# Patient Record
Sex: FEMALE | Race: WHITE | NOT HISPANIC OR LATINO | Employment: FULL TIME | URBAN - METROPOLITAN AREA
[De-identification: names, ages, dates, MRNs, and addresses within clinical notes are randomized per-mention and may not be internally consistent; named-entity substitution may affect disease eponyms.]

---

## 2018-06-25 ENCOUNTER — TRANSFERRED RECORDS (OUTPATIENT)
Dept: HEALTH INFORMATION MANAGEMENT | Facility: CLINIC | Age: 43
End: 2018-06-25

## 2018-06-26 ENCOUNTER — TRANSFERRED RECORDS (OUTPATIENT)
Dept: HEALTH INFORMATION MANAGEMENT | Facility: CLINIC | Age: 43
End: 2018-06-26

## 2018-06-29 ENCOUNTER — TRANSFERRED RECORDS (OUTPATIENT)
Dept: HEALTH INFORMATION MANAGEMENT | Facility: CLINIC | Age: 43
End: 2018-06-29

## 2020-10-22 ENCOUNTER — TRANSFERRED RECORDS (OUTPATIENT)
Dept: HEALTH INFORMATION MANAGEMENT | Facility: CLINIC | Age: 45
End: 2020-10-22
Payer: COMMERCIAL

## 2021-11-21 NOTE — PROGRESS NOTES
HPI:    Ms. Boston comes into establish care  And physical today.  She works at the [employer]. She was living in [place] until recently and has moved to be closer to her family. She had clinical COVID 4/2020. She states hair loss,  Muscle, joint pain/stiffness,  and skin pain/tightning. She does see a physical therapist. He mother has breast cancer. She was seen by several provider in [facility] (notes in Care Everywhere) including Rheumatology. She states has been seen in a post COVID clinic in [place]. Overall she is better than a few months ago She does not smoke nor abuse EtOH. No other HEENT, cardiopulmonary, abdominal, , GYN, neurological, systemic, psychiatric, lymphatic, endocrine, vascular complaints.        PE:    Vitals noted, gen, nad, cooperative, alert, neck supple nl rom, no B carotid bruits, she has hair loss posterior scalp. Lungs with good air movement, RRR, S1, S2, no MRG, abdomen, no acute findings. Grossly normal neurological exam. She does not have wrist swelling or tenderness to palpation.     MRI 2/5/2021 in Care Everywhere:    Impression  Performed by [id number]  The previously demonstrated hyperintense T1 precontrast focus in the posterior   pituitary gland is not visualized on today's study. This may have represented   proteinaceous debris or subacute blood products that have since resolved. Just   below the expected location of this abnormality, the 2 mm rounded focus of   hypoenhancement in the midline posterior 3rd of the pituitary gland is a again   redemonstrated and unchanged (see screenshots series 1000). This appears to   small to differentiate between possibilities including microadenoma, pituitary   cyst, Rathke's cleft cyst. While a Rathke's cleft cyst is favored,   endocrinologic workup would be appropriate.     Otherwise the pituitary gland appears normal. Midline infundibulum without optic   chiasm displacement or cavernous sinus abnormality.     Several small bifrontal subcortical T2  hyperintensities are stable and   nonspecific but can be seen with small vessel change or in some migraineurs.    Narrative  Performed by [id number]  EXAM: MR PITUITARY WITHOUT AND WITH IV CONTRAST     COMPARISON: Outside MRI 10/22/2020    Procedure Note    Tony Dimas M.D. - 02/05/2021   Formatting of this note might be different from the original.   EXAM: MR PITUITARY WITHOUT AND WITH IV CONTRAST     COMPARISON: Outside MRI 10/22/2020     IMPRESSION:   The previously demonstrated hyperintense T1 precontrast focus in the posterior   pituitary gland is not visualized on today's study. This may have represented   proteinaceous debris or subacute blood products that have since resolved. Just   below the expected location of this abnormality, the 2 mm rounded focus of   hypoenhancement in the midline posterior 3rd of the pituitary gland is a again   redemonstrated and unchanged (see screenshots series 1000). This appears to   small to differentiate between possibilities including microadenoma, pituitary   cyst, Rathke's cleft cyst. While a Rathke's cleft cyst is favored,   endocrinologic workup would be appropriate.     Otherwise the pituitary gland appears normal. Midline infundibulum without optic   chiasm displacement or cavernous sinus abnormality.     Several small bifrontal subcortical T2 hyperintensities are stable and   nonspecific but can be seen with small vessel change or in some migraineurs.  Specimen Collected: 02/05/21 12:27 PM Last Resulted: 02/05/21  1:26 PM   Received From: [facility]            A/P:    1. Immunizations; Influenza vaccine 10/21/2021  2. Lipids; 7/9/2020 HDL 71 and   3. Mammogram; ordered today 11/22/2021   4. Dermatology being followed for alopecia and hirsutism; see [facility] Dermatology note 2/5/2021 (In Care Everyhwere)   5. Colonsocopy; not done and ordered today   6. GYN/annual exam placed referral today.   7. [facility] note Endocrinology in [facility] [place]  2/9/2021 for pituitary cyst/adenoma  8. Anxiety on Cymbalta   9. Vitamin D   10. ADHD on Vyvanse   11. Seen [facility] Neurology detailed  note  From Dr. Duke  in Care Everywhere 2/9/2021 for neurological sxs. Related to COVID infection in 4/2020. She had a normal EMG 2/4/2021. She had a negative autonomic screen 2/5/2021 w/o evidence of autonomic failures. Subjective orthostatic intolerance was noted. Normal swallowing study at [facility] 2/9/2021 (in Care Everywhere). She had cervical and thoracic MRI studies 10/6/2020 in [facility] (In Care Everywhere)   12. Thrombocytosis; she states being evaluated by Hematology? in the past. Can't recall JAK2 testing? Platelets 458 8/26/2020. 426 on 7/6/2021  13. Cardiology note in Care Everywhere 8/20/2018 for discussion regarding PFO closure secondary to possible TIA vs. Migraine. No clear indication for PFO closure.

## 2021-11-22 ENCOUNTER — OFFICE VISIT (OUTPATIENT)
Dept: INTERNAL MEDICINE | Facility: CLINIC | Age: 46
End: 2021-11-22
Payer: COMMERCIAL

## 2021-11-22 VITALS
WEIGHT: 152 LBS | BODY MASS INDEX: 23.04 KG/M2 | HEIGHT: 68 IN | DIASTOLIC BLOOD PRESSURE: 70 MMHG | HEART RATE: 97 BPM | SYSTOLIC BLOOD PRESSURE: 107 MMHG | OXYGEN SATURATION: 97 %

## 2021-11-22 DIAGNOSIS — Z12.11 SPECIAL SCREENING FOR MALIGNANT NEOPLASMS, COLON: Primary | ICD-10-CM

## 2021-11-22 DIAGNOSIS — Z12.31 ENCOUNTER FOR SCREENING MAMMOGRAM FOR BREAST CANCER: ICD-10-CM

## 2021-11-22 PROCEDURE — 99396 PREV VISIT EST AGE 40-64: CPT | Performed by: INTERNAL MEDICINE

## 2021-11-22 RX ORDER — LISDEXAMFETAMINE DIMESYLATE 40 MG/1
40 CAPSULE ORAL EVERY MORNING
COMMUNITY
End: 2023-04-26

## 2021-11-22 RX ORDER — DULOXETIN HYDROCHLORIDE 30 MG/1
1 CAPSULE, DELAYED RELEASE ORAL
COMMUNITY
End: 2022-02-09

## 2021-11-22 RX ORDER — IBUPROFEN 400 MG/1
1 TABLET, FILM COATED ORAL DAILY PRN
COMMUNITY
End: 2022-06-21

## 2021-11-22 ASSESSMENT — MIFFLIN-ST. JEOR: SCORE: 1372.97

## 2021-11-22 ASSESSMENT — PAIN SCALES - GENERAL: PAINLEVEL: SEVERE PAIN (6)

## 2021-11-22 NOTE — NURSING NOTE
Chief Complaint   Patient presents with     Physical     Establish Care     recently moved from [place], about a year and a half ago got sick with something cause neurological problems       Opal Daniel, EMT at 5:36 PM on 11/22/2021

## 2021-11-23 NOTE — PATIENT INSTRUCTIONS
To schedule a mammogram, please call radiology scheduling at [phone].    To schedule a covid booster appointment, please call the pharmacy scheduling at [phone].

## 2021-11-26 ENCOUNTER — TELEPHONE (OUTPATIENT)
Dept: GASTROENTEROLOGY | Facility: CLINIC | Age: 46
End: 2021-11-26
Payer: COMMERCIAL

## 2021-11-26 DIAGNOSIS — Z11.59 ENCOUNTER FOR SCREENING FOR OTHER VIRAL DISEASES: ICD-10-CM

## 2021-11-26 NOTE — TELEPHONE ENCOUNTER
Screening Questions  1. Are you active on mychart? N    2. What insurance is in the chart? Preferred One/A[employer]na    2.  Ordering/Referring Provider: Philipp    3. BMI 23.1 If greater than 40 review exclusion criteria    4.  Respiratory Screening (If yes to any of the following Hospital setting only):     Do you use daily home oxygen? N  Do you have mod to severe Obstructive Sleep Apnea? N   Do you have Pulmonary Hypertension? N   Do you have UNCONTROLLED asthma? N    5. Have you had a heart or lung transplant (If yes, please review exclusion criteria) ? N    6. Are you currently on dialysis or have chronic kidney disease? N    7. Have you had a stroke or Transient ischemic attack (TIA) within 6 months? N    8. In the past 6 months, have you had any heart related issues including cardiomyopathy or heart attack? N                 If yes, did it require cardiac stenting or other implantable device?      9. Do you have any implantable devices in your body (pacemaker, defib, LVAD)? N    10. Do you take nitroglycerin? If yes, how often? N    11. Are you currently taking any blood thinners?N    12. Are you a diabetic? N    13. (Females) Are you currently pregnant?   If yes, how many weeks?      15. Are you taking any prescription pain medications on a routine schedule? Y (not on a schedule, as needed. 100 mg of Gavapentin as needed. Takes a couple times per week. If yes, MAC sedation.    16. Do you have any chemical dependencies such as alcohol, street drugs, or methadone? NIf yes, MAC sedation.    17. Do you have any history of post-traumatic stress syndrome, severe anxiety or history of psychosis? N    18. Do you transfer independently? Y    19.  Do you have any issues with constipation? N    20. Preferred Pharmacy for Pre Prescription [facility] on [address].    Scheduling Details    Which Colonoscopy Prep was Sent?: Miralax  Procedure Scheduled: Colonoscopy  Surgeon:   Date of Procedure: 12/7  Location:  [facility]  Caller (Please ask for phone number if not scheduled by patient): Inez      Sedation Type: MAC  Conscious Sedation- Needs  for 6 hours after the procedure  MAC/General-Needs  for 24 hours after procedure    Pre-op Required at [facility], [place], [place] and OR for MAC sedation:   (if yes advise patient they will need a pre-op prior to procedure)      Is patient on blood thinners? -N (If yes- inform patient to follow up with PCP or provider for follow up instructions)     Informed patient they will need an adult  Y  Cannot take any type of public or medical transportation alone    Pre-Procedure Covid test to be completed at [facility] or Externally: [facility] on 12/3 at 1:30 pm    Confirmed Nurse will call to complete assessment Y    Additional comments:    done

## 2021-12-03 ENCOUNTER — LAB (OUTPATIENT)
Dept: LAB | Facility: CLINIC | Age: 46
End: 2021-12-03
Payer: COMMERCIAL

## 2021-12-03 DIAGNOSIS — Z11.59 ENCOUNTER FOR SCREENING FOR OTHER VIRAL DISEASES: ICD-10-CM

## 2021-12-03 PROCEDURE — U0005 INFEC AGEN DETEC AMPLI PROBE: HCPCS

## 2021-12-03 PROCEDURE — U0003 INFECTIOUS AGENT DETECTION BY NUCLEIC ACID (DNA OR RNA); SEVERE ACUTE RESPIRATORY SYNDROME CORONAVIRUS 2 (SARS-COV-2) (CORONAVIRUS DISEASE [COVID-19]), AMPLIFIED PROBE TECHNIQUE, MAKING USE OF HIGH THROUGHPUT TECHNOLOGIES AS DESCRIBED BY CMS-2020-01-R: HCPCS

## 2021-12-04 LAB — SARS-COV-2 RNA RESP QL NAA+PROBE: NEGATIVE

## 2021-12-07 ENCOUNTER — TRANSFERRED RECORDS (OUTPATIENT)
Dept: HEALTH INFORMATION MANAGEMENT | Facility: CLINIC | Age: 46
End: 2021-12-07
Payer: COMMERCIAL

## 2021-12-07 ENCOUNTER — DOCUMENTATION ONLY (OUTPATIENT)
Dept: GASTROENTEROLOGY | Facility: OUTPATIENT CENTER | Age: 46
End: 2021-12-07
Payer: COMMERCIAL

## 2021-12-07 DIAGNOSIS — Z12.31 ENCOUNTER FOR SCREENING MAMMOGRAM FOR BREAST CANCER: ICD-10-CM

## 2021-12-07 DIAGNOSIS — Z12.11 SPECIAL SCREENING FOR MALIGNANT NEOPLASMS, COLON: ICD-10-CM

## 2021-12-21 NOTE — PROGRESS NOTES
HPI:    Last visit with me 11/22/2021 to establish care. She continues with PT for mainly B neck pain and stiffness. She finds this beneficial. She still has B UE numbness but not worse. She does not feel she has any worrisome skin lesions that need to be evaluated in Dermatology. Otherwise, no additional HEENT, cardiopulmonary, abdominal, , neurological, systemic, psychiatric, lymphatic, endocrine complaints.       PE:    Vitals noted, gen, nad, cooperative, alert, neck supple with some B lower neck mild tenderness to palpation. No B carotid bruits, lungs with good air movement, RRR, S1, S2, no MRG, abdomen, no acute findings. Grossly normal neurological exam.     A/P:    1. Immunizations; Pfizer COVID vaccination x 3. Influenza vaccine 10/1/2021  2. Mammogram scheduled for 1/11/2022  3. Colonoscopy 12/7/2021 and repeat 10 years   4. Placed GYN referral 11/22/2021 and she states this is scheduled.   5. Thrombocytosis; will review records and most likely order additional testing and hematology evaluation  6. Vitamin D today normal at 32.   7. She remains on Cymbalta and Vyvanse  8. Placed PMR referral to Dr. Fischer to evaluate for possible trigger point/botox treatment?         30 minutes spent on the date of the encounter doing chart review, history and exam, documentation and further activities as noted above

## 2021-12-22 ENCOUNTER — OFFICE VISIT (OUTPATIENT)
Dept: INTERNAL MEDICINE | Facility: CLINIC | Age: 46
End: 2021-12-22
Payer: COMMERCIAL

## 2021-12-22 ENCOUNTER — TELEPHONE (OUTPATIENT)
Dept: PHYSICAL MEDICINE AND REHAB | Facility: CLINIC | Age: 46
End: 2021-12-22

## 2021-12-22 ENCOUNTER — LAB (OUTPATIENT)
Dept: LAB | Facility: CLINIC | Age: 46
End: 2021-12-22
Payer: COMMERCIAL

## 2021-12-22 VITALS
WEIGHT: 154.32 LBS | SYSTOLIC BLOOD PRESSURE: 107 MMHG | HEIGHT: 68 IN | OXYGEN SATURATION: 98 % | DIASTOLIC BLOOD PRESSURE: 69 MMHG | HEART RATE: 81 BPM | BODY MASS INDEX: 23.39 KG/M2

## 2021-12-22 DIAGNOSIS — M54.2 NECK PAIN: Primary | ICD-10-CM

## 2021-12-22 DIAGNOSIS — R79.89 HIGH PLATELET COUNT: ICD-10-CM

## 2021-12-22 DIAGNOSIS — M54.2 NECK PAIN: ICD-10-CM

## 2021-12-22 LAB
ALBUMIN SERPL-MCNC: 3.8 G/DL (ref 3.4–5)
ALP SERPL-CCNC: 51 U/L (ref 40–150)
ALT SERPL W P-5'-P-CCNC: 27 U/L (ref 0–50)
ANION GAP SERPL CALCULATED.3IONS-SCNC: 7 MMOL/L (ref 3–14)
AST SERPL W P-5'-P-CCNC: 12 U/L (ref 0–45)
BASOPHILS # BLD AUTO: 0.1 10E3/UL (ref 0–0.2)
BASOPHILS NFR BLD AUTO: 1 %
BILIRUB SERPL-MCNC: 0.4 MG/DL (ref 0.2–1.3)
BUN SERPL-MCNC: 10 MG/DL (ref 7–30)
CALCIUM SERPL-MCNC: 9 MG/DL (ref 8.5–10.1)
CHLORIDE BLD-SCNC: 105 MMOL/L (ref 94–109)
CO2 SERPL-SCNC: 30 MMOL/L (ref 20–32)
CREAT SERPL-MCNC: 0.57 MG/DL (ref 0.52–1.04)
DEPRECATED CALCIDIOL+CALCIFEROL SERPL-MC: 32 UG/L (ref 20–75)
EOSINOPHIL # BLD AUTO: 0.2 10E3/UL (ref 0–0.7)
EOSINOPHIL NFR BLD AUTO: 3 %
ERYTHROCYTE [DISTWIDTH] IN BLOOD BY AUTOMATED COUNT: 12.1 % (ref 10–15)
GFR SERPL CREATININE-BSD FRML MDRD: >90 ML/MIN/1.73M2
GLUCOSE BLD-MCNC: 91 MG/DL (ref 70–99)
HCT VFR BLD AUTO: 40 % (ref 35–47)
HGB BLD-MCNC: 12.8 G/DL (ref 11.7–15.7)
IMM GRANULOCYTES # BLD: 0 10E3/UL
IMM GRANULOCYTES NFR BLD: 1 %
LYMPHOCYTES # BLD AUTO: 2.7 10E3/UL (ref 0.8–5.3)
LYMPHOCYTES NFR BLD AUTO: 31 %
MCH RBC QN AUTO: 30.5 PG (ref 26.5–33)
MCHC RBC AUTO-ENTMCNC: 32 G/DL (ref 31.5–36.5)
MCV RBC AUTO: 95 FL (ref 78–100)
MONOCYTES # BLD AUTO: 0.6 10E3/UL (ref 0–1.3)
MONOCYTES NFR BLD AUTO: 7 %
NEUTROPHILS # BLD AUTO: 5.1 10E3/UL (ref 1.6–8.3)
NEUTROPHILS NFR BLD AUTO: 57 %
NRBC # BLD AUTO: 0 10E3/UL
NRBC BLD AUTO-RTO: 0 /100
PLATELET # BLD AUTO: 500 10E3/UL (ref 150–450)
POTASSIUM BLD-SCNC: 4.4 MMOL/L (ref 3.4–5.3)
PROT SERPL-MCNC: 7.5 G/DL (ref 6.8–8.8)
RBC # BLD AUTO: 4.2 10E6/UL (ref 3.8–5.2)
SODIUM SERPL-SCNC: 142 MMOL/L (ref 133–144)
TSH SERPL DL<=0.005 MIU/L-ACNC: 1.39 MU/L (ref 0.4–4)
WBC # BLD AUTO: 8.8 10E3/UL (ref 4–11)

## 2021-12-22 PROCEDURE — 82306 VITAMIN D 25 HYDROXY: CPT | Performed by: INTERNAL MEDICINE

## 2021-12-22 PROCEDURE — 99214 OFFICE O/P EST MOD 30 MIN: CPT | Performed by: INTERNAL MEDICINE

## 2021-12-22 PROCEDURE — 36415 COLL VENOUS BLD VENIPUNCTURE: CPT | Performed by: PATHOLOGY

## 2021-12-22 PROCEDURE — 80050 GENERAL HEALTH PANEL: CPT | Performed by: PATHOLOGY

## 2021-12-22 ASSESSMENT — MIFFLIN-ST. JEOR: SCORE: 1383.49

## 2021-12-22 ASSESSMENT — PAIN SCALES - GENERAL: PAINLEVEL: MODERATE PAIN (5)

## 2021-12-22 NOTE — NURSING NOTE
Inez Boston is a 46 year old female patient that presents today in clinic for the following:    Chief Complaint   Patient presents with     RECHECK     post covid symptoms     The patient's allergies and medications were reviewed as noted. A set of vitals were recorded as noted without incident. The patient does not have any other questions for the provider.    Satish Bagley, EMT at 9:54 AM on 12/22/2021

## 2021-12-22 NOTE — TELEPHONE ENCOUNTER
Health Call Center    Phone Message    May a detailed message be left on voicemail: yes     Reason for Call: Appointment Intake    Referring Provider Name: Tony Segal MD   Diagnosis and/or Symptoms:Neck pain   High platelet count      Patient is being referred to Dr. Fischer. Per our protocols, she does not see patients with neck pain. Please review and contact the patient to schedule.    Action Taken: Message routed to:  [facility] PMR    Travel Screening: Not Applicable

## 2021-12-23 NOTE — TELEPHONE ENCOUNTER
Left the patient a VM with appt date and time and our clinic number if this date and time doesn't work for the patient.

## 2021-12-24 ENCOUNTER — PATIENT OUTREACH (OUTPATIENT)
Dept: ONCOLOGY | Facility: CLINIC | Age: 46
End: 2021-12-24
Payer: COMMERCIAL

## 2021-12-24 ENCOUNTER — TELEPHONE (OUTPATIENT)
Dept: INTERNAL MEDICINE | Facility: CLINIC | Age: 46
End: 2021-12-24

## 2021-12-24 DIAGNOSIS — R79.89 ELEVATED PLATELET COUNT: Primary | ICD-10-CM

## 2021-12-24 NOTE — PROGRESS NOTES
Writer received referral for thrombocytosis. Reviewed for urgency based on labs and symptomology. Appropriate scheduling instructions added and referral sent to New Patient Scheduling for completion.

## 2021-12-24 NOTE — TELEPHONE ENCOUNTER
Placed future lab orders and hematology referral this encounter    Dear Ms. Boston;    I reviewed your laboratory tests and still elevated platelets. I went back through your old records as well. There is some testing and evaluation I recommend:      (1) ordered future laboratory testing    (2) hematology referral    You can call [phone] to schedule these appointments.    TOVA Segal MD

## 2021-12-26 ENCOUNTER — HEALTH MAINTENANCE LETTER (OUTPATIENT)
Age: 46
End: 2021-12-26

## 2022-01-04 NOTE — PROGRESS NOTES
RECORDS STATUS - ALL OTHER DIAGNOSIS      RECORDS RECEIVED FROM: [facility]    DATE RECEIVED: 2/2/2022   NOTES STATUS DETAILS   OFFICE NOTE from referring provider Complete Epic  Tony Segal MD   OFFICE NOTE from medical oncologist N/A    DISCHARGE SUMMARY from hospital     DISCHARGE REPORT from the ER     OPERATIVE REPORT Complete Colonoscopy- [facility] Report in Epic 12/7/2021   MEDICATION LIST Complete [facility]   CLINICAL TRIAL TREATMENTS TO DATE     LABS     PATHOLOGY REPORTS Pending  Cytology Report in [facility] (internal)    ANYTHING RELATED TO DIAGNOSIS Complete Labs last updated on 1/11/2022   GENONOMIC TESTING     TYPE:     IMAGING (NEED IMAGES & REPORT)     CT SCANS     MRI     MAMMO     ULTRASOUND     PET       Action    Action Taken 1/4/2022 10:36AM MARKIE   I called pt Inez - her phone went to .     I called [facility] and requested imaging from 2021   [phone] option 2    1/4/2022 3:27PM MARKIE     I resolved imaging in PACS    I called our internal imaging dept [phone]

## 2022-01-11 ENCOUNTER — LAB (OUTPATIENT)
Dept: LAB | Facility: CLINIC | Age: 47
End: 2022-01-11
Payer: COMMERCIAL

## 2022-01-11 ENCOUNTER — ANCILLARY PROCEDURE (OUTPATIENT)
Dept: MAMMOGRAPHY | Facility: CLINIC | Age: 47
End: 2022-01-11
Attending: INTERNAL MEDICINE
Payer: COMMERCIAL

## 2022-01-11 DIAGNOSIS — Z00.00 ANNUAL VISIT FOR GENERAL ADULT MEDICAL EXAMINATION WITHOUT ABNORMAL FINDINGS: ICD-10-CM

## 2022-01-11 DIAGNOSIS — R79.89 ELEVATED PLATELET COUNT: Primary | ICD-10-CM

## 2022-01-11 DIAGNOSIS — Z12.31 ENCOUNTER FOR SCREENING MAMMOGRAM FOR BREAST CANCER: ICD-10-CM

## 2022-01-11 DIAGNOSIS — Z12.11 SPECIAL SCREENING FOR MALIGNANT NEOPLASMS, COLON: ICD-10-CM

## 2022-01-11 LAB
BASOPHILS # BLD AUTO: 0.1 10E3/UL (ref 0–0.2)
BASOPHILS NFR BLD AUTO: 1 %
CHOLEST SERPL-MCNC: 269 MG/DL
EOSINOPHIL # BLD AUTO: 0.2 10E3/UL (ref 0–0.7)
EOSINOPHIL NFR BLD AUTO: 1 %
ERYTHROCYTE [DISTWIDTH] IN BLOOD BY AUTOMATED COUNT: 12 % (ref 10–15)
FASTING STATUS PATIENT QL REPORTED: YES
HCT VFR BLD AUTO: 38.9 % (ref 35–47)
HDLC SERPL-MCNC: 80 MG/DL
HGB BLD-MCNC: 12.7 G/DL (ref 11.7–15.7)
IMM GRANULOCYTES # BLD: 0.1 10E3/UL
IMM GRANULOCYTES NFR BLD: 1 %
LDLC SERPL CALC-MCNC: 175 MG/DL
LYMPHOCYTES # BLD AUTO: 2.1 10E3/UL (ref 0.8–5.3)
LYMPHOCYTES NFR BLD AUTO: 21 %
MCH RBC QN AUTO: 31.2 PG (ref 26.5–33)
MCHC RBC AUTO-ENTMCNC: 32.6 G/DL (ref 31.5–36.5)
MCV RBC AUTO: 96 FL (ref 78–100)
MONOCYTES # BLD AUTO: 0.6 10E3/UL (ref 0–1.3)
MONOCYTES NFR BLD AUTO: 6 %
NEUTROPHILS # BLD AUTO: 7.3 10E3/UL (ref 1.6–8.3)
NEUTROPHILS NFR BLD AUTO: 70 %
NONHDLC SERPL-MCNC: 189 MG/DL
NRBC # BLD AUTO: 0 10E3/UL
NRBC BLD AUTO-RTO: 0 /100
PLATELET # BLD AUTO: 472 10E3/UL (ref 150–450)
RBC # BLD AUTO: 4.07 10E6/UL (ref 3.8–5.2)
RETICS # AUTO: 0.05 10E6/UL (ref 0.03–0.1)
RETICS/RBC NFR AUTO: 1.2 % (ref 0.5–2)
TRIGL SERPL-MCNC: 71 MG/DL
WBC # BLD AUTO: 10.4 10E3/UL (ref 4–11)

## 2022-01-11 PROCEDURE — 36415 COLL VENOUS BLD VENIPUNCTURE: CPT | Performed by: PATHOLOGY

## 2022-01-11 PROCEDURE — 85060 BLOOD SMEAR INTERPRETATION: CPT | Performed by: PATHOLOGY

## 2022-01-11 PROCEDURE — 80061 LIPID PANEL: CPT | Performed by: PATHOLOGY

## 2022-01-11 PROCEDURE — 86803 HEPATITIS C AB TEST: CPT | Performed by: OBSTETRICS & GYNECOLOGY

## 2022-01-11 PROCEDURE — 85025 COMPLETE CBC W/AUTO DIFF WBC: CPT | Performed by: PATHOLOGY

## 2022-01-11 PROCEDURE — 87389 HIV-1 AG W/HIV-1&-2 AB AG IA: CPT | Performed by: OBSTETRICS & GYNECOLOGY

## 2022-01-11 PROCEDURE — 77063 BREAST TOMOSYNTHESIS BI: CPT | Performed by: RADIOLOGY

## 2022-01-11 PROCEDURE — 85045 AUTOMATED RETICULOCYTE COUNT: CPT | Performed by: PATHOLOGY

## 2022-01-11 PROCEDURE — 77067 SCR MAMMO BI INCL CAD: CPT | Performed by: RADIOLOGY

## 2022-01-12 ENCOUNTER — TELEPHONE (OUTPATIENT)
Dept: PHYSICAL MEDICINE AND REHAB | Facility: CLINIC | Age: 47
End: 2022-01-12

## 2022-01-12 LAB
HCV AB SERPL QL IA: NONREACTIVE
HIV 1+2 AB+HIV1 P24 AG SERPL QL IA: NONREACTIVE
PATH REPORT.COMMENTS IMP SPEC: NORMAL
PATH REPORT.FINAL DX SPEC: NORMAL
PATH REPORT.MICROSCOPIC SPEC OTHER STN: NORMAL
PATH REPORT.MICROSCOPIC SPEC OTHER STN: NORMAL
PATH REPORT.RELEVANT HX SPEC: NORMAL

## 2022-01-12 NOTE — TELEPHONE ENCOUNTER
[facility]    Phone Message    May a detailed message be left on voicemail: yes     Reason for Call: Other: Patient is requesting to reschedule 1/17/22 BOTOX appointment, please call patient at [phone] to assist.     Action Taken: Message routed to:  [facility] PM&R    Travel Screening: Not Applicable

## 2022-01-13 NOTE — TELEPHONE ENCOUNTER
Called patient and left voicemail to reschedule 1/17 Botox appt with Dr. Fischer. Left call center number: [phone].    Jeremy Triana

## 2022-01-25 ENCOUNTER — TELEPHONE (OUTPATIENT)
Dept: INTERNAL MEDICINE | Facility: CLINIC | Age: 47
End: 2022-01-25
Payer: COMMERCIAL

## 2022-01-25 DIAGNOSIS — M62.89 TIGHTNESS OF FASCIA: Primary | ICD-10-CM

## 2022-01-25 NOTE — TELEPHONE ENCOUNTER
Called patient and left VM.  Will need to know:    1. Reason for physical therapy?   2. Will patient get physical therapy out-of-network? If yes, please provide clinic name, address, phone and fax number.      Lorne Syed CMA ([facility]) at 11:35 AM on 1/25/2022

## 2022-01-25 NOTE — TELEPHONE ENCOUNTER
[facility] Call Center    Phone Message    May a detailed message be left on voicemail: yes     Reason for Call: Other: Pt requesting call back to discuss a referral for physical therapy     Action Taken: Message routed to:  [facility] anne-marie

## 2022-01-26 NOTE — TELEPHONE ENCOUNTER
[facility] Call Center    Phone Message    May a detailed message be left on voicemail: yes     Reason for Call:   Other:     1.She has a post Covid Tightness of the fascia (Neck, Bilateral Shoulder and Back)     2.Yes, The name  [address] [address], [place] [address] Phone: [phone] Fax:[phone]     Action Taken: Message routed to:  [facility]    Travel Screening: Not Applicable

## 2022-02-02 ENCOUNTER — VIRTUAL VISIT (OUTPATIENT)
Dept: ONCOLOGY | Facility: CLINIC | Age: 47
End: 2022-02-02
Attending: INTERNAL MEDICINE
Payer: COMMERCIAL

## 2022-02-02 ENCOUNTER — PRE VISIT (OUTPATIENT)
Dept: ONCOLOGY | Facility: CLINIC | Age: 47
End: 2022-02-02

## 2022-02-02 DIAGNOSIS — R79.89 ELEVATED PLATELET COUNT: ICD-10-CM

## 2022-02-02 PROCEDURE — 99205 OFFICE O/P NEW HI 60 MIN: CPT | Mod: 95 | Performed by: INTERNAL MEDICINE

## 2022-02-02 NOTE — PROGRESS NOTES
Inez is a 46 year old who is being evaluated via a billable video visit.      How would you like to obtain your AVS? MyChart  If the video visit is dropped, the invitation should be resent by: Send to e-mail at: [email or web address] @ [phone]  Will anyone else be joining your video visit? Sugey MEZA        Video Start Time: 6:43 AM  Video-Visit Details    Type of service:  Video Visit    Video End Time:6:43 AM    Originating Location (pt. Location): Home    Distant Location (provider location):  [facility]     Platform used for Video Visit: TipTap

## 2022-02-02 NOTE — LETTER
2/2/2022         RE: Inez Boston  [address] [place] [address]        Dear Colleague,    Thank you for referring your patient, Inez Boston, to the [facility]. Please see a copy of my visit note below.    Inez is a 46 year old who is being evaluated via a billable video visit.      How would you like to obtain your AVS? MyChart  If the video visit is dropped, the invitation should be resent by: Send to e-mail at: [email or web address] @ [phone]  Will anyone else be joining your video visit? Sugey MEZA        Video Start Time: 6:43 AM  Video-Visit Details    Type of service:  Video Visit    Video End Time:6:43 AM    Originating Location (pt. Location): Home    Distant Location (provider location):  [facility]     Platform used for Video Visit: [employer]    [facility]    Hematology/Oncology New Patient Note virtual visit      Time started 1 PM   time ended 1:40 PM    Today's Date: 02/02/22    Reason for Consult: Thrombocytosis      HISTORY OF PRESENT ILLNESS: Inez Boston is a 46 year old female who presents for further evaluation of thrombocytosis.  She has had chronic thrombocytosis since at least 2017 at that time her platelets were in the 500s and then dropped down to 400s and back in the 500s again.  She recently moved to [place] from [place] and saw a hematologist last June in [place].  Blood work was obtained at [place] which showed iron saturation of 24% JAK2 V6 1 7 mutation was negative.  Patient had thrombocytosis prior to her Covid infection in October 2020.  Inez has started on aspirin 81 mg a day.  She clinically feels well except for mild facial headache after the Covid infection and is following up with neurology.  She never had a history of stroke or clots    Of note she also has family history of breast cancer her mother had breast cancer the age of 47.  She has  never met with a genetic counselor.        REVIEW OF SYSTEMS:   14 point ROS was reviewed and is negative other than as noted above in HPI.       HOME MEDICATIONS:  Current Outpatient Medications   Medication Sig Dispense Refill     cholecalciferol (VITAMIN D3) 25 mcg (1000 units) capsule 2,000 Units       DULoxetine (CYMBALTA) 30 MG capsule Take 1 capsule by mouth       ibuprofen (ADVIL/MOTRIN) 400 MG tablet Take 1 tablet by mouth daily as needed (Patient not taking: Reported on 11/22/2021)       lisdexamfetamine (VYVANSE) 40 MG capsule Take 40 mg by mouth every morning           ALLERGIES:  No Known Allergies      PAST MEDICAL HISTORY:  Status post Covid infection  Thrombocytosis    PAST SURGICAL HISTORY:  No past surgical history on file.      SOCIAL HISTORY:  Social History     Socioeconomic History     Marital status:      Spouse name: Not on file     Number of children: Not on file     Years of education: Not on file     Highest education level: Not on file   Occupational History     Not on file   Tobacco Use     Smoking status: Never Smoker     Smokeless tobacco: Never Used   Vaping Use     Vaping Use: Some days     Substances: Nicotine     Devices: Pre-filled pod   Substance and Sexual Activity     Alcohol use: Yes     Alcohol/week: 2.0 standard drinks     Types: 2 Cans of beer per week     Comment: a coupld times a week     Drug use: Never     Sexual activity: Not on file   Other Topics Concern     Not on file   Social History Narrative     Not on file     Social Determinants of Health     Financial Resource Strain: Not on file   Food Insecurity: Not on file   Transportation Needs: Not on file   Physical Activity: Not on file   Stress: Not on file   Social Connections: Not on file   Intimate Partner Violence: Not on file   Housing Stability: Not on file     Non-smoker.  Social alcohol use.  Currently works for the [employer] remotely    FAMILY HISTORY:  Mother had breast cancer at age of 47    PHYSICAL  EXAM:  Vital signs:  There were no vitals taken for this visit.   ECO  GENERAL/CONSTITUTIONAL: No acute distress.  EYES: Pupils are equal, round, and react to light and accommodation. Extraocular movements intact.  No scleral icterus.  Unable to fully examine due to the nature of this visit be a video visit  INTEGUMENTARY: No rashes or jaundice.         LABS:  CBC RESULTS:   Recent Labs   Lab Test 22  1636   WBC 10.4   RBC 4.07   HGB 12.7   HCT 38.9   MCV 96   MCH 31.2   MCHC 32.6   RDW 12.0   *       Recent Labs   Lab Test 21  1100      POTASSIUM 4.4   CHLORIDE 105   CO2 30   ANIONGAP 7   GLC 91   BUN 10   CR 0.57   TEJA 9.0         PATHOLOGY:  Peripheral blood morphology did not reveal any evidence of dysplasia    IMAGING:  None    ASSESSMENT/PLAN:  Inez Boston is a 46 year old female with chronic leukocytosis with platelets ranging between 400-500    Discussed with the patient that this is likely reactive state however I cannot absolutely rule out essential thrombocytosis even with that JAK2 mutation being negative as that will  ET in 80-90% of the cases and may miss 10% of the cases.  My recommendation would be to repeat labs in 4 months and if the labs are within further back in the 500s again I would recommend a bone marrow biopsy to rule out essential thrombocytosis or any sort of myeloproliferative disorder if that is negative I will discharge her from the clinic.  Due to the pandemic and the chronicity of this disorder I am less inclined to have schedule her for procedure that would not  at this point.  I would have recommended aspirin 81 mg and she is already on it.    Her family history is also significant for breast cancer my recommendation would be that she see a genetic counselor.    1.  Thrombocytosis repeat labs in 4 months with MD visit, see plan above.  Likely reactive state however cannot completely rule out essential thrombocytosis.  Iron  deficiency ruled out      2.  Family history significant.  We will set up a genetic counselor.  She is up-to-date on her mammogram    All questions answered to her satisfaction.  Total time spent 60 minutes 40 minutes with the patient discussing the findings above.  10 minutes reviewing previous data and 10 minutes electronically documented clinical information from today's visit      Nam Caceres MD  Hematology/Oncology  [facility]      Again, thank you for allowing me to participate in the care of your patient.        Sincerely,        Nam Caceres MD

## 2022-02-02 NOTE — LETTER
2/2/2022         RE: Inez Boston  [address] [place] [address]        Dear Colleague,    Thank you for referring your patient, Inez Boston, to the [facility]. Please see a copy of my visit note below.    Inez is a 46 year old who is being evaluated via a billable video visit.      How would you like to obtain your AVS? MyChart  If the video visit is dropped, the invitation should be resent by: Send to e-mail at: [email or web address] OR @ [phone]  Will anyone else be joining your video visit? Sugey MEZA        Video Start Time: 6:43 AM  Video-Visit Details    Type of service:  Video Visit    Video End Time:6:43 AM    Originating Location (pt. Location): Home    Distant Location (provider location):  [facility]     Platform used for Video Visit: CoupOption    [facility]    Hematology/Oncology New Patient Note virtual visit      Time started 1 PM   time ended 1:40 PM    Today's Date: 02/02/22    Reason for Consult: Thrombocytosis      HISTORY OF PRESENT ILLNESS: Inez Boston is a 46 year old female who presents for further evaluation of thrombocytosis.  She has had chronic thrombocytosis since at least 2017 at that time her platelets were in the 500s and then dropped down to 400s and back in the 500s again.  She recently moved to [place] from [place] and saw a hematologist last June in [place].  Blood work was obtained at [facility] which showed iron saturation of 24% JAK2 V6 1 7 mutation was negative.  Patient had thrombocytosis prior to her Covid infection in October 2020.  Inez has started on aspirin 81 mg a day.  She clinically feels well except for mild facial headache after the Covid infection and is following up with neurology.  She never had a history of stroke or clots    Of note she also has family history of breast cancer her mother had breast cancer the age of 47.  She has  never met with a genetic counselor.        REVIEW OF SYSTEMS:   14 point ROS was reviewed and is negative other than as noted above in HPI.       HOME MEDICATIONS:  Current Outpatient Medications   Medication Sig Dispense Refill     cholecalciferol (VITAMIN D3) 25 mcg (1000 units) capsule 2,000 Units       DULoxetine (CYMBALTA) 30 MG capsule Take 1 capsule by mouth       ibuprofen (ADVIL/MOTRIN) 400 MG tablet Take 1 tablet by mouth daily as needed (Patient not taking: Reported on 11/22/2021)       lisdexamfetamine (VYVANSE) 40 MG capsule Take 40 mg by mouth every morning           ALLERGIES:  No Known Allergies      PAST MEDICAL HISTORY:  Status post Covid infection  Thrombocytosis    PAST SURGICAL HISTORY:  No past surgical history on file.      SOCIAL HISTORY:  Social History     Socioeconomic History     Marital status:      Spouse name: Not on file     Number of children: Not on file     Years of education: Not on file     Highest education level: Not on file   Occupational History     Not on file   Tobacco Use     Smoking status: Never Smoker     Smokeless tobacco: Never Used   Vaping Use     Vaping Use: Some days     Substances: Nicotine     Devices: Pre-filled pod   Substance and Sexual Activity     Alcohol use: Yes     Alcohol/week: 2.0 standard drinks     Types: 2 Cans of beer per week     Comment: a coupld times a week     Drug use: Never     Sexual activity: Not on file   Other Topics Concern     Not on file   Social History Narrative     Not on file     Social Determinants of Health     Financial Resource Strain: Not on file   Food Insecurity: Not on file   Transportation Needs: Not on file   Physical Activity: Not on file   Stress: Not on file   Social Connections: Not on file   Intimate Partner Violence: Not on file   Housing Stability: Not on file     Non-smoker.  Social alcohol use.  Currently works for the [email or web address] remotely    FAMILY HISTORY:  Mother had breast cancer at age of 47    PHYSICAL  EXAM:  Vital signs:  There were no vitals taken for this visit.   ECO  GENERAL/CONSTITUTIONAL: No acute distress.  EYES: Pupils are equal, round, and react to light and accommodation. Extraocular movements intact.  No scleral icterus.  Unable to fully examine due to the nature of this visit be a video visit  INTEGUMENTARY: No rashes or jaundice.         LABS:  CBC RESULTS:   Recent Labs   Lab Test 22  1636   WBC 10.4   RBC 4.07   HGB 12.7   HCT 38.9   MCV 96   MCH 31.2   MCHC 32.6   RDW 12.0   *       Recent Labs   Lab Test 21  1100      POTASSIUM 4.4   CHLORIDE 105   CO2 30   ANIONGAP 7   GLC 91   BUN 10   CR 0.57   TEJA 9.0         PATHOLOGY:  Peripheral blood morphology did not reveal any evidence of dysplasia    IMAGING:  None    ASSESSMENT/PLAN:  Inez Boston is a 46 year old female with chronic leukocytosis with platelets ranging between 400-500    Discussed with the patient that this is likely reactive state however I cannot absolutely rule out essential thrombocytosis even with that JAK2 mutation being negative as that will  ET in 80-90% of the cases and may miss 10% of the cases.  My recommendation would be to repeat labs in 4 months and if the labs are within further back in the 500s again I would recommend a bone marrow biopsy to rule out essential thrombocytosis or any sort of myeloproliferative disorder if that is negative I will discharge her from the clinic.  Due to the pandemic and the chronicity of this disorder I am less inclined to have schedule her for procedure that would not  at this point.  I would have recommended aspirin 81 mg and she is already on it.    Her family history is also significant for breast cancer my recommendation would be that she see a genetic counselor.    1.  Thrombocytosis repeat labs in 4 months with MD visit, see plan above.  Likely reactive state however cannot completely rule out essential thrombocytosis.  Iron  deficiency ruled out      2.  Family history significant.  We will set up a genetic counselor.  She is up-to-date on her mammogram    All questions answered to her satisfaction.  Total time spent 60 minutes 40 minutes with the patient discussing the findings above.  10 minutes reviewing previous data and 10 minutes electronically documented clinical information from today's visit      Nam Caceres MD  Hematology/Oncology  [facility]      Again, thank you for allowing me to participate in the care of your patient.        Sincerely,        Nam Caceres MD

## 2022-02-03 ENCOUNTER — OFFICE VISIT (OUTPATIENT)
Dept: OBGYN | Facility: CLINIC | Age: 47
End: 2022-02-03
Payer: COMMERCIAL

## 2022-02-03 ENCOUNTER — PATIENT OUTREACH (OUTPATIENT)
Dept: ONCOLOGY | Facility: CLINIC | Age: 47
End: 2022-02-03
Payer: COMMERCIAL

## 2022-02-03 VITALS
SYSTOLIC BLOOD PRESSURE: 129 MMHG | WEIGHT: 156.9 LBS | BODY MASS INDEX: 23.78 KG/M2 | OXYGEN SATURATION: 96 % | HEART RATE: 114 BPM | HEIGHT: 68 IN | DIASTOLIC BLOOD PRESSURE: 74 MMHG

## 2022-02-03 DIAGNOSIS — Z01.419 WOMEN'S ANNUAL ROUTINE GYNECOLOGICAL EXAMINATION: ICD-10-CM

## 2022-02-03 DIAGNOSIS — R30.0 DYSURIA: ICD-10-CM

## 2022-02-03 DIAGNOSIS — R10.2 PELVIC PAIN IN FEMALE: Primary | ICD-10-CM

## 2022-02-03 LAB
ALBUMIN UR-MCNC: NEGATIVE MG/DL
APPEARANCE UR: CLEAR
BILIRUB UR QL STRIP: NEGATIVE
CLUE CELLS: ABNORMAL
COLOR UR AUTO: YELLOW
GLUCOSE UR STRIP-MCNC: NEGATIVE MG/DL
HGB UR QL STRIP: NEGATIVE
KETONES UR STRIP-MCNC: ABNORMAL MG/DL
LEUKOCYTE ESTERASE UR QL STRIP: NEGATIVE
NITRATE UR QL: NEGATIVE
PH UR STRIP: 6 [PH] (ref 5–7)
SP GR UR STRIP: >=1.03 (ref 1–1.03)
TRICHOMONAS, WET PREP: ABNORMAL
UROBILINOGEN UR STRIP-ACNC: 0.2 E.U./DL
WBC'S/HIGH POWER FIELD, WET PREP: ABNORMAL
YEAST, WET PREP: ABNORMAL

## 2022-02-03 PROCEDURE — 99213 OFFICE O/P EST LOW 20 MIN: CPT | Mod: 25 | Performed by: OBSTETRICS & GYNECOLOGY

## 2022-02-03 PROCEDURE — 87624 HPV HI-RISK TYP POOLED RSLT: CPT | Performed by: OBSTETRICS & GYNECOLOGY

## 2022-02-03 PROCEDURE — 99386 PREV VISIT NEW AGE 40-64: CPT | Mod: 25 | Performed by: OBSTETRICS & GYNECOLOGY

## 2022-02-03 PROCEDURE — 81003 URINALYSIS AUTO W/O SCOPE: CPT | Performed by: OBSTETRICS & GYNECOLOGY

## 2022-02-03 PROCEDURE — G0145 SCR C/V CYTO,THINLAYER,RESCR: HCPCS | Performed by: OBSTETRICS & GYNECOLOGY

## 2022-02-03 PROCEDURE — 87210 SMEAR WET MOUNT SALINE/INK: CPT | Performed by: OBSTETRICS & GYNECOLOGY

## 2022-02-03 ASSESSMENT — ANXIETY QUESTIONNAIRES
IF YOU CHECKED OFF ANY PROBLEMS ON THIS QUESTIONNAIRE, HOW DIFFICULT HAVE THESE PROBLEMS MADE IT FOR YOU TO DO YOUR WORK, TAKE CARE OF THINGS AT HOME, OR GET ALONG WITH OTHER PEOPLE: NOT DIFFICULT AT ALL
2. NOT BEING ABLE TO STOP OR CONTROL WORRYING: NOT AT ALL
5. BEING SO RESTLESS THAT IT IS HARD TO SIT STILL: SEVERAL DAYS
1. FEELING NERVOUS, ANXIOUS, OR ON EDGE: NOT AT ALL
GAD7 TOTAL SCORE: 2
7. FEELING AFRAID AS IF SOMETHING AWFUL MIGHT HAPPEN: NOT AT ALL
6. BECOMING EASILY ANNOYED OR IRRITABLE: NOT AT ALL
3. WORRYING TOO MUCH ABOUT DIFFERENT THINGS: NOT AT ALL

## 2022-02-03 ASSESSMENT — PATIENT HEALTH QUESTIONNAIRE - PHQ9
5. POOR APPETITE OR OVEREATING: SEVERAL DAYS
SUM OF ALL RESPONSES TO PHQ QUESTIONS 1-9: 2

## 2022-02-03 ASSESSMENT — MIFFLIN-ST. JEOR: SCORE: 1395.19

## 2022-02-03 NOTE — PROGRESS NOTES
Inez is a 46 year old No obstetric history on file. female who presents for annual exam.     Menses are regular q 28-30 days and normal and heavy lasting 2 days.  Menses flow: normal and heavy.  Patient's last menstrual period was 01/12/2022 (approximate).. Using none for contraception.  She is not currently considering pregnancy.  Besides routine health maintenance, she would like to discuss pelvic pain and dysuria. She reports that she has areas all over her body where her tendons feel more pronounced and tight/strained and has not been able to find an adequate explanation for why this is happening. She describes hip pain that radiates into the thigh/groin area as well as tightness in her extremities, her throat and her neck. It will often feel like she is wearing something that is too tight and squeezing her. This started several years ago but got worse after her COVID infection in 3/2020. She has continued to work with her PCP and other specialists without a satisfactory answer. Also has times when her fingertips, nipples and feet will be burning and turn purple when exposed to cold, has not been evaluated for Raynaud's yet.   Specific to her pelvic concerns she reports RLQ abdominal pain as well as pain in her hips and groin. She will have occasional dysuria and some urinary frequency. She also reports some vaginal itching/irritaiton and occasional increased clear vaginal discharge.   GYNECOLOGIC HISTORY:  Menarche:12  Age at first intercourse: 15 Number of lifetime partners: less than 6  Inez is not sexually active with 0 male partner(s) and is not currently in monogamous relationship with 0.    History sexually transmitted infections:No STD history  STI testing offered?  Declined  CHRISTOPHER exposure: Unknown  History of abnormal Pap smear: NO - age 30- 65 PAP every 3 years recommended  Family history of breast CA: Yes (Please explain): maternal Mother  Family history of uterine/ovarian CA: No    Family history of  colon CA: Yes (Please explain): Mothers parents    HEALTH MAINTENANCE:  Cholesterol: (  Cholesterol   Date Value Ref Range Status   01/11/2022 269 (H) <200 mg/dL Final    History of abnormal lipids: Yes  Mammo: Jan 2022 . History of abnormal Mammo: YES.  Regular Self Breast Exams: Yes  Calcium/Vitamin D intake: source:  dairy Adequate? Yes  TSH: (  TSH   Date Value Ref Range Status   12/22/2021 1.39 0.40 - 4.00 mU/L Final    )  Pap; (No results found for: PAP )    HISTORY:  OB History   No obstetric history on file.     No past medical history on file.  History reviewed. No pertinent surgical history.  Family History   Problem Relation Age of Onset     Breast Cancer Mother 45     Urinary tract infection Mother      Osteoporosis Mother      Hyperlipidemia Maternal Grandmother      Urinary tract infection Maternal Grandmother      Hyperlipidemia Maternal Grandfather      Congenital heart disease Paternal Grandmother      Thyroid Disease Paternal Grandmother      Heart Failure Paternal Grandfather      Social History     Socioeconomic History     Marital status:      Spouse name: Not on file     Number of children: Not on file     Years of education: Not on file     Highest education level: Not on file   Occupational History     Not on file   Tobacco Use     Smoking status: Never Smoker     Smokeless tobacco: Never Used   Vaping Use     Vaping Use: Some days     Substances: Nicotine     Devices: Pre-filled pod   Substance and Sexual Activity     Alcohol use: Yes     Alcohol/week: 2.0 standard drinks     Types: 2 Cans of beer per week     Comment: a coupld times a week     Drug use: Never     Sexual activity: Not on file   Other Topics Concern     Not on file   Social History Narrative     Not on file     Social Determinants of Health     Financial Resource Strain: Not on file   Food Insecurity: Not on file   Transportation Needs: Not on file   Physical Activity: Not on file   Stress: Not on file   Social  "Connections: Not on file   Intimate Partner Violence: Not on file   Housing Stability: Not on file       Current Outpatient Medications:      cholecalciferol (VITAMIN D3) 25 mcg (1000 units) capsule, 2,000 Units, Disp: , Rfl:      DULoxetine (CYMBALTA) 30 MG capsule, Take 1 capsule by mouth, Disp: , Rfl:      lisdexamfetamine (VYVANSE) 40 MG capsule, Take 40 mg by mouth every morning, Disp: , Rfl:      ibuprofen (ADVIL/MOTRIN) 400 MG tablet, Take 1 tablet by mouth daily as needed (Patient not taking: Reported on 11/22/2021), Disp: , Rfl:    No Known Allergies    Past medical, surgical, social and family history were reviewed and updated in EPIC.    ROS:   C:     NEGATIVE for fever, chills, change in weight  I:       NEGATIVE for worrisome rashes, moles or lesions  E:     NEGATIVE for vision changes or irritation  E/M: NEGATIVE for ear, mouth and throat problems  R:     NEGATIVE for significant cough or SOB  CV:   NEGATIVE for chest pain, palpitations or peripheral edema  GI:     NEGATIVE for nausea, abdominal pain, heartburn, or change in bowel habits  :   NEGATIVE for frequency, dysuria, hematuria, vaginal discharge, or irregular bleeding  M:     NEGATIVE for significant arthralgias or myalgia  N:      NEGATIVE for weakness, dizziness or paresthesias  E:      NEGATIVE for temperature intolerance, skin/hair changes  P:      NEGATIVE for changes in mood or affect.    EXAM:  /74   Pulse 114   Ht 1.727 m (5' 8\")   Wt 71.2 kg (156 lb 14.4 oz)   LMP 01/12/2022 (Approximate)   SpO2 96%   BMI 23.86 kg/m     BMI: Body mass index is 23.86 kg/m .  Constitutional: healthy, alert and no distress  Head: Normocephalic. No masses, lesions, tenderness or abnormalities  Neck: Neck supple. Trachea midline. No adenopathy. Thyroid symmetric, normal size.   Cardiovascular: RRR.   Respiratory: Negative.   Breast: Breasts reveal mild symmetric fibrocystic densities, but there are no dominant, discrete, fixed or suspicious " masses found.  Gastrointestinal: Abdomen soft, non-tender, non-distended. No masses, organomegaly.  :  Vulva:  No external lesions, normal female hair distribution, no inguinal adenopathy.    Urethra:  Midline, non-tender, well supported, no discharge  Vagina:  Moist, pink, no abnormal discharge, no lesions. Increased pelvic floor tone, discomfort with palpation over the levator muscles.   Uterus:  Normal size , non-tender, freely mobile  Ovaries:  No masses appreciated, but exam limited due to discomfort   Rectal Exam: deferred  Musculoskeletal: extremities normal  Skin: no suspicious lesions or rashes  Psychiatric: Affect appropriate, cooperative,mentation appears normal.     COUNSELING:   Reviewed preventive health counseling, as reflected in patient instructions       breastcancer screening       (Jennifer)menopause management   reports that she has never smoked. She has never used smokeless tobacco.    Body mass index is 23.86 kg/m .    FRAX Risk Assessment    ASSESSMENT:  46 year old female with satisfactory annual exam  (Z01.419) Women's annual routine gynecological examination  (primary encounter diagnosis)  -Patient completed mammogram on 1/11/2022, BIRADS-0 due to some asymmetry seen in left breast. Follow up imaging was recommended but patient has not scheduled yet and was encouraged to do so today. Given her family history of a first degree relative with early onset breast cancer, she was referred to genetic counseling which has been scheduled.   -Colon cancer screening ordered by PCP  -Cervical cancer screening-Pap with HPV completed today     (R10.2) Pelvic pain in female  -We reviewed etiologies of pelvic pain and given the amount of pelvic floor dysfunction on exam, I think she would benefit from pelvic floor PT. A pelvic US was also ordered to rule out structural causes.   Plan: US Pelvic Complete with Transvaginal, Physical         Therapy Referral            (R30.0) Dysuria   Plan: UA Macro with  Reflex to Micro and Culture - lab        collect, Wet prep - Clinic Collect            Dispo: RTC as needed   Elda Mcpherson MD

## 2022-02-03 NOTE — PROGRESS NOTES
[facility]    Hematology/Oncology New Patient Note virtual visit      Time started 1 PM   time ended 1:40 PM    Today's Date: 02/02/22    Reason for Consult: Thrombocytosis      HISTORY OF PRESENT ILLNESS: Inez Boston is a 46 year old female who presents for further evaluation of thrombocytosis.  She has had chronic thrombocytosis since at least 2017 at that time her platelets were in the 500s and then dropped down to 400s and back in the 500s again.  She recently moved to [place] from [place] and saw a hematologist last June in [place].  Blood work was obtained at [place] which showed iron saturation of 24% JAK2 V6 1 7 mutation was negative.  Patient had thrombocytosis prior to her Covid infection in October 2020.  Inez has started on aspirin 81 mg a day.  She clinically feels well except for mild facial headache after the Covid infection and is following up with neurology.  She never had a history of stroke or clots    Of note she also has family history of breast cancer her mother had breast cancer the age of 47.  She has never met with a genetic counselor.        REVIEW OF SYSTEMS:   14 point ROS was reviewed and is negative other than as noted above in HPI.       HOME MEDICATIONS:  Current Outpatient Medications   Medication Sig Dispense Refill     cholecalciferol (VITAMIN D3) 25 mcg (1000 units) capsule 2,000 Units       DULoxetine (CYMBALTA) 30 MG capsule Take 1 capsule by mouth       ibuprofen (ADVIL/MOTRIN) 400 MG tablet Take 1 tablet by mouth daily as needed (Patient not taking: Reported on 11/22/2021)       lisdexamfetamine (VYVANSE) 40 MG capsule Take 40 mg by mouth every morning           ALLERGIES:  No Known Allergies      PAST MEDICAL HISTORY:  Status post Covid infection  Thrombocytosis    PAST SURGICAL HISTORY:  No past surgical history on file.      SOCIAL HISTORY:  Social History     Socioeconomic History     Marital status:      Spouse name: Not on file      Number of children: Not on file     Years of education: Not on file     Highest education level: Not on file   Occupational History     Not on file   Tobacco Use     Smoking status: Never Smoker     Smokeless tobacco: Never Used   Vaping Use     Vaping Use: Some days     Substances: Nicotine     Devices: Pre-filled pod   Substance and Sexual Activity     Alcohol use: Yes     Alcohol/week: 2.0 standard drinks     Types: 2 Cans of beer per week     Comment: a coupld times a week     Drug use: Never     Sexual activity: Not on file   Other Topics Concern     Not on file   Social History Narrative     Not on file     Social Determinants of Health     Financial Resource Strain: Not on file   Food Insecurity: Not on file   Transportation Needs: Not on file   Physical Activity: Not on file   Stress: Not on file   Social Connections: Not on file   Intimate Partner Violence: Not on file   Housing Stability: Not on file     Non-smoker.  Social alcohol use.  Currently works for the [employer] remotely    FAMILY HISTORY:  Mother had breast cancer at age of 47    PHYSICAL EXAM:  Vital signs:  There were no vitals taken for this visit.   ECO  GENERAL/CONSTITUTIONAL: No acute distress.  EYES: Pupils are equal, round, and react to light and accommodation. Extraocular movements intact.  No scleral icterus.  Unable to fully examine due to the nature of this visit be a video visit  INTEGUMENTARY: No rashes or jaundice.         LABS:  CBC RESULTS:   Recent Labs   Lab Test 22  1636   WBC 10.4   RBC 4.07   HGB 12.7   HCT 38.9   MCV 96   MCH 31.2   MCHC 32.6   RDW 12.0   *       Recent Labs   Lab Test 21  1100      POTASSIUM 4.4   CHLORIDE 105   CO2 30   ANIONGAP 7   GLC 91   BUN 10   CR 0.57   TEJA 9.0         PATHOLOGY:  Peripheral blood morphology did not reveal any evidence of dysplasia    IMAGING:  None    ASSESSMENT/PLAN:  Inez Boston is a 46 year old female with chronic leukocytosis with platelets ranging  between 400-500    Discussed with the patient that this is likely reactive state however I cannot absolutely rule out essential thrombocytosis even with that JAK2 mutation being negative as that will  ET in 80-90% of the cases and may miss 10% of the cases.  My recommendation would be to repeat labs in 4 months and if the labs are within further back in the 500s again I would recommend a bone marrow biopsy to rule out essential thrombocytosis or any sort of myeloproliferative disorder if that is negative I will discharge her from the clinic.  Due to the pandemic and the chronicity of this disorder I am less inclined to have schedule her for procedure that would not  at this point.  I would have recommended aspirin 81 mg and she is already on it.    Her family history is also significant for breast cancer my recommendation would be that she see a genetic counselor.    1.  Thrombocytosis repeat labs in 4 months with MD visit, see plan above.  Likely reactive state however cannot completely rule out essential thrombocytosis.  Iron deficiency ruled out      2.  Family history significant.  We will set up a genetic counselor.  She is up-to-date on her mammogram    All questions answered to her satisfaction.  Total time spent 60 minutes 40 minutes with the patient discussing the findings above.  10 minutes reviewing previous data and 10 minutes electronically documented clinical information from today's visit      Nam Caceres MD  Hematology/Oncology  [facility]

## 2022-02-04 ASSESSMENT — ANXIETY QUESTIONNAIRES: GAD7 TOTAL SCORE: 2

## 2022-02-09 ENCOUNTER — OFFICE VISIT (OUTPATIENT)
Dept: PHYSICAL MEDICINE AND REHAB | Facility: CLINIC | Age: 47
End: 2022-02-09
Payer: COMMERCIAL

## 2022-02-09 VITALS
RESPIRATION RATE: 16 BRPM | HEART RATE: 76 BPM | DIASTOLIC BLOOD PRESSURE: 76 MMHG | SYSTOLIC BLOOD PRESSURE: 119 MMHG | OXYGEN SATURATION: 98 %

## 2022-02-09 DIAGNOSIS — M62.89 PELVIC FLOOR DYSFUNCTION IN FEMALE: Primary | ICD-10-CM

## 2022-02-09 DIAGNOSIS — G24.3 SPASMODIC TORTICOLLIS: ICD-10-CM

## 2022-02-09 DIAGNOSIS — G24.3 CERVICAL DYSTONIA: ICD-10-CM

## 2022-02-09 LAB
BKR LAB AP GYN ADEQUACY: NORMAL
BKR LAB AP GYN INTERPRETATION: NORMAL
BKR LAB AP HPV REFLEX: NORMAL
BKR LAB AP LMP: NORMAL
BKR LAB AP PREVIOUS ABNORMAL: NORMAL
PATH REPORT.COMMENTS IMP SPEC: NORMAL
PATH REPORT.COMMENTS IMP SPEC: NORMAL
PATH REPORT.RELEVANT HX SPEC: NORMAL

## 2022-02-09 PROCEDURE — 99205 OFFICE O/P NEW HI 60 MIN: CPT | Performed by: PHYSICAL MEDICINE & REHABILITATION

## 2022-02-09 RX ORDER — DULOXETIN HYDROCHLORIDE 20 MG/1
CAPSULE, DELAYED RELEASE ORAL DAILY
COMMUNITY
Start: 2022-01-11 | End: 2023-06-02

## 2022-02-09 ASSESSMENT — PAIN SCALES - GENERAL: PAINLEVEL: MILD PAIN (2)

## 2022-02-09 NOTE — PATIENT INSTRUCTIONS
Physical Therapy - Karo Poe    [facility]  [facility]  [id number]  [place] [id number]    PH: [phone]  FX: [phone]

## 2022-02-09 NOTE — LETTER
"2/9/2022       RE: Inez Boston  [address] [place] [address]     Dear Colleague,    Thank you for referring your patient, Inez Boston, to the [facility] PHYSICAL MEDICINE AND REHABILITATION CLINIC [place] at [facility]. Please see a copy of my visit note below.    [facility]  Cl[facility] & [facility]  Physical Medicine & Rehabilitation Consultation    Inez Boston   YOB: 1975  MRN: [id number]   Date of Service: 02/09/22    Requesting Provider: Tony Segal MD      History of Present Illness:  Inez Boston is a 46 year old female who is referred to clinic for evaluation and management of neck pain, myofascial pain, and persistent muscle spasms among many other physical complaints which have been present since she recovered from COVID19 in Spring of 2020.     The patient reports that she became ill with COVID in 3/2020, and made essentially a full recovery without any other significant medical intervention. Then in 5/2020 she started to develop worsening fatigue, unintentional weight loss, hair loss, and brain fog. She also developed a feeling of \"my skin is on wrong,\" which is the most bothersome of her complaints and is ongoing to date. Specifically, she has extreme \"tightness and pulling\" sensation that starts in her toes and travels to the top of her head. She states that it feels like her skin is covered in \"tight bands\" with a constant \"trembling and rumbling\" sensation throughout her body. She also has an associated sensation that she cannot sit still at times, and frequently has to move around to stay comfortable.     She states that there is an area behind her eyes and ears that is the \"epicenter\" of all of her symptoms. She feels as though all of her muscles are in spasm, but it is more severe above her shoulders in the head/neck/upper shoulder area. She has constant numbness " "and tingling in her fingers and ties with associated achiness in the affected areas. She did have \"choking sensation\" and difficulty breathing (specifically had difficulty expanding her lungs/diaphragm), but these symptoms resolved with PT (myofascial massage).     The patient has seen numerous specialists including two Neurologists at [facility] and has had extensive workup which has been unrevealing. Her symptoms have been so debilitating that it has contributed to the breakup between the patient and her partner. She recently moved from [place] to be closer to family for better support.     She has tried muscle relaxers, which help her sleep at night but do not do much for her above described symptoms. She has tried alternating cold/heat with minimal benefit. Currently participating in PT. Has tried Ropinerole for RLS without benefit. She is on Vyvanse for ADHD, and duloxetine for mood.     No past medical history on file.       Family History   Problem Relation Age of Onset     Breast Cancer Mother 45     Urinary tract infection Mother      Osteoporosis Mother      Hyperlipidemia Maternal Grandmother      Urinary tract infection Maternal Grandmother      Hyperlipidemia Maternal Grandfather      Congenital heart disease Paternal Grandmother      Thyroid Disease Paternal Grandmother      Heart Failure Paternal Grandfather        Social History     Socioeconomic History     Marital status:      Spouse name: Not on file     Number of children: Not on file     Years of education: Not on file     Highest education level: Not on file   Occupational History     Not on file   Tobacco Use     Smoking status: Never Smoker     Smokeless tobacco: Never Used   Vaping Use     Vaping Use: Some days     Substances: Nicotine     Devices: Pre-filled pod   Substance and Sexual Activity     Alcohol use: Yes     Alcohol/week: 2.0 standard drinks     Types: 2 Cans of beer per week     Comment: a coupld times a week     Drug use: " Never     Sexual activity: Not on file   Other Topics Concern     Not on file   Social History Narrative     Not on file     Social Determinants of Health     Financial Resource Strain: Not on file   Food Insecurity: Not on file   Transportation Needs: Not on file   Physical Activity: Not on file   Stress: Not on file   Social Connections: Not on file   Intimate Partner Violence: Not on file   Housing Stability: Not on file       Current Outpatient Medications   Medication Sig Dispense Refill     B Complex Vitamins (VITAMIN-B COMPLEX PO) Take 1 tablet by mouth every other day       cholecalciferol (VITAMIN D3) 25 mcg (1000 units) capsule 2,000 Units       DULoxetine HCl 40 MG CPEP daily       ibuprofen (ADVIL/MOTRIN) 400 MG tablet Take 1 tablet by mouth daily as needed        lisdexamfetamine (VYVANSE) 40 MG capsule Take 40 mg by mouth every morning         No Known Allergies      Physical Examination:  VS: /76   Pulse 76   Resp 16   LMP 01/12/2022 (Approximate)   SpO2 98%    GEN: In no acute distress, pleasant and cooperative  HEENT: Normocephalic, atraumatic. Hair loss most notable in posterior aspect of head (left parietal scalp).   NECK: She has multiple areas of myofascial pain in the upper trapezius and rhomboids bilaterally. Palpation of these areas causes significant discomfort and difficulty holding still. Mild involuntary cervical rotation to the right with right shoulder elevation. Mild left SCM hypertrophy.   NEURO/MSK:    Cognition: Fund of knowledge and train of thought appropriate.    Sensation: Sensation to light touch intact throughout   Strength: 5/5 in all muscle groups of upper and lower extremities   Reflexes: 2+ and symmetric in upper and lower extremities. Neg Oneill's.   Speech: fluent without dysarthria or aphasia  SKIN: no rashes or lesions noted.   EXT: No lower extremity edema bilaterally.   PSYCH: Normal affect.      Assessment & Recommendations:  Inez Boston is a 46 year  old female who presents to rehabilitation clinic for evaluation and management of neck pain, myofascial pain, and persistent muscle spasms among many other physical complaints which have been present since she recovered from COVID19 in Spring of 2020.     She has had extensive workup, all of which have been unrevealing. She has also tried numerous pharmacological and conservative treatment options, none of which have made a difference. Over time, her symptoms are not improving and this has been quite disabling for her with regards to her personal and professional life.     On physical exam, she does not have any focal neurological deficits. Of note, she did have significant discomfort and difficulty holding still with any type of tactile pressure (lighter or more firm) on the neck and shoulder areas, which is of unknown significance. Additionally, she does have mild right cervical rotation and right shoulder elevation, likely due to muscle spasms which may or may not be sequelae from her COVID19 infection in 3/2020.     We did discuss today that it is unclear whether or not botulinum toxin injections could help her, but it would be helpful to at least give it a try with specific focus on the spastic muscles in her neck, shoulder girdle and scalp musculature, which may not only help with the persistent muscle spasms but may also help with the pain and dysesthesias she is experiencing. Since her symptoms are very widespread, it is likely that the botulinum toxin injections may only help with focal areas of pain and not give her the relief she is looking for. That being said, the patient feels she has exhausted all options at this point, and would like to move forward with intramuscular injections of botulinum toxin into the involved musculature of her neck, shoulder and scalp.    Plan will be to request prior authorization from her insurer for 200 units of Botox for management of spasmodic torticollis. We will  schedule once prior authorization is obtained.     Additionally, a PT referral was placed to Irasema Parker at [facility] in [place], as she does manual muscle work, which may be quite helpful for this patient.        Thank you for this consultation. Please do not hesitate to contact me with further questions.   Elizabeth Fischer MD  Physical Medicine and Rehabilitation  [phone]      I spent a total of 60 minutes face-to-face and managing the care of Inez Boston. Over 50% of my time was spent counseling the patient and coordinating care. Please see note for details.       Again, thank you for allowing me to participate in the care of your patient.      Sincerely,    Elizabeth Fischer MD

## 2022-02-10 ENCOUNTER — HOSPITAL ENCOUNTER (OUTPATIENT)
Dept: ULTRASOUND IMAGING | Facility: CLINIC | Age: 47
Discharge: HOME | End: 2022-02-10
Attending: OBSTETRICS & GYNECOLOGY | Admitting: OBSTETRICS & GYNECOLOGY
Payer: COMMERCIAL

## 2022-02-10 DIAGNOSIS — R10.2 PELVIC PAIN IN FEMALE: ICD-10-CM

## 2022-02-10 LAB — RADIOLOGIST FLAGS: NORMAL

## 2022-02-10 PROCEDURE — 76856 US EXAM PELVIC COMPLETE: CPT | Mod: 26 | Performed by: RADIOLOGY

## 2022-02-10 PROCEDURE — 76830 TRANSVAGINAL US NON-OB: CPT | Mod: 26 | Performed by: RADIOLOGY

## 2022-02-10 PROCEDURE — 76856 US EXAM PELVIC COMPLETE: CPT

## 2022-02-11 ENCOUNTER — TELEPHONE (OUTPATIENT)
Dept: OBGYN | Facility: CLINIC | Age: 47
End: 2022-02-11
Payer: COMMERCIAL

## 2022-02-11 LAB
HUMAN PAPILLOMA VIRUS 16 DNA: NEGATIVE
HUMAN PAPILLOMA VIRUS 18 DNA: NEGATIVE
HUMAN PAPILLOMA VIRUS FINAL DIAGNOSIS: NORMAL
HUMAN PAPILLOMA VIRUS OTHER HR: NEGATIVE

## 2022-02-11 NOTE — TELEPHONE ENCOUNTER
Patient TC to clinic with questions related to her ultrasound yesterday. The US showed a pelvic mass, and an MRI was recommended. Patient is wondering if what her OB provider advises. Routed to provider for recommendations.   Kathie Valdez RN

## 2022-02-14 DIAGNOSIS — N94.89 ADNEXAL MASS: Primary | ICD-10-CM

## 2022-02-22 NOTE — PROGRESS NOTES
"[facility]  Physical Medicine & Rehabilitation Consultation    Inez Boston   YOB: 1975  MRN: [id number]   Date of Service: 02/09/22    Requesting Provider: Tony Segal MD      History of Present Illness:  Inez Boston is a 46 year old female who is referred to clinic for evaluation and management of neck pain, myofascial pain, and persistent muscle spasms among many other physical complaints which have been present since she recovered from COVID19 in Spring of 2020.     The patient reports that she became ill with COVID in 3/2020, and made essentially a full recovery without any other significant medical intervention. Then in 5/2020 she started to develop worsening fatigue, unintentional weight loss, hair loss, and brain fog. She also developed a feeling of \"my skin is on wrong,\" which is the most bothersome of her complaints and is ongoing to date. Specifically, she has extreme \"tightness and pulling\" sensation that starts in her toes and travels to the top of her head. She states that it feels like her skin is covered in \"tight bands\" with a constant \"trembling and rumbling\" sensation throughout her body. She also has an associated sensation that she cannot sit still at times, and frequently has to move around to stay comfortable.     She states that there is an area behind her eyes and ears that is the \"epicenter\" of all of her symptoms. She feels as though all of her muscles are in spasm, but it is more severe above her shoulders in the head/neck/upper shoulder area. She has constant numbness and tingling in her fingers and ties with associated achiness in the affected areas. She did have \"choking sensation\" and difficulty breathing (specifically had difficulty expanding her lungs/diaphragm), but these symptoms resolved with PT (myofascial massage).     The patient has seen numerous specialists including two Neurologists at [place] and has had extensive workup which has been unrevealing. Her symptoms have been so debilitating that it has contributed to the breakup between the patient and her partner. She recently moved from [place] to be closer to family for better support.     She has tried muscle relaxers, which help her sleep at night but do not do much for her above described symptoms. She has tried alternating cold/heat with minimal benefit. Currently participating in PT. Has tried Ropinerole for RLS without benefit. She is on Vyvanse for ADHD, and duloxetine for mood.     No past medical history on file.       Family History   Problem Relation Age of Onset     Breast Cancer Mother 45     Urinary tract infection Mother      Osteoporosis Mother      Hyperlipidemia Maternal Grandmother      Urinary tract infection Maternal Grandmother      Hyperlipidemia Maternal Grandfather      Congenital heart disease Paternal Grandmother      Thyroid Disease Paternal Grandmother      Heart Failure Paternal Grandfather        Social History     Socioeconomic History     Marital status:      Spouse name: Not on file     Number of children: Not on file     Years of education: Not on file     Highest education level: Not on file   Occupational History     Not on file   Tobacco Use     Smoking status: Never Smoker     Smokeless tobacco: Never Used   Vaping Use     Vaping Use: Some days     Substances: Nicotine     Devices: Pre-filled pod   Substance and Sexual Activity     Alcohol use: Yes     Alcohol/week: 2.0 standard drinks     Types: 2 Cans of beer per week     Comment: a coupld times a week     Drug use: Never     Sexual activity: Not on file   Other Topics Concern     Not on file   Social History Narrative     Not on file     Social Determinants of Health     Financial Resource Strain: Not on file   Food Insecurity: Not on file   Transportation Needs: Not on file   Physical Activity: Not on file   Stress: Not on file   Social Connections: Not  on file   Intimate Partner Violence: Not on file   Housing Stability: Not on file       Current Outpatient Medications   Medication Sig Dispense Refill     B Complex Vitamins (VITAMIN-B COMPLEX PO) Take 1 tablet by mouth every other day       cholecalciferol (VITAMIN D3) 25 mcg (1000 units) capsule 2,000 Units       DULoxetine HCl 40 MG CPEP daily       ibuprofen (ADVIL/MOTRIN) 400 MG tablet Take 1 tablet by mouth daily as needed        lisdexamfetamine (VYVANSE) 40 MG capsule Take 40 mg by mouth every morning         No Known Allergies      Physical Examination:  VS: /76   Pulse 76   Resp 16   LMP 01/12/2022 (Approximate)   SpO2 98%    GEN: In no acute distress, pleasant and cooperative  HEENT: Normocephalic, atraumatic. Hair loss most notable in posterior aspect of head (left parietal scalp).   NECK: She has multiple areas of myofascial pain in the upper trapezius and rhomboids bilaterally. Palpation of these areas causes significant discomfort and difficulty holding still. Mild involuntary cervical rotation to the right with right shoulder elevation. Mild left SCM hypertrophy.   NEURO/MSK:    Cognition: Fund of knowledge and train of thought appropriate.    Sensation: Sensation to light touch intact throughout   Strength: 5/5 in all muscle groups of upper and lower extremities   Reflexes: 2+ and symmetric in upper and lower extremities. Neg Oneill's.   Speech: fluent without dysarthria or aphasia  SKIN: no rashes or lesions noted.   EXT: No lower extremity edema bilaterally.   PSYCH: Normal affect.      Assessment & Recommendations:  Inez Boston is a 46 year old female who presents to rehabilitation clinic for evaluation and management of neck pain, myofascial pain, and persistent muscle spasms among many other physical complaints which have been present since she recovered from COVID19 in Spring of 2020.     She has had extensive workup, all of which have been unrevealing. She has also tried  numerous pharmacological and conservative treatment options, none of which have made a difference. Over time, her symptoms are not improving and this has been quite disabling for her with regards to her personal and professional life.     On physical exam, she does not have any focal neurological deficits. Of note, she did have significant discomfort and difficulty holding still with any type of tactile pressure (lighter or more firm) on the neck and shoulder areas, which is of unknown significance. Additionally, she does have mild right cervical rotation and right shoulder elevation, likely due to muscle spasms which may or may not be sequelae from her COVID19 infection in 3/2020.     We did discuss today that it is unclear whether or not botulinum toxin injections could help her, but it would be helpful to at least give it a try with specific focus on the spastic muscles in her neck, shoulder girdle and scalp musculature, which may not only help with the persistent muscle spasms but may also help with the pain and dysesthesias she is experiencing. Since her symptoms are very widespread, it is likely that the botulinum toxin injections may only help with focal areas of pain and not give her the relief she is looking for. That being said, the patient feels she has exhausted all options at this point, and would like to move forward with intramuscular injections of botulinum toxin into the involved musculature of her neck, shoulder and scalp.    Plan will be to request prior authorization from her insurer for 200 units of Botox for management of spasmodic torticollis. We will schedule once prior authorization is obtained.     Additionally, a PT referral was placed to Irasema Parker at [facility] in [place], as she does manual muscle work, which may be quite helpful for this patient.        Thank you for this consultation. Please do not hesitate to contact me with further questions.   Elizabeth Fischer,  MD  Physical Medicine and Rehabilitation  [phone]      I spent a total of 60 minutes face-to-face and managing the care of Inez Boston. Over 50% of my time was spent counseling the patient and coordinating care. Please see note for details.

## 2022-02-24 ENCOUNTER — THERAPY VISIT (OUTPATIENT)
Dept: PHYSICAL THERAPY | Facility: CLINIC | Age: 47
End: 2022-02-24
Payer: COMMERCIAL

## 2022-02-24 ENCOUNTER — HOSPITAL ENCOUNTER (OUTPATIENT)
Dept: MRI IMAGING | Facility: CLINIC | Age: 47
Discharge: HOME | End: 2022-02-24
Attending: OBSTETRICS & GYNECOLOGY | Admitting: OBSTETRICS & GYNECOLOGY
Payer: COMMERCIAL

## 2022-02-24 DIAGNOSIS — R10.2 PELVIC PAIN IN FEMALE: ICD-10-CM

## 2022-02-24 DIAGNOSIS — M99.05 SOMATIC DYSFUNCTION OF PELVIS REGION: ICD-10-CM

## 2022-02-24 DIAGNOSIS — N39.41 URGE INCONTINENCE: ICD-10-CM

## 2022-02-24 DIAGNOSIS — N94.89 ADNEXAL MASS: ICD-10-CM

## 2022-02-24 PROCEDURE — 72197 MRI PELVIS W/O & W/DYE: CPT

## 2022-02-24 PROCEDURE — 97112 NEUROMUSCULAR REEDUCATION: CPT | Mod: GP | Performed by: PHYSICAL THERAPIST

## 2022-02-24 PROCEDURE — A9585 GADOBUTROL INJECTION: HCPCS | Performed by: OBSTETRICS & GYNECOLOGY

## 2022-02-24 PROCEDURE — 97530 THERAPEUTIC ACTIVITIES: CPT | Mod: GP | Performed by: PHYSICAL THERAPIST

## 2022-02-24 PROCEDURE — 97110 THERAPEUTIC EXERCISES: CPT | Mod: GP | Performed by: PHYSICAL THERAPIST

## 2022-02-24 PROCEDURE — 97162 PT EVAL MOD COMPLEX 30 MIN: CPT | Mod: GP | Performed by: PHYSICAL THERAPIST

## 2022-02-24 PROCEDURE — 255N000002 HC RX 255 OP 636: Performed by: OBSTETRICS & GYNECOLOGY

## 2022-02-24 RX ORDER — GADOBUTROL 604.72 MG/ML
8 INJECTION INTRAVENOUS ONCE
Status: COMPLETED | OUTPATIENT
Start: 2022-02-24 | End: 2022-02-24

## 2022-02-24 RX ADMIN — GADOBUTROL 8 ML: 604.72 INJECTION INTRAVENOUS at 20:24

## 2022-02-24 NOTE — PROGRESS NOTES
SUBJECTIVE:  Patient reports onset of symptoms Had COVID in March of 2020.  Had symptoms since 2013 with tightness in the groin region.  Tried to ignore this.  Then after COVID symptoms got worse.  Lost her hair, lost weight, had tightness everywhere and had pain in certain areas. Has some numbness in the buttocks most of the time.  Also feels tightness in the groin.  Feels both legs go numb. Massage does help. Heat helps. Sleeping with heating pads.  Feels this in the pelvic region and in the and from head to feet. Feels weakness and weakness was noted by neurologists on the left side.  Working from home. Works for the [employer]. Was living in [place].  Since onset symptoms have been getting better, worse or staying the same? worse  Recently had a pelvic ultrasound showing a solid left pelvic mass measuring 6.6 cm, positioned between the left ovary and the uterus. Will have a pelvic MRI today.   Urination:  Do you leak on the way to the bathroom or with a strong urge to void? Yes    Do you leak with cough,sneeze, jumping, running?Yes   Any other activities that cause leaking? unsure   Do you have triggers that make you feel you can't wait to go to the bathroom? Yes   what are theysitting.  Type of pad and number used per day? no  When you leak what is the amount? small    How long can you delay the need to urinate? 1 - 2 minutes.   How many times do you get up to urinate at night? 1-2    Can you stop the flow of urine when on the toilet? unsure  Is the volume of urine passed usually: large. (8sec rule=  250ml with average bladder storing  400-600ml)    Do you strain to pass urine? Yes  Do you have a slow or hesitant urinary stream? sometimes  Do you have difficulty initiating the urine stream? No, has spraying all over  Is urination painful?  No    How many bladder infections have you had in last 12 months?0    Fluid intake(one glass is 8oz or one cup) a lotglasses/day,   Bowel habits:  Frequency of bowel movements? 2-3 times  a day  Consistancy of stool? soft, Do you ignore the urge to defecate? No  Do you strain to pass stool? Yes    Pelvic Pain:  Do you have any pelvic pain with intercourse, exams, use of tampons? Numb and painful lower butt most of the time  Pain with recent pelvic exam, not in 2019. When was having intercourse, this was painful.          Given birth? Are you sexually active?No  Have you ever been worried for your physical safety? No  Any abdominal or pelvic surgeries? no  Are you having any regular exercise?yes, stretching, used to run, walks on a treadmill  Have you practiced the PF(kegel) exercises for 4 or more weeks?no  Thyroid checked?no(related to hair loss, flu-like symptoms, wt gain/loss, fatigue, menopause)  Changed diet lately?no    OBSERVATION  Lumbar Posture: Negative  Pelvic symmetry: Positive  Introitus: tight  Trendelenberg Sign:Positive    ROM  Single leg standing unilateral hip flexion PSIS:Not Tested: Time constraints  Standing forward flexion PSIS:Negative  Passive Hip ROM:Positive  HERMAN:Positive  HERMAN with OP:Not Tested: Contraindicated  Posterior Sacral Shear:Not Tested: Time constraints    MUSCEL PERFORMANCE  Active SLR:Not Tested: Time constraints  Abdominal Core 90/90 hip lower:Not Tested: Time constraints  Baseline PF tone:normal but difficulty relaxing after contraction  PF Tone with cough: hyper  Valsalva: hyper  PF Response quality: brisk - normal  PF Power: Center: 4 Stronger on right/left na.  Endurance: Maximum contraction in seconds: na  # of endurance contractions before fatigue: na    Quick contraction repetitions prior to fatigue: na.  Specificity/accessory muscles: Not Tested, Synergy with abdominals: Not Tested.  Prolapse: Cyctocele/Rectocele/Uterine ndgndrndanddndend:nd nd2nd Urethrocele: none, Enterocele: none.    PALPATION: Reproduction of symptoms with digital evaluations at introitus, did not palpate deeper as patient was experiencing increased pain with palpation and has decreased  sensation      Physical Therapy Initial Evaluation  Subjective:  The history is provided by the patient. No  was used.   Therapist Generated HPI Evaluation         Type of problem:  Pelvic dysfunction.                                                 Objective:  Standing Alignment:    Cervical/Thoracic:  Convex thoracic scoliosis R      Pelvic:  Iliac crest high L                                                 Pelvic Dysfunction Evaluation:      Diagnostic Tests:  Diagnostic tests pelvic: US showed mass on left, MRI today.  Pelvic Exam:  Painful                Colonoscopy:  Normal      Flexibility:    Tightness present at:Adductors; Iliopsoas; Hamstrings and Gluteals  guarding with PROM of hip assessment, ecreased hip ER  Abdominal Wall:  NA        Pelvic Clock Exam:    Ischiocavernosis pain:  +  Bulbocavernosis pain:  +  Transverse Perineal:  +  Levator ANI:  +  Perineal Body:  +  SI Provocation:  NA        Reflex Testing:  normal    External Assessment:  normal              Internal Assessment:    Sensory Exam:  Abnormal for posterior and abnormal for anterior  Contraction/Grade:  Good squeeze, good hold with lift, repeatable (4)          SEMG Biofeedback:    Equipment:  [employer] with computer      Baseline EMG PM:  Normal but twitches and spikes with muscle spasm and difficulty relaxing after contraction        EMG interpretation to fatigue:  3-5 seconds  Additional History:    Number of Pregnancies: 0                         General     ROS    Assessment/Plan:    Patient is a 46 year old female with pelvic complaints.    Patient has the following significant findings with corresponding treatment plan.                Diagnosis 1:  Pelvic pain, urge incontinence  Pain -  manual therapy, self management, education and home program  Decreased ROM/flexibility - manual therapy, therapeutic exercise and home program  Decreased strength - therapeutic exercise, therapeutic activities and home  program  Impaired muscle performance - biofeedback, neuro re-education and home program  Decreased function - therapeutic activities and home program    Therapy Evaluation Codes:   1) History comprised of:   Personal factors that impact the plan of care:      Time since onset of symptoms.    Comorbidity factors that impact the plan of care are:      Numbness/tingling, Pain at night/rest, Weakness and post COVID sx.     Medications impacting care: Anti-depressant.  2) Examination of Body Systems comprised of:   Body structures and functions that impact the plan of care:      Pelvis.   Activity limitations that impact the plan of care are:      Sitting, Working, Sleeping, Pelvic Exam, [place] and Urge incontinence.  3) Clinical presentation characteristics are:   Evolving/Changing.  4) Decision-Making    Moderate complexity using standardized patient assessment instrument and/or measureable assessment of functional outcome.  Cumulative Therapy Evaluation is: Moderate complexity.    Previous and current functional limitations:  (See Goal Flow Sheet for this information)    Short term and Long term goals: (See Goal Flow Sheet for this information)     Communication ability:  Patient appears to be able to clearly communicate and understand verbal and written communication and follow directions correctly.  Treatment Explanation - The following has been discussed with the patient:   RX ordered/plan of care  Anticipated outcomes  Possible risks and side effects  This patient would benefit from PT intervention to resume normal activities.   Rehab potential is good.    Frequency:  2 X a month, once daily  Duration:  for 3 months  Discharge Plan:  Achieve all LTG.  Independent in home treatment program.  Reach maximal therapeutic benefit.    Please refer to the daily flowsheet for treatment today, total treatment time and time spent performing 1:1 timed codes.

## 2022-02-24 NOTE — PROGRESS NOTES
Physical Therapy Initial Evaluation  Subjective:    Patient Health History           General health as reported by patient is fair.  Pertinent medical history includes: numbness/tingling.   Red flags:  Changes in bowel and bladder habits, change in skin color, numbness in perianal region and unexplained weight loss.     Surgeries include:  Other. Other surgery history details: ear and vaginal area as a child.    Current medications:  Anti-depressants.    Current occupation is [employer].   Primary job tasks include:  Computer work and prolonged sitting.                                    Objective:  System    Physical Exam    General     ROS    Assessment/Plan:

## 2022-02-25 ENCOUNTER — LAB (OUTPATIENT)
Dept: LAB | Facility: CLINIC | Age: 47
End: 2022-02-25
Payer: COMMERCIAL

## 2022-02-25 DIAGNOSIS — N94.89 ADNEXAL MASS: ICD-10-CM

## 2022-02-25 DIAGNOSIS — N94.89 ADNEXAL MASS: Primary | ICD-10-CM

## 2022-02-25 LAB — CANCER AG125 SERPL-ACNC: 10 U/ML (ref 0–30)

## 2022-02-25 PROCEDURE — 36415 COLL VENOUS BLD VENIPUNCTURE: CPT

## 2022-02-25 PROCEDURE — 86304 IMMUNOASSAY TUMOR CA 125: CPT

## 2022-02-28 ENCOUNTER — OFFICE VISIT (OUTPATIENT)
Dept: OBGYN | Facility: CLINIC | Age: 47
End: 2022-02-28
Payer: COMMERCIAL

## 2022-02-28 VITALS
HEART RATE: 84 BPM | SYSTOLIC BLOOD PRESSURE: 119 MMHG | DIASTOLIC BLOOD PRESSURE: 77 MMHG | BODY MASS INDEX: 24.24 KG/M2 | OXYGEN SATURATION: 100 % | WEIGHT: 159.4 LBS

## 2022-02-28 DIAGNOSIS — R19.00 PELVIC MASS: ICD-10-CM

## 2022-02-28 DIAGNOSIS — D25.2 INTRAMURAL AND SUBSEROUS LEIOMYOMA OF UTERUS: ICD-10-CM

## 2022-02-28 DIAGNOSIS — D25.1 INTRAMURAL AND SUBSEROUS LEIOMYOMA OF UTERUS: ICD-10-CM

## 2022-02-28 DIAGNOSIS — N88.2 CERVICAL OS STENOSIS: Primary | ICD-10-CM

## 2022-02-28 PROCEDURE — 99213 OFFICE O/P EST LOW 20 MIN: CPT | Mod: 25 | Performed by: OBSTETRICS & GYNECOLOGY

## 2022-02-28 PROCEDURE — 58100 BIOPSY OF UTERUS LINING: CPT | Mod: 52 | Performed by: OBSTETRICS & GYNECOLOGY

## 2022-03-01 ENCOUNTER — TELEPHONE (OUTPATIENT)
Dept: OBGYN | Facility: CLINIC | Age: 47
End: 2022-03-01
Payer: COMMERCIAL

## 2022-03-01 RX ORDER — MISOPROSTOL 200 UG/1
400 TABLET ORAL ONCE
Qty: 2 TABLET | Refills: 0 | Status: SHIPPED | OUTPATIENT
Start: 2022-03-01 | End: 2022-03-01

## 2022-03-01 NOTE — TELEPHONE ENCOUNTER
Per conversation with KD, looking into getting pt in on the 11th at 4:20. LVM for pt- please tell them when they call back that we may do the 11th if pt can do so. 3/1 SH

## 2022-03-01 NOTE — TELEPHONE ENCOUNTER
----- Message from Elda Mcpherson MD sent at 3/1/2022  1:57 PM CST -----  Regarding: Follow up  Inez Mirza needs to be scheduled of an ultrasound guided endometrial biopsy (similar to what we do for ultrasound guided IUDs) so she will need the time blocked off on the ultrasound schedule and on my schedule as well.     Thank you!  Edla Mcpherson

## 2022-03-01 NOTE — TELEPHONE ENCOUNTER
Called pt, scheduled for 4 wks out and messaged Dr. Mcpherson to see if there is any way to do this within the recommended window 3/1 SH

## 2022-03-04 ENCOUNTER — THERAPY VISIT (OUTPATIENT)
Dept: PHYSICAL THERAPY | Facility: CLINIC | Age: 47
End: 2022-03-04
Payer: COMMERCIAL

## 2022-03-04 DIAGNOSIS — R10.2 PELVIC PAIN IN FEMALE: ICD-10-CM

## 2022-03-04 DIAGNOSIS — N39.41 URGE INCONTINENCE: ICD-10-CM

## 2022-03-04 DIAGNOSIS — M99.05 SOMATIC DYSFUNCTION OF PELVIS REGION: ICD-10-CM

## 2022-03-04 PROCEDURE — 97110 THERAPEUTIC EXERCISES: CPT | Mod: GP | Performed by: PHYSICAL THERAPIST

## 2022-03-11 ENCOUNTER — OFFICE VISIT (OUTPATIENT)
Dept: OBGYN | Facility: CLINIC | Age: 47
End: 2022-03-11
Attending: OBSTETRICS & GYNECOLOGY
Payer: COMMERCIAL

## 2022-03-11 ENCOUNTER — ANCILLARY PROCEDURE (OUTPATIENT)
Dept: ULTRASOUND IMAGING | Facility: CLINIC | Age: 47
End: 2022-03-11
Attending: OBSTETRICS & GYNECOLOGY
Payer: COMMERCIAL

## 2022-03-11 DIAGNOSIS — N88.2 CERVICAL OS STENOSIS: ICD-10-CM

## 2022-03-11 DIAGNOSIS — D25.2 INTRAMURAL AND SUBSEROUS LEIOMYOMA OF UTERUS: ICD-10-CM

## 2022-03-11 DIAGNOSIS — R19.00 PELVIC MASS: ICD-10-CM

## 2022-03-11 DIAGNOSIS — N88.2 CERVICAL OS STENOSIS: Primary | ICD-10-CM

## 2022-03-11 DIAGNOSIS — D25.1 INTRAMURAL AND SUBSEROUS LEIOMYOMA OF UTERUS: ICD-10-CM

## 2022-03-11 PROCEDURE — 76998 US GUIDE INTRAOP: CPT | Performed by: OBSTETRICS & GYNECOLOGY

## 2022-03-11 PROCEDURE — 58100 BIOPSY OF UTERUS LINING: CPT | Performed by: OBSTETRICS & GYNECOLOGY

## 2022-03-11 PROCEDURE — 99213 OFFICE O/P EST LOW 20 MIN: CPT | Mod: 25 | Performed by: OBSTETRICS & GYNECOLOGY

## 2022-03-11 PROCEDURE — 88305 TISSUE EXAM BY PATHOLOGIST: CPT | Performed by: PATHOLOGY

## 2022-03-11 NOTE — PROGRESS NOTES
"GYN Progress Note     CC: Solid pelvic mass     HPI: Inez Boston is a 45 YO  who presents for a discussion regarding pre-operative planning and for a repeat attempt at endometrial biopsy which was unsuccessful when attempting in the office previously. She placed 400 mcg of misoprostol vaginally 4 hours prior to today's procedure.      O:  Vital signs:                         Estimated body mass index is 24.24 kg/m  as calculated from the following:    Height as of 2/3/22: 1.727 m (5' 8\").    Weight as of 22: 72.3 kg (159 lb 6.4 oz).    Endometrial Biopsy Procedure    After obtaining written consent from the patient and performing a time out a speculum was placed in the vagina. A pregnancy test was not performed as the patient has not been sexually active for a prolonged period and denies any chance of pregnancy. The cervix was visualized and prepped with betadine swabs. A tenaculum was used to grasp the cervix at the 12 o'clock position and I attempted to pass an os finder through the internal cervix os which was unsuccessful. A paracervical block was then performed with 10 mL of 1% lidocaine and I then attempted to pass a small cervical dilator under ultrasound guidance. Due to the sharp anteversion/anteflexion of the uterus, it was difficult to advance the dilator past 4 cm. After multiple attempts, I was able to advance the dilator to the uterine fundus but then was unable to get the flexible [place] to advance. Past 4 cm. I did sample tissue at this level although this was likely endocervical cells as opposed to endometrial tissue. The procedure was then terminated at this point in time. The tenaculum was removed and hemostasis obtained with silver nitrate. The patient tolerate the procedure well. EBL 15 mL.     EXAMINATION: US PELVIC TRANSABDOMINAL AND TRANSVAGINAL 2/10/2022 1:42  PM      CLINICAL HISTORY: Pelvic pain in female     COMPARISON: None         PROCEDURE COMMENTS: Ultrasound of the " pelvis was performed with color  Doppler.     FINDINGS:  Right ovary: 2.3 x 1.3 x 1.2 cm, volume of 1.9 mL.  Left ovary: 2.6 x 1.2 x 1.4 cm, volume of 2.3 mL.  Uterus: 6.5 x 4.5 x 3.9 cm, volume of 59.7 mL.  Endometrial stripe: 2.6 mm.     Both ovaries are visualized and are normal. Normal ovarian blood flow  as documented by color Doppler evaluation. The uterus is normal in  morphology. Scattered nabothian cysts. There are three small  subserosal/exophytic fibroids measuring 1.6 cm, 2.1 cm, and 0.9 cm.  The urinary bladder is well distended and appears normal. In the left  pelvis, there is a heterogeneous, solid mass measuring 6.6 x 5.1 x 4.5  cm. This is positioned between the left ovary and the left lateral  aspect of the uterus. Small amount of pelvic free fluid.                                                              IMPRESSION:  1. Solid left pelvic mass measuring 6.6 cm, positioned between the  left ovary and the uterus. The differential includes large  pedunculated uterine fibroid and solid ovarian mass. Recommend further  evaluation with pelvic MRI.  2. Several small subserosal/exophytic uterine fibroids.    MRI PELVIS WITH AND WITHOUT CONTRAST  2/24/2022 8:19 PM      HISTORY: Soft tissue mass, pelvis. Ultrasound/x-ray nondiagnostic.  Adnexal mass.     COMPARISON: Ultrasound from February 10, 2022      TECHNIQUE: Multiplanar multisequence imaging of the pelvis acquired  before and after administration of 8 mL Gadavist intravenous contrast.     FINDINGS: 5.1 x 6.0 x 5.1 cm mass is isointense and T2 isointense to  muscle. This appears avidly enhancing. This appears to deform the left  ovary significantly, I favor this is arising from the left ovary.  Ultimately a very pedunculated fibroid would be difficult to exclude.  No endometrial thickening for age. Within the endometrium is an 8 mm  T2 hyperintense focus which appears nonenhancing or minimally  enhancing. This may be an endothelial cyst. Additional  ill-defined  small T2 hypointense areas within the myometrium are evident which may  be small fibroids. Adenomyosis is an additional consideration in the  differential. Probable fundal subserosal fibroid measuring 1.7 cm on  the left. Probable exophytic subserosal fibroid measuring 1.4 cm on  the right. Junctional zone thickness unremarkable. The right ovary and  adnexa are unremarkable.  No adenopathy in the pelvis. Small amount of  free fluid. No worrisome osseous signal. No enlarged lymph nodes  demonstrated.                                                                      IMPRESSION: 5.1 x 6.0 x 5.1 cm mass. This appears to deform and expand  the left ovary significantly, I favor this is arising from the ovary.  Pedunculated fibroid ultimately not excluded but felt less likely.    Component      Latest Ref Rng & Units 2022         0 - 30 U/mL 10     Assessment and plan:   Inez Boston is a 45 YO  who presents for pre-operative planning regarding removal of a solid pelvic mass in the left adnexa   -We discussed that based on the above imaging reports this mass appears most likely to be ovarian in origin with a differential including an ovarian fibroma or fibrothecoma, a sergio tumor, mature teratoma, or pedunculated/ectopic fibroid would be less likely based on imaging characteristics. We also discussed the possibility of this being a malignancy although her CA-125 and imaging characteristics are reassuring. We discussed that if final pathology did confirm a malignancy that she would need to be referred to GYN oncology for further evaluation and treatment and that further surgery could be indicated.   -We reviewed that based on the difficulty ascertaining if this mass originates from the uterus or from the ovary, the procedure necessary to remove this mass would depend on our intraoperative findings so we spent a significant amount of time discussing the possible findings and possible  surgical procedures.   -I recommended starting with a diagnostic laparoscopy to further assess this mass. If it is ovarian in origin that I would recommend proceeding with a left oophorectomy and pelvic washings. If the mass is a pedunculated uterine fibroid we discussed the option of proceeding with a myomectomy vs hysterectomy. She would prefer to proceed with a myomectomy if possible and retain her uterus. We discussed that if the fibroid is pedunculated with a small stalk than this can likely be done laparoscopically and the fibroid placed into a endocatch bag and hand morcellated and removed through the umbilical incision.   -If this mass is a fibroid with a significant intramural component (which does not appear to be the case based on her imaging) I would recommend proceeding with a hysterectomy which she is in agreement with. Given the possibility of performing a laparoscopic hysterectomy which would require morcellation, we attempted to obtain a endometrial biopsy today but we were not able to adequately sample the endometrium. Given that she is not having any irregular or abnormal bleeding, she is low risk for endometrial cancer based on her history and she had a normal appearing endometrial stripe on her prior imaging, I think it is reasonable to proceed with her procedure without further attempts at endometrial sampling.   -We discussed the risk of spreading cancerous cells through morcellation in the event that this mass ultimately ended up being a ovarian malignancy or leiomyosarcoma and that this can lead more advanced progression of the malignancy. We attempt to minimize this risk by morcellating with the specimen in an endocatch bag although that does not eliminate the risk. We reviewed that this risk needs to be weighed against the increased risks associated with laparotomy which would be the alternative and given this information, she is comfortable with morcellation of the pelvic mass if we are  able to complete the procedure laparoscopically.   -Following this discussion we discussed the surgical plan of proceeding with a diagnostic laparoscopy, bilateral salpingectomy and removal of pelvic mass which may require a left oophorectomy, myomectomy or hysterectomy depending on the intraoperative findings as well as the possibility of converting to an open procedure. We reviewed the risks of surgery including the risk of bleeding, infection, damage to the bowel/bladder/ureter or other surrounding structures which could require further surgery to repair and the risk of poor wound healing. We discussed the typical recovery for the planned procedure and post-op restrictions. We also discussed the role of resident physicians/students in her procedure.   -All questions answered and patient feels comfortable proceeding with the planned procedure. Message sent to scheduling team.     Elda Mcpherson MD

## 2022-03-15 ENCOUNTER — TELEPHONE (OUTPATIENT)
Dept: OBGYN | Facility: CLINIC | Age: 47
End: 2022-03-15
Payer: COMMERCIAL

## 2022-03-15 NOTE — TELEPHONE ENCOUNTER
Type of surgery: gyn  Location of surgery: [facility] OR  Date and time of surgery: 06/03/22 7:30AM  Surgeon: Rebekah Mcpherson  Pre-Op Appt Date: 05/20/22 Valentina Mckeon  Post-Op Appt Date: not needed   Packet sent out: Yes  Pre-cert/Authorization completed:  Not Applicable  Date: 03/15/22     [facility] OB/GYN Surgery Scheduler

## 2022-03-16 LAB
PATH REPORT.COMMENTS IMP SPEC: NORMAL
PATH REPORT.COMMENTS IMP SPEC: NORMAL
PATH REPORT.FINAL DX SPEC: NORMAL
PATH REPORT.GROSS SPEC: NORMAL
PATH REPORT.MICROSCOPIC SPEC OTHER STN: NORMAL
PATH REPORT.RELEVANT HX SPEC: NORMAL
PHOTO IMAGE: NORMAL

## 2022-03-17 NOTE — PROGRESS NOTES
"GYN Progress Note     CC: F/U pelvic mass     HPI: Inez Boston is a 47 YO  who presents for follow up regarding a pelvic mass which was initially seen on pelvic US obtained due to pelvic pain.  A MRI was obtained to further delineate if the mass is uterine or ovarian in origin. She denies any other concerns today.     O: Vital signs:                      Weight: 72.3 kg (159 lb 6.4 oz)  Estimated body mass index is 24.24 kg/m  as calculated from the following:    Height as of 2/3/22: 1.727 m (5' 8\").    Weight as of this encounter: 72.3 kg (159 lb 6.4 oz).    EXAMINATION: US PELVIC TRANSABDOMINAL AND TRANSVAGINAL 2/10/2022 1:42  PM       CLINICAL HISTORY: Pelvic pain in female     COMPARISON: None         PROCEDURE COMMENTS: Ultrasound of the pelvis was performed with color  Doppler.     FINDINGS:  Right ovary: 2.3 x 1.3 x 1.2 cm, volume of 1.9 mL.  Left ovary: 2.6 x 1.2 x 1.4 cm, volume of 2.3 mL.  Uterus: 6.5 x 4.5 x 3.9 cm, volume of 59.7 mL.  Endometrial stripe: 2.6 mm.     Both ovaries are visualized and are normal. Normal ovarian blood flow  as documented by color Doppler evaluation. The uterus is normal in  morphology. Scattered nabothian cysts. There are three small  subserosal/exophytic fibroids measuring 1.6 cm, 2.1 cm, and 0.9 cm.  The urinary bladder is well distended and appears normal. In the left  pelvis, there is a heterogeneous, solid mass measuring 6.6 x 5.1 x 4.5  cm. This is positioned between the left ovary and the left lateral  aspect of the uterus. Small amount of pelvic free fluid.                                                                     IMPRESSION:  1. Solid left pelvic mass measuring 6.6 cm, positioned between the  left ovary and the uterus. The differential includes large  pedunculated uterine fibroid and solid ovarian mass. Recommend further  evaluation with pelvic MRI.  2. Several small subserosal/exophytic uterine fibroids.      MRI PELVIS WITH AND WITHOUT CONTRAST  " 2/24/2022 8:19 PM      HISTORY: Soft tissue mass, pelvis. Ultrasound/x-ray nondiagnostic.  Adnexal mass.     COMPARISON: Ultrasound from February 10, 2022      TECHNIQUE: Multiplanar multisequence imaging of the pelvis acquired  before and after administration of 8 mL Gadavist intravenous contrast.     FINDINGS: 5.1 x 6.0 x 5.1 cm mass is isointense and T2 isointense to  muscle. This appears avidly enhancing. This appears to deform the left  ovary significantly, I favor this is arising from the left ovary.  Ultimately a very pedunculated fibroid would be difficult to exclude.  No endometrial thickening for age. Within the endometrium is an 8 mm  T2 hyperintense focus which appears nonenhancing or minimally  enhancing. This may be an endothelial cyst. Additional ill-defined  small T2 hypointense areas within the myometrium are evident which may  be small fibroids. Adenomyosis is an additional consideration in the  differential. Probable fundal subserosal fibroid measuring 1.7 cm on  the left. Probable exophytic subserosal fibroid measuring 1.4 cm on  the right. Junctional zone thickness unremarkable. The right ovary and  adnexa are unremarkable.  No adenopathy in the pelvis. Small amount of  free fluid. No worrisome osseous signal. No enlarged lymph nodes  demonstrated.                                                                      IMPRESSION: 5.1 x 6.0 x 5.1 cm mass. This appears to deform and expand  the left ovary significantly, I favor this is arising from the ovary.  Pedunculated fibroid ultimately not excluded but felt less likely.     UMBERTO ARREDONDO MD     Component      Latest Ref Rng & Units 2/25/2022         0 - 30 U/mL 10       Endometrial Biopsy Procedure    After obtaining written consent from the patient and performing a time out a speculum was placed in the vagina. The cervix was visualized and prepped with betadine swabs. A tenaculum was used to grasp the cervix at the 12 o'clock position  and I attempted to pass a Pipelle through the internal os but was unsuccessful. Multiple attempts were made with an os finder and cervical dilators but I was unable to advance past about 4 centimeters. It was difficult to determine if this was resistance due to cervical stenosis of the internal os or if I was hitting the posterior wall of the lower uterine segment due to the sharp anteversion/anteflexion of the uterus. Due to the concern for uterine perforation with continued attempts, the procedure was terminated with the plan to repeat under US guidance at a later date.     Assessment and plan:   Inez Boston is a 45 YO  who presents for follow up for left adnexal mass  -We reviewed the differential diagnosis for this mass including benign or malignant ovarian mass or uterine fibroid. I personally reviewed her ultrasound images and agree with the findings of a solid adnexal mass, likely ovarian in origin.  Her CA-125 was negative and no imaging features to suggest malignancy so I felt it was reasonable for her to proceed with removal of the pelvic mass with general gynecology.   -We discussed the possible surgical procedure necessary to remove this mass including possible oophorectomy, myomectomy or hysterectomy. She would like to proceed with risk reducing bilateral salpingectomy at the time of surgery but would like to retain the uterus and right ovary if these appear normal at the time of her procedure.   -I recommended an endometrial biopsy due to the findings of a pelvic mass of unknown etiology and the plan to proceed with surgical removal which could include hysterectomy but the endometrial biopsy was unsuccessful today. Will plan to repeat with cervical preporation with misoprostol and under ultrasound guidance.     Dispo: RTC for repeat endometrial biopsy and further counseling regarding the planned surgical procedure    Elda Mcpherson MD

## 2022-03-20 NOTE — PROGRESS NOTES
HPI;    Last visit with us 12/22/2021. Overall stable. She still has neck and abdominal muscle complaints. She is getting pelvic PT. She has seen GYN, Dr. Mcpherson as well as Dr. Fischer, PMR and botox is being considered. She is on Baclofen (from her Psychiatrist) and this sees beneficial for sleep and muscle spasms. She continues to work from home. She has found it more difficult to get exercise in [place] compared to [place]. No other HEENT, cardiopulmonary, abdominal, , GYN, neurological, systemic, psychiatric, lymphatic, endocrine, vascular complaints.         PE:    Vitals noted, gen, nad, cooperative, alert, neck supple nl rom, lungs with good air movement, RRR, S1, S2, no MRG, abdomen, no acute findings. No change in neurological exam.     A/P:    1. Immunizations;  COVID vaccine Pfizer x 1. Influenza vaccine 10/1/2021. Td/Tdap today 3/21/2022  2. Hematology appt. With Dr. Caceres 5/31/2022. See note from 2/2/2022 regarding thrombocytosis. She is on ASA    3. PMR follow up with Dr. Fischer 3/23/2022; see visit from 2/9/2022 regarding neck and myofacial pain. On baclofen more for sleep   4. GYN note Dr. Mcpherson, 3/11/2022 and 2/28/2022 regarding endometrial bx. plevic mass and pelvic pain.   5. On Cymbalta and Vyvanse and she finds this beneficial   6. Mammogram 1/11/2022  7. She has seen PT for groin pain 2/24/2022 and appt. 3/21/2022  8. Genetic counseling given family history significant for breat cancer on 4/28/2022  9. Colonoscopy 12/7/2021 and repeat in 10 years   10. Lipids 175 and HDL 80. Will hold on statin today given her musculoskeletal complaints. Will recheck in about 2 months and if still elevated have her see Dr. Bisi Wilkinson [facility]       30 minutes spent on the date of the encounter doing chart review, history and exam, documentation and further activities as noted above

## 2022-03-21 ENCOUNTER — THERAPY VISIT (OUTPATIENT)
Dept: PHYSICAL THERAPY | Facility: CLINIC | Age: 47
End: 2022-03-21
Payer: COMMERCIAL

## 2022-03-21 ENCOUNTER — OFFICE VISIT (OUTPATIENT)
Dept: INTERNAL MEDICINE | Facility: CLINIC | Age: 47
End: 2022-03-21
Payer: COMMERCIAL

## 2022-03-21 VITALS
HEART RATE: 87 BPM | HEIGHT: 68 IN | DIASTOLIC BLOOD PRESSURE: 73 MMHG | RESPIRATION RATE: 16 BRPM | BODY MASS INDEX: 24.25 KG/M2 | SYSTOLIC BLOOD PRESSURE: 116 MMHG | WEIGHT: 160 LBS | OXYGEN SATURATION: 100 %

## 2022-03-21 DIAGNOSIS — R10.2 PELVIC PAIN IN FEMALE: ICD-10-CM

## 2022-03-21 DIAGNOSIS — M99.05 SOMATIC DYSFUNCTION OF PELVIS REGION: ICD-10-CM

## 2022-03-21 DIAGNOSIS — N39.41 URGE INCONTINENCE: ICD-10-CM

## 2022-03-21 DIAGNOSIS — Z23 NEED FOR TDAP VACCINATION: ICD-10-CM

## 2022-03-21 DIAGNOSIS — G24.1 DYSTONIA, TORSION, FRAGMENTS OF: Primary | ICD-10-CM

## 2022-03-21 PROCEDURE — 97140 MANUAL THERAPY 1/> REGIONS: CPT | Mod: GP | Performed by: PHYSICAL THERAPIST

## 2022-03-21 PROCEDURE — 97110 THERAPEUTIC EXERCISES: CPT | Mod: GP | Performed by: PHYSICAL THERAPIST

## 2022-03-21 PROCEDURE — 90715 TDAP VACCINE 7 YRS/> IM: CPT | Performed by: INTERNAL MEDICINE

## 2022-03-21 PROCEDURE — 90471 IMMUNIZATION ADMIN: CPT | Performed by: INTERNAL MEDICINE

## 2022-03-21 PROCEDURE — 99214 OFFICE O/P EST MOD 30 MIN: CPT | Mod: 25 | Performed by: INTERNAL MEDICINE

## 2022-03-21 RX ORDER — BACLOFEN 10 MG/1
TABLET ORAL
COMMUNITY
Start: 2022-03-13 | End: 2023-04-26

## 2022-03-21 ASSESSMENT — PAIN SCALES - GENERAL: PAINLEVEL: MILD PAIN (2)

## 2022-03-21 NOTE — NURSING NOTE
Inez Boston is a 46 year old female patient that presents today in clinic for the following:    Chief Complaint   Patient presents with     RECHECK     Pt comes into clinic for 3 month follow up, discuss upcoming surgery     The patient's allergies and medications were reviewed as noted. A set of vitals were recorded as noted without incident. The patient does not have any other questions for the provider.    Ely Batista, EMT at 12:11 PM on 3/21/2022

## 2022-03-21 NOTE — PROGRESS NOTES
Physical Therapy Initial Evaluation  Subjective:  HPI                    Objective:  System    Physical Exam    General     ROS    Assessment/Plan:    PROGRESS  REPORT    Progress reporting period is from 2/24/2022 to 3/21/2022.       SUBJECTIVE  Subjective changes noted by patient:  .  Subjective: Saw physiatrist and will get Botox for dystonia in the neck. Will also see neurologist.   No significant change in symptoms in the pelvic region with tight band felt across abdomen.   Current pain level is 3/10  .     Previous pain level was   Initial Pain level: 3/10.   Changes in function:  Yes (See Goal flowsheet attached for changes in current functional level)  Adverse reaction to treatment or activity: None    OBJECTIVE  Changes noted in objective findings:  Yes,   Objective: Discussed what exercises to focus on after surgery for protection after scurgery and then scar mobilization after it heals and with clearance from MD.  Some relief of lower abdominal tightness with prone extension stretch and myofascial release today.       ASSESSMENT/PLAN  Updated problem list and treatment plan: Diagnosis 1:  Pelvic dysfunction  Pain -  manual therapy, self management, education and home program  Decreased ROM/flexibility - manual therapy, therapeutic exercise and home program  Decreased strength - therapeutic exercise and therapeutic activities  Impaired muscle performance - biofeedback, electric stimulation, neuro re-education and home program  Decreased function - therapeutic activities and home program  STG/LTGs have been met or progress has been made towards goals:  No significant change  Assessment of Progress: The patient's condition has potential to improve.  Self Management Plans:  Patient has been instructed in a home treatment program.  I have re-evaluated this patient and find that the nature, scope, duration and intensity of the therapy is appropriate for the medical condition of the patient.  Inez continues to  require the following intervention to meet STG and LTG's:  PT    Recommendations:  This patient would benefit from further evaluation.  Return for further PT after pelvic surgery.    Please refer to the daily flowsheet for treatment today, total treatment time and time spent performing 1:1 timed codes.

## 2022-03-21 NOTE — TELEPHONE ENCOUNTER
FUTURE VISIT INFORMATION      SURGERY INFORMATION:    Date: 6/3/22    Location: ur or    Surgeon:  Elda Mcpherson MD Olson, Amanda Elizabeth, MD    Anesthesia Type:  general    Procedure: Diagnostic laparoscopy, removal of pelvic mass and bilateral salpingectomy (possible left oophorectomy, possible myomectomy, possible total hysterectomy, possible coversion to laparotomy)     Consult: ov 3/11    RECORDS REQUESTED FROM:        Primary Care Provider: Tony Segal MD- [facility]    Most recent EKG+ Tracin18- [facility]    Most recent ECHO: 18- [facility]

## 2022-03-23 ENCOUNTER — OFFICE VISIT (OUTPATIENT)
Dept: PHYSICAL MEDICINE AND REHAB | Facility: CLINIC | Age: 47
End: 2022-03-23
Payer: COMMERCIAL

## 2022-03-23 VITALS
RESPIRATION RATE: 16 BRPM | OXYGEN SATURATION: 99 % | HEART RATE: 98 BPM | DIASTOLIC BLOOD PRESSURE: 80 MMHG | SYSTOLIC BLOOD PRESSURE: 130 MMHG | TEMPERATURE: 97.8 F

## 2022-03-23 DIAGNOSIS — G24.3 SPASMODIC TORTICOLLIS: Primary | ICD-10-CM

## 2022-03-23 PROCEDURE — 64616 CHEMODENERV MUSC NECK DYSTON: CPT | Mod: 59 | Performed by: PHYSICAL MEDICINE & REHABILITATION

## 2022-03-23 PROCEDURE — 96372 THER/PROPH/DIAG INJ SC/IM: CPT | Mod: GC | Performed by: PHYSICAL MEDICINE & REHABILITATION

## 2022-03-23 PROCEDURE — 95874 GUIDE NERV DESTR NEEDLE EMG: CPT | Mod: GC | Performed by: PHYSICAL MEDICINE & REHABILITATION

## 2022-03-23 NOTE — LETTER
3/23/2022       RE: Inez Boston  [address] [place] [address]     Dear Colleague,    Thank you for referring your patient, Inez Boston, to the [facility] [place] at [facility]. Please see a copy of my visit note below.        [facility]     PM&R CLINIC NOTE  BOTULINUM TOXIN PROCEDURE      HPI  Chief Complaint   Patient presents with     Botox     Inez Boston is a 46 year old female who presents to Botox clinic for initial injections for management of neck pain, myofascial pain, and persistent muscle spasms.      SINCE LAST VISIT  Patient reports being diagnosed with a pelvic mass of unknown pathology however considered benign. She questions whether this is contributing to her overall symptoms and dystonia. She otherwise continues to reports similar symptoms since her initial evaluation on 02/09/2022.    Patient denies new medical diagnoses, illnesses, hospitalizations, emergency room visits, and injuries since the previous injection with botulinum neurotoxin.    We reviewed the recommended safety guidelines for  Botox from any vaccine injection, such as the seasonal flu vaccine, by a minimum of 10-14 days with Inez Boston. She acknowledged understanding.      PHYSICAL EXAM  /80   Pulse 98   Temp 97.8  F (36.6  C)   Resp 16   SpO2 99%    AROM ASSESSMENT - HEAD AND NECK: Limited ROM with rotation to the left  HEAD, NECK AND TRUNK PATTERN:   Head & Neck Flexion:  Present  Head & Neck Rotation:   Right  Head & Neck Lateral Bend:   Right  Shoulder Elevation:   Right  JAW AND FACIAL PATTERN:   Jaw Clenching:   Bilateral  VOCAL INVOLVEMENT:   No  SPASMS: Dystonic movements with irritation       ALLERGIES  No Known Allergies      CURRENT MEDICATIONS    Current Outpatient Medications:      B Complex Vitamins (VITAMIN-B COMPLEX PO), Take 1  tablet by mouth every other day, Disp: , Rfl:      baclofen (LIORESAL) 10 MG tablet, , Disp: , Rfl:      cholecalciferol (VITAMIN D3) 25 mcg (1000 units) capsule, 2,000 Units, Disp: , Rfl:      DULoxetine HCl 40 MG CPEP, daily, Disp: , Rfl:      ibuprofen (ADVIL/MOTRIN) 400 MG tablet, Take 1 tablet by mouth daily as needed , Disp: , Rfl:      lisdexamfetamine (VYVANSE) 40 MG capsule, Take 40 mg by mouth every morning, Disp: , Rfl:     Current Facility-Administered Medications:      botulinum toxin type A (BOTOX) 100 units injection 200 Units, 200 Units, Intramuscular, Q90 Days, Elizabeth Fischer MD       RESPONSE TO PREVIOUS TREATMENT  N/A - Initial treatment.       BOTULINUM NEUROTOXIN INJECTION PROCEDURES    VERIFICATION OF PATIENT IDENTIFICATION AND PROCEDURE     Initials   Patient Name nep   Patient  nep   Procedure Verified by: nep     Prior to the start of the procedure and with procedural staff participation, I verbally confirmed the patient s identity using two indicators, relevant allergies, that the procedure was appropriate and matched the consent or emergent situation, and that the correct equipment/implants were available. Immediately prior to starting the procedure I conducted the Time Out with the procedural staff and re-confirmed the patient s name, procedure, and site/side. (The Joint Commission universal protocol was followed.)  Yes    Sedation (Moderate or Deep): None    ABOVE ASSESSMENTS PERFORMED BY  Resident/Fellow: Dean Trujillo  The attending provider was present for the entire procedure documented below.    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  Her baseline symptoms have been recalcitrant to oral medications and conservative therapy.  She is here today for injection with Botox.    GOAL OF PROCEDURE  The goal of this procedure is to increase active range of motion, decrease pain, and decrease  involuntary muscle spasms.    TOTAL DOSE ADMINISTERED  Dose Administered:  150 units  Botox (Botulinum Toxin Type A)       2:1 Dilution   Unavoidable Drug Waste: Yes  Amount of drug waste (mL): 50 units Botox.  Reason for waste:  Single use vial.  Diluent Used:  Preservative Free Normal Saline  Total Volume of Diluent Used:  3 ml  Lot # [id number] with Expiration Date:  05/2024  NDC #: Botox 100u ([address]-31)    Medication guide was offered to patient and was declined.      CONSENT  The risks, benefits, and treatment options were discussed with Inez Boston and she agreed to proceed.    Written consent was obtained by JS.       EQUIPMENT USED  Needle-25mm stimulating/recording  EMG/NCS Machine    SKIN PREPARATION  Skin preparation was performed using an alcohol wipe.    GUIDANCE DESCRIPTION  Electro-myographic guidance was necessary throughout the procedure to accurately identify all areas of dystonic muscles while avoiding injection of non-dystonic muscles, neighboring nerves and nearby vascular structures.     AREA/MUSCLE INJECTED: 150 UNITS BOTOX = TOTAL DOSE, 2:1 DILUTION    1. NECK & SHOULDER GIRDLE MUSCLES: 45 UNITS BOTOX = TOTAL DOSE, 2:1 DILUTION    Right Upper Trapezius (upper cervical) - 5 units of Botox at 1 site/s.   Left Upper Trapezius (upper cervical) - 5 units of Botox at 1 site/s.    Right Splenius Cervicis - 5 units of Botox at 1 site/s.   Left Splenius Cervicis -  5 units of Botox at 1 site/s.    Right Levator Scapulae - 5 units of Botox at 1 site/s.   Left Levator Scapulae - 10 units of Botox at 1 site/s.    Right Rhomboids - 5 units at 1 site  Left Rhomboids - 5 units at 1 site    2. FACIAL & SCALP MUSCLES: 105 UNITS BOTOX = TOTAL DOSE, 2:1 DILUTION  Right Frontalis - 10 units of Botox at 2 site/s.  Left Frontalis - 10 units of Botox at 2 site/s.    Right Temporalis - 25 units of Botox at 5 site/s.  Left Temporalis - 25 units of Botox at 5 site/s.    Left Occipitalis - 20 units of Botox at 5  site/s.     Right  - 5 units of Botox at 1 site/s.   Left  - 5 units of Botox at 1 site/s.    Procerus - 5 units of Botox at 1 site/s.      RESPONSE TO PROCEDURE  Inez Boston tolerated the procedure well and there were no immediate complications.   She was allowed to recover for an appropriate period of time and was discharged home in stable condition.    ASSESSMENT AND PLAN   1. Initial Botox injections today for management of cervical dystonia. Patient will monitor response to Botox and report her response at next appointment. She was given our direct clinic to call for questions or concerns if they happen to come up prior to her next appointment in 12 weeks.    2. Follow up: She was rescheduled for the next series of injections in 12 weeks.        Elizabeth Fischer MD

## 2022-03-23 NOTE — PROGRESS NOTES
[facility]     PM&R CLINIC NOTE  BOTULINUM TOXIN PROCEDURE      HPI  Chief Complaint   Patient presents with     Botox     Inez Boston is a 46 year old female who presents to Botox clinic for initial injections for management of neck pain, myofascial pain, and persistent muscle spasms.      SINCE LAST VISIT  Patient reports being diagnosed with a pelvic mass of unknown pathology however considered benign. She questions whether this is contributing to her overall symptoms and dystonia. She otherwise continues to reports similar symptoms since her initial evaluation on 02/09/2022.    Patient denies new medical diagnoses, illnesses, hospitalizations, emergency room visits, and injuries since the previous injection with botulinum neurotoxin.    We reviewed the recommended safety guidelines for  Botox from any vaccine injection, such as the seasonal flu vaccine, by a minimum of 10-14 days with Inez Boston. She acknowledged understanding.      PHYSICAL EXAM  /80   Pulse 98   Temp 97.8  F (36.6  C)   Resp 16   SpO2 99%    AROM ASSESSMENT - HEAD AND NECK: Limited ROM with rotation to the left  HEAD, NECK AND TRUNK PATTERN:   Head & Neck Flexion:  Present  Head & Neck Rotation:   Right  Head & Neck Lateral Bend:   Right  Shoulder Elevation:   Right  JAW AND FACIAL PATTERN:   Jaw Clenching:   Bilateral  VOCAL INVOLVEMENT:   No  SPASMS: Dystonic movements with irritation       ALLERGIES  No Known Allergies      CURRENT MEDICATIONS    Current Outpatient Medications:      B Complex Vitamins (VITAMIN-B COMPLEX PO), Take 1 tablet by mouth every other day, Disp: , Rfl:      baclofen (LIORESAL) 10 MG tablet, , Disp: , Rfl:      cholecalciferol (VITAMIN D3) 25 mcg (1000 units) capsule, 2,000 Units, Disp: , Rfl:      DULoxetine HCl 40 MG CPEP, daily, Disp: , Rfl:      ibuprofen (ADVIL/MOTRIN) 400 MG tablet, Take 1 tablet by mouth daily as needed , Disp: , Rfl:       lisdexamfetamine (VYVANSE) 40 MG capsule, Take 40 mg by mouth every morning, Disp: , Rfl:     Current Facility-Administered Medications:      botulinum toxin type A (BOTOX) 100 units injection 200 Units, 200 Units, Intramuscular, Q90 Days, Elizabeth Fischer MD       RESPONSE TO PREVIOUS TREATMENT  N/A - Initial treatment.       BOTULINUM NEUROTOXIN INJECTION PROCEDURES    VERIFICATION OF PATIENT IDENTIFICATION AND PROCEDURE     Initials   Patient Name nep   Patient  nep   Procedure Verified by: nep     Prior to the start of the procedure and with procedural staff participation, I verbally confirmed the patient s identity using two indicators, relevant allergies, that the procedure was appropriate and matched the consent or emergent situation, and that the correct equipment/implants were available. Immediately prior to starting the procedure I conducted the Time Out with the procedural staff and re-confirmed the patient s name, procedure, and site/side. (The Joint Commission universal protocol was followed.)  Yes    Sedation (Moderate or Deep): None    ABOVE ASSESSMENTS PERFORMED BY  Resident/Fellow: Dean Trujillo  The attending provider was present for the entire procedure documented below.    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  Her baseline symptoms have been recalcitrant to oral medications and conservative therapy.  She is here today for injection with Botox.    GOAL OF PROCEDURE  The goal of this procedure is to increase active range of motion, decrease pain, and decrease involuntary muscle spasms.    TOTAL DOSE ADMINISTERED  Dose Administered:  150 units  Botox (Botulinum Toxin Type A)       2:1 Dilution   Unavoidable Drug Waste: Yes  Amount of drug waste (mL): 50 units Botox.  Reason for waste:  Single use vial.  Diluent Used:  Preservative Free Normal Saline  Total Volume of Diluent Used:  3 ml  Lot # [id number]  with Expiration Date:  05/2024  NDC #: Botox 100u ([address]-43)    Medication guide was offered to patient and was declined.      CONSENT  The risks, benefits, and treatment options were discussed with Inez Boston and she agreed to proceed.    Written consent was obtained by JAYLENE.       EQUIPMENT USED  Needle-25mm stimulating/recording  EMG/NCS Machine    SKIN PREPARATION  Skin preparation was performed using an alcohol wipe.    GUIDANCE DESCRIPTION  Electro-myographic guidance was necessary throughout the procedure to accurately identify all areas of dystonic muscles while avoiding injection of non-dystonic muscles, neighboring nerves and nearby vascular structures.     AREA/MUSCLE INJECTED: 150 UNITS BOTOX = TOTAL DOSE, 2:1 DILUTION    1. NECK & SHOULDER GIRDLE MUSCLES: 45 UNITS BOTOX = TOTAL DOSE, 2:1 DILUTION    Right Upper Trapezius (upper cervical) - 5 units of Botox at 1 site/s.   Left Upper Trapezius (upper cervical) - 5 units of Botox at 1 site/s.    Right Splenius Cervicis - 5 units of Botox at 1 site/s.   Left Splenius Cervicis -  5 units of Botox at 1 site/s.    Right Levator Scapulae - 5 units of Botox at 1 site/s.   Left Levator Scapulae - 10 units of Botox at 1 site/s.    Right Rhomboids - 5 units at 1 site  Left Rhomboids - 5 units at 1 site    2. FACIAL & SCALP MUSCLES: 105 UNITS BOTOX = TOTAL DOSE, 2:1 DILUTION  Right Frontalis - 10 units of Botox at 2 site/s.  Left Frontalis - 10 units of Botox at 2 site/s.    Right Temporalis - 25 units of Botox at 5 site/s.  Left Temporalis - 25 units of Botox at 5 site/s.    Left Occipitalis - 20 units of Botox at 5 site/s.     Right  - 5 units of Botox at 1 site/s.   Left  - 5 units of Botox at 1 site/s.    Procerus - 5 units of Botox at 1 site/s.      RESPONSE TO PROCEDURE  Inez Boston tolerated the procedure well and there were no immediate complications.   She was allowed to recover for an appropriate period of time and was  discharged home in stable condition.    ASSESSMENT AND PLAN   1. Initial Botox injections today for management of cervical dystonia. Patient will monitor response to Botox and report her response at next appointment. She was given our direct clinic to call for questions or concerns if they happen to come up prior to her next appointment in 12 weeks.    2. Follow up: She was rescheduled for the next series of injections in 12 weeks.

## 2022-03-28 NOTE — TELEPHONE ENCOUNTER
RECORDS RECEIVED FROM: Internal   REASON FOR VISIT: Dystonia   Date of Appt: 5/27/22   NOTES (FOR ALL VISITS) STATUS DETAILS   OFFICE NOTE from referring provider Internal Dr Tony Segal @ [facility]:  3/21/22   OFFICE NOTE from other specialist Internal/CE Dr Fischer @ [facility]:  3/23/22  2/9/22    Dr Leigh Duke @ [facility]:  2/9/21 2/4/21    Dr Jeny Mims @ [place], [place]:  11/11/20  10/7/20    Dr Gera Christensen @ [place], [place]:  10/18/18  7/30/18   MEDICATION LIST Internal    IMAGING  (FOR ALL VISITS)     EMG Care Everywhere [place]:  2/4/21   MRI (HEAD, NECK, SPINE) Received [place]:  MRI Pituitary 2/5/21    [place], [place]:  MRI Brain 10/22/20  MRI Cervical Spine 10/22/20  MRI Thoracic Spine 10/22/20  MRI Brain 6/26/18  MRA Head Neck 6/26/18   CT (HEAD, NECK, SPINE) Received [place], [place]:  CT Head 6/25/18      Action 3/28/22 MV 1.10pm   Action Taken 1) imaging request faxed to [place]    --4/29/22 MV 11.29am--  2nd request faxed   [place] Fax # [phone]  Tracking # [id number]    --5/16/22 MV 12.05pm--  Imaging disk received and images resolved in PACS

## 2022-04-28 ENCOUNTER — VIRTUAL VISIT (OUTPATIENT)
Dept: ONCOLOGY | Facility: CLINIC | Age: 47
End: 2022-04-28
Attending: INTERNAL MEDICINE
Payer: COMMERCIAL

## 2022-04-28 DIAGNOSIS — R79.89 ELEVATED PLATELET COUNT: ICD-10-CM

## 2022-04-28 DIAGNOSIS — Z80.0 FAMILY HISTORY OF COLON CANCER: ICD-10-CM

## 2022-04-28 DIAGNOSIS — Z80.3 FAMILY HISTORY OF MALIGNANT NEOPLASM OF BREAST: Primary | ICD-10-CM

## 2022-04-28 PROCEDURE — 96040 HC GENETIC COUNSELING, EACH 30 MINUTES: CPT | Mod: GT,95 | Performed by: GENETIC COUNSELOR, MS

## 2022-04-28 NOTE — PROGRESS NOTES
Inez is a 46 year old who is being evaluated via a billable video visit.      Pt is in [place]    How would you like to obtain your AVS? MyChart  If the video visit is dropped, the invitation should be resent by: Text to cell phone: [phone]  Will anyone else be joining your video visit? Sugey MEZA

## 2022-04-28 NOTE — LETTER
Cancer Risk Management  Program Locations    [facility]  [facility]  [facility]  [facility]  [facility]  Mailing Address  Cancer Risk Management Program  [facility]  [address]  [place] [address]    New patient appointments  [phone]  April 29, 2022    Inez Boston  [address] [place] [address]      Dear Inez,    It was a pleasure talking with you and your parents on 4-. Here is a copy of the progress note from your recent genetic counseling visit to the Cancer Risk Management Program. If you have any additional questions, please feel free to contact me.      Cancer Risk Management Program Genetic Counseling Note    4/28/2022    Referring Provider: Dr. Nam Caceres    Presenting Information:   I had a video visit with Inez Boston today for genetic counseling through the Cancer Risk Management Program, in order to discuss her family history of breast, colon, and other cancer.  She presents today to review this history, cancer screening recommendations, and available genetic testing options. Inez's parents were present for part of this conversation.    Personal History:  Inez is a 46 year old female. She does not have any personal history of cancer. 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      She also reports a history of issues with muscle pain (eg. 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      In addition, as part of her evaluation for abdominal pain, Inez underwent abdominal imaging, which has noted a mass between her left ovary  and uterus; the imaging report notes that this mass could be consistent with a fibroid, but this mass was unable to be biopsied.  Inez reports that she plans to have this mass surgically removed in .    We respect to her hormone-based medical history, Inez reports that she had her first menstrual period at age 12.  She does not have any biologocal children.  Inez reports that she took oral contraceptives for about 10 years in her 20s.  She states that she is currently pre-menopausal.  Inez currently has her ovaries, fallopian tubes, and uterus intact; she states that depending on what is found in her upcoming abdominal surgery for her pelvic mass, it is possible that she may have some of those organs removed.     With respect to her previous cancer screening, Inez had a mammogram in January, for which it was originally recommended that she have follow-up views.  Inez reports today that the radiology department was able to obtain copies of [place] and that she was subsequently told that it was felt that her most recent mammogram was consistent with her previous mammograms, and so no additional imaging was needed at that time.  Inez had a colonoscopy in 2021, which did not note any polyps; a follow-up colonoscopy was recommended for 10 years from that time. Inez reports that she does not have a history of any abnormal Pap smears.  She does not do any other cancer screening at this time.    Family History: (Please see scanned pedigree for detailed family history information); the following is per Inez and her parents report:    Inez's mother reports that she was diagnosed with breast cancer at the age of 48, for which she underwent surgical (but not other) treatment.    It is reported that Inez's maternal grandmother was diagnosed with colon cancer in her late 40s and, unfortunately,  of complications of that cancer at age 55. Inez's mother reports that  "this individual's sister (ie. Inez's great aunt) may have had uterine cancer and that this individual's mother (ie. Inez's great-grandmother) may have also  related to cancer in her 40s.    It was reported that Inez's maternal grandfather was diagnosed with colon cancer in his 70s and, unfortunately,  of complications of that disease.    They are not aware of any cancers on Inez's father's side of the family.    With respect to Inez's recent diagnosis of cervical dystonia, Inez's mother states that she has a personal history of occasional muscle spasms and twitching.  She also reports that Inez's maternal uncle also has a history of muscle pain/twitching that he was told was related to overuse of his muscles and a \"build up of lactic acid.\"  (We talked about that Inez should share this family history information with her provider at her upcoming neurology appointment.)    Inez reports that her family is of  ancestry.  There is no known Ashkenazi Yazdanism ancestry on either side of her family. There is no reported consanguinity.    Discussion:    We reviewed the features of sporadic, familial, and hereditary cancers. In looking at Inez's family history, it is possible that a cancer susceptibility gene is present due to the family history of early onset breast cancer in her mother and also the history of early onset colon cancer in her maternal grandmother.  In additional, although the available details from the extended family is somewhat limited, the history of a possible uterine cancer in her maternal grandmother's sister (as uterine and colon cancer can be related to each other in some hereditary cancer conditions) and also the early onset cancer in her maternal grandmother's mother is also further supportive of the question about a possible hereditary cancer risk factor in Inez's mother's side of the family.      We discussed the natural history and genetics of hereditary cancer syndromes " in general. A detailed handout regarding some of the hereditary cancer syndromes associated with breast cancer and the information we discussed was provided to Inez at the end of our appointment today and can be found in the after visit summary. Topics included: inheritance pattern, cancer risks, cancer screening recommendations, and also risks, benefits and limitations of testing.      We discussed that there are a variety of genes that could cause increased risk for breast or colon (or other) cancer. As many of these genes present with overlapping features in a family and accurate cancer risk cannot always be established based upon the pedigree analysis alone, it would be reasonable for Inez to consider panel genetic testing to analyze multiple hereditary cancer genes at once.      Inez stated that she is interested in pursuing genetic testing through a panel of genes associated with hereditary cancer syndromes, and so we reviewed the various panel options and their potential benefits and limitations.  After this conversation, Inez decided that she was interested in the Common Hereditary Cancers genetic testing panel through [facility].        Medical Management: For Inez, we reviewed that the information from genetic testing may determine:    additional cancer screening for which Inez may qualify (i.e. mammogram and breast MRI, more frequent colonoscopies, more frequent dermatologic exams, etc.),    options for risk-reducing surgeries Inez could consider (i.e. bilateral mastectomy, surgery to remove her ovaries and/or uterus, etc.),      and targeted therapies for Inez if she were to develop certain cancers in the future (i.e. lumpectomy versus mastectomy,  immunotherapy for individuals with Manzano syndrome, PARP inhibitors for HBOC syndrome, etc.).       These recommendations and possible targeted therapies will be discussed in detail once genetic testing is completed.     Plan:  1) Today, Inez decided to  proceed with a BRCA1/BRCA2 analysis with an automatic reflex to the Common Hereditary Cancers genetic testing panel through [facility].  Therefore, consent was reviewed and verbally obtaine for this testing.  In particular, Inez was interested in obtaining this information prior to her upcoming gynecological surgery, in case this information would alter the surgery plans.    2) We reviewed that options for sample collection.  Inez plans to have her blood drawn at her local [facility] lab.  Test results are usually expected to be available within 4 weeks of that blood draw.    3) Inez will either have a telephone visit or video visit to discuss the results.  Additional recommendations about screening will be made at that time.    Alejandrina Caceres MS, [facility]  Genetic Counselor  Ph: [phone]    Face to face time: 60 minutes

## 2022-04-28 NOTE — LETTER
4/28/2022         RE: Inez Boston  [address] [place] [address]        Dear Colleague,    Thank you for referring your patient, Inez Boston, to the [facility]. Please see a copy of my visit note below.    Inez is a 46 year old who is being evaluated via a billable video visit.      Pt is in [place]    How would you like to obtain your AVS? MyChart  If the video visit is dropped, the invitation should be resent by: Text to cell phone: [phone]  Will anyone else be joining your video visit? Sugey Hinojosa       Cancer Risk Management Program Genetic Counseling Note    4/28/2022    Referring Provider: Dr. Nam Caceres    Presenting Information:   I had a video visit with Inez Boston today for genetic counseling through the Cancer Risk Management Program, in order to discuss her family history of breast, colon, and other cancer.  She presents today to review this history, cancer screening recommendations, and available genetic testing options. Inez's parents were present for part of this conversation.    Personal History:  Inez is a 46 year old female. She does not have any personal history of cancer. 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      She also reports a history of issues with muscle pain (eg. 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      In addition, as part of her evaluation for abdominal pain, Inez underwent abdominal imaging, which has noted a mass between her left ovary and uterus; the imaging report notes that this mass could be consistent with a fibroid, but this mass was unable to be biopsied.  Inez reports that she plans to have this mass  surgically removed in .    We respect to her hormone-based medical history, Inez reports that she had her first menstrual period at age 12.  She does not have any biologocal children.  Inez reports that she took oral contraceptives for about 10 years in her 20s.  She states that she is currently pre-menopausal.  Inez currently has her ovaries, fallopian tubes, and uterus intact; she states that depending on what is found in her upcoming abdominal surgery for her pelvic mass, it is possible that she may have some of those organs removed.     With respect to her previous cancer screening, Inez had a mammogram in January, for which it was originally recommended that she have follow-up views.  Inez reports today that the radiology department was able to obtain copies of [place] and that she was subsequently told that it was felt that her most recent mammogram was consistent with her previous mammograms, and so no additional imaging was needed at that time.  Inez had a colonoscopy in 2021, which did not note any polyps; a follow-up colonoscopy was recommended for 10 years from that time. Inez reports that she does not have a history of any abnormal Pap smears.  She does not do any other cancer screening at this time.    Family History: (Please see scanned pedigree for detailed family history information); the following is per Inez and her parents report:    Inez's mother reports that she was diagnosed with breast cancer at the age of 48, for which she underwent surgical (but not other) treatment.    It is reported that Inez's maternal grandmother was diagnosed with colon cancer in her late 40s and, unfortunately,  of complications of that cancer at age 55. Inez's mother reports that this individual's sister (ie. Inez's great aunt) may have had uterine cancer and that this individual's mother (ie. Inez's great-grandmother) may have also  related to  "cancer in her 40s.    It was reported that Inez's maternal grandfather was diagnosed with colon cancer in his 70s and, unfortunately,  of complications of that disease.    They are not aware of any cancers on Inez's father's side of the family.    With respect to Inez's recent diagnosis of cervical dystonia, Inez's mother states that she has a personal history of occasional muscle spasms and twitching.  She also reports that Inez's maternal uncle also has a history of muscle pain/twitching that he was told was related to overuse of his muscles and a \"build up of lactic acid.\"  (We talked about that Inez should share this family history information with her provider at her upcoming neurology appointment.)    Inez reports that her family is of  ancestry.  There is no known Ashkenazi Cheondoism ancestry on either side of her family. There is no reported consanguinity.    Discussion:    We reviewed the features of sporadic, familial, and hereditary cancers. In looking at Inez's family history, it is possible that a cancer susceptibility gene is present due to the family history of early onset breast cancer in her mother and also the history of early onset colon cancer in her maternal grandmother.  In additional, although the available details from the extended family is somewhat limited, the history of a possible uterine cancer in her maternal grandmother's sister (as uterine and colon cancer can be related to each other in some hereditary cancer conditions) and also the early onset cancer in her maternal grandmother's mother is also further supportive of the question about a possible hereditary cancer risk factor in Inez's mother's side of the family.      We discussed the natural history and genetics of hereditary cancer syndromes in general. A detailed handout regarding some of the hereditary cancer syndromes associated with breast cancer and the information we discussed was provided to Inez at the " end of our appointment today and can be found in the after visit summary. Topics included: inheritance pattern, cancer risks, cancer screening recommendations, and also risks, benefits and limitations of testing.      We discussed that there are a variety of genes that could cause increased risk for breast or colon (or other) cancer. As many of these genes present with overlapping features in a family and accurate cancer risk cannot always be established based upon the pedigree analysis alone, it would be reasonable for Inez to consider panel genetic testing to analyze multiple hereditary cancer genes at once.      Inez stated that she is interested in pursuing genetic testing through a panel of genes associated with hereditary cancer syndromes, and so we reviewed the various panel options and their potential benefits and limitations.  After this conversation, Inez decided that she was interested in the Common Hereditary Cancers genetic testing panel through [facility].        Medical Management: For Inez, we reviewed that the information from genetic testing may determine:    additional cancer screening for which Inez may qualify (i.e. mammogram and breast MRI, more frequent colonoscopies, more frequent dermatologic exams, etc.),    options for risk-reducing surgeries Inez could consider (i.e. bilateral mastectomy, surgery to remove her ovaries and/or uterus, etc.),      and targeted therapies for Inez if she were to develop certain cancers in the future (i.e. lumpectomy versus mastectomy,  immunotherapy for individuals with Manzano syndrome, PARP inhibitors for HBOC syndrome, etc.).       These recommendations and possible targeted therapies will be discussed in detail once genetic testing is completed.     Plan:  1) Today, Inez decided to proceed with a BRCA1/BRCA2 analysis with an automatic reflex to the Common Hereditary Cancers genetic testing panel through [facility].  Therefore, consent was reviewed and  verbally obtaine for this testing.  In particular, Inez was interested in obtaining this information prior to her upcoming gynecological surgery, in case this information would alter the surgery plans.    2) We reviewed that options for sample collection.  Inez plans to have her blood drawn at her local [facility] lab.  Test results are usually expected to be available within 4 weeks of that blood draw.    3) Inez will either have a telephone visit or video visit to discuss the results.  Additional recommendations about screening will be made at that time.      Face to face time: 60 minutes        Again, thank you for allowing me to participate in the care of your patient.      Sincerely,    Rosa Maria Caceres, GC

## 2022-04-29 ENCOUNTER — LAB (OUTPATIENT)
Dept: LAB | Facility: CLINIC | Age: 47
End: 2022-04-29
Payer: COMMERCIAL

## 2022-04-29 DIAGNOSIS — Z80.0 FAMILY HISTORY OF COLON CANCER: ICD-10-CM

## 2022-04-29 DIAGNOSIS — Z80.3 FAMILY HISTORY OF MALIGNANT NEOPLASM OF BREAST: ICD-10-CM

## 2022-04-29 PROCEDURE — 36415 COLL VENOUS BLD VENIPUNCTURE: CPT

## 2022-04-29 PROCEDURE — 99000 SPECIMEN HANDLING OFFICE-LAB: CPT

## 2022-04-29 NOTE — PROGRESS NOTES
Cancer Risk Management Program Genetic Counseling Note    4/28/2022    Referring Provider: Dr. Nam Caceres    Presenting Information:   I had a video visit with Inez Boston today for genetic counseling through the Cancer Risk Management Program, in order to discuss her family history of breast, colon, and other cancer.  She presents today to review this history, cancer screening recommendations, and available genetic testing options. Inez's parents were present for part of this conversation.    Personal History:  Inez is a 46 year old female. She does not have any personal history of cancer. 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      She also reports a history of issues with muscle pain (eg. 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      In addition, as part of her evaluation for abdominal pain, Inez underwent abdominal imaging, which has noted a mass between her left ovary and uterus; the imaging report notes that this mass could be consistent with a fibroid, but this mass was unable to be biopsied.  Inez reports that she plans to have this mass surgically removed in June.    We respect to her hormone-based medical history, Inez reports that she had her first menstrual period at age 12.  She does not have any biologocal children.  Inez reports that she took oral contraceptives for about 10 years in her 20s.  She states that she is currently pre-menopausal.  Inez currently has her ovaries, fallopian tubes, and uterus intact; she states that depending on what is found in her upcoming abdominal surgery for her pelvic mass, it is possible that she may have some of those organs removed.     With respect to her previous  "cancer screening, Inez had a mammogram in January, for which it was originally recommended that she have follow-up views.  Inez reports today that the radiology department was able to obtain copies of [place] and that she was subsequently told that it was felt that her most recent mammogram was consistent with her previous mammograms, and so no additional imaging was needed at that time.  Inez had a colonoscopy in 2021, which did not note any polyps; a follow-up colonoscopy was recommended for 10 years from that time. Inez reports that she does not have a history of any abnormal Pap smears.  She does not do any other cancer screening at this time.    Family History: (Please see scanned pedigree for detailed family history information); the following is per Inez and her parents report:    Inez's mother reports that she was diagnosed with breast cancer at the age of 48, for which she underwent surgical (but not other) treatment.    It is reported that Inez's maternal grandmother was diagnosed with colon cancer in her late 40s and, unfortunately,  of complications of that cancer at age 55. Inez's mother reports that this individual's sister (ie. Inez's great aunt) may have had uterine cancer and that this individual's mother (ie. Inez's great-grandmother) may have also  related to cancer in her 40s.    It was reported that Inez's maternal grandfather was diagnosed with colon cancer in his 70s and, unfortunately,  of complications of that disease.    They are not aware of any cancers on Inez's father's side of the family.    With respect to Inez's recent diagnosis of cervical dystonia, Inez's mother states that she has a personal history of occasional muscle spasms and twitching.  She also reports that Inez's maternal uncle also has a history of muscle pain/twitching that he was told was related to overuse of his muscles and a \"build up of lactic " "acid.\"  (We talked about that Inez should share this family history information with her provider at her upcoming neurology appointment.)    Inez reports that her family is of  ancestry.  There is no known Ashkenazi Mandaen ancestry on either side of her family. There is no reported consanguinity.    Discussion:    We reviewed the features of sporadic, familial, and hereditary cancers. In looking at Inez's family history, it is possible that a cancer susceptibility gene is present due to the family history of early onset breast cancer in her mother and also the history of early onset colon cancer in her maternal grandmother.  In additional, although the available details from the extended family is somewhat limited, the history of a possible uterine cancer in her maternal grandmother's sister (as uterine and colon cancer can be related to each other in some hereditary cancer conditions) and also the early onset cancer in her maternal grandmother's mother is also further supportive of the question about a possible hereditary cancer risk factor in Inez's mother's side of the family.      We discussed the natural history and genetics of hereditary cancer syndromes in general. A detailed handout regarding some of the hereditary cancer syndromes associated with breast cancer and the information we discussed was provided to Inez at the end of our appointment today and can be found in the after visit summary. Topics included: inheritance pattern, cancer risks, cancer screening recommendations, and also risks, benefits and limitations of testing.      We discussed that there are a variety of genes that could cause increased risk for breast or colon (or other) cancer. As many of these genes present with overlapping features in a family and accurate cancer risk cannot always be established based upon the pedigree analysis alone, it would be reasonable for Inez to consider panel genetic testing to analyze multiple " hereditary cancer genes at once.      Inez stated that she is interested in pursuing genetic testing through a panel of genes associated with hereditary cancer syndromes, and so we reviewed the various panel options and their potential benefits and limitations.  After this conversation, Inez decided that she was interested in the Common Hereditary Cancers genetic testing panel through [facility].        Medical Management: For Inez, we reviewed that the information from genetic testing may determine:    additional cancer screening for which Inez may qualify (i.e. mammogram and breast MRI, more frequent colonoscopies, more frequent dermatologic exams, etc.),    options for risk-reducing surgeries Inez could consider (i.e. bilateral mastectomy, surgery to remove her ovaries and/or uterus, etc.),      and targeted therapies for Inez if she were to develop certain cancers in the future (i.e. lumpectomy versus mastectomy,  immunotherapy for individuals with Manzano syndrome, PARP inhibitors for HBOC syndrome, etc.).       These recommendations and possible targeted therapies will be discussed in detail once genetic testing is completed.     Plan:  1) Today, Inez decided to proceed with a BRCA1/BRCA2 analysis with an automatic reflex to the Common Hereditary Cancers genetic testing panel through [facility].  Therefore, consent was reviewed and verbally obtaine for this testing.  In particular, Inez was interested in obtaining this information prior to her upcoming gynecological surgery, in case this information would alter the surgery plans.    2) We reviewed that options for sample collection.  Inez plans to have her blood drawn at her local [facility] lab.  Test results are usually expected to be available within 4 weeks of that blood draw.    3) Inez will either have a telephone visit or video visit to discuss the results.  Additional recommendations about screening will be made at that time.    Alejandrina  MS Morris, [facility]  Genetic Counselor  Ph: [phone]    Face to face time: 60 minutes    Video Start Time: 8:58AM    Video-Visit Details    Type of service:  Video Visit    Video End Time: 9:58AM    Originating Location (pt. Location): Home    Distant Location (provider location):  [facility] CANCER [facility]     Platform used for Video Visit: [employer]ane[employer]

## 2022-04-29 NOTE — PATIENT INSTRUCTIONS
Assessing Cancer Risk  Only about 5-10% of cancers are thought to be due to an inherited cancer susceptibility gene.    These families often have:  Several people with the same or related types of cancer  Cancers diagnosed at a young age (before age 50)  Individuals with more than one primary cancer  Multiple generations of the family affected with cancer    Some people may be candidates for genetic testing of more than one gene.  For these families, genetic testing using a cancer panel may be offered.  These panels will test different genes known to increase the risk for breast, ovarian, uterine, and/or other cancers. All of the genes discussed below have published clinical management guidelines for individuals who are found to carry a mutation. The purpose of this handout is to serve as a brief summary of the genes analyzed by the panels used to inquire about hereditary breast and gynecologic cancer:  PEGGY, BRCA1, BRCA2, BRIP1, CDH1, CHEK2, MLH1, MSH2, MSH6, PMS2, EPCAM, PTEN, PALB2, RAD51C, RAD51D, and TP53.  ______________________________________________________________________________  Hereditary Breast and Ovarian Cancer Syndrome   (BRCA1 and BRCA2)  A single mutation in one of the copies of BRCA1 or BRCA2 increases the risk for breast and ovarian cancer, among others.  The risk for pancreatic cancer and melanoma may also be slightly increased in some families.  The chart below shows the chance that someone with a BRCA mutation would develop cancer in his or her lifetime1,2,3,4.        A person s ethnic background is also important to consider, as individuals of Ashkenazi Jew ancestry have a higher chance of having a BRCA gene mutation.  There are three BRCA mutations that occur more frequently in this population.    Manzano Syndrome   (MLH1, MSH2, MSH6, PMS2, and EPCAM)  Currently five genes are known to cause Manzano Syndrome: MLH1, MSH2, MSH6, PMS2, and EPCAM.  A single mutation in one of the Manzano  Syndrome genes increases the risk for colon, endometrial, ovarian, and stomach cancers.  Other cancers that occur less commonly in Manzano Syndrome include urinary tract, skin, and brain cancers.  The chart below shows the chance that a person with Manzano syndrome would develop cancer in his or her lifetime5.      *Cancer risk varies depending on Manzano syndrome gene found    Cowden Syndrome   (PTEN)  Cowden syndrome is a hereditary condition that increases the risk for breast, thyroid, endometrial, colon, and kidney cancer.  Cowden syndrome is caused by a mutation in the PTEN gene.  A single mutation in one of the copies of PTEN causes Cowden syndrome and increases cancer risk.  The chart below shows the chance that someone with a PTEN mutation would develop cancer in their lifetime6,7.  Other benign features seen in some individuals with Cowden syndrome include benign skin lesions (facial papules, keratoses, lipomas), learning disability, autism, thyroid nodules, colon polyps, and larger head size.      *One recent study found breast cancer risk to be increased to 85%    Li-Fraumeni Syndrome   (TP53)  Li-Fraumeni Syndrome (LFS) is a cancer predisposition syndrome caused by a mutation in the TP53 gene. A single mutation in one of the copies of TP53 increases the risk for multiple cancers. Individuals with LFS are at an increased risk for developing cancer at a young age. The lifetime risk for development of a LFS-associated cancer is 50% by age 30 and 90% by age 60.   Core Cancers: Sarcomas, Breast, Brain, Lung, Leukemias/Lymphomas, Adrenocortical carcinomas  Other Cancers: Gastrointestinal, Thyroid, Skin, Genitourinary    Hereditary Diffuse Gastric Cancer   (CDH1)  Currently, one gene is known to cause hereditary diffuse gastric cancer (HDGC): CDH1.  Individuals with HDGC are at increased risk for diffuse gastric cancer and lobular breast cancer. Of people diagnosed with HDGC, 30-50% have a mutation in the CDH1 gene.   This suggests there are likely other genes that may cause HDGC that have not been identified yet.      Lifetime Cancer Risks    General Population HDGC    Diffuse Gastric  <1% ~80%   Breast 12% 39-52%         Additional Genes  PEGGY  PEGGY is a moderate-risk breast cancer gene. Women who have a mutation in PEGGY can have between a 2-4 fold increased risk for breast cancer compared to the general population8. PEGGY mutations have also been associated with increased risk for pancreatic cancer, however an estimate of this cancer risk is not well understood9. Individuals who inherit two PEGGY mutations have a condition called ataxia-telangiectasia (AT).  This rare autosomal recessive condition affects the nervous system and immune system, and is associated with progressive cerebellar ataxia beginning in childhood.  Individuals with ataxia-telangiectasia often have a weakened immune system and have an increased risk for childhood cancers.    PALB2  Mutations in PALB2 have been shown to increase the risk of breast cancer up to 33-58% in some families; where individuals fall within this risk range is dependent upon family [id number]. PALB2 mutations have also been associated with increased risk for pancreatic cancer, although this risk has not been quantified yet.  Individuals who inherit two PALB2 mutations--one from their mother and one from their father--have a condition called Fanconi Anemia.  This rare autosomal recessive condition is associated with short stature, developmental delay, bone marrow failure, and increased risk for childhood cancers.    CHEK2   CHEK2 is a moderate-risk breast cancer gene.  Women who have a mutation in CHEK2 have around a 2-fold increased risk for breast cancer compared to the general population, and this risk may be higher depending upon family history.11,12,13 Mutations in CHEK2 have also been shown to increase the risk of a number of other cancers, including colon and prostate, however these  cancer risks are currently not well understood.    BRIP1, RAD51C and RAD51D  Mutations in BRIP1, RAD51C, and RAD51D have been shown to increase the risk of ovarian cancer and possibly female breast cancer as well14,15 .       Lifetime Cancer Risk    General Population BRIP1 RAD51C RAD51D   Ovarian 1-2% ~5-8% ~5-9% ~7-15%           Inheritance  All of the cancer syndromes reviewed above are inherited in an autosomal dominant pattern.  This means that if a parent has a mutation, each of his or her children will have a 50% chance of inheriting that same mutation.  Therefore, each child--male or female--would have a 50% chance of being at increased risk for developing cancer.      Image obtained from Genetics Home Reference, 2013     Mutations in some genes can occur de carlos, which means that a person s mutation occurred for the first time in them and was not inherited from a parent.  Now that they have the mutation, however, it can be passed on to future generations.    Genetic Testing  Genetic testing involves a blood test and will look at the genetic information in the PEGGY, BRCA1, BRCA2, BRIP1, CDH1, CHEK2, MLH1, MSH2, MSH6, PMS2, EPCAM, PTEN, PALB2, RAD51C, RAD51D, and TP53 genes for any harmful mutations that are associated with increased cancer risk.  If possible, it is recommended that the person(s) who has had cancer be tested before other family members.  That person will give us the most useful information about whether or not a specific gene is associated with the cancer in the family.    Results  There are three possible results of genetic testing:  Positive--a harmful mutation was identified in one or more of the genes  Negative--no mutation was identified in any of the genes on this panel  Variant of unknown significance--a variation in one of the genes was identified, but it is unclear how this impacts cancer risk in the family    Advantages and Disadvantages   There are advantages and disadvantages to  genetic testing.    Advantages  May clarify your cancer risk  Can help you make medical decisions  May explain the cancers in your family  May give useful information to your family members (if you share your results)    Disadvantages  Possible negative emotional impact of learning about inherited cancer risk  Uncertainty in interpreting a negative test result in some situations  Possible genetic discrimination concerns (see below)    Genetic Information Nondiscrimination Act (SOFIYA)  SOFIYA is a federal law that protects individuals from health insurance or employment discrimination based on a genetic test result alone.  Although rare, there are currently no legal discrimination protections in terms of life insurance, long term care, or disability insurances.  Visit the "[employer] Research [place] website to learn more.    Reducing Cancer Risk  All of the genes described above have nationally recognized cancer screening guidelines that would be recommended for individuals who test positive.  In addition to increased cancer screening, surgeries may be offered or recommended to reduce cancer risk.  Recommendations are based upon an individual s genetic test result as well as their personal and family history of cancer.    Questions to Think About Regarding Genetic Testing:  What effect will the test result have on me and my relationship with my family members if I have an inherited gene mutation?  If I don t have a gene mutation?  Should I share my test results, and how will my family react to this news, which may also affect them?  Are my children ready to learn new information that may one day affect their own health?    Hereditary Cancer Resources    FORCE: Facing Our Risk of Cancer Empowered facingourrisk.org   Bright Pink bebrightpink.org   Li-Fraumeni Syndrome Association lfsassociation.org   PTEN World PTENworld.Topmall   No stomach for cancer, Inc. nostomachforcancer.org   Stomach cancer relief network  Scrnet.org   Collaborative Group of the Americas on Inherited Colorectal Cancer (CGA) cgaicc.com    Cancer Care cancercare.org   American Cancer Society (ACS) cancer.org   National Cancer [place] (NCI) cancer.gov     Please call us if you have any questions or concerns.   Cancer Risk Management Program [phone]-CANCER ([phone])  Keyur Encarnacion, MS [facility] [phone]  Anupama Jey, MS, [facility] [phone]  CELESTINA    Alejandrina Caceres, MS, [facility]  [phone]    [email or web address]  Lurdes Chávez, MS, [facility]  [phone]  Adelia Theo, MS, [facility]  [phone]  Alcira Curiel, MS, [facility] [phone]  Latia Mary, MS, [facility] [phone]    References  Gurvinder JULIAN, Gabriel PDP, Guzman S, Ade LANTIGUA, Yanet JE, Brit JL, Luis N, Kate H, Tank O, Alexei A, Carolin B, Casey P, Manhernesto S, Missy DM, Gerard N, Carolina E, Gt H, Mati E, Khushbu J, Gronkraig J, Leonid B, Mark H, Thorlacius S, Eerola H, Adela H, Jake K, June OP. Average risks of breast and ovarian cancer associated with BRCA1 or BRCA2 mutations detected in case series unselected for family history: a combined analysis of 222 studies. Am J Hum Shweta. 2003;72:1117-30.  Won CONTRERAS, Mikayla M, Maria D G.  BRCA1 and BRCA2 Hereditary Breast and Ovarian Cancer. Gene Reviews online. 2013.  Ari YC, Vinicius S, Nicolás G, Bar S. Breast cancer risk among male BRCA1 and BRCA2 mutation carriers. J Natl Cancer Inst. 2007;99:1811-4.  Odilon MORALES, Isai I, Franck J, Sumit E, Meryl ER, Hiwot F. Risk of breast cancer in male BRCA2 carriers. J Med Shweta. 2010;47:710-1.  National Comprehensive Cancer Network. Clinical practice guidelines in oncology, colorectal cancer screening. Available online (registration required). 2015.  Amado ASTORGA, Raul J, Sergei J, Ina LA, Kyle MS, Eng C. Lifetime cancer risks in individuals with germline PTEN mutations. Clin Cancer Res. 2012;18:400-7.  Luz Maria LESLIE. Cowden Syndrome: A Critical Review of the Clinical  Literature. J Shweta . 2009:18:13-27.  Crystal A, David D, Yunior S, Belen P, Reyna T, Shabana M, Michael B, Ramon H, Asya R, Lawanda K, Dirk L, Odilon DG, Missy D, Bola DF, Anupam MR, The Breast Cancer Susceptibility Collaboration () & Milo CONTRERAS. PEGGY mutations that cause ataxia-telangiectasia are breast cancer susceptibility alleles. Nature Genetics. 2006;38:873-875  Edgar N , Santam Y, Josette J, Skinny L, Elias GM , Bassem ML, Gallinger S, Capellan AG, Syngal S, Marie ML, Cachorro J , Aixa R, Hina SZ, Mikala JR, Arash VE, Neva M, Vomin B, Sandra N, Terry RH, Mabel KW, and Ronnie AP. PEGGY mutations in patients with hereditary pancreatic cancer. Cancer Discover. 2012;2:41-46  Gurvinder BARGER, et al. Breast-Cancer Risk in Families with Mutations in PALB2. NEJM. 2014; 371(6):497-506.  CHEK2 Breast Cancer Case-Control Consortium. CHEK2*1100delC and susceptibility to breast cancer: A collaborative analysis involving 10,860 breast cancer cases and 9,065 controls from 10 studies. Am J Hum Shweta, 74 (2004), pp. 6397-2529  Malika T, Julisa S, Love K, et al. Spectrum of Mutations in BRCA1, BRCA2, CHEK2, and TP53 in Families at High Risk of Breast Cancer. MAURICIO. 2006;295(12):2322-9132.   Julian C, Derek D, Miriam A, et al. Risk of breast cancer in women with a CHEK2 mutation with and without a family history of breast cancer. J Clin Oncol. 2011;29:1713-1920.  Amol H, Lilli E, Bernarda SJ, et al. Contribution of germline mutations in the RAD51B, RAD51C, and RAD51D genes to ovarian cancer in the population. J Clin Oncol. 2015;33(26):4032-7058. Doi:10.1200/JCO.2015.61.8124.  Carissa VIVEROS, Judson BALDWIN, Gonzalo P, et al. Mutations in BRIP1 confer high risk of ovarian cancer. Flores Shweta. 2011;43(11):0729-5857. doi:10.1038/ng.955.

## 2022-05-03 PROBLEM — Z86.16 HISTORY OF SEVERE ACUTE RESPIRATORY SYNDROME CORONAVIRUS 2 (SARS-COV-2) DISEASE: Status: ACTIVE | Noted: 2021-02-05

## 2022-05-03 PROBLEM — F41.9 ANXIETY: Status: ACTIVE | Noted: 2021-02-05

## 2022-05-03 PROBLEM — F90.0 ATTENTION DEFICIT HYPERACTIVITY DISORDER (ADHD), PREDOMINANTLY INATTENTIVE TYPE: Status: ACTIVE | Noted: 2021-02-05

## 2022-05-03 PROBLEM — G24.9 DYSTONIA: Status: ACTIVE | Noted: 2020-03-26

## 2022-05-03 PROBLEM — R20.8 DYSESTHESIA: Status: ACTIVE | Noted: 2021-02-09

## 2022-05-03 PROBLEM — Z87.898 HISTORY OF DISEASE: Status: ACTIVE | Noted: 2021-02-05

## 2022-05-03 RX ORDER — ACETAMINOPHEN 325 MG/1
975 TABLET ORAL ONCE
Status: CANCELLED | OUTPATIENT
Start: 2022-06-03

## 2022-05-03 RX ORDER — CEFAZOLIN SODIUM/WATER 2 G/20 ML
2 SYRINGE (ML) INTRAVENOUS
Status: CANCELLED | OUTPATIENT
Start: 2022-06-03

## 2022-05-03 RX ORDER — CEFAZOLIN SODIUM/WATER 2 G/20 ML
2 SYRINGE (ML) INTRAVENOUS SEE ADMIN INSTRUCTIONS
Status: CANCELLED | OUTPATIENT
Start: 2022-06-03

## 2022-05-04 ENCOUNTER — VIRTUAL VISIT (OUTPATIENT)
Dept: INTERNAL MEDICINE | Facility: CLINIC | Age: 47
End: 2022-05-04
Payer: COMMERCIAL

## 2022-05-04 VITALS — HEIGHT: 68 IN | BODY MASS INDEX: 23.34 KG/M2 | WEIGHT: 154 LBS

## 2022-05-04 DIAGNOSIS — R79.89 ELEVATED PLATELET COUNT: Primary | ICD-10-CM

## 2022-05-04 PROBLEM — M99.05 SOMATIC DYSFUNCTION OF PELVIS REGION: Status: RESOLVED | Noted: 2022-02-24 | Resolved: 2022-05-04

## 2022-05-04 PROCEDURE — 99212 OFFICE O/P EST SF 10 MIN: CPT | Mod: 95 | Performed by: INTERNAL MEDICINE

## 2022-05-04 NOTE — PROGRESS NOTES
Inez is a 46 year old who is being evaluated via a billable telephone visit.  Pt reports also taking Magnesium.    What phone number would you like to be contacted at? [phone]  How would you like to obtain your AVS? [employer]  Phone call duration: 9 minutesTelephone note     Ms. Boston agrees to a telephone visit     PAC appt. 5/20/2022 for diagnostic GYN laparoscopy on 6/3/2022    New Neurology appt. With Dr. Wylie 5/27/2022 assess for movement disorder    She has PMR follow up for Botox with Dr. Fischer 6/21/2022. Last visit 3/23/2022. She had PT 3/21/2022 for neck dystonia and myofacial pain. She states physical therapy is quite useful for myofacial release and she gets this about once a week. I will see her back on 6/21/2022    Hematology follow up with Dr. Caceres 5/31/2022 for elevated platelets and is on [facility]    Genetic Counselor appt. On 4/28/2022    Last in-person visit with us 3/21/2022    She states her brother (who she lives with) tested positive for COVID yesterday. She does not have sxs. She had 3 total Pfizer COVID vaccinations; last was 12/2/2021.

## 2022-05-05 ENCOUNTER — PATIENT OUTREACH (OUTPATIENT)
Dept: NEUROLOGY | Facility: CLINIC | Age: 47
End: 2022-05-05
Payer: COMMERCIAL

## 2022-05-09 LAB — SCANNED LAB RESULT: NORMAL

## 2022-05-10 ENCOUNTER — TELEPHONE (OUTPATIENT)
Dept: NEUROLOGY | Facility: CLINIC | Age: 47
End: 2022-05-10
Payer: COMMERCIAL

## 2022-05-10 NOTE — TELEPHONE ENCOUNTER
[facility] Call Center    Phone Message    May a detailed message be left on voicemail: yes     Reason for Call: Other: Pt called back and stated that she missed a call from Eliza. Please call pt back when available.      Action Taken: Message routed to:  [facility]    Travel Screening: Not Applicable

## 2022-05-11 NOTE — TELEPHONE ENCOUNTER
"Upcoming visit date: 5/27  Provider: Dr. Wylie  Referred by: Dr. Segal  Diagnosis: Dystonia, torsion, fragments of (Possible dystonia listed in the notes)    Goals for the visit:   1. Diagnosis for her symptoms, Dr. Fischer said it would be best to see a Neurologist to get an official diagnosis    Who have you seen for this problem in the past?  Dr. Fischer, Dr. Christensen, Dr. Mims, Dr. Duke    Set realistic expectations:     1. The provider will be evaluating your movement symptoms.     2. Movement disorders are mostly diagnosed \"clinically\" which means one needs to be seen in clinic over time. Certain diagnoses \"declare\" themselves over time. Therefore, we may not have an answer after your first visit.     3. Further tests may be ordered, referrals to other specialists may be ordered.     4. Almost 30% of movement disorders are FUNCTIONAL MOVEMENT DISORDERs. Your provider will discuss this with you    5. Appointment will need to be rescheduled. She is willing to go to [facility] if they have openings prior to her 6/3 procedure    "

## 2022-05-11 NOTE — TELEPHONE ENCOUNTER
[facility] Call [facility]    Phone Message    May a detailed message be left on voicemail: yes     Reason for Call: Other: Pt calling back returning Eliza's call. Pt says she missed it this morning because she's usually in meetings in the morning. Pt says can call her anytime in the afternoon. She is available.   Ph: [phone]    Action Taken: Message routed to:  [facility] ([facility] Neurology    Travel Screening: Not Applicable

## 2022-05-20 ENCOUNTER — OFFICE VISIT (OUTPATIENT)
Dept: SURGERY | Facility: CLINIC | Age: 47
End: 2022-05-20
Payer: COMMERCIAL

## 2022-05-20 ENCOUNTER — LAB (OUTPATIENT)
Dept: LAB | Facility: CLINIC | Age: 47
End: 2022-05-20

## 2022-05-20 ENCOUNTER — ANESTHESIA EVENT (OUTPATIENT)
Dept: SURGERY | Facility: CLINIC | Age: 47
End: 2022-05-20
Payer: COMMERCIAL

## 2022-05-20 ENCOUNTER — PRE VISIT (OUTPATIENT)
Dept: SURGERY | Facility: CLINIC | Age: 47
End: 2022-05-20

## 2022-05-20 VITALS
OXYGEN SATURATION: 97 % | RESPIRATION RATE: 16 BRPM | HEART RATE: 89 BPM | TEMPERATURE: 98.5 F | BODY MASS INDEX: 24.71 KG/M2 | HEIGHT: 68 IN | WEIGHT: 163 LBS | DIASTOLIC BLOOD PRESSURE: 76 MMHG | SYSTOLIC BLOOD PRESSURE: 116 MMHG

## 2022-05-20 DIAGNOSIS — D25.1 INTRAMURAL AND SUBSEROUS LEIOMYOMA OF UTERUS: ICD-10-CM

## 2022-05-20 DIAGNOSIS — R19.00 PELVIC MASS: ICD-10-CM

## 2022-05-20 DIAGNOSIS — Z01.818 PREOP EXAMINATION: ICD-10-CM

## 2022-05-20 DIAGNOSIS — D25.2 INTRAMURAL AND SUBSEROUS LEIOMYOMA OF UTERUS: ICD-10-CM

## 2022-05-20 DIAGNOSIS — Z01.818 PREOP EXAMINATION: Primary | ICD-10-CM

## 2022-05-20 LAB
ABO/RH(D): NORMAL
ANTIBODY SCREEN: NEGATIVE
ERYTHROCYTE [DISTWIDTH] IN BLOOD BY AUTOMATED COUNT: 12.1 % (ref 10–15)
HCT VFR BLD AUTO: 39.7 % (ref 35–47)
HGB BLD-MCNC: 12.8 G/DL (ref 11.7–15.7)
MCH RBC QN AUTO: 31.3 PG (ref 26.5–33)
MCHC RBC AUTO-ENTMCNC: 32.2 G/DL (ref 31.5–36.5)
MCV RBC AUTO: 97 FL (ref 78–100)
PLATELET # BLD AUTO: 430 10E3/UL (ref 150–450)
RBC # BLD AUTO: 4.09 10E6/UL (ref 3.8–5.2)
SPECIMEN EXPIRATION DATE: NORMAL
WBC # BLD AUTO: 10.9 10E3/UL (ref 4–11)

## 2022-05-20 PROCEDURE — 36415 COLL VENOUS BLD VENIPUNCTURE: CPT | Performed by: PATHOLOGY

## 2022-05-20 PROCEDURE — 99204 OFFICE O/P NEW MOD 45 MIN: CPT | Performed by: PHYSICIAN ASSISTANT

## 2022-05-20 PROCEDURE — 86850 RBC ANTIBODY SCREEN: CPT | Performed by: PHYSICIAN ASSISTANT

## 2022-05-20 PROCEDURE — 85027 COMPLETE CBC AUTOMATED: CPT | Performed by: PATHOLOGY

## 2022-05-20 PROCEDURE — 86901 BLOOD TYPING SEROLOGIC RH(D): CPT | Performed by: PHYSICIAN ASSISTANT

## 2022-05-20 RX ORDER — ASPIRIN 81 MG/1
162 TABLET ORAL EVERY MORNING
COMMUNITY

## 2022-05-20 ASSESSMENT — LIFESTYLE VARIABLES: TOBACCO_USE: 0

## 2022-05-20 ASSESSMENT — PAIN SCALES - GENERAL: PAINLEVEL: MILD PAIN (2)

## 2022-05-20 ASSESSMENT — ENCOUNTER SYMPTOMS: SEIZURES: 0

## 2022-05-20 NOTE — H&P (VIEW-ONLY)
Pre-Operative H & P     CC:  Preoperative exam to assess for increased cardiopulmonary risk while undergoing surgery and anesthesia.    Date of Encounter: 5/20/2022  Primary Care Physician:  Tony Segal     Reason for visit:   Encounter Diagnoses   Name Primary?     Preop examination Yes     Intramural and subserous leiomyoma of uterus      Pelvic mass        HPI  Inez Boston is a 46 year old female who presents for pre-operative H & P in preparation for  Procedure Information     Case: [id number] Date/Time: 06/03/22 0730    Procedures:       Diagnostic laparoscopy, (Left Abdomen)      removal of pelvic mass and (N/A Pelvis)      bilateral salpingectomy (possible left oophorectomy, (Bilateral Abdomen)      possible myomectomy, (N/A Abdomen)      possible total hysterectomy, possible coversion to laparotomy) (N/A Abdomen)      Possible Open (N/A Abdomen)    Anesthesia type: General    Diagnosis:       Intramural and subserous leiomyoma of uterus [D25.1, D25.2]      Pelvic mass [R19.00]    Pre-op diagnosis:       Intramural and subserous leiomyoma of uterus [D25.1, D25.2]      Pelvic mass [R19.00]    [facility]    Providers: Elda Mcpherson MD          This is a pleasant 46 year old female with PMH significant for thrombocytosis, anxiety, ADHD and pelvic mass.  She presented with pelvic pain and subsequent US showed pelvic mass.  MRI suggests mass arises from the left ovary.  Her CA-125 was negative and per chart review, imaging does not suggest malignancy.  In office endometrial biopsy was unsuccessful and then attempted again after vaginal misoprostol.  Sample did not contain endometrial tissue.  The above procedure is now planned.     History is obtained from the patient and chart review    Hx of abnormal bleeding or anti-platelet use:  ASA 81    Menstrual history: Patient's last menstrual period was 04/27/2022 (within days).:      Past Medical History  Past Medical History:   Diagnosis  Date     ADHD (attention deficit hyperactivity disorder)      Anxiety      Cervical dystonia      Thrombocytosis        Past Surgical History  Past Surgical History:   Procedure Laterality Date     COLONOSCOPY       LAPAROSCOPY      6/3/2022     MYRINGOTOMY W/ TUBES      as a child       Prior to Admission Medications  Current Outpatient Medications   Medication Sig Dispense Refill     aspirin 81 MG EC tablet Take 81 mg by mouth every morning       B Complex Vitamins (VITAMIN-B COMPLEX PO) Take 1 tablet by mouth every other day       baclofen (LIORESAL) 10 MG tablet nightly as needed       cholecalciferol (VITAMIN D3) 25 mcg (1000 units) capsule 2,000 Units every morning       DULoxetine HCl 40 MG CPEP every morning       lisdexamfetamine (VYVANSE) 40 MG capsule Take 40 mg by mouth every morning       ibuprofen (ADVIL/MOTRIN) 400 MG tablet Take 1 tablet by mouth daily as needed  (Patient not taking: Reported on 5/20/2022)       misoprostol (CYTOTEC) 200 MCG tablet PLACE 2 TABLETS VAGINALLY ONCE FOR 1 DOSE 3 TO 4 HOURS PRIOR TO THE PROCEDURE (Patient not taking: Reported on 5/20/2022)       VYVANSE 50 MG capsule          Allergies  No Known Allergies    Social History  Social History     Socioeconomic History     Marital status:      Spouse name: Not on file     Number of children: Not on file     Years of education: Not on file     Highest education level: Not on file   Occupational History     Not on file   Tobacco Use     Smoking status: Never Smoker     Smokeless tobacco: Never Used   Vaping Use     Vaping Use: Some days     Substances: Nicotine     Devices: Pre-filled pod   Substance and Sexual Activity     Alcohol use: Yes     Alcohol/week: 2.0 standard drinks     Types: 2 Cans of beer per week     Comment: a couple times a week     Drug use: Yes     Types: Marijuana     Sexual activity: Not on file   Other Topics Concern     Not on file   Social History Narrative    n looking at Inez's family history,  it is possible that a cancer susceptibility gene is present due to the family history of early onset breast cancer in her mother and also the history of early onset colon cancer in her maternal grandmother.  In additional, although the available details from the extended family is somewhat limited, the history of a possible uterine cancer in her maternal grandmother's sister (as uterine and colon cancer can be related to each other in some hereditary cancer conditions) and also the early onset cancer in her maternal grandmother's mother is also further supportive of the question about a possible hereditary cancer risk factor in Inez's mother's side of the family.     Social Determinants of Health     Financial Resource Strain: Not on file   Food Insecurity: Not on file   Transportation Needs: Not on file   Physical Activity: Not on file   Stress: Not on file   Social Connections: Not on file   Intimate Partner Violence: Not on file   Housing Stability: Not on file       Family History  Family History   Problem Relation Age of Onset     Breast Cancer Mother 45     Urinary tract infection Mother      Osteoporosis Mother      Hyperlipidemia Maternal Grandmother      Urinary tract infection Maternal Grandmother      Colon Cancer Maternal Grandmother         early age onset     Hyperlipidemia Maternal Grandfather      Congenital heart disease Paternal Grandmother      Thyroid Disease Paternal Grandmother      Heart Failure Paternal Grandfather      Uterine Cancer Other         maternal grandmother's sister       Review of Systems  The complete review of systems is negative other than noted in the HPI or here.   Anesthesia Evaluation   Pt has had prior anesthetic.     No history of anesthetic complications       ROS/MED HX  ENT/Pulmonary:  - neg pulmonary ROS  (-) tobacco use, asthma and sleep apnea   Neurologic: Comment: ADHD   (-) no seizures and no CVA   Cardiovascular:  - neg cardiovascular ROS   (+) -----Previous  "cardiac testing   Echo: Date: 6/2018 Results:  Conclusion:   The left ventricle is normal in internal dimension, wall thicknesses and wall   motion (EF 60-65%).   Left ventricular diastolic function is normal.   The right ventricle is normal in size and function.   There is an atrial septal aneurysm.   There is no significant valvular disease.   There is no pericardial effusion   Intravenous agitated saline contrast injection revealed the presence of a PFO   with passage of > 30  bubbles per frame into the left heart.   No prior study is available for comparison.    Stress Test: Date: Results:    ECG Reviewed: Date: Results:    Cath: Date: Results:   (-) PVD   METS/Exercise Tolerance: >4 METS Comment: Walks everyday for one hour   Hematologic: Comments: thrombocytosis   (-) history of blood clots and history of blood transfusion   Musculoskeletal: Comment: Chronic pain and diffuse body spasms since covid in 2020.  Receives Botox for neck dystonia, last 3/23/22      GI/Hepatic:  - neg GI/hepatic ROS  (-) GERD   Renal/Genitourinary:  - neg Renal ROS     Endo:  - neg endo ROS     Psychiatric/Substance Use:     (+) psychiatric history anxiety Recreational drug usage: Cannabis.    Infectious Disease: Comment: covid infection spring 2020      Malignancy:  - neg malignancy ROS     Other:  - neg other ROS          /76 (BP Location: Right arm, Patient Position: Chair, Cuff Size: Adult Regular)   Pulse 89   Temp 98.5  F (36.9  C) (Oral)   Resp 16   Ht 1.727 m (5' 8\")   Wt 73.9 kg (163 lb)   LMP 04/27/2022 (Within Days)   SpO2 97%   BMI 24.78 kg/m      Physical Exam   Constitutional: Awake, alert, cooperative, no apparent distress, and appears stated age.  Eyes: Pupils equal, round and reactive to light, extra ocular muscles intact, sclera clear, conjunctiva normal.  HENT: Normocephalic, oral pharynx with moist mucus membranes, good dentition. No goiter appreciated.   Respiratory: Clear to auscultation " bilaterally, no crackles or wheezing.  Cardiovascular: Regular rate and rhythm, normal S1 and S2, and no murmur noted.   No edema. Palpable pulses to radial and PT arteries.   GI: Normal bowel sounds, soft, non-distended, non-tender abdomen  Lymph/Hematologic: No cervical lymphadenopathy and no supraclavicular lymphadenopathy.  Genitourinary:  deferred  Skin: Warm and dry.  No rashes at anticipated surgical site.   Musculoskeletal: Full ROM of neck. There is no redness, warmth, or swelling of the joints. Gross motor strength is normal.    Neurologic: Awake, alert, oriented to name, place and time. Cranial nerves II-XII are grossly intact. Gait is normal.   Neuropsychiatric: Calm, cooperative. Normal affect.     Prior Labs/Diagnostic Studies   All labs and imaging personally reviewed     Component      Latest Ref Rng & Units 5/20/2022   WBC      4.0 - 11.0 10e3/uL 10.9   RBC Count      3.80 - 5.20 10e6/uL 4.09   Hemoglobin      11.7 - 15.7 g/dL 12.8   Hematocrit      35.0 - 47.0 % 39.7   MCV      78 - 100 fL 97   MCH      26.5 - 33.0 pg 31.3   MCHC      31.5 - 36.5 g/dL 32.2   RDW      10.0 - 15.0 % 12.1   Platelet Count      150 - 450 10e3/uL 430       EKG/ stress test - if available please see in ROS above         The patient's records and results personally reviewed by this provider.     Outside records reviewed from: Care Everywhere        Assessment      Inez Boston is a 46 year old female seen as a PAC referral for risk assessment and optimization for anesthesia.    Plan/Recommendations  Pt will be optimized for the proposed procedure.  See below for details on the assessment, risk, and preoperative recommendations    NEUROLOGY  - previous work up for constellation of symptoms arising after covid infection 3/2020 to include fatigue, brain fog, feeling of skin tightening/pulling.  Work up unremarkable  - ADHD, hold Vyvanse DOS  - Post Op delirium risk factors:  No risk identified    ENT  - No current airway  "concerns.  Will need to be reassessed day of surgery.  Mallampati: I  TM: > 3    CARDIAC  - No history of CAD, Hypertension and Afib   - denies exertional symptoms.  - METS (Metabolic Equivalents), [place] [facility]  Patient performs 4 or more METS exercise without symptoms            Total Score: 0      RCRI-Low risk: Class 2 0.9% complication rate            Total Score: 1    RCRI: High Risk Surgery        PULMONARY    CAMERON Low Risk            Total Score: 0      - Denies asthma or inhaler use  - Tobacco History      History   Smoking Status     Never Smoker   Smokeless Tobacco     Never Used       GI  - denies GERD  PONV High Risk  Total Score: 3           1 AN PONV: Pt is Female    1 AN PONV: Patient is not a current smoker    1 AN PONV: Intended Post Op Opioids          ENDOCRINE    - BMI: Estimated body mass index is 24.78 kg/m  as calculated from the following:    Height as of this encounter: 1.727 m (5' 8\").    Weight as of this encounter: 73.9 kg (163 lb).  Healthy Weight (BMI 18.5-24.9)  - No history of Diabetes Mellitus    HEME  - chronic thrombocytosis with platelets 400-500. Iron def anemia ruled out. Evaluated by Heme/onc 2/2/22.  JAK2 mutation negative  - plan is follow up 4 months with repeat labs.    - Pt is on ASA 81mg, will hold 7 days prior to above procedure    VTE Low Risk 0.26%            Total Score: 0      - no h/o VTE      MSK  - pt receives Botox injections for cervical dystonia, last 3/23/22.  She also participates in PT and is followed by PM&R    PSYCH  - anxiety        The patient is optimized for their procedure. AVS with information on surgery time/arrival time, meds and NPO status given by nursing staff. No further diagnostic testing indicated.            Valentina Mckeon PA-C  Preoperative Assessment Center  [facility]  Phone: [phone]  Fax: [phone]  "

## 2022-05-20 NOTE — H&P
Pre-Operative H & P     CC:  Preoperative exam to assess for increased cardiopulmonary risk while undergoing surgery and anesthesia.    Date of Encounter: 5/20/2022  Primary Care Physician:  Tony Segal     Reason for visit:   Encounter Diagnoses   Name Primary?     Preop examination Yes     Intramural and subserous leiomyoma of uterus      Pelvic mass        HPI  Inez Boston is a 46 year old female who presents for pre-operative H & P in preparation for  Procedure Information     Case: [id number] Date/Time: 06/03/22 0730    Procedures:       Diagnostic laparoscopy, (Left Abdomen)      removal of pelvic mass and (N/A Pelvis)      bilateral salpingectomy (possible left oophorectomy, (Bilateral Abdomen)      possible myomectomy, (N/A Abdomen)      possible total hysterectomy, possible coversion to laparotomy) (N/A Abdomen)      Possible Open (N/A Abdomen)    Anesthesia type: General    Diagnosis:       Intramural and subserous leiomyoma of uterus [D25.1, D25.2]      Pelvic mass [R19.00]    Pre-op diagnosis:       Intramural and subserous leiomyoma of uterus [D25.1, D25.2]      Pelvic mass [R19.00]    Location: [facility]    Providers: Elda Mcpherson MD          This is a pleasant 46 year old female with PMH significant for thrombocytosis, anxiety, ADHD and pelvic mass.  She presented with pelvic pain and subsequent US showed pelvic mass.  MRI suggests mass arises from the left ovary.  Her CA-125 was negative and per chart review, imaging does not suggest malignancy.  In office endometrial biopsy was unsuccessful and then attempted again after vaginal misoprostol.  Sample did not contain endometrial tissue.  The above procedure is now planned.     History is obtained from the patient and chart review    Hx of abnormal bleeding or anti-platelet use:  ASA 81    Menstrual history: Patient's last menstrual period was 04/27/2022 (within days).:      Past Medical History  Past Medical History:   Diagnosis  Date     ADHD (attention deficit hyperactivity disorder)      Anxiety      Cervical dystonia      Thrombocytosis        Past Surgical History  Past Surgical History:   Procedure Laterality Date     COLONOSCOPY       LAPAROSCOPY      6/3/2022     MYRINGOTOMY W/ TUBES      as a child       Prior to Admission Medications  Current Outpatient Medications   Medication Sig Dispense Refill     aspirin 81 MG EC tablet Take 81 mg by mouth every morning       B Complex Vitamins (VITAMIN-B COMPLEX PO) Take 1 tablet by mouth every other day       baclofen (LIORESAL) 10 MG tablet nightly as needed       cholecalciferol (VITAMIN D3) 25 mcg (1000 units) capsule 2,000 Units every morning       DULoxetine HCl 40 MG CPEP every morning       lisdexamfetamine (VYVANSE) 40 MG capsule Take 40 mg by mouth every morning       ibuprofen (ADVIL/MOTRIN) 400 MG tablet Take 1 tablet by mouth daily as needed  (Patient not taking: Reported on 5/20/2022)       misoprostol (CYTOTEC) 200 MCG tablet PLACE 2 TABLETS VAGINALLY ONCE FOR 1 DOSE 3 TO 4 HOURS PRIOR TO THE PROCEDURE (Patient not taking: Reported on 5/20/2022)       VYVANSE 50 MG capsule          Allergies  No Known Allergies    Social History  Social History     Socioeconomic History     Marital status:      Spouse name: Not on file     Number of children: Not on file     Years of education: Not on file     Highest education level: Not on file   Occupational History     Not on file   Tobacco Use     Smoking status: Never Smoker     Smokeless tobacco: Never Used   Vaping Use     Vaping Use: Some days     Substances: Nicotine     Devices: Pre-filled pod   Substance and Sexual Activity     Alcohol use: Yes     Alcohol/week: 2.0 standard drinks     Types: 2 Cans of beer per week     Comment: a couple times a week     Drug use: Yes     Types: Marijuana     Sexual activity: Not on file   Other Topics Concern     Not on file   Social History Narrative    n looking at Inez's family history,  it is possible that a cancer susceptibility gene is present due to the family history of early onset breast cancer in her mother and also the history of early onset colon cancer in her maternal grandmother.  In additional, although the available details from the extended family is somewhat limited, the history of a possible uterine cancer in her maternal grandmother's sister (as uterine and colon cancer can be related to each other in some hereditary cancer conditions) and also the early onset cancer in her maternal grandmother's mother is also further supportive of the question about a possible hereditary cancer risk factor in Inez's mother's side of the family.     Social Determinants of Health     Financial Resource Strain: Not on file   Food Insecurity: Not on file   Transportation Needs: Not on file   Physical Activity: Not on file   Stress: Not on file   Social Connections: Not on file   Intimate Partner Violence: Not on file   Housing Stability: Not on file       Family History  Family History   Problem Relation Age of Onset     Breast Cancer Mother 45     Urinary tract infection Mother      Osteoporosis Mother      Hyperlipidemia Maternal Grandmother      Urinary tract infection Maternal Grandmother      Colon Cancer Maternal Grandmother         early age onset     Hyperlipidemia Maternal Grandfather      Congenital heart disease Paternal Grandmother      Thyroid Disease Paternal Grandmother      Heart Failure Paternal Grandfather      Uterine Cancer Other         maternal grandmother's sister       Review of Systems  The complete review of systems is negative other than noted in the HPI or here.   Anesthesia Evaluation   Pt has had prior anesthetic.     No history of anesthetic complications       ROS/MED HX  ENT/Pulmonary:  - neg pulmonary ROS  (-) tobacco use, asthma and sleep apnea   Neurologic: Comment: ADHD   (-) no seizures and no CVA   Cardiovascular:  - neg cardiovascular ROS   (+) -----Previous  "cardiac testing   Echo: Date: 6/2018 Results:  Conclusion:   The left ventricle is normal in internal dimension, wall thicknesses and wall   motion (EF 60-65%).   Left ventricular diastolic function is normal.   The right ventricle is normal in size and function.   There is an atrial septal aneurysm.   There is no significant valvular disease.   There is no pericardial effusion   Intravenous agitated saline contrast injection revealed the presence of a PFO   with passage of > 30  bubbles per frame into the left heart.   No prior study is available for comparison.    Stress Test: Date: Results:    ECG Reviewed: Date: Results:    Cath: Date: Results:   (-) PVD   METS/Exercise Tolerance: >4 METS Comment: Walks everyday for one hour   Hematologic: Comments: thrombocytosis   (-) history of blood clots and history of blood transfusion   Musculoskeletal: Comment: Chronic pain and diffuse body spasms since covid in 2020.  Receives Botox for neck dystonia, last 3/23/22      GI/Hepatic:  - neg GI/hepatic ROS  (-) GERD   Renal/Genitourinary:  - neg Renal ROS     Endo:  - neg endo ROS     Psychiatric/Substance Use:     (+) psychiatric history anxiety Recreational drug usage: Cannabis.    Infectious Disease: Comment: covid infection spring 2020      Malignancy:  - neg malignancy ROS     Other:  - neg other ROS          /76 (BP Location: Right arm, Patient Position: Chair, Cuff Size: Adult Regular)   Pulse 89   Temp 98.5  F (36.9  C) (Oral)   Resp 16   Ht 1.727 m (5' 8\")   Wt 73.9 kg (163 lb)   LMP 04/27/2022 (Within Days)   SpO2 97%   BMI 24.78 kg/m      Physical Exam   Constitutional: Awake, alert, cooperative, no apparent distress, and appears stated age.  Eyes: Pupils equal, round and reactive to light, extra ocular muscles intact, sclera clear, conjunctiva normal.  HENT: Normocephalic, oral pharynx with moist mucus membranes, good dentition. No goiter appreciated.   Respiratory: Clear to auscultation " bilaterally, no crackles or wheezing.  Cardiovascular: Regular rate and rhythm, normal S1 and S2, and no murmur noted.   No edema. Palpable pulses to radial and PT arteries.   GI: Normal bowel sounds, soft, non-distended, non-tender abdomen  Lymph/Hematologic: No cervical lymphadenopathy and no supraclavicular lymphadenopathy.  Genitourinary:  deferred  Skin: Warm and dry.  No rashes at anticipated surgical site.   Musculoskeletal: Full ROM of neck. There is no redness, warmth, or swelling of the joints. Gross motor strength is normal.    Neurologic: Awake, alert, oriented to name, place and time. Cranial nerves II-XII are grossly intact. Gait is normal.   Neuropsychiatric: Calm, cooperative. Normal affect.     Prior Labs/Diagnostic Studies   All labs and imaging personally reviewed     Component      Latest Ref Rng & Units 5/20/2022   WBC      4.0 - 11.0 10e3/uL 10.9   RBC Count      3.80 - 5.20 10e6/uL 4.09   Hemoglobin      11.7 - 15.7 g/dL 12.8   Hematocrit      35.0 - 47.0 % 39.7   MCV      78 - 100 fL 97   MCH      26.5 - 33.0 pg 31.3   MCHC      31.5 - 36.5 g/dL 32.2   RDW      10.0 - 15.0 % 12.1   Platelet Count      150 - 450 10e3/uL 430       EKG/ stress test - if available please see in ROS above         The patient's records and results personally reviewed by this provider.     Outside records reviewed from: Care Everywhere        Assessment      Inez Boston is a 46 year old female seen as a PAC referral for risk assessment and optimization for anesthesia.    Plan/Recommendations  Pt will be optimized for the proposed procedure.  See below for details on the assessment, risk, and preoperative recommendations    NEUROLOGY  - previous work up for constellation of symptoms arising after covid infection 3/2020 to include fatigue, brain fog, feeling of skin tightening/pulling.  Work up unremarkable  - ADHD, hold Vyvanse DOS  - Post Op delirium risk factors:  No risk identified    ENT  - No current airway  "concerns.  Will need to be reassessed day of surgery.  Mallampati: I  TM: > 3    CARDIAC  - No history of CAD, Hypertension and Afib   - denies exertional symptoms.  - METS (Metabolic Equivalents), [place] [facility] [place] [facility] [place] [facility]  Patient performs 4 or more METS exercise without symptoms            Total Score: 0      RCRI-Low risk: Class 2 0.9% complication rate            Total Score: 1    RCRI: High Risk Surgery        PULMONARY    CAMERON Low Risk            Total Score: 0      - Denies asthma or inhaler use  - Tobacco History      History   Smoking Status     Never Smoker   Smokeless Tobacco     Never Used       GI  - denies GERD  PONV High Risk  Total Score: 3           1 AN PONV: Pt is Female    1 AN PONV: Patient is not a current smoker    1 AN PONV: Intended Post Op Opioids          ENDOCRINE    - BMI: Estimated body mass index is 24.78 kg/m  as calculated from the following:    Height as of this encounter: 1.727 m (5' 8\").    Weight as of this encounter: 73.9 kg (163 lb).  Healthy Weight (BMI 18.5-24.9)  - No history of Diabetes Mellitus    HEME  - chronic thrombocytosis with platelets 400-500. Iron def anemia ruled out. Evaluated by Heme/onc 2/2/22.  JAK2 mutation negative  - plan is follow up 4 months with repeat labs.    - Pt is on ASA 81mg, will hold 7 days prior to above procedure    VTE Low Risk 0.26%            Total Score: 0      - no h/o VTE      MSK  - pt receives Botox injections for cervical dystonia, last 3/23/22.  She also participates in PT and is followed by PM&R    PSYCH  - anxiety        The patient is optimized for their procedure. AVS with information on surgery time/arrival time, meds and NPO status given by nursing staff. No further diagnostic testing indicated.            Valentina Mckeon PA-C  Preoperative Assessment Center  [facility]  Clinic and Surgery Center  Phone: [phone]  Fax: [phone]  "

## 2022-05-24 ENCOUNTER — VIRTUAL VISIT (OUTPATIENT)
Dept: ONCOLOGY | Facility: CLINIC | Age: 47
End: 2022-05-24
Attending: GENETIC COUNSELOR, MS
Payer: COMMERCIAL

## 2022-05-24 DIAGNOSIS — Z80.0 FAMILY HISTORY OF COLON CANCER: ICD-10-CM

## 2022-05-24 DIAGNOSIS — Z80.3 FAMILY HISTORY OF MALIGNANT NEOPLASM OF BREAST: Primary | ICD-10-CM

## 2022-05-24 PROCEDURE — 999N000069 HC STATISTIC GENETIC COUNSELING, < 16 MIN: Mod: GT,95 | Performed by: GENETIC COUNSELOR, MS

## 2022-05-24 NOTE — LETTER
Cancer Risk Management  Program Locations    [facility]  [facility]  [facility]  [facility]  Mailing Address  Cancer Risk Management Program  [address]  [place] [address]    New patient appointments  [phone]  May 30, 2022    Inez Boston  [address] [place] [address]      Dear Inez,    It was a pleasure talking with you again on 5-. Here is a copy of the progress note from your recent genetic counseling visit to the Cancer Risk Management Program. If you have any additional questions, please feel free to contact me.    Cancer Risk Management Program Genetic Counseling Note    5/24/2022    Referring Provider: Dr. Nam Caceres    Presenting Information:  Inez had a return video visit through the Cancer Risk Management Program, in order to discuss her genetic testing results. Her blood was drawn on 4-.  A BRCA1/BRCA2 analysis with an automatic reflex to the Common Hereditary Cancers genetic testing panel through [facility] was ordered. This testing was done because of her family history of breast, colon, and other cancer.    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  The variant is RAD50  Is called c.2563G>T (also called p.Tol843Rlo) and the variant in SMAD4 is called c.1140-3A>T. No other variants or mutations were found in either of these genes. Given the uncertain significance of this result, medical management decisions should NOT be made based on this test result alone.    Of note, Inez tested negative for mutations in the following genes: APC, PEGGY, AXIN2, BARD1, BMPR1A, BRCA1, BRCA2, BRIP1, CDH1, CDK4, CDKN2A, CHEK2, CTNNA1, DICER1, EPCAM, GREM1, HOXB13, KIT,  "MEN1, MLH1, MSH2, MSH3, MSH6, MUTYH, NBN, NF1, NTHL1, PALB2, PDGFRA, PMS2, POLD1, POLE, PTEN, RAD51C, RAD51D, SDHA, SDHB, SDHC, SDHD, SMARCA4, STK11, TP53,TSC1, TSC2, and VHL.      Therefore, genetic testing did not detect an identifiable mutation associated with Hereditary Breast and Ovarian Cancer syndrome (BRCA1, BRCA2), Manzano syndrome (MLH1, MSH2, MSH6, PMS2, EPCAM), Familial Adenomatous Polyposis (APC), Hereditary Diffuse Gastric Cancer (CDH1), Familial Atypical Multiple Mole Melanoma syndrome (CDK4, CDKN2A), Cowden syndrome (PTEN), Li Fraumeni syndrome (TP53), Peutz-Jeghers syndrome (STK11), MUTYH Associated Polyposis (MUTYH), Hereditary Paraganglioma and Pheochromocytoma syndrome (SDHA, SDHB, SDHC, SDHD), Tuberous sclerosis complex (TSC1, TSC2), Von Hippel-Lindau disease (VHL), Neurofibromatosis type 1 (NF1), and Multiple Endocrine Neoplasia type 1 (MEN1).. We reviewed the autosomal dominant inheritance of these genes. Inez cannot pass on a mutation in any of these genes to her children based on this test result. Mutations in these genes do not skip generations.      A copy of the test report can be found in the Laboratory tab, dated 4-, and named \"LABORATORY MISCELLANEOUS ORDER.\"  The report is scanned in as a linked document.    Interpretation:  We discussed several different interpretations of this inconclusive test result. It is not clear if either of these gene variants are associated with increased cancer risk:   1. Each variant may be a benign change that does not increase cancer risk.  2. Each variant may be a harmful mutation that causes an increased risk for cancer.    We talked about that harmful mutations in the SMAD4 gene cause a genetic condition called Juvenile Polyposis Syndrome (JPS).  This condition causes juvenile type polyps to form in the stomach and/or colon.  Individuals with JPS are at increased risk for colon cancer (~40-50% lifetime risk) and stomach cancer (as high as a 21% " "lifetime risk).  Pancreatic and small bowel cancers have also been reported in JPS. Some individuals with a harmful mutation in SMAD4 also have symptoms of Hereditary Hemorrhagic Telangiectasia, in which individuals have arteriovenous malformations resulting in internal bleeds and/or excessive nose bleeds.  Of note, Inez had a colonoscopy in December 2021 that did not note any colon polyps.    We talked about that initial scientific literature suggested that individuals with one harmful mutation in the RAD50 gene may have an increased risk for breast or ovarian or other cancers.  However, we talked about that more recent published literature has not supported the idea that having one harmful mutation in the RAD50 gene increases cancer risk.  However, Individuals with one RAD50 mutation are considered carriers for Nijmegen breakage syndrome-like disorder; in rare situations in which both parents have a mutation in the RAD50 gene, their children each have a 25% risk for Nijmegen breakage syndrome-like disorder. This condition is a rare autosomal recessive disorder that typically presents in childhood and affects multiple body systems include facial features, height, learning disabilities and increased risk for childhood cancer. For individuals of childbearing age with RAD50 mutations, genetic counseling and genetic testing may be advised for their partners.     Again, it is not known at this time if either of these gene variants are harmful gene mutations or, instead, are benign variants of normal.  We talked about that because Inez does not have any personal or family history of colon polyposis and because it is not thought at this time that individuals with one mutation in the RAD50 gene are at increased risk for cancer, it seems unlikely that either one of the \"variants of unknown significance\" seen in Inez are related to her family history of cancer. Genetic testing labs are working to determine if this variant " is harmful or benign, and they will contact me if it is reclassified.     It is also important to consider that Inez's mother with early onset breast cancer (or other relatives) may have had a mutation in a hereditary cancer gene, but Inez did not inherit it.  (If Inez's mother undergoes hereditary cancer genetic testing, this could provide some additional information for Inez if it does identify a gene mutation that Inez did not inherit.)    Inheritance:  We reviewed the inheritance of variants in these gene. We discussed that it is likely that Inez inherited these variants from her parents. However, because it is unclear what, if any, risk is associated with either of these variants, clinical genetic testing for either of these variants alone is not recommended for relatives.    Screening:  Because Inez's genetic testing did not give an obviously explanation for the cancer in Inez's, family, it is important for Inez and her relatives to refer back to the family history for appropriate cancer screening:     We talked about today that since we do not have a genetic explanation for the early onset breast cancer in Inez's mother, this genetic test result does not give us specific breast cancer risk information for Inez personally.  In this circumstance, we talked about that a few different breast cancer risk models have been used to try to estimate a woman's breast cancer risk, in order to determine which women should consider increased breast cancer surveillance:      - We talked about that the Jamin model is based on family history of breast cancer in first and second degree relatives only; we talked about that this model would allow me to estimate breast cancer risk based on Inez's mother's history of breast cancer.  Inez reports that her mother's breast cancer was diagnosed in her 40s; using this information, the Jamin tables gives an estimated 13% lifetime risk of breast cancer for Inez.        - We  talked about that another model (the EARNEST breast cancer risk calculator) gives breast cancer risk estimates based on family history, plus some additional risk factors such as breast density, previous breast biopsies, age of menarche/first child, and previous genetic testing results; the EARNEST risk calculator predicted around a 16%% lifetime risk for breast cancer for Inez.    - We talked about that when a women's estimated lifetime risk of breast cancer is 20% or higher based on at least one of these models, it is generally recommended that they have additional conversations about the options for increased breast cancer surveillance.  We talked about that Inez's estimated breast cancer risks do not meet this 20% risk threshold, given the information currently available about Inez's personal and family medical history.       We talked about that Inez is currently up-to-date for colon cancer screening via colonoscopy for individuals with a second degree relative (eg. Grandparent) with colon cancer.  At this time, the National Comprehensive Cancer Network (NCCN) only recommends more frequent colonoscopy screening for individuals with a first degree relative (eg. parent or sibling) with colon cancer.      Other population cancer screening options, such as those recommended by the American Cancer Society and NCCN, are also appropriate for Inez and her family. These screening recommendations may change if there are changes to Inez's personal and/or family history of cancer. Final screening recommendations should be made by each individual's primary care provider.    Additional Testing Considerations:  It is possible Inez does carry a gene or combination of genes and environment that increase her  risk for cancer that was not identifiable through this particular genetic testing panel. Inez is encouraged to contact me in the future if she wants to know if there is additional genetic testing that might be  "available/indicated for her then or if she wishes to readdress larger gene panel options in the future.     Although Inez's genetic testing result is inconclusive, other relatives may still carry a harmful gene mutation associated with an increased risk for cancer. Genetic counseling is recommended Inez's mother or other maternal relatives to discuss genetic testing options. If any of these relatives do pursue genetic testing, Inez is encouraged to contact me so that we may review the impact of their test results on her.    Summary:  We do not have an explanation for Inez's family history of cancer. While no genetic changes were identified, Inez may still be at risk for certain cancers due to family history, environmental factors, or other genetic causes not identified by this test.  Because of that, it is important that she continue with cancer screening based on her personal and family history as discussed above.    Genetic testing is rapidly advancing, and new cancer susceptibility genes will most likely be identified in the future. Therefore, I encouraged Inez to contact me annually or if there are changes in her personal or family history. This may change how we assess her cancer risk, screening, and the testing we would offer.    Plan:  1.  I have released to Inez a copy of her test results through the [employer] portal.  She will also get a copy of this clinic note and a handout about \"variants of unknown significance.\"  2.  Inez plans to follow-up with her primary care provider as previously recommended.  3.  Inez should contact me periodically, or sooner if her family history changes, and she would like to know if there are additional screening or testing recommendations at that time.  4.  I will try to contact Inez if the laboratory informs me that this VUS has been reclassified.  This may change screening and testing recommendations for Inez and her relatives.    If Inez has any further " questions, I encouraged her to contact me via [facility] or through email: [email or web address].    Alejandrina Caceres MS, [facility]  Genetic Counselor    Time spent face to face via video:  15 minutes  (1:46pm-2:01PM)

## 2022-05-24 NOTE — LETTER
5/24/2022         RE: Inez Boston  [address] [place] [address]        Dear Colleague,    Thank you for referring your patient, Inez Boston, to the [facility]. Please see a copy of my visit note below.    Cancer Risk Management Program Genetic Counseling Note    5/24/2022    Referring Provider: Dr. Nam Caceres    Presenting Information:  Inez had a return video visit through the Cancer Risk Management Program, in order to discuss her genetic testing results. Her blood was drawn on 4-.  A BRCA1/BRCA2 analysis with an automatic reflex to the Common Hereditary Cancers genetic testing panel through [facility] was ordered. This testing was done because of her family history of breast, colon, and other cancer.    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  The variant is RAD50  Is called c.2563G>T (also called [id number]) and the variant in SMAD4 is called c.1140-3A>T. No other variants or mutations were found in either of these genes. Given the uncertain significance of this result, medical management decisions should NOT be made based on this test result alone.    Of note, Inez tested negative for mutations in the following genes: APC, PEGGY, AXIN2, BARD1, BMPR1A, BRCA1, BRCA2, BRIP1, CDH1, CDK4, CDKN2A, CHEK2, CTNNA1, DICER1, EPCAM, GREM1, HOXB13, KIT, MEN1, MLH1, MSH2, MSH3, MSH6, MUTYH, NBN, NF1, NTHL1, PALB2, PDGFRA, PMS2, POLD1, POLE, PTEN, RAD51C, RAD51D, SDHA, SDHB, SDHC, SDHD, SMARCA4, STK11, TP53,TSC1, TSC2, and VHL.      Therefore, genetic testing did not detect an identifiable mutation associated with Hereditary Breast and Ovarian Cancer syndrome (BRCA1, BRCA2), Manzano syndrome (MLH1, MSH2, MSH6, PMS2, EPCAM), Familial Adenomatous Polyposis (APC), Hereditary Diffuse Gastric Cancer (CDH1), Familial Atypical Multiple Mole  "Melanoma syndrome (CDK4, CDKN2A), Cowden syndrome (PTEN), Li Fraumeni syndrome (TP53), Peutz-Jeghers syndrome (STK11), MUTYH Associated Polyposis (MUTYH), Hereditary Paraganglioma and Pheochromocytoma syndrome (SDHA, SDHB, SDHC, SDHD), Tuberous sclerosis complex (TSC1, TSC2), Von Hippel-Lindau disease (VHL), Neurofibromatosis type 1 (NF1), and Multiple Endocrine Neoplasia type 1 (MEN1).. We reviewed the autosomal dominant inheritance of these genes. Inez cannot pass on a mutation in any of these genes to her children based on this test result. Mutations in these genes do not skip generations.      A copy of the test report can be found in the Laboratory tab, dated 4-, and named \"LABORATORY MISCELLANEOUS ORDER.\"  The report is scanned in as a linked document.    Interpretation:  We discussed several different interpretations of this inconclusive test result. It is not clear if either of these gene variants are associated with increased cancer risk:   1. Each variant may be a benign change that does not increase cancer risk.  2. Each variant may be a harmful mutation that causes an increased risk for cancer.    We talked about that harmful mutations in the SMAD4 gene cause a genetic condition called Juvenile Polyposis Syndrome (JPS).  This condition causes juvenile type polyps to form in the stomach and/or colon.  Individuals with JPS are at increased risk for colon cancer (~40-50% lifetime risk) and stomach cancer (as high as a 21% lifetime risk).  Pancreatic and small bowel cancers have also been reported in JPS. Some individuals with a harmful mutation in SMAD4 also have symptoms of Hereditary Hemorrhagic Telangiectasia, in which individuals have arteriovenous malformations resulting in internal bleeds and/or excessive nose bleeds.  Of note, Inez had a colonoscopy in December 2021 that did not note any colon polyps.    We talked about that initial scientific literature suggested that individuals with one " "harmful mutation in the RAD50 gene may have an increased risk for breast or ovarian or other cancers.  However, we talked about that more recent published literature has not supported the idea that having one harmful mutation in the RAD50 gene increases cancer risk.  However, Individuals with one RAD50 mutation are considered carriers for Nijmegen breakage syndrome-like disorder; in rare situations in which both parents have a mutation in the RAD50 gene, their children each have a 25% risk for Nijmegen breakage syndrome-like disorder. This condition is a rare autosomal recessive disorder that typically presents in childhood and affects multiple body systems include facial features, height, learning disabilities and increased risk for childhood cancer. For individuals of childbearing age with RAD50 mutations, genetic counseling and genetic testing may be advised for their partners.     Again, it is not known at this time if either of these gene variants are harmful gene mutations or, instead, are benign variants of normal.  We talked about that because Inez does not have any personal or family history of colon polyposis and because it is not thought at this time that individuals with one mutation in the RAD50 gene are at increased risk for cancer, it seems unlikely that either one of the \"variants of unknown significance\" seen in Inez are related to her family history of cancer. Genetic testing labs are working to determine if this variant is harmful or benign, and they will contact me if it is reclassified.     It is also important to consider that Inez's mother with early onset breast cancer (or other relatives) may have had a mutation in a hereditary cancer gene, but Inez did not inherit it.  (If Inez's mother undergoes hereditary cancer genetic testing, this could provide some additional information for Inez if it does identify a gene mutation that Inez did not inherit.)    Inheritance:  We reviewed the " inheritance of variants in these gene. We discussed that it is likely that Inez inherited these variants from her parents. However, because it is unclear what, if any, risk is associated with either of these variants, clinical genetic testing for either of these variants alone is not recommended for relatives.    Screening:  Because Inez's genetic testing did not give an obviously explanation for the cancer in Inez's, family, it is important for Inez and her relatives to refer back to the family history for appropriate cancer screening:     We talked about today that since we do not have a genetic explanation for the early onset breast cancer in Inez's mother, this genetic test result does not give us specific breast cancer risk information for Inez personally.  In this circumstance, we talked about that a few different breast cancer risk models have been used to try to estimate a woman's breast cancer risk, in order to determine which women should consider increased breast cancer surveillance:      - We talked about that the Jaimn model is based on family history of breast cancer in first and second degree relatives only; we talked about that this model would allow me to estimate breast cancer risk based on Inez's mother's history of breast cancer.  Inez reports that her mother's breast cancer was diagnosed in her 40s; using this information, the Jamin tables gives an estimated 13% lifetime risk of breast cancer for Inez.        - We talked about that another model (the EARNEST breast cancer risk calculator) gives breast cancer risk estimates based on family history, plus some additional risk factors such as breast density, previous breast biopsies, age of menarche/first child, and previous genetic testing results; the EARNEST risk calculator predicted around a 16%% lifetime risk for breast cancer for Inez.    - We talked about that when a women's estimated lifetime risk of breast cancer is 20% or higher based  on at least one of these models, it is generally recommended that they have additional conversations about the options for increased breast cancer surveillance.  We talked about that Inez's estimated breast cancer risks do not meet this 20% risk threshold, given the information currently available about Inez's personal and family medical history.       We talked about that Inez is currently up-to-date for colon cancer screening via colonoscopy for individuals with a second degree relative (eg. Grandparent) with colon cancer.  At this time, the National Comprehensive Cancer Network (NCCN) only recommends more frequent colonoscopy screening for individuals with a first degree relative (eg. parent or sibling) with colon cancer.      Other population cancer screening options, such as those recommended by the American Cancer Society and NCCN, are also appropriate for Inez and her family. These screening recommendations may change if there are changes to Inez's personal and/or family history of cancer. Final screening recommendations should be made by each individual's primary care provider.    Additional Testing Considerations:  It is possible Inez does carry a gene or combination of genes and environment that increase her  risk for cancer that was not identifiable through this particular genetic testing panel. Inez is encouraged to contact me in the future if she wants to know if there is additional genetic testing that might be available/indicated for her then or if she wishes to readdress larger gene panel options in the future.     Although Inez's genetic testing result is inconclusive, other relatives may still carry a harmful gene mutation associated with an increased risk for cancer. Genetic counseling is recommended Inez's mother or other maternal relatives to discuss genetic testing options. If any of these relatives do pursue genetic testing, Inez is encouraged to contact me so that we may review the  "impact of their test results on her.    Summary:  We do not have an explanation for Inez's family history of cancer. While no genetic changes were identified, Inez may still be at risk for certain cancers due to family history, environmental factors, or other genetic causes not identified by this test.  Because of that, it is important that she continue with cancer screening based on her personal and family history as discussed above.    Genetic testing is rapidly advancing, and new cancer susceptibility genes will most likely be identified in the future. Therefore, I encouraged Inez to contact me annually or if there are changes in her personal or family history. This may change how we assess her cancer risk, screening, and the testing we would offer.    Plan:  1.  I have released to Inez a copy of her test results through the [facility] portal.  She will also get a copy of this clinic note and a handout about \"variants of unknown significance.\"  2.  Inez plans to follow-up with her primary care provider as previously recommended.  3.  Inez should contact me periodically, or sooner if her family history changes, and she would like to know if there are additional screening or testing recommendations at that time.  4.  I will try to contact Inez if the laboratory informs me that this VUS has been reclassified.  This may change screening and testing recommendations for Inez and her relatives.    If Inez has any further questions, I encouraged her to contact me via [employer] or through email: [email or web address].      Alejandrina Caceres MS, [facility]  Genetic Counselor    Time spent face to face via video:  15 minutes  (~1:45pm-~2PM)  "

## 2022-05-24 NOTE — PATIENT INSTRUCTIONS
Genetic Testing  You had a blood test that looked at the genetic information in one or more genes associated with increased cancer risk.  The testing looked for any harmful changes that would stop this particular gene from working like it should.  It is important to remember that everyone has variants in multiple genes in their bodies that do not affect how the gene works.  These changes are benign and do not cause disease or increase cancer risk.  The term often used to describe these variants is benign polymorphism.  If an individual is found to have a benign polymorphism, their genetic test result is reported as Negative.    Results  There are three possible results of any genetic test:  Positive--a harmful mutation was identified  Negative--no mutation was identified  Variant of unknown significance--a variation in one of the genes was identified, but it is unclear how this impacts cancer risk in the family    Variant of Unknown Significance (VUS)  Two VUS's were identified in your blood sample.  Scientists currently do not know if these variants are harmful or if they are benign polymorphisms not associated with any increased risk of cancer.   There are several reasons for this lack of knowledge, but likely your VUS's have either never been seen before or have only been seen in a small number of individuals.  Until your VUS's are studied in more detail and/or seen in more families, scientists are not able to predict if it is a harmful mutation or a benign polymorphism.  Therefore, the cause of the cancer in your relatives is still unknown.          Reclassification  Genetic testing laboratories and researchers are constantly working to determine the importance of variants of unknown significance.  Most laboratories will notify the genetic counselor when a patient s VUS has been reclassified as a harmful mutation or a benign polymorphism.      Some families may be candidates for participation in the  laboratory s variant research programs to help determine the importance of their VUS.  Only the testing laboratory can decide who is eligible for participation.  Participating in these programs does not guarantee that families will learn the significance of their VUS immediately.  It could be months or years before a VUS is reclassified.       Screening Recommendations  Cancer screening recommendations for patients with a VUS should be based on their personal and family history rather than the VUS itself.  This may include increased cancer screening for certain individuals in the family.  Your genetic counselor and health care provider can help make appropriate recommendations for you and your relatives.      It is usually not recommended that other relatives have genetic testing for the VUS unless it is done as part of the laboratory s variant research program because an individual s test results should not influence their cancer screening until we determine the importance of the VUS.  Your genetic counselor can help you and your relatives understand the risks and benefits of all genetic testing and cancer screening options.    Please call us if you have any questions or concerns.     Cancer Risk Management Program [phone] ([phone])  Keyur Encarnacion, MS [facility] [phone]  Anupama Khanna, MS, [facility] [phone]  CELESTINA Caceres, MS, [facility]  [phone]     [email or web address]  Lurdes Chávez, MS, [facility]  [phone]  Adelia Venegas, MS, [facility]  [phone]  Alcira Curiel, MS, [facility] [phone]  Latia Hernandez, MS, [facility] [phone]

## 2022-05-24 NOTE — PROGRESS NOTES
Inez is a 46 year old who is being evaluated via a billable video visit.      How would you like to obtain your AVS? [employer]  If the video visit is dropped, the invitation should be resent by: Text to cell phone: [phone]  Will anyone else be joining your video visit? No      Video Start Time: 1:46PM    Video-Visit Details    Type of service:  Video Visit    Video End Time: 2:01PM    Originating Location (pt. Location): Home    Distant Location (provider location):  [facility]     Platform used for Video Visit: [employer]

## 2022-05-24 NOTE — PROGRESS NOTES
Cancer Risk Management Program Genetic Counseling Note    5/24/2022    Referring Provider: Dr. Nam Caceres    Presenting Information:  Inez had a return video visit through the Cancer Risk Management Program, in order to discuss her genetic testing results. Her blood was drawn on 4-.  A BRCA1/BRCA2 analysis with an automatic reflex to the Common Hereditary Cancers genetic testing panel through [facility] was ordered. This testing was done because of her family history of breast, colon, and other cancer.    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  The variant is RAD50  Is called c.2563G>T (also called p.[id number]) and the variant in SMAD4 is called c.1140-3A>T. No other variants or mutations were found in either of these genes. Given the uncertain significance of this result, medical management decisions should NOT be made based on this test result alone.    Of note, Inez tested negative for mutations in the following genes: APC, PEGGY, AXIN2, BARD1, BMPR1A, BRCA1, BRCA2, BRIP1, CDH1, CDK4, CDKN2A, CHEK2, CTNNA1, DICER1, EPCAM, GREM1, HOXB13, KIT, MEN1, MLH1, MSH2, MSH3, MSH6, MUTYH, NBN, NF1, NTHL1, PALB2, PDGFRA, PMS2, POLD1, POLE, PTEN, RAD51C, RAD51D, SDHA, SDHB, SDHC, SDHD, SMARCA4, STK11, TP53,TSC1, TSC2, and VHL.      Therefore, genetic testing did not detect an identifiable mutation associated with Hereditary Breast and Ovarian Cancer syndrome (BRCA1, BRCA2), Manzano syndrome (MLH1, MSH2, MSH6, PMS2, EPCAM), Familial Adenomatous Polyposis (APC), Hereditary Diffuse Gastric Cancer (CDH1), Familial Atypical Multiple Mole Melanoma syndrome (CDK4, CDKN2A), Cowden syndrome (PTEN), Li Fraumeni syndrome (TP53), Peutz-Jeghers syndrome (STK11), MUTYH Associated Polyposis (MUTYH), Hereditary Paraganglioma and Pheochromocytoma syndrome (SDHA, SDHB, SDHC, SDHD), Tuberous sclerosis complex  "(TSC1, TSC2), Von Hippel-Lindau disease (VHL), Neurofibromatosis type 1 (NF1), and Multiple Endocrine Neoplasia type 1 (MEN1).. We reviewed the autosomal dominant inheritance of these genes. nIez cannot pass on a mutation in any of these genes to her children based on this test result. Mutations in these genes do not skip generations.      A copy of the test report can be found in the Laboratory tab, dated 4-, and named \"LABORATORY MISCELLANEOUS ORDER.\"  The report is scanned in as a linked document.    Interpretation:  We discussed several different interpretations of this inconclusive test result. It is not clear if either of these gene variants are associated with increased cancer risk:   1. Each variant may be a benign change that does not increase cancer risk.  2. Each variant may be a harmful mutation that causes an increased risk for cancer.    We talked about that harmful mutations in the SMAD4 gene cause a genetic condition called Juvenile Polyposis Syndrome (JPS).  This condition causes juvenile type polyps to form in the stomach and/or colon.  Individuals with JPS are at increased risk for colon cancer (~40-50% lifetime risk) and stomach cancer (as high as a 21% lifetime risk).  Pancreatic and small bowel cancers have also been reported in JPS. Some individuals with a harmful mutation in SMAD4 also have symptoms of Hereditary Hemorrhagic Telangiectasia, in which individuals have arteriovenous malformations resulting in internal bleeds and/or excessive nose bleeds.  Of note, Inez had a colonoscopy in December 2021 that did not note any colon polyps.    We talked about that initial scientific literature suggested that individuals with one harmful mutation in the RAD50 gene may have an increased risk for breast or ovarian or other cancers.  However, we talked about that more recent published literature has not supported the idea that having one harmful mutation in the RAD50 gene increases cancer " "risk.  However, Individuals with one RAD50 mutation are considered carriers for Nijmegen breakage syndrome-like disorder; in rare situations in which both parents have a mutation in the RAD50 gene, their children each have a 25% risk for Nijmegen breakage syndrome-like disorder. This condition is a rare autosomal recessive disorder that typically presents in childhood and affects multiple body systems include facial features, height, learning disabilities and increased risk for childhood cancer. For individuals of childbearing age with RAD50 mutations, genetic counseling and genetic testing may be advised for their partners.     Again, it is not known at this time if either of these gene variants are harmful gene mutations or, instead, are benign variants of normal.  We talked about that because Inez does not have any personal or family history of colon polyposis and because it is not thought at this time that individuals with one mutation in the RAD50 gene are at increased risk for cancer, it seems unlikely that either one of the \"variants of unknown significance\" seen in Inez are related to her family history of cancer. Genetic testing labs are working to determine if this variant is harmful or benign, and they will contact me if it is reclassified.     It is also important to consider that Inez's mother with early onset breast cancer (or other relatives) may have had a mutation in a hereditary cancer gene, but Inez did not inherit it.  (If Inez's mother undergoes hereditary cancer genetic testing, this could provide some additional information for Inez if it does identify a gene mutation that Inez did not inherit.)    Inheritance:  We reviewed the inheritance of variants in these gene. We discussed that it is likely that Inez inherited these variants from her parents. However, because it is unclear what, if any, risk is associated with either of these variants, clinical genetic testing for either of these " variants alone is not recommended for relatives.    Screening:  Because Inez's genetic testing did not give an obviously explanation for the cancer in Inez's, family, it is important for Inez and her relatives to refer back to the family history for appropriate cancer screening:     We talked about today that since we do not have a genetic explanation for the early onset breast cancer in Inez's mother, this genetic test result does not give us specific breast cancer risk information for Inez personally.  In this circumstance, we talked about that a few different breast cancer risk models have been used to try to estimate a woman's breast cancer risk, in order to determine which women should consider increased breast cancer surveillance:      - We talked about that the Jamin model is based on family history of breast cancer in first and second degree relatives only; we talked about that this model would allow me to estimate breast cancer risk based on Inez's mother's history of breast cancer.  Inez reports that her mother's breast cancer was diagnosed in her 40s; using this information, the Jamin tables gives an estimated 13% lifetime risk of breast cancer for Inez.        - We talked about that another model (the EARNEST breast cancer risk calculator) gives breast cancer risk estimates based on family history, plus some additional risk factors such as breast density, previous breast biopsies, age of menarche/first child, and previous genetic testing results; the EARNEST risk calculator predicted around a 16%% lifetime risk for breast cancer for Inez.    - We talked about that when a women's estimated lifetime risk of breast cancer is 20% or higher based on at least one of these models, it is generally recommended that they have additional conversations about the options for increased breast cancer surveillance.  We talked about that Inez's estimated breast cancer risks do not meet this 20% risk threshold, given  the information currently available about Inez's personal and family medical history.       We talked about that Inez is currently up-to-date for colon cancer screening via colonoscopy for individuals with a second degree relative (eg. Grandparent) with colon cancer.  At this time, the National Comprehensive Cancer Network (NCCN) only recommends more frequent colonoscopy screening for individuals with a first degree relative (eg. parent or sibling) with colon cancer.      Other population cancer screening options, such as those recommended by the American Cancer Society and NCCN, are also appropriate for Inez and her family. These screening recommendations may change if there are changes to Inez's personal and/or family history of cancer. Final screening recommendations should be made by each individual's primary care provider.    Additional Testing Considerations:  It is possible Inez does carry a gene or combination of genes and environment that increase her  risk for cancer that was not identifiable through this particular genetic testing panel. Inez is encouraged to contact me in the future if she wants to know if there is additional genetic testing that might be available/indicated for her then or if she wishes to readdress larger gene panel options in the future.     Although Inez's genetic testing result is inconclusive, other relatives may still carry a harmful gene mutation associated with an increased risk for cancer. Genetic counseling is recommended Inez's mother or other maternal relatives to discuss genetic testing options. If any of these relatives do pursue genetic testing, Inez is encouraged to contact me so that we may review the impact of their test results on her.    Summary:  We do not have an explanation for Inez's family history of cancer. While no genetic changes were identified, Inez may still be at risk for certain cancers due to family history, environmental factors, or other  "genetic causes not identified by this test.  Because of that, it is important that she continue with cancer screening based on her personal and family history as discussed above.    Genetic testing is rapidly advancing, and new cancer susceptibility genes will most likely be identified in the future. Therefore, I encouraged Inez to contact me annually or if there are changes in her personal or family history. This may change how we assess her cancer risk, screening, and the testing we would offer.    Plan:  1.  I have released to Inez a copy of her test results through the [facility] portal.  She will also get a copy of this clinic note and a handout about \"variants of unknown significance.\"  2.  Inez plans to follow-up with her primary care provider as previously recommended.  3.  Inez should contact me periodically, or sooner if her family history changes, and she would like to know if there are additional screening or testing recommendations at that time.  4.  I will try to contact Inez if the laboratory informs me that this VUS has been reclassified.  This may change screening and testing recommendations for Inez and her relatives.    If Inez has any further questions, I encouraged her to contact me via [facility] or through email: [email or web address].    Alejandrina Caceres MS, [facility]  Genetic Counselor    Time spent face to face via video:  15 minutes  (~1:45pm-~2PM)    "

## 2022-05-25 RX ORDER — GABAPENTIN 300 MG/1
CAPSULE ORAL
Status: ON HOLD | COMMUNITY
Start: 2021-09-09 | End: 2022-06-03

## 2022-05-25 NOTE — PROGRESS NOTES
"Movement disorder  Brielle Cardona CMA        Diagnosis/Summary/Recommendations:    PATIENT: Inez Boston  46 year old female     : 1975    CORINNE: 2022    MRN: [id number]      Works at [facility] [employer] [address] [place] [address]     [phone] (M)   [phone] (H)      [email or web address]     Angelic Boston  [phone]    See details at bottom       Assessment:  (G25.9) Movement disorder  (primary encounter diagnosis)  Dystonia     Past history of covid19 - see  Notes from [place] and [place]     Genetics consultation information  ? With respect to Inez's recent diagnosis of cervical dystonia, Inez's mother states that she has a personal history of occasional muscle spasms and twitching.  She also reports that Inez's maternal uncle also has a history of muscle pain/twitching that he was told was related to overuse of his muscles and a \"build up of lactic acid.\"  (We talked about that Inez should share this family history information with her provider at her upcoming neurology appointment.)  ? Inez reports that her family is of  ancestry.  There is no known Ashkenazi Faith ancestry on either side of her family. There is no reported consanguinity.     PMR consultation with Dr. Fischer on 2022     Inez Boston is a 46 year old female who is referred to clinic for evaluation and management of neck pain, myofascial pain, and persistent muscle spasms among many other physical complaints which have been present since she recovered from COVID19 in Spring of 2020.      The patient reports that she became ill with COVID in 3/2020, and made essentially a full recovery without any other significant medical intervention. Then in 2020 she started to develop worsening fatigue, unintentional weight loss, hair loss, and brain fog. She also developed a feeling of \"my skin is on wrong,\" which is the most bothersome of her complaints and is ongoing to date. Specifically, she has " "extreme \"tightness and pulling\" sensation that starts in her toes and travels to the top of her head. She states that it feels like her skin is covered in \"tight bands\" with a constant \"trembling and rumbling\" sensation throughout her body. She also has an associated sensation that she cannot sit still at times, and frequently has to move around to stay comfortable.      She states that there is an area behind her eyes and ears that is the \"epicenter\" of all of her symptoms. She feels as though all of her muscles are in spasm, but it is more severe above her shoulders in the head/neck/upper shoulder area. She has constant numbness and tingling in her fingers and ties with associated achiness in the affected areas. She did have \"choking sensation\" and difficulty breathing (specifically had difficulty expanding her lungs/diaphragm), but these symptoms resolved with PT (myofascial massage).      The patient has seen numerous specialists including two Neurologists at [facility] and has had extensive workup which has been unrevealing. Her symptoms have been so debilitating that it has contributed to the breakup between the patient and her partner. She recently moved from [place] to be closer to family for better support.      She has tried muscle relaxers, which help her sleep at night but do not do much for her above described symptoms. She has tried alternating cold/heat with minimal benefit. Currently participating in PT. Has tried Ropinerole for RLS without benefit. She is on Vyvanse for ADHD, and duloxetine for mood.      PLAN     Botox administered 3/23/2022     AREA/MUSCLE INJECTED: 150 UNITS BOTOX = TOTAL DOSE, 2:1 DILUTION     1. NECK & SHOULDER GIRDLE MUSCLES: 45 UNITS BOTOX = TOTAL DOSE, 2:1 DILUTION     Right Upper Trapezius (upper cervical) - 5 units of Botox at 1 site/s.   Left Upper Trapezius (upper cervical) - 5 units of Botox at 1 site/s.     Right Splenius Cervicis - 5 units of Botox at 1 site/s.   Left " Splenius Cervicis -  5 units of Botox at 1 site/s.     Right Levator Scapulae - 5 units of Botox at 1 site/s.   Left Levator Scapulae - 10 units of Botox at 1 site/s.     Right Rhomboids - 5 units at 1 site  Left Rhomboids - 5 units at 1 site     2. FACIAL & SCALP MUSCLES: 105 UNITS BOTOX = TOTAL DOSE, 2:1 DILUTION  Right Frontalis - 10 units of Botox at 2 site/s.  Left Frontalis - 10 units of Botox at 2 site/s.     Right Temporalis - 25 units of Botox at 5 site/s.  Left Temporalis - 25 units of Botox at 5 site/s.     Left Occipitalis - 20 units of Botox at 5 site/s.      Right  - 5 units of Botox at 1 site/s.              Left  - 5 units of Botox at 1 site/s.     Procerus - 5 units of Botox at 1 site/     Additionally, a PT referral was placed to Irasema Parker at [facility] in [place], as she does manual muscle work, which may be quite helpful for this patient.     Review of diagnosis    Presumed functional movement problem    Avoidance of dopamine blockers   Not takign    Motor complication review   n/a    Review of Impulse control disorders       Review of surgical or medication options       Gait/Balance/Falls       Exercise/Therapy performed/offered       Cognitive/Driving       Mood   Anxiety  ADHD  Mother and brother  Had adhd  Mother does hand movements all day long  Mother has spasms when relaxing  Jumps a little  May suppress the movements and feels relief when does the movement  Mother suppressing the movements     Massages arms, that helps  Untwisting knot inh er arm   \  Psychiatrist virtual kelley arreola - psychiatrist in [place]   He is prescribing 3 medications        Hallucinations/delusions       Sleep       Bladder/Renal/Prostate/Gyn/Other  Urinary urge incontinece  Somatic dysfufnction of pelvis region  Pelvic pain    GI/Constipation/GERD       ENDO/Lipid/DM/Bone density/Thyroid      Cardio/heart/Hyper or Hypotensive       Vision/Dry  Eyes/Cataracts/Glaucoma/Macular       Heme/Anticoagulation/Antiplatelet/Anemia/Other      ENT/Resp  SARS-CoV-2 Spring 2020    Skin/Cancer/Seborrhea/other  Altered skin sensations including dysesthesia and pulling  Alopecia areata and telogen effluvium related to covid19    Musculoskeletal/Pain/Headache      Other:    In addition, as part of her evaluation for abdominal pain, Inez underwent abdominal imaging, which has noted a mass between her left ovary and uterus; the imaging report notes that this mass could be consistent with a fibroid, but this mass was unable to be biopsied.  Inez reports that she plans to have this mass surgically removed in June     MRI Pelvis 2/24/2022  IMPRESSION: 5.1 x 6.0 x 5.1 cm mass. This appears to deform and expand  the left ovary significantly, I favor this is arising from the ovary.  Pedunculated fibroid ultimately not excluded but felt less likely.     Ultrasound guided 3/11/2022  Impression: No images obtained secondary to failed endometrial biopsy attempt.     Procedure 6/3/2022 Diagnostic laparoscopy     Laboratory testing which included a CBC, CK, JESUS, CCP, paraneoplastic panel, UA were unremarkable. Repeat COVID-19 PCR testing was negative. FSH, LH, testosterone levels were obtained and appeared to be normal for age however I would defer interpretation to my colleagues in Endocrinology.     dysethesia     EMG dated February 4, 2021 was normal with no evidence for a large fiber neuropathy, myopathy, or peripheral nerve hyper excitability disorder to explain the patient's symptoms of pulling and spasms.    Autonomic reflex screen also performed February 5, 2021 was normal with no evidence of autonomic failure. Subjective orthostatic intolerance was noted.    Thermoregulatory sweat test was normal.     Pituitary lesion query microadenoma versus benign Rathke's cleft cyst     MRI pituitary was performed on February 5, 2021 and compared to the prior study of ten twenty two,  twenty twenty. Again was noted a 2 mm rounded focus of hypoenhancement which is unchanged from the prior study. It is too small to differentiate between a microadenoma, pituitary cyst or a cleft cyst. Endocrinology clinic workup was thought to be appropriate.        Medications                 Acetaminophen tylenol 325mg prn       Asprin 81mg 1       B complex   qod           Baclofen lioresal 10mg        prn      Cholecalciferol Vitamin D3 25mcg 1000 units             Duloxetine cymbalta 40mg             Gabapentin neurontin 300mg        Ibuprofen advil/motrin 400mg              Lisdexamfetamine Vyvanse 40mg  1            Misoprostol cytotec 200mcg             Vyvanse 40 mg (lisdexamfetamine)                                                                      Plan:    Malik wilder   [email or web address]  Consultation about dystonia - drd, myoclonus dystonia, etc.     Neuropsychological evaluation    She does not have classic dopa responsive dystonia, myoclonic dystonia, or paroxysmal kinesigenic movement problems.     Discussed functional movement disorder as a possible diagnosis    [email or web address]    counsellor referral - consider cognitive behavioral therapy    Seeing Rain     Sinemet medication trial     Return visit     [facility]  [address] [place] [id number]      Ro May, Ph.D., LP  [phone]    Rosalia Frias,Ph.D.,LP  [phone] (inpatient)    Milan Borja,Ph.D.,  [phone]    Yenifer Kaye, Ph.D.  [phone]    Cytnhia Hernández, Ph.D.,   [phone]    Ambrocio Martinez, Ph.D., [facility],   [phone]    Belle Urias,Ph.D.,   [phone]    Ilene Jay psychologist  [phone]   [facility] [place] [id number] [facility] [address] [place] [id number].    Coding statement:   Medical Decision Making:  #  Chronic progressive medical conditions addressed  - see above --   Review and/or interpretation of unique test  or documentation from a provider outside of neurology reviewed    Independent historian provided additional details  no I  Prescription drug management and review of potential side effects and/or monitoring for side effects  -- see above ---  Health impacted by social determinants of health  no    I have reviewed the note as documented above.  This accurately captures the substance of my conversation with the patient and total time spent preparing for visit, executing visit and completing visit on the day of the visit:  60 minutes.  The portion of this total time included face to face time 9am - 954am    Alec Wylie MD     ______________________________________    Last visit date and details:             ______________________________________      Patient was asked about 14 Review of systems including changes in vision (dry eyes, double vision), hearing, heart, lungs, musculoskeletal, depression, anxiety, snoring, RBD, insomnia, urinary frequency, urinary urgency, constipation, swallowing problems, hematological, ID, allergies, skin problems: seborrhea, endocrinological: thyroid, diabetes, cholesterol; balance, weight changes, and other neurological problems and these were not significant at this time except for   Patient Active Problem List   Diagnosis     Pelvic pain in female     Urge incontinence     Anxiety     Attention deficit hyperactivity disorder (ADHD), predominantly inattentive type     Dysesthesia     Dystonia     History of disease     History of severe acute respiratory syndrome coronavirus 2 (SARS-CoV-2) disease        No Known Allergies  Past Surgical History:   Procedure Laterality Date     COLONOSCOPY       LAPAROSCOPY      6/3/2022     MYRINGOTOMY W/ TUBES      as a child     Past Medical History:   Diagnosis Date     ADHD (attention deficit hyperactivity disorder)      Anxiety      Cervical dystonia      Thrombocytosis      Social History     Socioeconomic History     Marital status:       Spouse name: Not on file     Number of children: Not on file     Years of education: Not on file     Highest education level: Not on file   Occupational History     Not on file   Tobacco Use     Smoking status: Never Smoker     Smokeless tobacco: Never Used   Vaping Use     Vaping Use: Some days     Substances: Nicotine     Devices: Pre-filled pod   Substance and Sexual Activity     Alcohol use: Yes     Alcohol/week: 2.0 standard drinks     Types: 2 Cans of beer per week     Comment: a couple times a week     Drug use: Yes     Types: Marijuana     Sexual activity: Not on file   Other Topics Concern     Not on file   Social History Narrative    n looking at Inez's family history, it is possible that a cancer susceptibility gene is present due to the family history of early onset breast cancer in her mother and also the history of early onset colon cancer in her maternal grandmother.  In additional, although the available details from the extended family is somewhat limited, the history of a possible uterine cancer in her maternal grandmother's sister (as uterine and colon cancer can be related to each other in some hereditary cancer conditions) and also the early onset cancer in her maternal grandmother's mother is also further supportive of the question about a possible hereditary cancer risk factor in Inez's mother's side of the family.     Social Determinants of Health     Financial Resource Strain: Not on file   Food Insecurity: Not on file   Transportation Needs: Not on file   Physical Activity: Not on file   Stress: Not on file   Social Connections: Not on file   Intimate Partner Violence: Not on file   Housing Stability: Not on file       Drug and lactation database from the [place] National Library of Medicine:  http://toxnet.nlm.nih.gov/cgi-bin/sis/htmlgen?LACT      B/P: Data Unavailable, T: Data Unavailable, P: Data Unavailable, R: Data Unavailable 0 lbs 0 oz  Last menstrual period 04/27/2022, not  currently breastfeeding., There is no height or weight on file to calculate BMI.  Medications and Vitals not listed above were documented in the cart and reviewed by me.     Current Outpatient Medications   Medication Sig Dispense Refill     aspirin 81 MG EC tablet Take 81 mg by mouth every morning       B Complex Vitamins (VITAMIN-B COMPLEX PO) Take 1 tablet by mouth every other day       baclofen (LIORESAL) 10 MG tablet nightly as needed       cholecalciferol (VITAMIN D3) 25 mcg (1000 units) capsule 2,000 Units every morning       DULoxetine HCl 40 MG CPEP every morning       gabapentin (NEURONTIN) 300 MG capsule TAKE 1 CAPSULE BY MOUTH IN THE EVENING AND AT BEDTIME       ibuprofen (ADVIL/MOTRIN) 400 MG tablet Take 1 tablet by mouth daily as needed  (Patient not taking: No sig reported)       lisdexamfetamine (VYVANSE) 40 MG capsule Take 40 mg by mouth every morning       misoprostol (CYTOTEC) 200 MCG tablet PLACE 2 TABLETS VAGINALLY ONCE FOR 1 DOSE 3 TO 4 HOURS PRIOR TO THE PROCEDURE (Patient not taking: No sig reported)       VYVANSE 50 MG capsule           Leigh Duke M.D. - 02/09/2021 10:00 AM CST  Formatting of this note might be different from the original.  SUBJECTIVE     Chief Compliant: Multiple neurological symptoms    HPI:Inez Bosotn returns today for follow-up of multiple neurological and other symptoms in the setting of COVID-19 infection April 2020 for which I saw the patient on February 4, 2021.    At that time, we initiated workup looking for a more objective measure of neurologic dysfunction which included an EMG, autonomic reflex screen, laboratory evaluation. We also pursued endocrine evaluation with as well as a repeat MRI head given some of her clinical symptoms and prior imaging findings. She was also seen in the preventative care COVID-19 Clinic for recommendations regarding treatment as well as Dermatology for alopecia and hirsutism.    Interim results.    Laboratory testing  which included a CBC, CK, JESUS, CCP, paraneoplastic panel, UA were unremarkable. Repeat COVID-19 PCR testing was negative. FSH, LH, testosterone levels were obtained and appeared to be normal for age however I would defer interpretation to my colleagues in Endocrinology.    MRI pituitary was performed on February 5, 2021 and compared to the prior study of ten twenty two, twenty twenty. Again was noted a 2 mm rounded focus of hypoenhancement which is unchanged from the prior study. It is too small to differentiate between a microadenoma, pituitary cyst or a cleft cyst. Endocrinology clinic workup was thought to be appropriate.    EMG dated February 4, 2021 was normal with no evidence for a large fiber neuropathy, myopathy, or peripheral nerve hyper excitability disorder to explain the patient's symptoms of pulling and spasms.    Autonomic reflex screen also performed February 5, 2021 was normal with no evidence of autonomic failure. Subjective orthostatic intolerance was noted.    Thermoregulatory sweat test was normal.    Swallowing evaluation performed February 9, 2020 was normal.    Preventative medicine evaluation by a nurse [place] on February 5, 2021 was reviewed. Complementary therapies including massage, consideration of acupuncture and further complement to hurry therapies with suggested. [facility] consultation was discussed. No other evaluations through Cardiology or pulmonology were thought to be needed. Ongoing evaluation in the COVID after care clinic could be considered with .    Dermatology consultation by Dr. Jacob was reviewed. This neuro consultation identified to login effluvium which has resolved, alopecia areata which is also resolved. These findings have both been reported with COVID-19 infection. Recommendations included over-the-counter minoxidil 5% foam once daily to the area of reduced hair density for a full 12 month trial. Worsening hears with a some was thought  to be likely age-appropriate, however endocrinology consultation was endorsed. There have not been clear reports of COVID-19 infection associated with such a finding. Topical amitriptyline ketamine foam or cream could be considered for the dysesthetic symptoms.    ASSESSMENT / PLAN     #1 COVID-19 infection spring 2020  #2 Altered skin sensations including dysesthesia and pulling  #3 Alopecia areata and telogen effluvium related to #1  #4 Pituitary lesion query microadenoma versus benign Rathke's cleft cyst    Reassuringly, there are no laboratory or objective neurological tests findings to identify a primary neurogenic process at play. She has no evidence of large fiber dysfunction, small fiber dysfunction, autonomic dysfunction or peripheral nerve hyper excitability to account for her symptoms. Laboratory evaluation has been unremarkable. Endocrine evaluation is pending to determine if any component of her symptoms may be related to an endocrinopathy.    Assuming that this is not the case as imaging is more likely favoring a benign Rathke's cleft cyst, supportive management is the next step.    Recommendations for ongoing complementary therapies have been made by our preventative care providers. We would endorse the same recommendations. Consideration of topical agents for her dysesthetic symptoms could be considered. Given the episodic and diffuse but migrating nature of her symptoms one could consider an oral neuropathic pain agent that this may cause more trouble than benefit in the long run. As noted by Dermatology, amitriptyline ketamine foam is an option. In some patients, Lyrica can be of benefit for dysesthetic symptoms. Acupuncture massage therapy for the pulling sensations may be of more benefit. It is hopeful that these symptoms will slowly resolve over time we also discussed consideration of local referral by her primary to physical therapy for consideration ultrasound therapy, massage therapy or an  exercise regimen to assist with the tightness she is experiencing..    At this juncture I would have no further recommendations or needs for testing but will review and report the results of endocrinology when they are complete. It was a pleasure seeing the patient. If she has further questions in the future she can send them through the portal.    I personally spent over half of a total 30 minutes face to face with the patient in counseling and discussion and/or coordination of care as described above.    PATIENT EDUCATION  Ready to learn, no apparent learning barriers were identified; learning preferences include listening. Explained diagnosis and treatment plan; patient expressed understanding of the content.    Electronically signed by Leigh Duke M.D. at 02/09/2021 10:18 AM CST             Dr. Segal 3/21/2022  Last visit with us 12/22/2021. Overall stable. She still has neck and abdominal muscle complaints. She is getting pelvic PT. She has seen GYN, Dr. Mcpherson as well as Dr. Fischer, PMR and botox is being considered. She is on Baclofen (from her Psychiatrist) and this sees beneficial for sleep and muscle spasms. She continues to work from home. She has found it more difficult to get exercise in [place] compared to [place]. No other HEENT, cardiopulmonary, abdominal, , GYN, neurological, systemic, psychiatric, lymphatic, endocrine, vascular complaints.      Inez is a 46 year old female. She does not have any personal history of cancer. However, in other medical history:       Inez has a history of unexplained elevated platelet levels, for which she is being followed by hematology.      She also reports a history of issues with muscle pain (eg. In her neck and her abdomen); she states that she was recently given a diagnosis of cervical dystonia, and because of this, has been referred to neurology here at [facility] for further assessment for dystonia conditions.      In addition, as part of her  evaluation for abdominal pain, Inez underwent abdominal imaging, which has noted a mass between her left ovary and uterus; the imaging report notes that this mass could be consistent with a fibroid, but this mass was unable to be biopsied.  Inez reports that she plans to have this mass surgically removed in June.     We respect to her hormone-based medical history, Inez reports that she had her first menstrual period at age 12.  She does not have any biologocal children.  Inez reports that she took oral contraceptives for about 10 years in her 20s.  She states that she is currently pre-menopausal.  Inez currently has her ovaries, fallopian tubes, and uterus intact; she states that depending on what is found in her upcoming abdominal surgery for her pelvic mass, it is possible that she may have some of those organs removed.      With respect to her previous cancer screening, Inez had a mammogram in January, for which it was originally recommended that she have follow-up views.  Inez reports today that the radiology department was able to obtain copies of her previous mamm[place] and that she was subsequently told that it was felt that her most recent mammogram was consistent with her previous mammograms, and so no additional imaging was needed at that time.  Inez had a colonoscopy in December of 2021, which did not note any polyps; a follow-up colonoscopy was recommended for 10 years from that time. Inez reports that she does not have a history of any abnormal Pap smears.  She does not do any other cancer screening at this time.     Family History: (Please see scanned pedigree for detailed family history information); the following is per Inez and her parents report:  ? Inez's mother reports that she was diagnosed with breast cancer at the age of 48, for which she underwent surgical (but not other) treatment.  ? It is reported that Inez's maternal grandmother was diagnosed with colon cancer  "in her late 40s and, unfortunately,  of complications of that cancer at age 55. Inez's mother reports that this individual's sister (ie. Inez's great aunt) may have had uterine cancer and that this individual's mother (ie. Inez's great-grandmother) may have also  related to cancer in her 40s.  ? It was reported that Inez's maternal grandfather was diagnosed with colon cancer in his 70s and, unfortunately,  of complications of that disease.  ? They are not aware of any cancers on Inez's father's side of the family.  ? With respect to Inez's recent diagnosis of cervical dystonia, Inez's mother states that she has a personal history of occasional muscle spasms and twitching.  She also reports that Inez's maternal uncle also has a history of muscle pain/twitching that he was told was related to overuse of his muscles and a \"build up of lactic acid.\"  (We talked about that Inez should share this family history information with her provider at her upcoming neurology appointment.)  ? Inez reports that her family is of  ancestry.  There is no known Ashkenazi Baptism ancestry on either side of her family. There is no reported consanguinity.     Discussion:  ? We reviewed the features of sporadic, familial, and hereditary cancers. In looking at Inez's family history, it is possible that a cancer susceptibility gene is present due to the family history of early onset breast cancer in her mother and also the history of early onset colon cancer in her maternal grandmother.  In additional, although the available details from the extended family is somewhat limited, the history of a possible uterine cancer in her maternal grandmother's sister (as uterine and colon cancer can be related to each other in some hereditary cancer conditions) and also the early onset cancer in her maternal grandmother's mother is also further supportive of the question about a possible hereditary cancer risk factor " in Inez's mother's side of the family.     ? We discussed the natural history and genetics of hereditary cancer syndromes in general. A detailed handout regarding some of the hereditary cancer syndromes associated with breast cancer and the information we discussed was provided to Inez at the end of our appointment today and can be found in the after visit summary. Topics included: inheritance pattern, cancer risks, cancer screening recommendations, and also risks, benefits and limitations of testing.     ? We discussed that there are a variety of genes that could cause increased risk for breast or colon (or other) cancer. As many of these genes present with overlapping features in a family and accurate cancer risk cannot always be established based upon the pedigree analysis alone, it would be reasonable for Inez to consider panel genetic testing to analyze multiple hereditary cancer genes at once.     ? Inez stated that she is interested in pursuing genetic testing through a panel of genes associated with hereditary cancer syndromes, and so we reviewed the various panel options and their potential benefits and limitations.  After this conversation, Inez decided that she was interested in the Common Hereditary Cancers genetic testing panel through [facility].      ? Medical Management: For Inez, we reviewed that the information from genetic testing may determine:    additional cancer screening for which Inez may qualify (i.e. mammogram and breast MRI, more frequent colonoscopies, more frequent dermatologic exams, etc.),    options for risk-reducing surgeries Inez could consider (i.e. bilateral mastectomy, surgery to remove her ovaries and/or uterus, etc.),      and targeted therapies for Inez if she were to develop certain cancers in the future (i.e. lumpectomy versus mastectomy,  immunotherapy for individuals with Manzano syndrome, PARP inhibitors for HBOC syndrome, etc.).      ? These recommendations and  possible targeted therapies will be discussed in detail once genetic testing is completed.      Plan:  1) Today, Inez decided to proceed with a BRCA1/BRCA2 analysis with an automatic reflex to the Common Hereditary Cancers genetic testing panel through [facility].  Therefore, consent was reviewed and verbally obtaine for this testing.  In particular, Inez was interested in obtaining this information prior to her upcoming gynecological surgery, in case this information would alter the surgery plans.     2) We reviewed that options for sample collection.  Inez plans to have her blood drawn at her local [facility] lab.  Test results are usually expected to be available within 4 weeks of that blood draw.     3) Inez will either have a telephone visit or video visit to discuss the results.  Additional recommendations about screening will be made at that time.     Alejandrina Caceres MS, [facility]  Genetic Counselor  Ph: [phone]     Face to face time: 60 minutes     Video Start Time: 8:58AM     Video-Visit Details     Type of service:  Video Visit     Video End Time: 9:58AM    Originating Location (pt. Location): Home     Distant Location (provider location):  [facility]      Platform used for Video Visit: Yolande Wylie MD

## 2022-05-26 DIAGNOSIS — Z11.59 ENCOUNTER FOR SCREENING FOR OTHER VIRAL DISEASES: Primary | ICD-10-CM

## 2022-05-27 ENCOUNTER — PRE VISIT (OUTPATIENT)
Dept: NEUROLOGY | Facility: CLINIC | Age: 47
End: 2022-05-27

## 2022-05-27 DIAGNOSIS — Z01.818 PRE-OP EXAM: Primary | ICD-10-CM

## 2022-05-27 DIAGNOSIS — D25.2 INTRAMURAL AND SUBSEROUS LEIOMYOMA OF UTERUS: ICD-10-CM

## 2022-05-27 DIAGNOSIS — D25.1 INTRAMURAL AND SUBSEROUS LEIOMYOMA OF UTERUS: ICD-10-CM

## 2022-05-31 ENCOUNTER — ONCOLOGY VISIT (OUTPATIENT)
Dept: ONCOLOGY | Facility: CLINIC | Age: 47
End: 2022-05-31
Attending: INTERNAL MEDICINE
Payer: COMMERCIAL

## 2022-05-31 VITALS
BODY MASS INDEX: 24.46 KG/M2 | RESPIRATION RATE: 16 BRPM | SYSTOLIC BLOOD PRESSURE: 117 MMHG | HEART RATE: 78 BPM | HEIGHT: 68 IN | OXYGEN SATURATION: 100 % | WEIGHT: 161.4 LBS | TEMPERATURE: 98.6 F | DIASTOLIC BLOOD PRESSURE: 75 MMHG

## 2022-05-31 DIAGNOSIS — Z91.89 AT HIGH RISK FOR BREAST CANCER: Primary | ICD-10-CM

## 2022-05-31 PROCEDURE — G0463 HOSPITAL OUTPT CLINIC VISIT: HCPCS

## 2022-05-31 PROCEDURE — 99215 OFFICE O/P EST HI 40 MIN: CPT | Performed by: INTERNAL MEDICINE

## 2022-05-31 ASSESSMENT — PAIN SCALES - GENERAL: PAINLEVEL: MILD PAIN (2)

## 2022-05-31 NOTE — PROGRESS NOTES
"Oncology Rooming Note    May 31, 2022 3:16 PM   Inez Boston is a 46 year old female who presents for:    Chief Complaint   Patient presents with     Oncology Clinic Visit     Initial Vitals: /75   Pulse 78   Temp 98.6  F (37  C) (Oral)   Resp 16   Ht 1.727 m (5' 8\")   Wt 73.2 kg (161 lb 6.4 oz)   LMP 04/27/2022 (Within Days)   SpO2 100%   BMI 24.54 kg/m   Estimated body mass index is 24.54 kg/m  as calculated from the following:    Height as of this encounter: 1.727 m (5' 8\").    Weight as of this encounter: 73.2 kg (161 lb 6.4 oz). Body surface area is 1.87 meters squared.  Mild Pain (2) Comment: Data Unavailable   Patient's last menstrual period was 04/27/2022 (within days).  Allergies reviewed: Yes  Medications reviewed: Yes    Medications: Medication refills not needed today.  Pharmacy name entered into [facility]: [facility] #[id number] - [address]    Clinical concerns: Mild \"pulling\" sensation on her left side running from her eyes to her pelvis.      Rosa Maria Pratt MA              "

## 2022-05-31 NOTE — LETTER
"    5/31/2022         RE: Inez Boston  [address]  [facility] [address]        Dear Colleague,    Thank you for referring your patient, Inez Boston, to the [facility] [place]. Please see a copy of my visit note below.    Oncology Rooming Note    May 31, 2022 3:16 PM   Inez Boston is a 46 year old female who presents for:    Chief Complaint   Patient presents with     Oncology Clinic Visit     Initial Vitals: /75   Pulse 78   Temp 98.6  F (37  C) (Oral)   Resp 16   Ht 1.727 m (5' 8\")   Wt 73.2 kg (161 lb 6.4 oz)   LMP 04/27/2022 (Within Days)   SpO2 100%   BMI 24.54 kg/m   Estimated body mass index is 24.54 kg/m  as calculated from the following:    Height as of this encounter: 1.727 m (5' 8\").    Weight as of this encounter: 73.2 kg (161 lb 6.4 oz). Body surface area is 1.87 meters squared.  Mild Pain (2) Comment: Data Unavailable   Patient's last menstrual period was 04/27/2022 (within days).  Allergies reviewed: Yes  Medications reviewed: Yes    Medications: Medication refills not needed today.  Pharmacy name entered into [facility]: [facility] #[id number] [address]    Clinical concerns: Mild \"pulling\" sensation on her left side running from her eyes to her pelvis.      Rosa Maria Pratt MA                [facility]    Hematology/Oncology return patient visit       Today's Date: 05/31/2022    Reason for Consult: Thrombocytosis      HISTORY OF PRESENT ILLNESS: Inez Boston is a 46 year old female who presents for further evaluation of thrombocytosis.  She has had chronic thrombocytosis since at least 2017 at that time her platelets were in the 500s and then dropped down to 400s and back in the 500s again.  She recently moved to [place] from [place] and saw a hematologist last June in [place].  Blood work was obtained at [facility] which showed iron saturation of 24% JAK2 V6 1 7 mutation was negative.  " "Patient had thrombocytosis prior to her Covid infection in October 2020.  Inez has started on aspirin 81 mg a day.      Of note she also has family history of breast cancer her mother had breast cancer the age of 47.  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    Most recent hgb is 12.3, platelets at 430 K. She is complaining of chronic dystonia related to a 'band like senstion \" like someone is taking a wire and squeezing it around her stomach and neck. She has had chronic hair loss after covid and she did not have full growth after that. She also notices bluishing discoloration of her legs in the fashion of Livido reticularis with her extremities getting cold all after the covid infection.         REVIEW OF SYSTEMS:   14 point ROS was reviewed and is negative other than as noted above in HPI.       HOME MEDICATIONS:  Current Outpatient Medications   Medication Sig Dispense Refill     aspirin 81 MG EC tablet Take 81 mg by mouth every morning       B Complex Vitamins (VITAMIN-B COMPLEX PO) Take 1 tablet by mouth every other day       baclofen (LIORESAL) 10 MG tablet nightly as needed       cholecalciferol (VITAMIN D3) 25 mcg (1000 units) capsule 2,000 Units every morning       DULoxetine HCl 40 MG CPEP every morning       ibuprofen (ADVIL/MOTRIN) 400 MG tablet Take 1 tablet by mouth daily as needed       lisdexamfetamine (VYVANSE) 40 MG capsule Take 40 mg by mouth every morning       acetaminophen (TYLENOL) 325 MG tablet Take 3 tablets (975 mg) by mouth every 6 hours as needed for mild pain 50 tablet 0     ibuprofen (ADVIL/MOTRIN) 800 MG tablet Take 1 tablet (800 mg) by mouth every 6 hours as needed for other (mild and/or inflammatory pain) 30 tablet 0     oxyCODONE (ROXICODONE) 5 MG tablet Take 1 tablet (5 mg) by mouth every 6 hours as needed for pain 8 tablet 0     senna-docusate (SENOKOT-S/PERICOLACE) 8.6-50 MG tablet Take 1-2 tablets by mouth 2 times daily 30 tablet 0         ALLERGIES:  No Known Allergies      PAST " MEDICAL HISTORY:  Status post Covid infection  Thrombocytosis    PAST SURGICAL HISTORY:  Past Surgical History:   Procedure Laterality Date     COLONOSCOPY       LAPAROSCOPY      6/3/2022     MYRINGOTOMY W/ TUBES      as a child         SOCIAL HISTORY:  Social History     Socioeconomic History     Marital status:      Spouse name: Not on file     Number of children: Not on file     Years of education: Not on file     Highest education level: Not on file   Occupational History     Not on file   Tobacco Use     Smoking status: Never Smoker     Smokeless tobacco: Never Used   Vaping Use     Vaping Use: Some days     Substances: Nicotine     Devices: Pre-filled pod   Substance and Sexual Activity     Alcohol use: Yes     Alcohol/week: 2.0 standard drinks     Types: 2 Cans of beer per week     Comment: a couple times a week     Drug use: Yes     Types: Marijuana     Sexual activity: Not on file   Other Topics Concern     Not on file   Social History Narrative    n looking at Inez's family history, it is possible that a cancer susceptibility gene is present due to the family history of early onset breast cancer in her mother and also the history of early onset colon cancer in her maternal grandmother.  In additional, although the available details from the extended family is somewhat limited, the history of a possible uterine cancer in her maternal grandmother's sister (as uterine and colon cancer can be related to each other in some hereditary cancer conditions) and also the early onset cancer in her maternal grandmother's mother is also further supportive of the question about a possible hereditary cancer risk factor in Inez's mother's side of the family.     Social Determinants of Health     Financial Resource Strain: Not on file   Food Insecurity: Not on file   Transportation Needs: Not on file   Physical Activity: Not on file   Stress: Not on file   Social Connections: Not on file   Intimate Partner  "Violence: Not on file   Housing Stability: Not on file     Non-smoker.  Social alcohol use.  Currently works for the [employer] remotely    FAMILY HISTORY:  Mother had breast cancer at age of 47    PHYSICAL EXAM:  Vital signs:  /75   Pulse 78   Temp 98.6  F (37  C) (Oral)   Resp 16   Ht 1.727 m (5' 8\")   Wt 73.2 kg (161 lb 6.4 oz)   LMP 2022 (Within Days)   SpO2 100%   BMI 24.54 kg/m     ECO  GENERAL/CONSTITUTIONAL: No acute distress.  EYES: Pupils are equal, round, and react to light and accommodation. Extraocular movements intact.  No scleral icterus.  Unable to fully examine due to the nature of this visit be a video visit  INTEGUMENTARY: No rashes or jaundice.        LABS:  CBC RESULTS:   Recent Labs   Lab Test 22  1636   WBC 10.4   RBC 4.07   HGB 12.7   HCT 38.9   MCV 96   MCH 31.2   MCHC 32.6   RDW 12.0   *       Recent Labs   Lab Test 21  1100      POTASSIUM 4.4   CHLORIDE 105   CO2 30   ANIONGAP 7   GLC 91   BUN 10   CR 0.57   TEJA 9.0       Labs noted and reviewed with the patient     PATHOLOGY:  Peripheral blood morphology did not reveal any evidence of dysplasia    IMAGING:  None    ASSESSMENT/PLAN:  Inez is a very pleasant 46-year-old female with history of chronic thrombocytosis most recent platelet count 429 suggestive of thrombocytosis resolving    JAK2 mutation negative, bone marrow biopsy not completed and she is on aspirin 81 mg again    Several issues today she has had evidence of long COVID infection with chronic dystonia, slow mentation and vascular changes.  At this time my recommendation would be to increase aspirin to 325 mg a day.  In regards to her dystonia she does have an appointment with neurology next month and we will see what they say.  I also think with a family history of RAD 50 mutation VUS we should alternate MRI with mammogram        1.  Thrombocytosis resolved monitor once every 6 months to a year     2.  Family history significant.  With " RAD 50 VUS and family history suggestive of breast cancer would recommend MRIs alternating with mammograms .    3.  Long COVID syndrome we will reach out to the infectious disease doctors [place] see how [place] treats her dystonia issues.  Patient does have an appointment with neurology    4 Vascular changes after COVID infection increase aspirin to 325 mg a day  All questions answered to her satisfaction.  Total time spent 60 minutes 40 minutes with the patient discussing the findings above.  10 minutes reviewing previous data and 10 minutes electronically documented clinical information from today's visit      Lab check in 4 months if stable at that time, will recommend lab checks yearly and can see her yearly with mammograms, alternating with MRI . She will be due for MRI in July 2022    Nam Caceres MD  Hematology/Oncology  [facility]      Again, thank you for allowing me to participate in the care of your patient.        Sincerely,        Nam Caceres MD

## 2022-06-01 ENCOUNTER — LAB (OUTPATIENT)
Dept: LAB | Facility: CLINIC | Age: 47
End: 2022-06-01
Payer: COMMERCIAL

## 2022-06-01 DIAGNOSIS — Z11.59 ENCOUNTER FOR SCREENING FOR OTHER VIRAL DISEASES: ICD-10-CM

## 2022-06-01 DIAGNOSIS — D25.2 INTRAMURAL AND SUBSEROUS LEIOMYOMA OF UTERUS: ICD-10-CM

## 2022-06-01 DIAGNOSIS — D25.1 INTRAMURAL AND SUBSEROUS LEIOMYOMA OF UTERUS: ICD-10-CM

## 2022-06-01 DIAGNOSIS — Z01.818 PRE-OP EXAM: ICD-10-CM

## 2022-06-01 LAB
ABO/RH(D): NORMAL
ANTIBODY SCREEN: NEGATIVE
ERYTHROCYTE [DISTWIDTH] IN BLOOD BY AUTOMATED COUNT: 12.4 % (ref 10–15)
HCT VFR BLD AUTO: 36.2 % (ref 35–47)
HGB BLD-MCNC: 11.6 G/DL (ref 11.7–15.7)
MCH RBC QN AUTO: 30.9 PG (ref 26.5–33)
MCHC RBC AUTO-ENTMCNC: 32 G/DL (ref 31.5–36.5)
MCV RBC AUTO: 97 FL (ref 78–100)
PLATELET # BLD AUTO: 429 10E3/UL (ref 150–450)
RBC # BLD AUTO: 3.75 10E6/UL (ref 3.8–5.2)
SPECIMEN EXPIRATION DATE: NORMAL
WBC # BLD AUTO: 8.6 10E3/UL (ref 4–11)

## 2022-06-01 PROCEDURE — 85027 COMPLETE CBC AUTOMATED: CPT

## 2022-06-01 PROCEDURE — U0003 INFECTIOUS AGENT DETECTION BY NUCLEIC ACID (DNA OR RNA); SEVERE ACUTE RESPIRATORY SYNDROME CORONAVIRUS 2 (SARS-COV-2) (CORONAVIRUS DISEASE [COVID-19]), AMPLIFIED PROBE TECHNIQUE, MAKING USE OF HIGH THROUGHPUT TECHNOLOGIES AS DESCRIBED BY CMS-2020-01-R: HCPCS

## 2022-06-01 PROCEDURE — 86850 RBC ANTIBODY SCREEN: CPT

## 2022-06-01 PROCEDURE — 36415 COLL VENOUS BLD VENIPUNCTURE: CPT

## 2022-06-01 PROCEDURE — 86900 BLOOD TYPING SEROLOGIC ABO: CPT

## 2022-06-01 PROCEDURE — 86901 BLOOD TYPING SEROLOGIC RH(D): CPT

## 2022-06-01 PROCEDURE — U0005 INFEC AGEN DETEC AMPLI PROBE: HCPCS

## 2022-06-02 LAB — SARS-COV-2 RNA RESP QL NAA+PROBE: NEGATIVE

## 2022-06-03 ENCOUNTER — HOSPITAL ENCOUNTER (OUTPATIENT)
Facility: CLINIC | Age: 47
Discharge: HOME | End: 2022-06-03
Attending: OBSTETRICS & GYNECOLOGY | Admitting: OBSTETRICS & GYNECOLOGY
Payer: COMMERCIAL

## 2022-06-03 ENCOUNTER — ANESTHESIA (OUTPATIENT)
Dept: SURGERY | Facility: CLINIC | Age: 47
End: 2022-06-03
Payer: COMMERCIAL

## 2022-06-03 VITALS
BODY MASS INDEX: 24.44 KG/M2 | HEART RATE: 80 BPM | RESPIRATION RATE: 16 BRPM | DIASTOLIC BLOOD PRESSURE: 52 MMHG | SYSTOLIC BLOOD PRESSURE: 91 MMHG | OXYGEN SATURATION: 97 % | TEMPERATURE: 97.9 F | WEIGHT: 160.72 LBS

## 2022-06-03 DIAGNOSIS — Z98.890 S/P LAPAROSCOPY: Primary | ICD-10-CM

## 2022-06-03 DIAGNOSIS — R19.00 PELVIC MASS: ICD-10-CM

## 2022-06-03 DIAGNOSIS — D25.2 INTRAMURAL AND SUBSEROUS LEIOMYOMA OF UTERUS: ICD-10-CM

## 2022-06-03 DIAGNOSIS — D25.1 INTRAMURAL AND SUBSEROUS LEIOMYOMA OF UTERUS: ICD-10-CM

## 2022-06-03 LAB
GLUCOSE BLDC GLUCOMTR-MCNC: 87 MG/DL (ref 70–99)
HCG UR QL: NEGATIVE

## 2022-06-03 PROCEDURE — 250N000013 HC RX MED GY IP 250 OP 250 PS 637: Performed by: STUDENT IN AN ORGANIZED HEALTH CARE EDUCATION/TRAINING PROGRAM

## 2022-06-03 PROCEDURE — 88307 TISSUE EXAM BY PATHOLOGIST: CPT | Mod: TC | Performed by: OBSTETRICS & GYNECOLOGY

## 2022-06-03 PROCEDURE — 710N000010 HC RECOVERY PHASE 1, LEVEL 2, PER MIN: Performed by: OBSTETRICS & GYNECOLOGY

## 2022-06-03 PROCEDURE — 88112 CYTOPATH CELL ENHANCE TECH: CPT | Mod: TC | Performed by: OBSTETRICS & GYNECOLOGY

## 2022-06-03 PROCEDURE — 81025 URINE PREGNANCY TEST: CPT | Performed by: OBSTETRICS & GYNECOLOGY

## 2022-06-03 PROCEDURE — 710N000012 HC RECOVERY PHASE 2, PER MINUTE: Performed by: OBSTETRICS & GYNECOLOGY

## 2022-06-03 PROCEDURE — 272N000001 HC OR GENERAL SUPPLY STERILE: Performed by: OBSTETRICS & GYNECOLOGY

## 2022-06-03 PROCEDURE — 258N000003 HC RX IP 258 OP 636: Performed by: NURSE ANESTHETIST, CERTIFIED REGISTERED

## 2022-06-03 PROCEDURE — 250N000025 HC SEVOFLURANE, PER MIN: Performed by: OBSTETRICS & GYNECOLOGY

## 2022-06-03 PROCEDURE — 250N000011 HC RX IP 250 OP 636: Performed by: OBSTETRICS & GYNECOLOGY

## 2022-06-03 PROCEDURE — 360N000076 HC SURGERY LEVEL 3, PER MIN: Performed by: OBSTETRICS & GYNECOLOGY

## 2022-06-03 PROCEDURE — 82962 GLUCOSE BLOOD TEST: CPT

## 2022-06-03 PROCEDURE — 58661 LAPAROSCOPY REMOVE ADNEXA: CPT | Mod: 80 | Performed by: OBSTETRICS & GYNECOLOGY

## 2022-06-03 PROCEDURE — 250N000011 HC RX IP 250 OP 636: Performed by: NURSE ANESTHETIST, CERTIFIED REGISTERED

## 2022-06-03 PROCEDURE — 250N000013 HC RX MED GY IP 250 OP 250 PS 637: Performed by: ANESTHESIOLOGY

## 2022-06-03 PROCEDURE — 250N000009 HC RX 250: Performed by: NURSE ANESTHETIST, CERTIFIED REGISTERED

## 2022-06-03 PROCEDURE — 58661 LAPAROSCOPY REMOVE ADNEXA: CPT | Mod: GC | Performed by: OBSTETRICS & GYNECOLOGY

## 2022-06-03 PROCEDURE — 999N000141 HC STATISTIC PRE-PROCEDURE NURSING ASSESSMENT: Performed by: OBSTETRICS & GYNECOLOGY

## 2022-06-03 PROCEDURE — 370N000017 HC ANESTHESIA TECHNICAL FEE, PER MIN: Performed by: OBSTETRICS & GYNECOLOGY

## 2022-06-03 RX ORDER — MEPERIDINE HYDROCHLORIDE 25 MG/ML
12.5 INJECTION INTRAMUSCULAR; INTRAVENOUS; SUBCUTANEOUS
Status: DISCONTINUED | OUTPATIENT
Start: 2022-06-03 | End: 2022-06-03 | Stop reason: HOSPADM

## 2022-06-03 RX ORDER — EPHEDRINE SULFATE 50 MG/ML
INJECTION, SOLUTION INTRAMUSCULAR; INTRAVENOUS; SUBCUTANEOUS PRN
Status: DISCONTINUED | OUTPATIENT
Start: 2022-06-03 | End: 2022-06-03

## 2022-06-03 RX ORDER — NALOXONE HYDROCHLORIDE 0.4 MG/ML
0.2 INJECTION, SOLUTION INTRAMUSCULAR; INTRAVENOUS; SUBCUTANEOUS
Status: DISCONTINUED | OUTPATIENT
Start: 2022-06-03 | End: 2022-06-03 | Stop reason: HOSPADM

## 2022-06-03 RX ORDER — ONDANSETRON 4 MG/1
4 TABLET, ORALLY DISINTEGRATING ORAL EVERY 30 MIN PRN
Status: DISCONTINUED | OUTPATIENT
Start: 2022-06-03 | End: 2022-06-03 | Stop reason: HOSPADM

## 2022-06-03 RX ORDER — SODIUM CHLORIDE, SODIUM LACTATE, POTASSIUM CHLORIDE, CALCIUM CHLORIDE 600; 310; 30; 20 MG/100ML; MG/100ML; MG/100ML; MG/100ML
INJECTION, SOLUTION INTRAVENOUS CONTINUOUS
Status: DISCONTINUED | OUTPATIENT
Start: 2022-06-03 | End: 2022-06-03 | Stop reason: HOSPADM

## 2022-06-03 RX ORDER — HYDROMORPHONE HYDROCHLORIDE 1 MG/ML
0.2 INJECTION, SOLUTION INTRAMUSCULAR; INTRAVENOUS; SUBCUTANEOUS EVERY 5 MIN PRN
Status: DISCONTINUED | OUTPATIENT
Start: 2022-06-03 | End: 2022-06-03 | Stop reason: HOSPADM

## 2022-06-03 RX ORDER — NALOXONE HYDROCHLORIDE 0.4 MG/ML
0.4 INJECTION, SOLUTION INTRAMUSCULAR; INTRAVENOUS; SUBCUTANEOUS
Status: DISCONTINUED | OUTPATIENT
Start: 2022-06-03 | End: 2022-06-03 | Stop reason: HOSPADM

## 2022-06-03 RX ORDER — ONDANSETRON 2 MG/ML
4 INJECTION INTRAMUSCULAR; INTRAVENOUS EVERY 30 MIN PRN
Status: DISCONTINUED | OUTPATIENT
Start: 2022-06-03 | End: 2022-06-03 | Stop reason: HOSPADM

## 2022-06-03 RX ORDER — OXYCODONE HYDROCHLORIDE 5 MG/1
5 TABLET ORAL EVERY 4 HOURS PRN
Status: DISCONTINUED | OUTPATIENT
Start: 2022-06-03 | End: 2022-06-03 | Stop reason: HOSPADM

## 2022-06-03 RX ORDER — DEXAMETHASONE SODIUM PHOSPHATE 4 MG/ML
INJECTION, SOLUTION INTRA-ARTICULAR; INTRALESIONAL; INTRAMUSCULAR; INTRAVENOUS; SOFT TISSUE PRN
Status: DISCONTINUED | OUTPATIENT
Start: 2022-06-03 | End: 2022-06-03

## 2022-06-03 RX ORDER — ONDANSETRON 2 MG/ML
INJECTION INTRAMUSCULAR; INTRAVENOUS PRN
Status: DISCONTINUED | OUTPATIENT
Start: 2022-06-03 | End: 2022-06-03

## 2022-06-03 RX ORDER — SODIUM CHLORIDE, SODIUM LACTATE, POTASSIUM CHLORIDE, CALCIUM CHLORIDE 600; 310; 30; 20 MG/100ML; MG/100ML; MG/100ML; MG/100ML
INJECTION, SOLUTION INTRAVENOUS CONTINUOUS PRN
Status: DISCONTINUED | OUTPATIENT
Start: 2022-06-03 | End: 2022-06-03

## 2022-06-03 RX ORDER — PROPOFOL 10 MG/ML
INJECTION, EMULSION INTRAVENOUS PRN
Status: DISCONTINUED | OUTPATIENT
Start: 2022-06-03 | End: 2022-06-03

## 2022-06-03 RX ORDER — GABAPENTIN 100 MG/1
300 CAPSULE ORAL
Status: COMPLETED | OUTPATIENT
Start: 2022-06-03 | End: 2022-06-03

## 2022-06-03 RX ORDER — LIDOCAINE 40 MG/G
CREAM TOPICAL
Status: CANCELLED | OUTPATIENT
Start: 2022-06-03

## 2022-06-03 RX ORDER — BUPIVACAINE HYDROCHLORIDE 2.5 MG/ML
INJECTION, SOLUTION INFILTRATION; PERINEURAL PRN
Status: DISCONTINUED | OUTPATIENT
Start: 2022-06-03 | End: 2022-06-03 | Stop reason: HOSPADM

## 2022-06-03 RX ORDER — ACETAMINOPHEN 325 MG/1
975 TABLET ORAL ONCE
Status: COMPLETED | OUTPATIENT
Start: 2022-06-03 | End: 2022-06-03

## 2022-06-03 RX ORDER — SODIUM CHLORIDE, SODIUM LACTATE, POTASSIUM CHLORIDE, CALCIUM CHLORIDE 600; 310; 30; 20 MG/100ML; MG/100ML; MG/100ML; MG/100ML
INJECTION, SOLUTION INTRAVENOUS CONTINUOUS
Status: CANCELLED | OUTPATIENT
Start: 2022-06-03

## 2022-06-03 RX ORDER — FENTANYL CITRATE 50 UG/ML
25 INJECTION, SOLUTION INTRAMUSCULAR; INTRAVENOUS
Status: DISCONTINUED | OUTPATIENT
Start: 2022-06-03 | End: 2022-06-03 | Stop reason: HOSPADM

## 2022-06-03 RX ORDER — FENTANYL CITRATE 50 UG/ML
INJECTION, SOLUTION INTRAMUSCULAR; INTRAVENOUS PRN
Status: DISCONTINUED | OUTPATIENT
Start: 2022-06-03 | End: 2022-06-03

## 2022-06-03 RX ORDER — IBUPROFEN 800 MG/1
800 TABLET, FILM COATED ORAL EVERY 6 HOURS PRN
Qty: 30 TABLET | Refills: 0 | Status: SHIPPED | OUTPATIENT
Start: 2022-06-03 | End: 2022-06-21

## 2022-06-03 RX ORDER — OXYCODONE HYDROCHLORIDE 5 MG/1
5 TABLET ORAL EVERY 6 HOURS PRN
Qty: 8 TABLET | Refills: 0 | Status: SHIPPED | OUTPATIENT
Start: 2022-06-03 | End: 2022-06-06

## 2022-06-03 RX ORDER — FENTANYL CITRATE 50 UG/ML
25 INJECTION, SOLUTION INTRAMUSCULAR; INTRAVENOUS EVERY 5 MIN PRN
Status: DISCONTINUED | OUTPATIENT
Start: 2022-06-03 | End: 2022-06-03 | Stop reason: HOSPADM

## 2022-06-03 RX ORDER — IBUPROFEN 200 MG
800 TABLET ORAL ONCE
Status: DISCONTINUED | OUTPATIENT
Start: 2022-06-03 | End: 2022-06-03 | Stop reason: HOSPADM

## 2022-06-03 RX ORDER — KETOROLAC TROMETHAMINE 30 MG/ML
INJECTION, SOLUTION INTRAMUSCULAR; INTRAVENOUS PRN
Status: DISCONTINUED | OUTPATIENT
Start: 2022-06-03 | End: 2022-06-03

## 2022-06-03 RX ORDER — OXYCODONE HYDROCHLORIDE 5 MG/1
5 TABLET ORAL
Status: DISCONTINUED | OUTPATIENT
Start: 2022-06-03 | End: 2022-06-03 | Stop reason: HOSPADM

## 2022-06-03 RX ORDER — LIDOCAINE HYDROCHLORIDE 20 MG/ML
INJECTION, SOLUTION INFILTRATION; PERINEURAL PRN
Status: DISCONTINUED | OUTPATIENT
Start: 2022-06-03 | End: 2022-06-03

## 2022-06-03 RX ORDER — AMOXICILLIN 250 MG
1-2 CAPSULE ORAL 2 TIMES DAILY
Qty: 30 TABLET | Refills: 0 | Status: SHIPPED | OUTPATIENT
Start: 2022-06-03 | End: 2022-06-21

## 2022-06-03 RX ORDER — ACETAMINOPHEN 325 MG/1
975 TABLET ORAL EVERY 6 HOURS PRN
Qty: 50 TABLET | Refills: 0 | Status: SHIPPED | OUTPATIENT
Start: 2022-06-03 | End: 2022-10-05

## 2022-06-03 RX ADMIN — ACETAMINOPHEN 975 MG: 325 TABLET ORAL at 13:08

## 2022-06-03 RX ADMIN — Medication 10 MG: at 08:18

## 2022-06-03 RX ADMIN — HYDROMORPHONE HYDROCHLORIDE 0.25 MG: 1 INJECTION, SOLUTION INTRAMUSCULAR; INTRAVENOUS; SUBCUTANEOUS at 10:02

## 2022-06-03 RX ADMIN — FENTANYL CITRATE 100 MCG: 50 INJECTION, SOLUTION INTRAMUSCULAR; INTRAVENOUS at 07:41

## 2022-06-03 RX ADMIN — Medication 40 MG: at 07:41

## 2022-06-03 RX ADMIN — SODIUM CHLORIDE, POTASSIUM CHLORIDE, SODIUM LACTATE AND CALCIUM CHLORIDE: 600; 310; 30; 20 INJECTION, SOLUTION INTRAVENOUS at 09:52

## 2022-06-03 RX ADMIN — GABAPENTIN 300 MG: 300 CAPSULE ORAL at 06:59

## 2022-06-03 RX ADMIN — Medication 5 MG: at 07:56

## 2022-06-03 RX ADMIN — Medication 5 MG: at 08:41

## 2022-06-03 RX ADMIN — PHENYLEPHRINE HYDROCHLORIDE 100 MCG: 10 INJECTION INTRAVENOUS at 08:03

## 2022-06-03 RX ADMIN — ONDANSETRON 4 MG: 2 INJECTION INTRAMUSCULAR; INTRAVENOUS at 08:57

## 2022-06-03 RX ADMIN — Medication 5 MG: at 07:52

## 2022-06-03 RX ADMIN — Medication 10 MG: at 08:00

## 2022-06-03 RX ADMIN — PHENYLEPHRINE HYDROCHLORIDE 0.2 MCG/KG/MIN: 10 INJECTION INTRAVENOUS at 08:15

## 2022-06-03 RX ADMIN — ACETAMINOPHEN 325MG 975 MG: 325 TABLET ORAL at 06:59

## 2022-06-03 RX ADMIN — PHENYLEPHRINE HYDROCHLORIDE 100 MCG: 10 INJECTION INTRAVENOUS at 08:15

## 2022-06-03 RX ADMIN — DEXAMETHASONE SODIUM PHOSPHATE 6 MG: 4 INJECTION, SOLUTION INTRAMUSCULAR; INTRAVENOUS at 08:04

## 2022-06-03 RX ADMIN — MIDAZOLAM 2 MG: 1 INJECTION INTRAMUSCULAR; INTRAVENOUS at 07:33

## 2022-06-03 RX ADMIN — LIDOCAINE HYDROCHLORIDE 60 MG: 20 INJECTION, SOLUTION INFILTRATION; PERINEURAL at 07:41

## 2022-06-03 RX ADMIN — SUGAMMADEX 150 MG: 100 INJECTION, SOLUTION INTRAVENOUS at 10:04

## 2022-06-03 RX ADMIN — Medication 5 MG: at 08:08

## 2022-06-03 RX ADMIN — HYDROMORPHONE HYDROCHLORIDE 0.25 MG: 1 INJECTION, SOLUTION INTRAMUSCULAR; INTRAVENOUS; SUBCUTANEOUS at 09:50

## 2022-06-03 RX ADMIN — PROPOFOL 160 MG: 10 INJECTION, EMULSION INTRAVENOUS at 07:41

## 2022-06-03 RX ADMIN — KETOROLAC TROMETHAMINE 30 MG: 30 INJECTION, SOLUTION INTRAMUSCULAR at 09:38

## 2022-06-03 RX ADMIN — SODIUM CHLORIDE, POTASSIUM CHLORIDE, SODIUM LACTATE AND CALCIUM CHLORIDE: 600; 310; 30; 20 INJECTION, SOLUTION INTRAVENOUS at 07:33

## 2022-06-03 RX ADMIN — PHENYLEPHRINE HYDROCHLORIDE 100 MCG: 10 INJECTION INTRAVENOUS at 07:55

## 2022-06-03 ASSESSMENT — LIFESTYLE VARIABLES: TOBACCO_USE: 1

## 2022-06-03 NOTE — DISCHARGE INSTRUCTIONS
"Discharge Instructions:   Following a Laparoscopy    Comfort:  The amount of discomfort you can expect is very unpredictable.   If you have pain that cannot be controlled with non aspirin medication or with the prescription medication you may have received, you should notify your physician.     You May Experience:  Abdominal tenderness; abdominal cramps (like menstrual cramps).  Low back ache or discomfort radiating to your shoulders, chest, back or neck. This is a result of the gas used to inflate your abdomen during surgery. This gas is absorbed in 24 to 36 hours. The \"knee chest\" position will help relieve this discomfort.  Sore throat for a day or two resulting from the anesthesia tube used during surgery. You may use throat lozenges to help relieve this discomfort.  Black and blue marks on your abdomen.    Drainage:  You may expect a small amount of drainage from the incision on your abdomen and you may change the bandage when necessary.  You may also have a small amount of vaginal drainage for 3 to 4 days; this is normal and no cause for concern. If excessive bleeding occurs, notify your physician.  Do not douche, and use a pad rather than tampons. Do not resume intercourse for at least one week or until bleeding has ceased.    Home Activity:  The day of surgery spend a quiet day at home.  Increase activity as tolerated.  You may bathe or shower, do not soak in bath tub or scrub incisions.  You have no restrictions on your diet. Following surgery, drink plenty of fluids and eat a light meal.  The anesthesia may produce some nausea. If you feel nauseated, stay in bed, keep your head down and try drinking fluids such as Seven-Up, tea or soup.    Notify Physician at Once IF:  You have a fever over 100.4 degrees. A low grade fever (under 100 degrees) is usual after surgery.  You have severe pain.  You have a large amount of bleeding or drainage.    Important numbers  [facility] ([address]) - " [place]: [phone]   [facility] ([address]) : [phone]    Rev. 2014     Same-Day Surgery   Adult Discharge Orders & Instructions     For 24 hours after surgery:  Get plenty of rest.  A responsible adult must stay with you for at least 24 hours after you leave the hospital.   Pain medication can slow your reflexes. Do not drive or use heavy equipment.  If you have weakness or tingling, don't drive or use heavy equipment until this feeling goes away.  Mixing alcohol and pain medication can cause dizziness and slow your breathing. It can even be fatal. Do not drink alcohol while taking pain medication.  Avoid strenuous or risky activities.  Ask for help when climbing stairs.   You may feel lightheaded.  If so, sit for a few minutes before standing.  Have someone help you get up.   If you have nausea (feel sick to your stomach), drink only clear liquids such as apple juice, ginger ale, broth or 7-Up.  Rest may also help.  Be sure to drink enough fluids.  Move to a regular diet as you feel able. Take pain medications with a small amount of solid food, such as toast or crackers, to avoid nausea.   A slight fever is normal. Call the doctor if your fever is over 100 F (37.7 C) (taken under the tongue) or lasts longer than 24 hours.  You may have a dry mouth, muscle aches, trouble sleeping or a sore throat.  These symptoms should go away after 24 hours.  Do not make important or legal decisions.   Pain Management:      1. Take pain medication (if prescribed) for pain as directed by your physician.        2. WARNING: If the pain medication you have been prescribed contains Tylenol  (acetaminophen), DO NOT take additional doses of Tylenol (acetaminophen).     Call your doctor for any of the followin.  Signs of infection (fever, growing tenderness at the surgery site, severe pain, a large amount of drainage or bleeding, foul-smelling drainage, redness, swelling).    2.  It has been  over 8 to 10 hours since surgery and you are still not able to urinate (pee).    3.  Headache for over 24 hours.    4.  Numbness, tingling or weakness the day after surgery (if you had spinal anesthesia).  To contact a doctor, call Dr. Elda Mcpherson [facility] clinic at [phone]  or:  '   [phone] and ask for the Resident On Call for:         OB/GYN  (answered 24 hours a day)  '   Emergency Department:  [place] [facility]: [phone]  [place] [facility]: [phone]               Rev. 10/2014

## 2022-06-03 NOTE — BRIEF OP NOTE
[facility]    Brief Operative Note    Pre-operative diagnosis: Intramural and subserous leiomyoma of uterus [D25.1, D25.2]  Pelvic mass [R19.00]  Post-operative diagnosis Same as pre-operative diagnosis, solid left ovarian mass    Procedure: Procedure(s):  bilateral salpingectomy  Laparoscopic oophorectomy  Surgeon: Surgeon(s) and Role:     * Elda Mcpherson MD - Primary     * Caryn Welch MD - Assisting     * Princess Kim MD - Resident - Assisting  Anesthesia: General   Estimated Blood Loss: 25 ml  IVF: 1100 ml crystalloid  UOP: 100 ml    Drains: None  Specimens:   ID Type Source Tests Collected by Time Destination   1 :  [facility] NON-GYNECOLOGIC CYTOLOGY Elda Mcpherson MD 6/3/2022  8:42 AM    2 : LEFT OVARY IS INCLUDED IN THE SPECIMEN, RIGHT TUBE HAS STITCH ATTACHED TO IT Tissue Fallopian Tube, Bilateral SURGICAL PATHOLOGY EXAM Elda Mcpherson MD 6/3/2022  8:54 AM      Findings:   Normal external genitalia with small anteverted uterus. Left adnexal fullness. On laparoscopy, normal liver, gallbladder, appendix, and stomach edge. Uterus with multiple small pedunculated fibroids along fundus. Normal bilateral salpinges. Normal right ovary. Left ovary enlarged with lsolid mass with partially cystic component. Ureters identified peristalsing away from surgical field. Left ovaran mass morcellated within bag at anterior abdominal wall. Sites hemostatic at end of case. .  Complications: None.  Implants: * No implants in log *

## 2022-06-03 NOTE — OP NOTE
Operative Report  6/3/2022 10:19 AM    Inez Boston  [id number]    Pre-operative diagnosis:   Pelvic mass of the left adnexa   Intramural and subserous leiomyoma of uterus  Post-operative diagnosis: Same, left ovarian mass   Procedure: Laparoscopic, bilateral sapingectomy, left oophorectomy  Surgeon: Elda Mcpherson MD  Assistant(s): MD Princess George MD PGY4 ,Lupe Rnagel, MS3  Anesthesia:  GETA  Estimated blood loss: 25 ml   Total IV fluids: 1100 cc crystalloid   Total urine output: 100 cc clear  Drains: Ravi catheter   Specimens: Right and left fallopian tube, left ovary, pelvic washings  Findings: Mild adhesive disease between sigmoid colon and left adnexa. Left ovary enlarged with a solid appearing ovarian mass. Right ovary and bilateral fallopian tubes normal. Uterus with two 1-2 cm subserosal fibroids. Upper abdominal anatomy within normal limits including liver and falciform ligament  Complications: None apparent  Condition: Stable, to PACU  Indications: Inez Boston is a 46 year old P2 who presented to Gynecology clinic with history of pelvic pain and left adnexal mass. On imaging mass was about 6.5cm in size, solid and either connected to left ovary or possible posterior uterus. Normal CA-125. Results reviewed with patient and mass suspected to be either benign ovarian mass or possible fibroid. Operative plan was made to either do bilateral salpingectomy with left oophorectomy and removal of mass or if mass is attached to uterus, perform a total hysterectomy for removal of mass. The risks, benefits and alternatives were discussed in detail with the patient and she agreed to proceed. Informed consent was signed prior to the procedure.   Procedure:   The patient was taken to the operating room where she was prepped and draped in the usual sterile fashion. She then underwent GETA, and was positioned to the dorsal lithotomy position with yellow-fin stirrups. A time out was  completed  Attention was then turned to the umbilicus, which was everted with Allis clamps. A 15mm incision was made horizontally along the inferior aspect of the umbilicus with scalpel. Direct entry with laparoscope was performed and confirmation that the trochar was in the abdominal space. Pneumoperitoneum was then achieved.  Two right and left lower quadrant ports were then inserted under direct visualization. The patient was placed in steep Trendelenburg and bowel was retracted from pelvic with atraumatic grasper. Abdominal surveillance was completed as detailed above.  Pelvic washings were obtained. Left ureter was identified with peristalsis noted far away from IP ligament.  The right fallopian tube was then grasped and elevated  and underlying mesosalpinx obliterated in a sequential fashion with bipolar cautery until the cornua was reached. The fallopian tube was then transected at the cornua. Specimen was removed from abdomen. The left adnexa were then elevated and the IP ligament was then cauterized and ligated taking care to avoid the ureter.  Next the left utero-ovarian ligament was cauterized and transected. The left fallopian tube and underlying mesosalpinx obliterated in a sequential fashion with bipolar cautery until the cornua was reached. The fallopian tube was then transected at the cornua and removed from the abdomen. The left ovary was then cauterized and cut along the mesovarim and  specimen was placed in an endocatch bag. Endocatch bag was brought to the abdominal wall at the 15mm port. Opening of the bag was brought through the port and port was extended to be about 3cm in size. The Ovarian mass was morcellated with kocher and scalpel while remaining inside the Endocatch bag with care to avoid perforation of the bag itself. The remainder of the mass was removed from the abdomen within the bag. The fascia was then closed with 0 Vicryl.  Pneumoperitoneum was then reachieved and abdominal survery  performed. Hemostasis was noted. Abdomen was desufflated and camera and ports were then removed. The port site skin incisions were closed in a subcuticular fashion with 4.0 monocryl.  There were no complications during the procedure. The patient went to the postanesthesia recovery room in stable condition.   Dr. Mcpherson was present for the entire procedure.    Princess Kim MD  Obstetrics & Gynecology, PGY-4  6/3/2022 10:19 AM    Due to the complex nature of the case an uncertain etiology of the adnexal mass Dr. Caryn Welch was present and needed to assist with uterine manipulation and morcellation of the ovarian mass. I was scrubbed and present for the entire case and agree with the resident documentation.     Elda Mcpherson MD

## 2022-06-03 NOTE — ANESTHESIA PREPROCEDURE EVALUATION
Anesthesia Pre-Procedure Evaluation    Patient: Inez Boston   MRN: [id number] : 1975        Procedure : Procedure(s):  Diagnostic laparoscopy, removal of pelvic mass and  bilateral salpingectomy (possible left oophorectomy,  possible Laparoscopic myomectomy,  possible total hysterectomy, possible coversion to laparotomy)  Possible Open          Past Medical History:   Diagnosis Date     ADHD (attention deficit hyperactivity disorder)      Anxiety      Cervical dystonia      Thrombocytosis       Past Surgical History:   Procedure Laterality Date     COLONOSCOPY       LAPAROSCOPY      6/3/2022     MYRINGOTOMY W/ TUBES      as a child      No Known Allergies   Social History     Tobacco Use     Smoking status: Never Smoker     Smokeless tobacco: Never Used   Substance Use Topics     Alcohol use: Yes     Alcohol/week: 2.0 standard drinks     Types: 2 Cans of beer per week     Comment: a couple times a week      Wt Readings from Last 1 Encounters:   22 72.9 kg (160 lb 11.5 oz)        Anesthesia Evaluation            ROS/MED HX  ENT/Pulmonary:     (+) tobacco use, Current use,     Neurologic:       Cardiovascular:  - neg cardiovascular ROS     METS/Exercise Tolerance:     Hematologic: Comments: Thrombocytosis      Musculoskeletal:  - neg musculoskeletal ROS     GI/Hepatic:  - neg GI/hepatic ROS     Renal/Genitourinary:       Endo:  - neg endo ROS     Psychiatric/Substance Use: Comment: ADHD      Infectious Disease:  - neg infectious disease ROS     Malignancy: Comment: Pelvic mass, most probably non-malignant, attempts at biopsy were not successful      Other:            Physical Exam    Airway  airway exam normal           Respiratory Devices and Support         Dental  no notable dental history         Cardiovascular   cardiovascular exam normal          Pulmonary   pulmonary exam normal                OUTSIDE LABS:  CBC:   Lab Results   Component Value Date    WBC 8.6 2022    WBC 10.9 2022     HGB 11.6 (L) 06/01/2022    HGB 12.8 05/20/2022    HCT 36.2 06/01/2022    HCT 39.7 05/20/2022     06/01/2022     05/20/2022     BMP:   Lab Results   Component Value Date     12/22/2021    POTASSIUM 4.4 12/22/2021    CHLORIDE 105 12/22/2021    CO2 30 12/22/2021    BUN 10 12/22/2021    CR 0.57 12/22/2021    GLC 87 06/03/2022    GLC 91 12/22/2021     COAGS: No results found for: PTT, INR, FIBR  POC: No results found for: BGM, HCG, HCGS  HEPATIC:   Lab Results   Component Value Date    ALBUMIN 3.8 12/22/2021    PROTTOTAL 7.5 12/22/2021    ALT 27 12/22/2021    AST 12 12/22/2021    ALKPHOS 51 12/22/2021    BILITOTAL 0.4 12/22/2021     OTHER:   Lab Results   Component Value Date    TEJA 9.0 12/22/2021    TSH 1.39 12/22/2021       Anesthesia Plan    ASA Status:  2      Anesthesia Type: General.     - Airway: ETT   Induction: Intravenous.   Maintenance: Balanced.   Techniques and Equipment:     - Lines/Monitors: 2nd IV     Consents    Anesthesia Plan(s) and associated risks, benefits, and realistic alternatives discussed. Questions answered and patient/representative(s) expressed understanding.     - Discussed: Risks, Benefits and Alternatives for BOTH SEDATION and the PROCEDURE were discussed     - Discussed with:  Patient         Postoperative Care    Pain management: Multi-modal analgesia.   PONV prophylaxis: Ondansetron (or other 5HT-3)     Comments:                Georgie Byrnes MD

## 2022-06-03 NOTE — ANESTHESIA PROCEDURE NOTES
Airway       Patient location during procedure: OR       Procedure Start/Stop Times: 6/3/2022 7:46 AM and 6/3/2022 7:46 AM  Staff -        CRNA: Katey Fulton APRN CRNA       Performed By: CRNA  Consent for Airway        Urgency: elective  Indications and Patient Condition       Indications for airway management: nam-procedural and airway protection       Induction type:intravenous       Mask difficulty assessment: 1 - vent by mask    Final Airway Details       Final airway type: endotracheal airway       Successful airway: ETT - single  Endotracheal Airway Details        ETT size (mm): 7.0       Cuffed: yes       Successful intubation technique: direct laryngoscopy       DL Blade Type: Ricketts 2       Grade View of Cords: 1       Adjucts: stylet       Position: Right       Measured from: lips       Secured at (cm): 21       Bite block used: None    Post intubation assessment        Placement verified by: capnometry, equal breath sounds and chest rise        Number of attempts at approach: 1       Secured with: silk tape       Ease of procedure: easy       Dentition: Intact and Unchanged    Medication(s) Administered   Medication Administration Time: 6/3/2022 7:46 AM

## 2022-06-03 NOTE — ANESTHESIA CARE TRANSFER NOTE
Patient: Inez Boston    Procedure: Procedure(s):  bilateral salpingectomy  Laparoscopic oophorectomy       Diagnosis: Intramural and subserous leiomyoma of uterus [D25.1, D25.2]  Pelvic mass [R19.00]  Diagnosis Additional Information: No value filed.    Anesthesia Type:   General     Note:    Oropharynx: oropharynx clear of all foreign objects and spontaneously breathing  Level of Consciousness: drowsy  Oxygen Supplementation: face mask  Level of Supplemental Oxygen (L/min / FiO2): 6  Independent Airway: airway patency satisfactory and stable  Dentition: dentition unchanged  Vital Signs Stable: post-procedure vital signs reviewed and stable  Report to RN Given: handoff report given  Patient transferred to: PACU    Handoff Report: Identifed the Patient, Identified the Reponsible Provider, Reviewed the pertinent medical history, Discussed the surgical course, Reviewed Intra-OP anesthesia mangement and issues during anesthesia, Set expectations for post-procedure period and Allowed opportunity for questions and acknowledgement of understanding      Vitals:  Vitals Value Taken Time   /59    Temp 37.1    Pulse 96 06/03/22 1017   Resp 17 06/03/22 1017   SpO2 100 % 06/03/22 1017   Vitals shown include unvalidated device data.    Electronically Signed By: SERGEI Hinkle CRNA  Allsion 3, 2022  10:18 AM

## 2022-06-06 LAB
PATH REPORT.COMMENTS IMP SPEC: NORMAL
PATH REPORT.FINAL DX SPEC: NORMAL
PATH REPORT.FINAL DX SPEC: NORMAL
PATH REPORT.GROSS SPEC: NORMAL
PATH REPORT.GROSS SPEC: NORMAL
PATH REPORT.MICROSCOPIC SPEC OTHER STN: NORMAL
PATH REPORT.RELEVANT HX SPEC: NORMAL
PATH REPORT.RELEVANT HX SPEC: NORMAL
PHOTO IMAGE: NORMAL

## 2022-06-06 PROCEDURE — 88307 TISSUE EXAM BY PATHOLOGIST: CPT | Mod: 26 | Performed by: PATHOLOGY

## 2022-06-06 PROCEDURE — 88305 TISSUE EXAM BY PATHOLOGIST: CPT | Mod: 26 | Performed by: PATHOLOGY

## 2022-06-06 PROCEDURE — 88112 CYTOPATH CELL ENHANCE TECH: CPT | Mod: 26 | Performed by: PATHOLOGY

## 2022-06-06 NOTE — PROGRESS NOTES
"[facility]    Hematology/Oncology return patient visit       Today's Date: 05/31/2022    Reason for Consult: Thrombocytosis      HISTORY OF PRESENT ILLNESS: Inez Boston is a 46 year old female who presents for further evaluation of thrombocytosis.  She has had chronic thrombocytosis since at least 2017 at that time her platelets were in the 500s and then dropped down to 400s and back in the 500s again.  She recently moved to [place] from [place] and saw a hematologist last June in [place].  Blood work was obtained at [facility] which showed iron saturation of 24% JAK2 V6 1 7 mutation was negative.  Patient had thrombocytosis prior to her Covid infection in October 2020.  Inez has started on aspirin 81 mg a day.      Of note she also has family history of breast cancer her mother had breast cancer the age of 47.  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    Most recent hgb is 12.3, platelets at 430 K. She is complaining of chronic dystonia related to a 'band like senstion \" like someone is taking a wire and squeezing it around her stomach and neck. She has had chronic hair loss after covid and she did not have full growth after that. She also notices bluishing discoloration of her legs in the fashion of Livido reticularis with her extremities getting cold all after the covid infection.         REVIEW OF SYSTEMS:   14 point ROS was reviewed and is negative other than as noted above in HPI.       HOME MEDICATIONS:  Current Outpatient Medications   Medication Sig Dispense Refill     aspirin 81 MG EC tablet Take 81 mg by mouth every morning       B Complex Vitamins (VITAMIN-B COMPLEX PO) Take 1 tablet by mouth every other day       baclofen (LIORESAL) 10 MG tablet nightly as needed       cholecalciferol (VITAMIN D3) 25 mcg (1000 units) capsule 2,000 Units every morning       DULoxetine HCl 40 MG CPEP every morning       ibuprofen (ADVIL/MOTRIN) 400 MG tablet Take 1 tablet by mouth daily as needed "       lisdexamfetamine (VYVANSE) 40 MG capsule Take 40 mg by mouth every morning       acetaminophen (TYLENOL) 325 MG tablet Take 3 tablets (975 mg) by mouth every 6 hours as needed for mild pain 50 tablet 0     ibuprofen (ADVIL/MOTRIN) 800 MG tablet Take 1 tablet (800 mg) by mouth every 6 hours as needed for other (mild and/or inflammatory pain) 30 tablet 0     oxyCODONE (ROXICODONE) 5 MG tablet Take 1 tablet (5 mg) by mouth every 6 hours as needed for pain 8 tablet 0     senna-docusate (SENOKOT-S/PERICOLACE) 8.6-50 MG tablet Take 1-2 tablets by mouth 2 times daily 30 tablet 0         ALLERGIES:  No Known Allergies      PAST MEDICAL HISTORY:  Status post Covid infection  Thrombocytosis    PAST SURGICAL HISTORY:  Past Surgical History:   Procedure Laterality Date     COLONOSCOPY       LAPAROSCOPY      6/3/2022     MYRINGOTOMY W/ TUBES      as a child         SOCIAL HISTORY:  Social History     Socioeconomic History     Marital status:      Spouse name: Not on file     Number of children: Not on file     Years of education: Not on file     Highest education level: Not on file   Occupational History     Not on file   Tobacco Use     Smoking status: Never Smoker     Smokeless tobacco: Never Used   Vaping Use     Vaping Use: Some days     Substances: Nicotine     Devices: Pre-filled pod   Substance and Sexual Activity     Alcohol use: Yes     Alcohol/week: 2.0 standard drinks     Types: 2 Cans of beer per week     Comment: a couple times a week     Drug use: Yes     Types: Marijuana     Sexual activity: Not on file   Other Topics Concern     Not on file   Social History Narrative    n looking at Inez's family history, it is possible that a cancer susceptibility gene is present due to the family history of early onset breast cancer in her mother and also the history of early onset colon cancer in her maternal grandmother.  In additional, although the available details from the extended family is somewhat limited,  "the history of a possible uterine cancer in her maternal grandmother's sister (as uterine and colon cancer can be related to each other in some hereditary cancer conditions) and also the early onset cancer in her maternal grandmother's mother is also further supportive of the question about a possible hereditary cancer risk factor in Inez's mother's side of the family.     Social Determinants of Health     Financial Resource Strain: Not on file   Food Insecurity: Not on file   Transportation Needs: Not on file   Physical Activity: Not on file   Stress: Not on file   Social Connections: Not on file   Intimate Partner Violence: Not on file   Housing Stability: Not on file     Non-smoker.  Social alcohol use.  Currently works for the [employer] remotely    FAMILY HISTORY:  Mother had breast cancer at age of 47    PHYSICAL EXAM:  Vital signs:  /75   Pulse 78   Temp 98.6  F (37  C) (Oral)   Resp 16   Ht 1.727 m (5' 8\")   Wt 73.2 kg (161 lb 6.4 oz)   LMP 2022 (Within Days)   SpO2 100%   BMI 24.54 kg/m     ECO  GENERAL/CONSTITUTIONAL: No acute distress.  EYES: Pupils are equal, round, and react to light and accommodation. Extraocular movements intact.  No scleral icterus.  Unable to fully examine due to the nature of this visit be a video visit  INTEGUMENTARY: No rashes or jaundice.        LABS:  CBC RESULTS:   Recent Labs   Lab Test 22  1636   WBC 10.4   RBC 4.07   HGB 12.7   HCT 38.9   MCV 96   MCH 31.2   MCHC 32.6   RDW 12.0   *       Recent Labs   Lab Test 21  1100      POTASSIUM 4.4   CHLORIDE 105   CO2 30   ANIONGAP 7   GLC 91   BUN 10   CR 0.57   TEJA 9.0       Labs noted and reviewed with the patient     PATHOLOGY:  Peripheral blood morphology did not reveal any evidence of dysplasia    IMAGING:  None    ASSESSMENT/PLAN:  Inez is a very pleasant 46-year-old female with history of chronic thrombocytosis most recent platelet count 429 suggestive of thrombocytosis " resolving    JAK2 mutation negative, bone marrow biopsy not completed and she is on aspirin 81 mg again    Several issues today she has had evidence of long COVID infection with chronic dystonia, slow mentation and vascular changes.  At this time my recommendation would be to increase aspirin to 325 mg a day.  In regards to her dystonia she does have an appointment with neurology next month and we will see what they say.  I also think with a family history of RAD 50 mutation VUS we should alternate MRI with mammogram        1.  Thrombocytosis resolved monitor once every 6 months to a year     2.  Family history significant.  With RAD 50 VUS and family history suggestive of breast cancer would recommend MRIs alternating with mammograms .    3.  Long COVID syndrome we will reach out to the infectious disease doctors as a curbside found on the [facility] to see how she treats her dystonia issues.  Patient does have an appointment with neurology    4 Vascular changes after COVID infection increase aspirin to 325 mg a day  All questions answered to her satisfaction.  Total time spent 60 minutes 40 minutes with the patient discussing the findings above.  10 minutes reviewing previous data and 10 minutes electronically documented clinical information from today's visit      Lab check in 4 months if stable at that time, will recommend lab checks yearly and can see her yearly with mammograms, alternating with MRI . She will be due for MRI in July 2022    Nam Caceres MD  Hematology/Oncology  [facility]

## 2022-06-09 DIAGNOSIS — Z91.89 AT HIGH RISK FOR BREAST CANCER: Primary | ICD-10-CM

## 2022-06-09 RX ORDER — MINOXIDIL 2.5 MG/1
2.5 TABLET ORAL SEE ADMIN INSTRUCTIONS
Qty: 90 TABLET | Refills: 3 | Status: SHIPPED | OUTPATIENT
Start: 2022-06-09 | End: 2022-06-13

## 2022-06-13 DIAGNOSIS — Z91.89 AT HIGH RISK FOR BREAST CANCER: ICD-10-CM

## 2022-06-13 RX ORDER — MINOXIDIL 2.5 MG/1
TABLET ORAL
Qty: 90 TABLET | Refills: 3 | Status: SHIPPED | OUTPATIENT
Start: 2022-06-13 | End: 2022-06-21

## 2022-06-21 ENCOUNTER — OFFICE VISIT (OUTPATIENT)
Dept: NEUROLOGY | Facility: CLINIC | Age: 47
End: 2022-06-21

## 2022-06-21 ENCOUNTER — OFFICE VISIT (OUTPATIENT)
Dept: PHYSICAL MEDICINE AND REHAB | Facility: CLINIC | Age: 47
End: 2022-06-21
Payer: COMMERCIAL

## 2022-06-21 VITALS
WEIGHT: 159.7 LBS | SYSTOLIC BLOOD PRESSURE: 104 MMHG | HEART RATE: 90 BPM | OXYGEN SATURATION: 96 % | DIASTOLIC BLOOD PRESSURE: 75 MMHG | BODY MASS INDEX: 24.28 KG/M2

## 2022-06-21 DIAGNOSIS — G25.9 MOVEMENT DISORDER: Primary | ICD-10-CM

## 2022-06-21 DIAGNOSIS — G24.3 SPASMODIC TORTICOLLIS: Primary | ICD-10-CM

## 2022-06-21 DIAGNOSIS — G25.9 FUNCTIONAL MOVEMENT DISORDER: ICD-10-CM

## 2022-06-21 DIAGNOSIS — F95.9 TIC: ICD-10-CM

## 2022-06-21 PROCEDURE — 95874 GUIDE NERV DESTR NEEDLE EMG: CPT | Performed by: PHYSICAL MEDICINE & REHABILITATION

## 2022-06-21 PROCEDURE — 99205 OFFICE O/P NEW HI 60 MIN: CPT | Performed by: PSYCHIATRY & NEUROLOGY

## 2022-06-21 PROCEDURE — 64646 CHEMODENERV TRUNK MUSC 1-5: CPT | Mod: 59 | Performed by: PHYSICAL MEDICINE & REHABILITATION

## 2022-06-21 PROCEDURE — 64616 CHEMODENERV MUSC NECK DYSTON: CPT | Mod: 59 | Performed by: PHYSICAL MEDICINE & REHABILITATION

## 2022-06-21 PROCEDURE — 96372 THER/PROPH/DIAG INJ SC/IM: CPT | Performed by: PHYSICAL MEDICINE & REHABILITATION

## 2022-06-21 RX ORDER — CARBIDOPA AND LEVODOPA 25; 100 MG/1; MG/1
TABLET ORAL
Qty: 60 TABLET | Refills: 11 | Status: SHIPPED | OUTPATIENT
Start: 2022-06-21 | End: 2023-06-02

## 2022-06-21 ASSESSMENT — PAIN SCALES - GENERAL: PAINLEVEL: MILD PAIN (3)

## 2022-06-21 NOTE — LETTER
"2022       RE: Inez Boston  [address]  [facility] [address]     Dear Colleague,    Thank you for referring your patient, Inez Boston, to the [facility]. Please see a copy of my visit note below.      Diagnosis/Summary/Recommendations:    PATIENT: Inez Boston  46 year old female     : 1975    CORINNE: 2022    MRN: [id number]      Works at [employer]  [address]   [place] [address]     [phone] ()   [phone] ()      [email or web address]     Angelic Boston  [phone]    See details at bottom       Assessment:  (G25.9) Movement disorder  (primary encounter diagnosis)  Dystonia     Past history of covid19 - see  Notes from [facility]     Genetics consultation information  ? With respect to Inez's recent diagnosis of cervical dystonia, Inez's mother states that she has a personal history of occasional muscle spasms and twitching.  She also reports that Inez's maternal uncle also has a history of muscle pain/twitching that he was told was related to overuse of his muscles and a \"build up of lactic acid.\"  (We talked about that Inez should share this family history information with her provider at her upcoming neurology appointment.)  ? Inez reports that her family is of  ancestry.  There is no known Ashkenazi Methodist ancestry on either side of her family. There is no reported consanguinity.     PMR consultation with Dr. Fischer on 2022     Inez Boston is a 46 year old female who is referred to clinic for evaluation and management of neck pain, myofascial pain, and persistent muscle spasms among many other physical complaints which have been present since she recovered from COVID19 in Spring of 2020.      The patient reports that she became ill with COVID in 3/2020, and made essentially a full recovery without any other significant medical " "intervention. Then in 5/2020 she started to develop worsening fatigue, unintentional weight loss, hair loss, and brain fog. She also developed a feeling of \"my skin is on wrong,\" which is the most bothersome of her complaints and is ongoing to date. Specifically, she has extreme \"tightness and pulling\" sensation that starts in her toes and travels to the top of her head. She states that it feels like her skin is covered in \"tight bands\" with a constant \"trembling and rumbling\" sensation throughout her body. She also has an associated sensation that she cannot sit still at times, and frequently has to move around to stay comfortable.      She states that there is an area behind her eyes and ears that is the \"epicenter\" of all of her symptoms. She feels as though all of her muscles are in spasm, but it is more severe above her shoulders in the head/neck/upper shoulder area. She has constant numbness and tingling in her fingers and ties with associated achiness in the affected areas. She did have \"choking sensation\" and difficulty breathing (specifically had difficulty expanding her lungs/diaphragm), but these symptoms resolved with PT (myofascial massage).      The patient has seen numerous specialists including two Neurologists at [facility] and has had extensive workup which has been unrevealing. Her symptoms have been so debilitating that it has contributed to the breakup between the patient and her partner. She recently moved from [place] to be closer to family for better support.      She has tried muscle relaxers, which help her sleep at night but do not do much for her above described symptoms. She has tried alternating cold/heat with minimal benefit. Currently participating in PT. Has tried Ropinerole for RLS without benefit. She is on Vyvanse for ADHD, and duloxetine for mood.      PLAN     Botox administered 3/23/2022     AREA/MUSCLE INJECTED: 150 UNITS BOTOX = TOTAL DOSE, 2:1 DILUTION     1. NECK & SHOULDER " GIRDLE MUSCLES: 45 UNITS BOTOX = TOTAL DOSE, 2:1 DILUTION     Right Upper Trapezius (upper cervical) - 5 units of Botox at 1 site/s.   Left Upper Trapezius (upper cervical) - 5 units of Botox at 1 site/s.     Right Splenius Cervicis - 5 units of Botox at 1 site/s.   Left Splenius Cervicis -  5 units of Botox at 1 site/s.     Right Levator Scapulae - 5 units of Botox at 1 site/s.   Left Levator Scapulae - 10 units of Botox at 1 site/s.     Right Rhomboids - 5 units at 1 site  Left Rhomboids - 5 units at 1 site     2. FACIAL & SCALP MUSCLES: 105 UNITS BOTOX = TOTAL DOSE, 2:1 DILUTION  Right Frontalis - 10 units of Botox at 2 site/s.  Left Frontalis - 10 units of Botox at 2 site/s.     Right Temporalis - 25 units of Botox at 5 site/s.  Left Temporalis - 25 units of Botox at 5 site/s.     Left Occipitalis - 20 units of Botox at 5 site/s.      Right  - 5 units of Botox at 1 site/s.              Left  - 5 units of Botox at 1 site/s.     Procerus - 5 units of Botox at 1 site/     Additionally, a PT referral was placed to Irasema Parker at [facility] in [place], as she does manual muscle work, which may be quite helpful for this patient.     Review of diagnosis    Presumed functional movement problem    Avoidance of dopamine blockers   Not takign    Motor complication review   n/a    Review of Impulse control disorders       Review of surgical or medication options       Gait/Balance/Falls       Exercise/Therapy performed/offered       Cognitive/Driving       Mood   Anxiety  ADHD  Mother and brother  Had adhd  Mother does hand movements all day long  Mother has spasms when relaxing  Jumps a little  May suppress the movements and feels relief when does the movement  Mother suppressing the movements     Massages arms, that helps  Untwisting knot inh er arm   \  Psychiatrist virtual kelley arreola - psychiatrist in [place]   He is prescribing 3 medications        Hallucinations/delusions        Sleep       Bladder/Renal/Prostate/Gyn/Other  Urinary urge incontinece  Somatic dysfufnction of pelvis region  Pelvic pain    GI/Constipation/GERD       ENDO/Lipid/DM/Bone density/Thyroid      Cardio/heart/Hyper or Hypotensive       Vision/Dry Eyes/Cataracts/Glaucoma/Macular       Heme/Anticoagulation/Antiplatelet/Anemia/Other      ENT/Resp  SARS-CoV-2 Spring 2020    Skin/Cancer/Seborrhea/other  Altered skin sensations including dysesthesia and pulling  Alopecia areata and telogen effluvium related to covid19    Musculoskeletal/Pain/Headache      Other:    In addition, as part of her evaluation for abdominal pain, Inez underwent abdominal imaging, which has noted a mass between her left ovary and uterus; the imaging report notes that this mass could be consistent with a fibroid, but this mass was unable to be biopsied.  Inez reports that she plans to have this mass surgically removed in June     MRI Pelvis 2/24/2022  IMPRESSION: 5.1 x 6.0 x 5.1 cm mass. This appears to deform and expand  the left ovary significantly, I favor this is arising from the ovary.  Pedunculated fibroid ultimately not excluded but felt less likely.     Ultrasound guided 3/11/2022  Impression: No images obtained secondary to failed endometrial biopsy attempt.     Procedure 6/3/2022 Diagnostic laparoscopy     Laboratory testing which included a CBC, CK, JESUS, CCP, paraneoplastic panel, UA were unremarkable. Repeat COVID-19 PCR testing was negative. FSH, LH, testosterone levels were obtained and appeared to be normal for age however I would defer interpretation to my colleagues in Endocrinology.     dysethesia     EMG dated February 4, 2021 was normal with no evidence for a large fiber neuropathy, myopathy, or peripheral nerve hyper excitability disorder to explain the patient's symptoms of pulling and spasms.    Autonomic reflex screen also performed February 5, 2021 was normal with no evidence of autonomic failure. Subjective  orthostatic intolerance was noted.    Thermoregulatory sweat test was normal.     Pituitary lesion query microadenoma versus benign Rathke's cleft cyst     MRI pituitary was performed on February 5, 2021 and compared to the prior study of ten twenty two, twenty twenty. Again was noted a 2 mm rounded focus of hypoenhancement which is unchanged from the prior study. It is too small to differentiate between a microadenoma, pituitary cyst or a cleft cyst. Endocrinology clinic workup was thought to be appropriate.        Medications                 Acetaminophen tylenol 325mg prn       Asprin 81mg 1       B complex   qod           Baclofen lioresal 10mg        prn      Cholecalciferol Vitamin D3 25mcg 1000 units             Duloxetine cymbalta 40mg             Gabapentin neurontin 300mg        Ibuprofen advil/motrin 400mg              Lisdexamfetamine Vyvanse 40mg  1            Misoprostol cytotec 200mcg             Vyvanse 40 mg (lisdexamfetamine)                                                                      Plan:    Malik wilder   [email or web address]  Consultation about dystonia - drd, myoclonus dystonia, etc.     Neuropsychological evaluation    She does not have classic dopa responsive dystonia, myoclonic dystonia, or paroxysmal kinesigenic movement problems.     Discussed functional movement disorder as a possible diagnosis    [email or web address]    counsellor referral - consider cognitive behavioral therapy    Seeing Rain     Sinemet medication trial     Return visit     [facility]  [address] [place] [id number]      Ro May, Ph.D.,   [phone]    Rosalia Frias,Ph.D.,LP  [phone] (inpatient)    Milan Borja,Ph.D.,LP  [phone]    Yenifer Kaye, Ph.D.  [phone]    Cynthia Hernández, Ph.D., LP  [phone]    Ambrocio Martinez, Ph.D., ABPP, LP  [phone]    Belle Urias,Ph.D., LP  [phone]    Ilene Jay psychologist  [phone]    [facility] [place]  [address]  [place] [address].    Coding statement:   Medical Decision Making:  #  Chronic progressive medical conditions addressed  - see above --   Review and/or interpretation of unique test or documentation from a provider outside of neurology reviewed    Independent historian provided additional details  no I  Prescription drug management and review of potential side effects and/or monitoring for side effects  -- see above ---  Health impacted by social determinants of health  no    I have reviewed the note as documented above.  This accurately captures the substance of my conversation with the patient and total time spent preparing for visit, executing visit and completing visit on the day of the visit:  60 minutes.  The portion of this total time included face to face time 9am - 954am    Alec Wylie MD     ______________________________________    Last visit date and details:             ______________________________________      Patient was asked about 14 Review of systems including changes in vision (dry eyes, double vision), hearing, heart, lungs, musculoskeletal, depression, anxiety, snoring, RBD, insomnia, urinary frequency, urinary urgency, constipation, swallowing problems, hematological, ID, allergies, skin problems: seborrhea, endocrinological: thyroid, diabetes, cholesterol; balance, weight changes, and other neurological problems and these were not significant at this time except for   Patient Active Problem List   Diagnosis     Pelvic pain in female     Urge incontinence     Anxiety     Attention deficit hyperactivity disorder (ADHD), predominantly inattentive type     Dysesthesia     Dystonia     History of disease     History of severe acute respiratory syndrome coronavirus 2 (SARS-CoV-2) disease        No Known Allergies  Past Surgical History:   Procedure Laterality Date     COLONOSCOPY       LAPAROSCOPY      6/3/2022     MYRINGOTOMY  W/ TUBES      as a child     Past Medical History:   Diagnosis Date     ADHD (attention deficit hyperactivity disorder)      Anxiety      Cervical dystonia      Thrombocytosis      Social History     Socioeconomic History     Marital status:      Spouse name: Not on file     Number of children: Not on file     Years of education: Not on file     Highest education level: Not on file   Occupational History     Not on file   Tobacco Use     Smoking status: Never Smoker     Smokeless tobacco: Never Used   Vaping Use     Vaping Use: Some days     Substances: Nicotine     Devices: Pre-filled pod   Substance and Sexual Activity     Alcohol use: Yes     Alcohol/week: 2.0 standard drinks     Types: 2 Cans of beer per week     Comment: a couple times a week     Drug use: Yes     Types: Marijuana     Sexual activity: Not on file   Other Topics Concern     Not on file   Social History Narrative    n looking at Inez's family history, it is possible that a cancer susceptibility gene is present due to the family history of early onset breast cancer in her mother and also the history of early onset colon cancer in her maternal grandmother.  In additional, although the available details from the extended family is somewhat limited, the history of a possible uterine cancer in her maternal grandmother's sister (as uterine and colon cancer can be related to each other in some hereditary cancer conditions) and also the early onset cancer in her maternal grandmother's mother is also further supportive of the question about a possible hereditary cancer risk factor in Inez's mother's side of the family.     Social Determinants of Health     Financial Resource Strain: Not on file   Food Insecurity: Not on file   Transportation Needs: Not on file   Physical Activity: Not on file   Stress: Not on file   Social Connections: Not on file   Intimate Partner Violence: Not on file   Housing Stability: Not on file       Drug and lactation  database from the [place] National Library of Medicine:  http://toxnet.nlm.nih.gov/cgi-bin/sis/htmlgen?LACT      B/P: Data Unavailable, T: Data Unavailable, P: Data Unavailable, R: Data Unavailable 0 lbs 0 oz  Last menstrual period 04/27/2022, not currently breastfeeding., There is no height or weight on file to calculate BMI.  Medications and Vitals not listed above were documented in the cart and reviewed by me.     Current Outpatient Medications   Medication Sig Dispense Refill     aspirin 81 MG EC tablet Take 81 mg by mouth every morning       B Complex Vitamins (VITAMIN-B COMPLEX PO) Take 1 tablet by mouth every other day       baclofen (LIORESAL) 10 MG tablet nightly as needed       cholecalciferol (VITAMIN D3) 25 mcg (1000 units) capsule 2,000 Units every morning       DULoxetine HCl 40 MG CPEP every morning       gabapentin (NEURONTIN) 300 MG capsule TAKE 1 CAPSULE BY MOUTH IN THE EVENING AND AT BEDTIME       ibuprofen (ADVIL/MOTRIN) 400 MG tablet Take 1 tablet by mouth daily as needed  (Patient not taking: No sig reported)       lisdexamfetamine (VYVANSE) 40 MG capsule Take 40 mg by mouth every morning       misoprostol (CYTOTEC) 200 MCG tablet PLACE 2 TABLETS VAGINALLY ONCE FOR 1 DOSE 3 TO 4 HOURS PRIOR TO THE PROCEDURE (Patient not taking: No sig reported)       VYVANSE 50 MG capsule           Leigh Duke M.D. - 02/09/2021 10:00 AM CST  Formatting of this note might be different from the original.  SUBJECTIVE     Chief Compliant: Multiple neurological symptoms    HPI:Inez Boston returns today for follow-up of multiple neurological and other symptoms in the setting of COVID-19 infection April 2020 for which I saw the patient on February 4, 2021.    At that time, we initiated workup looking for a more objective measure of neurologic dysfunction which included an EMG, autonomic reflex screen, laboratory evaluation. We also pursued endocrine evaluation with as well as a repeat MRI head given  some of her clinical symptoms and prior imaging findings. She was also seen in the preventative care [facility] for recommendations regarding treatment as well as Dermatology for alopecia and hirsutism.    Interim results.    Laboratory testing which included a CBC, CK, JESUS, CCP, paraneoplastic panel, UA were unremarkable. Repeat COVID-19 PCR testing was negative. FSH, LH, testosterone levels were obtained and appeared to be normal for age however I would defer interpretation to my colleagues in Endocrinology.    MRI pituitary was performed on February 5, 2021 and compared to the prior study of ten twenty two, twenty twenty. Again was noted a 2 mm rounded focus of hypoenhancement which is unchanged from the prior study. It is too small to differentiate between a microadenoma, pituitary cyst or a cleft cyst. Endocrinology clinic workup was thought to be appropriate.    EMG dated February 4, 2021 was normal with no evidence for a large fiber neuropathy, myopathy, or peripheral nerve hyper excitability disorder to explain the patient's symptoms of pulling and spasms.    Autonomic reflex screen also performed February 5, 2021 was normal with no evidence of autonomic failure. Subjective orthostatic intolerance was noted.    Thermoregulatory sweat test was normal.    Swallowing evaluation performed February 9, 2020 was normal.    Preventative medicine evaluation by a nurse [place] on February 5, 2021 was reviewed. Complementary therapies including massage, consideration of acupuncture and further complement to hurry therapies with suggested. [facility] consultation was discussed. No other evaluations through Cardiology or pulmonology were thought to be needed. Ongoing evaluation in the [facility] after care clinic could be considered with .    Dermatology consultation by Dr. Jacob was reviewed. This neuro consultation identified to login effluvium which has resolved, alopecia areata which is  also resolved. These findings have both been reported with COVID-19 infection. Recommendations included over-the-counter minoxidil 5% foam once daily to the area of reduced hair density for a full 12 month trial. Worsening hears with a some was thought to be likely age-appropriate, however endocrinology consultation was endorsed. There have not been clear reports of COVID-19 infection associated with such a finding. Topical amitriptyline ketamine foam or cream could be considered for the dysesthetic symptoms.    ASSESSMENT / PLAN     #1 COVID-19 infection spring 2020  #2 Altered skin sensations including dysesthesia and pulling  #3 Alopecia areata and telogen effluvium related to #1  #4 Pituitary lesion query microadenoma versus benign Rathke's cleft cyst    Reassuringly, there are no laboratory or objective neurological tests findings to identify a primary neurogenic process at play. She has no evidence of large fiber dysfunction, small fiber dysfunction, autonomic dysfunction or peripheral nerve hyper excitability to account for her symptoms. Laboratory evaluation has been unremarkable. Endocrine evaluation is pending to determine if any component of her symptoms may be related to an endocrinopathy.    Assuming that this is not the case as imaging is more likely favoring a benign Rathke's cleft cyst, supportive management is the next step.    Recommendations for ongoing complementary therapies have been made by our preventative care providers. We would endorse the same recommendations. Consideration of topical agents for her dysesthetic symptoms could be considered. Given the episodic and diffuse but migrating nature of her symptoms one could consider an oral neuropathic pain agent that this may cause more trouble than benefit in the long run. As noted by Dermatology, amitriptyline ketamine foam is an option. In some patients, Lyrica can be of benefit for dysesthetic symptoms. Acupuncture massage therapy for the  pulling sensations may be of more benefit. It is hopeful that these symptoms will slowly resolve over time we also discussed consideration of local referral by her primary to physical therapy for consideration ultrasound therapy, massage therapy or an exercise regimen to assist with the tightness she is experiencing..    At this juncture I would have no further recommendations or needs for testing but will review and report the results of endocrinology when they are complete. It was a pleasure seeing the patient. If she has further questions in the future she can send them through the portal.    I personally spent over half of a total 30 minutes face to face with the patient in counseling and discussion and/or coordination of care as described above.    PATIENT EDUCATION  Ready to learn, no apparent learning barriers were identified; learning preferences include listening. Explained diagnosis and treatment plan; patient expressed understanding of the content.    Electronically signed by Leigh Duke M.D. at 02/09/2021 10:18 AM CST             Dr. Segal 3/21/2022  Last visit with us 12/22/2021. Overall stable. She still has neck and abdominal muscle complaints. She is getting pelvic PT. She has seen GYN, Dr. Mcpherson as well as Dr. Fischer, PMR and botox is being considered. She is on Baclofen (from her Psychiatrist) and this sees beneficial for sleep and muscle spasms. She continues to work from home. She has found it more difficult to get exercise in [place] compared to [place]. No other HEENT, cardiopulmonary, abdominal, , GYN, neurological, systemic, psychiatric, lymphatic, endocrine, vascular complaints.      Inez is a 46 year old female. She does not have any personal history of cancer. However, in other medical history:       Inez has a history of unexplained elevated platelet levels, for which she is being followed by hematology.      She also reports a history of issues with muscle pain (eg. In her neck  and her abdomen); she states that she was recently given a diagnosis of cervical dystonia, and because of this, has been referred to neurology here at [facility] for further assessment for dystonia conditions.      In addition, as part of her evaluation for abdominal pain, Inez underwent abdominal imaging, which has noted a mass between her left ovary and uterus; the imaging report notes that this mass could be consistent with a fibroid, but this mass was unable to be biopsied.  Inez reports that she plans to have this mass surgically removed in June.     We respect to her hormone-based medical history, Inez reports that she had her first menstrual period at age 12.  She does not have any biologocal children.  Inez reports that she took oral contraceptives for about 10 years in her 20s.  She states that she is currently pre-menopausal.  Inez currently has her ovaries, fallopian tubes, and uterus intact; she states that depending on what is found in her upcoming abdominal surgery for her pelvic mass, it is possible that she may have some of those organs removed.      With respect to her previous cancer screening, Inez had a mammogram in January, for which it was originally recommended that she have follow-up views.  Inez reports today that the radiology department was able to obtain copies of her [place] mamm[place] and that she was subsequently told that it was felt that her most recent mammogram was consistent with her previous mammograms, and so no additional imaging was needed at that time.  Inez had a colonoscopy in December of 2021, which did not note any polyps; a follow-up colonoscopy was recommended for 10 years from that time. Inez reports that she does not have a history of any abnormal Pap smears.  She does not do any other cancer screening at this time.     Family History: (Please see scanned pedigree for detailed family history information); the following is per Inez and  "her parents report:  ? Inez's mother reports that she was diagnosed with breast cancer at the age of 48, for which she underwent surgical (but not other) treatment.  ? It is reported that Inez's maternal grandmother was diagnosed with colon cancer in her late 40s and, unfortunately,  of complications of that cancer at age 55. Inez's mother reports that this individual's sister (ie. Inez's great aunt) may have had uterine cancer and that this individual's mother (ie. Inez's great-grandmother) may have also  related to cancer in her 40s.  ? It was reported that Inez's maternal grandfather was diagnosed with colon cancer in his 70s and, unfortunately,  of complications of that disease.  ? They are not aware of any cancers on Inez's father's side of the family.  ? With respect to Inez's recent diagnosis of cervical dystonia, Inez's mother states that she has a personal history of occasional muscle spasms and twitching.  She also reports that Inez's maternal uncle also has a history of muscle pain/twitching that he was told was related to overuse of his muscles and a \"build up of lactic acid.\"  (We talked about that Inez should share this family history information with her provider at her upcoming neurology appointment.)  ? Inez reports that her family is of  ancestry.  There is no known Ashkenazi Scientologist ancestry on either side of her family. There is no reported consanguinity.     Discussion:  ? We reviewed the features of sporadic, familial, and hereditary cancers. In looking at Inez's family history, it is possible that a cancer susceptibility gene is present due to the family history of early onset breast cancer in her mother and also the history of early onset colon cancer in her maternal grandmother.  In additional, although the available details from the extended family is somewhat limited, the history of a possible uterine cancer in her maternal grandmother's sister (as uterine " and colon cancer can be related to each other in some hereditary cancer conditions) and also the early onset cancer in her maternal grandmother's mother is also further supportive of the question about a possible hereditary cancer risk factor in Inez's mother's side of the family.     ? We discussed the natural history and genetics of hereditary cancer syndromes in general. A detailed handout regarding some of the hereditary cancer syndromes associated with breast cancer and the information we discussed was provided to Inez at the end of our appointment today and can be found in the after visit summary. Topics included: inheritance pattern, cancer risks, cancer screening recommendations, and also risks, benefits and limitations of testing.     ? We discussed that there are a variety of genes that could cause increased risk for breast or colon (or other) cancer. As many of these genes present with overlapping features in a family and accurate cancer risk cannot always be established based upon the pedigree analysis alone, it would be reasonable for Inez to consider panel genetic testing to analyze multiple hereditary cancer genes at once.     ? Inez stated that she is interested in pursuing genetic testing through a panel of genes associated with hereditary cancer syndromes, and so we reviewed the various panel options and their potential benefits and limitations.  After this conversation, Inez decided that she was interested in the Common Hereditary Cancers genetic testing panel through [facility].      ? Medical Management: For Inez, we reviewed that the information from genetic testing may determine:    additional cancer screening for which Inez may qualify (i.e. mammogram and breast MRI, more frequent colonoscopies, more frequent dermatologic exams, etc.),    options for risk-reducing surgeries Inez could consider (i.e. bilateral mastectomy, surgery to remove her ovaries and/or uterus, etc.),      and  targeted therapies for Inez if she were to develop certain cancers in the future (i.e. lumpectomy versus mastectomy,  immunotherapy for individuals with Manzano syndrome, PARP inhibitors for HBOC syndrome, etc.).      ? These recommendations and possible targeted therapies will be discussed in detail once genetic testing is completed.      Plan:  1) Today, Inez decided to proceed with a BRCA1/BRCA2 analysis with an automatic reflex to the Common Hereditary Cancers genetic testing panel through [facility].  Therefore, consent was reviewed and verbally obtaine for this testing.  In particular, Inez was interested in obtaining this information prior to her upcoming gynecological surgery, in case this information would alter the surgery plans.     2) We reviewed that options for sample collection.  Inez plans to have her blood drawn at her local [facility] lab.  Test results are usually expected to be available within 4 weeks of that blood draw.     3) Inez will either have a telephone visit or video visit to discuss the results.  Additional recommendations about screening will be made at that time.     Alejandrina Caceres MS, [facility]  Genetic Counselor  Ph: [phone]     Face to face time: 60 minutes      Alec Wylie MD

## 2022-06-21 NOTE — PROGRESS NOTES
"  [facility]     PM&R CLINIC NOTE  BOTULINUM TOXIN PROCEDURE      HPI  No chief complaint on file.    Inez Boston is a 46 year old female with a history of  who presents to clinic for botulinum toxin injections for management of cervical dystonia.      SINCE LAST VISIT  Inez Boston was last seen here in clinic on 3/23/2022, at which time she received 150 units of Botox. This was her first round of Botox injections.     Patient reports the following new medical problems since last visit: She underwent bilateral salpingectomy on 6/3/2022 with resection of a uterine leiomyoma - this was negative for malignancy. No other new hospitalizations. Has started noticing strong urges to move various muscles in body including extremities, mouth, tongue. Some jerking and slow movements. Feels like it is voluntary and able to suppress in public although uncomfortable. Has had this before during PT when people have touched her. Started after she had Covid infection. Not occurring more frequently but unsure why she has it. Per chart, tried ropinerole for RLS without benefit. Calls it \"exorcism\" because not sure why it's happening. Feels really tired when it happens.    Feels like neck and upper extremity muscles are all pulling toward the left. Has been harder to keep eyes open due to abnormal sensation in her eyes. No difficulty swallowing.    RESPONSE TO PREVIOUS TREATMENT    Side effects: No problems reported    Pain Improvement: Yes.  Percent Improvement: 40%    Duration of Benefit:  9-10 weeks followed by gradual decrease in benefit.    Dystonia Improvement: Yes.  Percent Improvement: 40%    Duration of Benefit:   9-10 weeks followed by gradual decrease in benefit.      PHYSICAL EXAM  VS: LMP 05/23/2022 (Within Days)    GEN: Pleasant and cooperative, in no acute distress  HEENT: Right mouth drooping. No asymmetry with eyebrow lift.  HEAD AND NECK: Limited ROM with rotation to the " left  HEAD, NECK AND TRUNK PATTERN:   Head & Neck Flexion:  Present  Head & Neck Rotation:   Right  Head & Neck Lateral Bend:   Right  Shoulder Elevation:   Left  JAW AND FACIAL PATTERN:   Jaw Clenching:   Bilateral  VOCAL INVOLVEMENT:   No  SPASMS: Dystonic movements with irritation and facial movements.     ALLERGIES  No Known Allergies    CURRENT MEDICATIONS    Current Outpatient Medications:      acetaminophen (TYLENOL) 325 MG tablet, Take 3 tablets (975 mg) by mouth every 6 hours as needed for mild pain, Disp: 50 tablet, Rfl: 0     aspirin 81 MG EC tablet, Take 81 mg by mouth every morning, Disp: , Rfl:      B Complex Vitamins (VITAMIN-B COMPLEX PO), Take 1 tablet by mouth every other day, Disp: , Rfl:      baclofen (LIORESAL) 10 MG tablet, nightly as needed, Disp: , Rfl:      carbidopa-levodopa (SINEMET)  MG tablet, 1/2 tab by mouth daily x 3 days then 1 tab by mouth daily for one week then 1 tab twice daily (medication should be taken with bread or applesauce - not protein to be more effective), Disp: 60 tablet, Rfl: 11     cholecalciferol (VITAMIN D3) 25 mcg (1000 units) capsule, 2,000 Units every morning, Disp: , Rfl:      DULoxetine HCl 40 MG CPEP, every morning, Disp: , Rfl:      lisdexamfetamine (VYVANSE) 40 MG capsule, Take 40 mg by mouth every morning, Disp: , Rfl:     Current Facility-Administered Medications:      botulinum toxin type A (BOTOX) 100 units injection 200 Units, 200 Units, Intramuscular, Q90 Days, Elizabeth Fischer MD       BOTULINUM NEUROTOXIN INJECTION PROCEDURES    VERIFICATION OF PATIENT IDENTIFICATION AND PROCEDURE     Initials   Patient Name SES   Patient  SES   Procedure Verified by: SES     Prior to the start of the procedure and with procedural staff participation, I verbally confirmed the patient s identity using two indicators, relevant allergies, that the procedure was appropriate and matched the consent or emergent situation, and that the correct  equipment/implants were available. Immediately prior to starting the procedure I conducted the Time Out with the procedural staff and re-confirmed the patient s name, procedure, and site/side. (The Joint Commission universal protocol was followed.)  Yes    Sedation (Moderate or Deep): None    ABOVE ASSESSMENTS PERFORMED BY  Medical Student: Anupama Amezcua MS4  The attending provider was present for the entire procedure documented below.    Elizabeth Fischer MD      INDICATIONS FOR PROCEDURES  Inez Boston is a 46 year old patient with involuntary movements secondary to the diagnosis of cervical and generalized dystonia. Her baseline symptoms have been recalcitrant to oral medications and conservative therapy.  She is here today for reinjection with Botox.    GOAL OF PROCEDURE  The goal of this procedure is to increase active range of motion, improve volitional motor control, decrease pain  and enhance functional independence.      TOTAL DOSE ADMINISTERED  Dose Administered:  190 units  Botox (Botulinum Toxin Type A)       2:1 Dilution   Unavoidable Drug Waste: Yes  Amount of drug waste (mL): 10 units Botox.  Reason for waste:  Single use vial  Diluent Used:  0.5% bupivacaine (Lot: [id number], Exp: 3/1/2024, NDC: 9888-9066-34)  Total Volume of Diluent Used:  3.8 ml  Lot # [id number] with Expiration Date:  09/2023  NDC #: Botox 100u ([address]-78)      CONSENT  The risks, benefits, and treatment options were discussed with Inez Boston and she agreed to proceed.    Written consent was obtained by [facility].     EQUIPMENT USED  Needle-37mm stimulating/recording  Needle-30 gauge  EMG/NCS Machine    SKIN PREPARATION  Skin preparation was performed using an alcohol wipe.    GUIDANCE DESCRIPTION  Electro-myographic guidance was necessary throughout the neck portion of the procedure to accurately identify all areas of dystonic muscles while avoiding injection of non-dystonic muscles, neighboring nerves and nearby vascular  structures.     AREA/MUSCLE INJECTED: 190 UNITS BOTOX = TOTAL DOSE, 2:1 DILUTION    1. NECK & SHOULDER GIRDLE MUSCLES: 55 UNITS BOTOX = TOTAL DOSE    Right Upper Trapezius (upper cervical) - 5 units of Botox at 1 site/s.   Left Upper Trapezius (upper cervical) - 5 units of Botox at 1 site/s.    Right Splenius Cervicis - 5 units of Botox at 1 site/s.   Left Splenius Cervicis -  5 units of Botox at 1 site/s.    Right Levator Scapulae - 10 units of Botox at 1 site/s.   Left Levator Scapulae - 10 units of Botox at 1 site/s.    Right Rhomboids - 7.5 units at 1 site  Left Rhomboids - 7.5 units at 1 site    2. TRUNK MUSCLES: 25 UNITS BOTOX = TOTAL DOSE     Left Quadratus Lumborum - 15 units of Botox at 2 site/s.     Left Latissimus Dorsi - 10 units of Botox at 2 site/s.     2. FACIAL & SCALP MUSCLES: 110 UNITS BOTOX = TOTAL DOSE    Right Frontalis - 10 units of Botox at 2 site/s.  Left Frontalis - 10 units of Botox at 2 site/s.    Right Temporalis - 25 units of Botox at 5 site/s.  Left Temporalis - 25 units of Botox at 5 site/s.    Left Occipitalis - 25 units of Botox at 5 site/s.     Right  - 5 units of Botox at 1 site/s.   Left  - 5 units of Botox at 1 site/s.    Procerus - 5 units of Botox at 1 site/s.      RESPONSE TO PROCEDURE  Inez Boston tolerated the procedure well and there were no immediate complications. She was allowed to recover for an appropriate period of time and was discharged home in stable condition.    ASSESSMENT AND PLAN   1. Botulinum toxin injections: Dose of Botox was increased from 150 units to 190 units in hopes of increasing effectiveness and prolonging duration of benefit of injections. Additionally, bupivacaine was utilized as a diluent to help with painful areas.     2. Referrals: None.   3. Follow up: Inez Boston was rescheduled for the next series of injections in 12 weeks, at which time we will evaluate response to today's injections. she may call the clinic prior  with any questions or concerns prior to the next appointment.

## 2022-06-21 NOTE — PATIENT INSTRUCTIONS
Medications                 Acetaminophen tylenol 325mg prn       Asprin 81mg 1       B complex   qod           Baclofen lioresal 10mg        prn      Cholecalciferol Vitamin D3 25mcg 1000 units             Duloxetine cymbalta 40mg             Gabapentin neurontin 300mg        Ibuprofen advil/motrin 400mg              Lisdexamfetamine Vyvanse 40mg  1            Misoprostol cytotec 200mcg             Vyvanse 40 mg (lisdexamfetamine)                                                                      Plan:    Malik wilder   [email or web address]  Consultation about dystonia - drd, myoclonus dystonia, etc.     Neuropsychological evaluation    She does not have classic dopa responsive dystonia, myoclonic dystonia, or paroxysmal kinesigenic movement problems.     Discussed functional movement disorder as a possible diagnosis    [email or web address]    counsellor referral - consider cognitive behavioral therapy    Seeing Rain     Sinemet medication trial     Return visit     [facility]  [address] [place] [id number]      Ro May, Ph.D., LP  [phone]    Rosalia Frias,Ph.D.,LP  [phone] (inpatient)    Milan Borja,Ph.D.,LP  [phone]    Yenifer Kaye, Ph.D.  [phone]    Cynthia Hernández, Ph.D., LP  [phone]    Ambrocio Martinez, Ph.D., [facility], [facility]  [phone]    Belle Urias,Ph.D., LP  [phone]    Ilene Jay psychologist  [phone]   [facility] [place] [address] [facility] [address]  [place] [address].

## 2022-06-21 NOTE — LETTER
"6/21/2022       RE: Inez Boston  [address]  [place] [address]     Dear Colleague,    Thank you for referring your patient, Inez Boston, to the [facility] [place] at [facility]. Please see a copy of my visit note below.      [facility]     PM&R CLINIC NOTE  BOTULINUM TOXIN PROCEDURE      HPI  No chief complaint on file.    Inez Boston is a 46 year old female with a history of  who presents to clinic for botulinum toxin injections for management of cervical dystonia.      SINCE LAST VISIT  Inez Boston was last seen here in clinic on 3/23/2022, at which time she received 150 units of Botox. This was her first round of Botox injections.     Patient reports the following new medical problems since last visit: She underwent bilateral salpingectomy on 6/3/2022 with resection of a uterine leiomyoma - this was negative for malignancy. No other new hospitalizations. Has started noticing strong urges to move various muscles in body including extremities, mouth, tongue. Some jerking and slow movements. Feels like it is voluntary and able to suppress in public although uncomfortable. Has had this before during PT when people have touched her. Started after she had Covid infection. Not occurring more frequently but unsure why she has it. Per chart, tried ropinerole for RLS without benefit. Calls it \"exorcism\" because not sure why it's happening. Feels really tired when it happens.    Feels like neck and upper extremity muscles are all pulling toward the left. Has been harder to keep eyes open due to abnormal sensation in her eyes. No difficulty swallowing.    RESPONSE TO PREVIOUS TREATMENT    Side effects: No problems reported    Pain Improvement: Yes.  Percent Improvement: 40%    Duration of Benefit:  9-10 weeks followed by gradual decrease in benefit.    Dystonia " Improvement: Yes.  Percent Improvement: 40%    Duration of Benefit:   9-10 weeks followed by gradual decrease in benefit.      PHYSICAL EXAM  VS: LMP 05/23/2022 (Within Days)    GEN: Pleasant and cooperative, in no acute distress  HEENT: Right mouth drooping. No asymmetry with eyebrow lift.  HEAD AND NECK: Limited ROM with rotation to the left  HEAD, NECK AND TRUNK PATTERN:   Head & Neck Flexion:  Present  Head & Neck Rotation:   Right  Head & Neck Lateral Bend:   Right  Shoulder Elevation:   Left  JAW AND FACIAL PATTERN:   Jaw Clenching:   Bilateral  VOCAL INVOLVEMENT:   No  SPASMS: Dystonic movements with irritation and facial movements.     ALLERGIES  No Known Allergies    CURRENT MEDICATIONS    Current Outpatient Medications:      acetaminophen (TYLENOL) 325 MG tablet, Take 3 tablets (975 mg) by mouth every 6 hours as needed for mild pain, Disp: 50 tablet, Rfl: 0     aspirin 81 MG EC tablet, Take 81 mg by mouth every morning, Disp: , Rfl:      B Complex Vitamins (VITAMIN-B COMPLEX PO), Take 1 tablet by mouth every other day, Disp: , Rfl:      baclofen (LIORESAL) 10 MG tablet, nightly as needed, Disp: , Rfl:      carbidopa-levodopa (SINEMET)  MG tablet, 1/2 tab by mouth daily x 3 days then 1 tab by mouth daily for one week then 1 tab twice daily (medication should be taken with bread or applesauce - not protein to be more effective), Disp: 60 tablet, Rfl: 11     cholecalciferol (VITAMIN D3) 25 mcg (1000 units) capsule, 2,000 Units every morning, Disp: , Rfl:      DULoxetine HCl 40 MG CPEP, every morning, Disp: , Rfl:      lisdexamfetamine (VYVANSE) 40 MG capsule, Take 40 mg by mouth every morning, Disp: , Rfl:     Current Facility-Administered Medications:      botulinum toxin type A (BOTOX) 100 units injection 200 Units, 200 Units, Intramuscular, Q90 Days, StandElizabeth rojo MD       BOTULINUM NEUROTOXIN INJECTION PROCEDURES    VERIFICATION OF PATIENT IDENTIFICATION AND PROCEDURE     Initials    Patient Name SES   Patient  SES   Procedure Verified by: SES     Prior to the start of the procedure and with procedural staff participation, I verbally confirmed the patient s identity using two indicators, relevant allergies, that the procedure was appropriate and matched the consent or emergent situation, and that the correct equipment/implants were available. Immediately prior to starting the procedure I conducted the Time Out with the procedural staff and re-confirmed the patient s name, procedure, and site/side. (The Joint Commission universal protocol was followed.)  Yes    Sedation (Moderate or Deep): None    ABOVE ASSESSMENTS PERFORMED BY  Medical Student: Anupama Amezcua MS4  The attending provider was present for the entire procedure documented below.    Elizabeth Fischer MD      INDICATIONS FOR PROCEDURES  Inez Boston is a 46 year old patient with involuntary movements secondary to the diagnosis of cervical and generalized dystonia. Her baseline symptoms have been recalcitrant to oral medications and conservative therapy.  She is here today for reinjection with Botox.    GOAL OF PROCEDURE  The goal of this procedure is to increase active range of motion, improve volitional motor control, decrease pain  and enhance functional independence.      TOTAL DOSE ADMINISTERED  Dose Administered:  190 units  Botox (Botulinum Toxin Type A)       2:1 Dilution   Unavoidable Drug Waste: Yes  Amount of drug waste (mL): 10 units Botox.  Reason for waste:  Single use vial  Diluent Used:  0.5% bupivacaine (Lot: [id number], Exp: 3/1/2024, NDC: 9311-8654-05)  Total Volume of Diluent Used:  3.8 ml  Lot # [id number] with Expiration Date:  2023  NDC #: Botox 100u ([address]-41)      CONSENT  The risks, benefits, and treatment options were discussed with Inez Boston and she agreed to proceed.    Written consent was obtained by [facility].     EQUIPMENT USED  Needle-37mm stimulating/recording  Needle-30 gauge  EMG/NCS  Machine    SKIN PREPARATION  Skin preparation was performed using an alcohol wipe.    GUIDANCE DESCRIPTION  Electro-myographic guidance was necessary throughout the neck portion of the procedure to accurately identify all areas of dystonic muscles while avoiding injection of non-dystonic muscles, neighboring nerves and nearby vascular structures.     AREA/MUSCLE INJECTED: 190 UNITS BOTOX = TOTAL DOSE, 2:1 DILUTION    1. NECK & SHOULDER GIRDLE MUSCLES: 55 UNITS BOTOX = TOTAL DOSE    Right Upper Trapezius (upper cervical) - 5 units of Botox at 1 site/s.   Left Upper Trapezius (upper cervical) - 5 units of Botox at 1 site/s.    Right Splenius Cervicis - 5 units of Botox at 1 site/s.   Left Splenius Cervicis -  5 units of Botox at 1 site/s.    Right Levator Scapulae - 10 units of Botox at 1 site/s.   Left Levator Scapulae - 10 units of Botox at 1 site/s.    Right Rhomboids - 7.5 units at 1 site  Left Rhomboids - 7.5 units at 1 site    2. TRUNK MUSCLES: 25 UNITS BOTOX = TOTAL DOSE     Left Quadratus Lumborum - 15 units of Botox at 2 site/s.     Left Latissimus Dorsi - 10 units of Botox at 2 site/s.     2. FACIAL & SCALP MUSCLES: 110 UNITS BOTOX = TOTAL DOSE    Right Frontalis - 10 units of Botox at 2 site/s.  Left Frontalis - 10 units of Botox at 2 site/s.    Right Temporalis - 25 units of Botox at 5 site/s.  Left Temporalis - 25 units of Botox at 5 site/s.    Left Occipitalis - 25 units of Botox at 5 site/s.     Right  - 5 units of Botox at 1 site/s.   Left  - 5 units of Botox at 1 site/s.    Procerus - 5 units of Botox at 1 site/s.      RESPONSE TO PROCEDURE  Inez Boston tolerated the procedure well and there were no immediate complications. She was allowed to recover for an appropriate period of time and was discharged home in stable condition.    ASSESSMENT AND PLAN   1. Botulinum toxin injections: Dose of Botox was increased from 150 units to 190 units in hopes of increasing effectiveness and  prolonging duration of benefit of injections. Additionally, bupivacaine was utilized as a diluent to help with painful areas.     2. Referrals: None.   3. Follow up: Inez Boston was rescheduled for the next series of injections in 12 weeks, at which time we will evaluate response to today's injections. she may call the clinic prior with any questions or concerns prior to the next appointment.         Sincerely,    Elizabeth Fischer MD

## 2022-06-23 ENCOUNTER — OFFICE VISIT (OUTPATIENT)
Dept: OBGYN | Facility: CLINIC | Age: 47
End: 2022-06-23
Payer: COMMERCIAL

## 2022-06-23 ENCOUNTER — TELEPHONE (OUTPATIENT)
Dept: LAB | Facility: CLINIC | Age: 47
End: 2022-06-23

## 2022-06-23 VITALS
WEIGHT: 157.7 LBS | TEMPERATURE: 99.1 F | SYSTOLIC BLOOD PRESSURE: 102 MMHG | BODY MASS INDEX: 23.98 KG/M2 | DIASTOLIC BLOOD PRESSURE: 71 MMHG | HEART RATE: 105 BPM

## 2022-06-23 DIAGNOSIS — Z98.890 S/P LAPAROSCOPY: Primary | ICD-10-CM

## 2022-06-23 PROCEDURE — 99212 OFFICE O/P EST SF 10 MIN: CPT | Performed by: OBSTETRICS & GYNECOLOGY

## 2022-06-23 NOTE — PROGRESS NOTES
GENETIC COUNSELING--Neurology  I left a message at the request of Dr. Wylie offering genetic consult times and asked the patient to call me back if interested in pursuing this.    Malik Ma MS, [facility]  Certified Genetic Counselor

## 2022-06-23 NOTE — PROGRESS NOTES
GYN Post-op Progress Note       CC: Inez Boston is a 46 year old  who is s/p laparoscopic left oophorectomy and bilateral salpingectomy and presents for a post-op visit         HPI: Since her procedure Inez Boston reports that she has been feeling well. She is ambulating and voiding without difficulty. She denies any dysuria or urinary frequency and is able to fully empty her bladder. She denies nausea or vomiting and is tolerating a general diet. Incisions healing well, no concerns.         Physical exam:    Vitals:    22 1500   BP: 102/71   Pulse: 105   Temp: 99.1  F (37.3  C)   TempSrc: Oral   Weight: 71.5 kg (157 lb 11.2 oz)       Gen: no acute distress  Abd: soft, non-tender, incisions well healed  Pelvic: deferred  Ext: non-tender, no edema    Fallopian tubes, left ovary and ovarian mass, bilateral salpingectomy, left oophorectomy and mass excision:  - Ovarian tissue with benign cortical inclusion cysts and surface fibrous adhesions  - Fallopian tubes with no significant histologic abnormality   - Leiomyoma(s)  - Negative for malignancy         Assessment and Plan    Inez Boston is a 46 year old  who is s/p laparoscopic left oophorectomy and bilateral salpingectomy and presents for a post-op visit, progressing well  -No concerns, discussed that she could continue lifting precautions for 4-6 weeks post op but can otherwise resume regular activity     Dispo: RTC as needed   Elda Mcpherson MD

## 2022-06-24 DIAGNOSIS — G24.3 SPASMODIC TORTICOLLIS: Primary | ICD-10-CM

## 2022-07-06 ENCOUNTER — HOSPITAL ENCOUNTER (OUTPATIENT)
Dept: MRI IMAGING | Facility: CLINIC | Age: 47
Discharge: HOME | End: 2022-07-06
Attending: INTERNAL MEDICINE | Admitting: INTERNAL MEDICINE
Payer: COMMERCIAL

## 2022-07-06 DIAGNOSIS — Z91.89 AT HIGH RISK FOR BREAST CANCER: ICD-10-CM

## 2022-07-06 PROCEDURE — 77049 MRI BREAST C-+ W/CAD BI: CPT

## 2022-07-06 PROCEDURE — A9585 GADOBUTROL INJECTION: HCPCS | Performed by: INTERNAL MEDICINE

## 2022-07-06 PROCEDURE — 255N000002 HC RX 255 OP 636: Performed by: INTERNAL MEDICINE

## 2022-07-06 RX ORDER — GADOBUTROL 604.72 MG/ML
7 INJECTION INTRAVENOUS ONCE
Status: COMPLETED | OUTPATIENT
Start: 2022-07-06 | End: 2022-07-06

## 2022-07-06 RX ADMIN — GADOBUTROL 7 ML: 604.72 INJECTION INTRAVENOUS at 08:23

## 2022-07-17 NOTE — PROGRESS NOTES
HPI:    Last visit with us 5/4/2022 and some additional details in that note. Overall doing well. She is getting botox for neck dsytonia and feels better. She also had GYN surgery with Dr. Mcpherson and her abdominal complaints are less. Otherwise, no additional HEENT, cardiopulmonary, abdominal, , GYN, neurological, systemic, psychiatric complaints.   Past Medical History:   Diagnosis Date     ADHD (attention deficit hyperactivity disorder)      Anxiety      Cervical dystonia      Thrombocytosis      Past Surgical History:   Procedure Laterality Date     COLONOSCOPY       LAPAROSCOPIC OOPHORECTOMY Left 6/3/2022    Procedure: Laparoscopic oophorectomy;  Surgeon: Elda Mcpherson MD;  Location: [facility]     LAPAROSCOPIC OOPHORECTOMY  6/3/2022    Procedure: ;  Surgeon: Elda Mcpherson MD;  Location: [facility]     LAPAROSCOPIC OOPHORECTOMY  6/3/2022    Procedure: ;  Surgeon: Elda Mcpherson MD;  Location: [facility]     LAPAROSCOPIC SALPINGOTOMY Bilateral 6/3/2022    Procedure: bilateral salpingectomy;  Surgeon: Elda Mcpherson MD;  Location: [facility]     LAPAROSCOPY      6/3/2022     MYRINGOTOMY W/ TUBES      as a child     PE:    Vitals noted, gen, nad, cooperative, alert, neck supple nl rom, lungs with good air movement, RRR, S1, S2, no MRG, abdomen, no acute findings.     A/P:    1. Immunizations;  COVID vaccine Pfizer x 1.  Td/Tdap  3/21/2022  2. Hematology appt. With Dr. Caceres 5/31/2022 regarding thrombocytosis. She is on ASA    3. PMR follow up with Dr. Fischer 6/21/2022 regarding neck and myofacial pain. On baclofen more for sleep. She has 9/13/2022 follow up with Dr. Fischer   4. GYN note Dr. Mcpherson, 6/23/2022, 3/11/2022 and 2/28/2022 regarding endometrial bx. plevic mass and pelvic pain. She had surgery 6/3/2022  5. On Cymbalta and Vyvanse and she finds this beneficial   6. Mammogram 1/11/2022; breast MRI imaging 7/6/2022.   7. She has seen PT for groin pain 2/24/2022 and appt. 3/21/2022  8. Genetic counseling  given family history significant for breat cancer on 4/28/2022 and 5/24/2022  9. Colonoscopy 12/7/2021 and repeat in 10 years   10. Lipids on 1/11/2022 with 175 and HDL 80. Will hold on statin  given her musculoskeletal complaints. Will recheck  months and if still elevated consider to  have her see Dr. Bisi Wil[facility]   11. Seen Neurology 6/21/2022, Dr. Wylie for cervical dystonia  12. Neuropsychiatric testing scheduled for 1/13/2023     30 minutes spent on the date of the encounter doing chart review, history and exam, documentation and further activities as noted above

## 2022-07-18 ENCOUNTER — OFFICE VISIT (OUTPATIENT)
Dept: INTERNAL MEDICINE | Facility: CLINIC | Age: 47
End: 2022-07-18
Payer: COMMERCIAL

## 2022-07-18 VITALS
SYSTOLIC BLOOD PRESSURE: 118 MMHG | OXYGEN SATURATION: 98 % | BODY MASS INDEX: 24.63 KG/M2 | HEART RATE: 79 BPM | DIASTOLIC BLOOD PRESSURE: 78 MMHG | WEIGHT: 162 LBS

## 2022-07-18 DIAGNOSIS — E78.00 HIGH CHOLESTEROL: Primary | ICD-10-CM

## 2022-07-18 PROCEDURE — 99214 OFFICE O/P EST MOD 30 MIN: CPT | Performed by: INTERNAL MEDICINE

## 2022-07-18 NOTE — NURSING NOTE
Chief Complaint   Patient presents with     Recheck Medication       Norberto Urena CMA, EMT at 5:33 PM on 7/18/2022.

## 2022-08-03 ENCOUNTER — OFFICE VISIT (OUTPATIENT)
Dept: NEUROPSYCHOLOGY | Facility: CLINIC | Age: 47
End: 2022-08-03
Attending: PSYCHIATRY & NEUROLOGY
Payer: COMMERCIAL

## 2022-08-03 DIAGNOSIS — F90.0 ATTENTION DEFICIT HYPERACTIVITY DISORDER (ADHD), PREDOMINANTLY INATTENTIVE TYPE: ICD-10-CM

## 2022-08-03 DIAGNOSIS — R41.9 COGNITIVE COMPLAINTS: Primary | ICD-10-CM

## 2022-08-03 PROCEDURE — 90791 PSYCH DIAGNOSTIC EVALUATION: CPT

## 2022-08-03 PROCEDURE — 96133 NRPSYC TST EVAL PHYS/QHP EA: CPT

## 2022-08-03 PROCEDURE — 96139 PSYCL/NRPSYC TST TECH EA: CPT

## 2022-08-03 PROCEDURE — 96138 PSYCL/NRPSYC TECH 1ST: CPT

## 2022-08-03 PROCEDURE — 96132 NRPSYC TST EVAL PHYS/QHP 1ST: CPT

## 2022-08-03 NOTE — PROGRESS NOTES
Pt was seen for neuropsychological evaluation at the request of Alec Wylie MD for the purposes of diagnostic clarification and treatment planning. 226 minutes of test administration and scoring were provided by this writer. Please see Dr. Gloria Bhagat's report for a full interpretation of the findings.    Mimi Barriga  Psychometrist

## 2022-08-03 NOTE — LETTER
8/3/2022       RE: Inez Boston  [address] [facility] [address]     Dear Colleague,    Thank you for referring your patient, Inez Boston, to the [facility] NEUROPSYCHOLOGY [place] at [facility]. Please see a copy of my visit note below.    Pt was seen for neuropsychological evaluation at the request of Alec Wylie MD for the purposes of diagnostic clarification and treatment planning. 226 minutes of test administration and scoring were provided by this writer. Please see Dr. Gloria Bhagat's report for a full interpretation of the findings.    Mimi Barriga  Psychometrist         NEUROPSYCHOLOGICAL EVALUATION  **CONFIDENTIAL**      Name: Inez Boston Education: Master s degree (18 years)    (age): 1975 (46 years) ZAMUDIO:  7/3/2022   Referral: Alec Wylie MD  Department of Neurology Handedness:  MRN (Epic): Right  [id number]     IDENTIFYING INFORMATION AND REASON FOR REFERRAL   Ms. Boston is a 46-year-old, right-handed, White female with a history of cervical dystonia, COVID-19 infection, and ADHD. This evaluation was requested to provide a comprehensive assessment of her current neuropsychological status to inform treatment planning.     IMPRESSION  See below for relevant background information and detailed test results. See separate abstract for supporting documentation including a list of neuropsychological measures and test scores.    Ms. Boston s neuropsychological profile revealed impaired performance on tests of immediate auditory attention and working memory, mental flexibility, and verbal fluency, in the context of other well preserved cognitive abilities. Fine-motor dexterity was mildly impaired with her dominant (right) hand, but motor findings were not clearly lateralized. Performance was within expectation across other cognitive tests including learning and memory, speeded processing, naming, visuospatial  processing, abstract reasoning, and planning/organization. Memory for rote, unstructured verbal information (i.e., word list) represented a strength with delayed free recall performance in the high average to above average range.    Assessment of current psychological and emotional status revealed moderate symptoms of depression. On a self-report measure of personality and psychopathology, her profile was suggestive of possible over-reporting of cognitive complaints. She endorsed multiple somatic symptoms and specifically reported vague neurological complaints and malaise (i.e., experiencing poor health and feeling weak or tired.) She also reported engaging in problematic impulsive behavior.    Overall, Ms. Boston exhibited impaired performance on select tests of attention/executive functioning and language, in the context of other cognitive performances within expectation. This cognitive profile is not suggestive of focal or lateralized cerebral dysfunction, and it does not raise concerns about acquired neurologic disease. The pattern of performances is diagnostically nonspecific, but could be consistent with effects of ADHD, pain, fatigue, and psychological distress. Depression, lack of refreshing sleep, fatigue, and pain all may intermittently interfere with cognitive effectiveness, typically through interfering with normal attention and efficiency of information processing. It is important to note that persistent attention to cognitive weaknesses and physical complaints may exacerbate those difficulties, which could further affect mood and hinder the ability to compensate for subtle difficulties observed day to day. Addressing mental health symptoms may not only aid in improvement of her mood and daily functioning but will also likely improve her daily cognitive efficiency.    RECOMMENDATIONS  1. Ms. Boston is encouraged to consider engaging in individual psychotherapy to address symptoms of depression and  [facility] can be reached at [phone]. Additionally, a number of therapists can be found in your local community through [email or web address].Revl.  2. Ms. Boston is encouraged to live a healthy lifestyle that promotes brain health including getting appropriate exercise, as advised by her healthcare providers, and eating healthy.    3. Ms. Boston is encouraged to utilize the following behavioral strategies to maximize cognitive functioning in her daily life:   -Eliminate distraction. Eliminating environmental distracters can facilitate attention and memory performance. For example, turning off music or the television while having a conversation, so that all of your attention is focused on the task at hand.  -Work on decreasing interference from intrusive thoughts. When intrusive thoughts begin to interfere with your thinking, do not concentrate on them. Instead, observe them passively and calmly redirect your attention back to task.   -Engage in calming activities that increase focus on the present. Incorporating mindfulness techniques such as meditation, relaxation, yoga, and moderate cardiovascular exercise, into your daily routine will help keep you present-oriented and consequently improve attention and memory and decrease impulsive behavior.   -Pay mindful attention. You may find that you need to make a conscious effort to pay attention to conversations or when learning new information. When attention is not focused, it is difficult for the brain to learn new information. Thus, making a mindful effort to pay attention as you go through daily tasks will assist and facilitate memory recall later on.  -Allow for time. Take the time needed to learn and recall information. Some people tend to worry if they can't immediately recall something. Instead, you should take time to express a thought or complete a task rather than be critical or harsh on yourself.  -Use external aids to increase  structure in daily life. Ongoing use of compensatory strategies such as lists, notes, and a centralized calendar are supported. Tools such as smart phones with alarms and reminders are helpful in bringing structure to daily activities.  -Practice good sleep hygiene. Poor sleep can exacerbate cognitive difficulties and interfere with attention and memory. The sleep foundation has several recommendations for improving sleep quality including:  -Set a sleep schedule with a nightly routine and fixed wakeup time. Soft music, light stretching, reading and/or relaxation exercises (e.g., meditation, deep breathing) can be helpful to wind down before bed. Avoid bright lights and electronics at least 30 minutes before bed.  -Keep naps short and limited to the early afternoon.  -Avoid caffeine in the afternoon or evening. Avoid eating dinner late.  -Get daylight exposure (sunlight) during the day to encourage quality sleep at night  -Optimize your bedroom with a comfortable mattress, pillow, and bedding; use heavy curtains or an eye mask to prevent light from interrupting your sleep. Light smells, such as lavender, can induce a calmer state of mind and cultivate a positive space for sleep.    -For more information, a simple set of guidelines can be found here: https://www.sleepfoundation.org/sleep-topics/sleep-hygiene  4. The current evaluation will serve as an objective cognitive baseline against which results of future evaluations may be compared.  No follow-up is currently indicated; however, Ms. Boston may be referred for a repeat neuropsychological evaluation if there is significant change in cognitive status in the future.     Thank you for the opportunity to participate in Ms. Boston s care.  These findings and recommendations were reviewed with the patient in a separate feedback session on 8/5/2022 and all her questions were answered. If you have any questions regarding this evaluation, please do not hesitate to  contact me.       Gloria Bhagat, Ph.D.,   Clinical Neuropsychologist    RELEVANT BACKGROUND INFORMATION AND SUPPORTING DOCUMENTATION  Gathered from a clinical interview with the patient and reviews of electronic medical record.    History of Presenting Problem(s)  Ms. Boston presented with a history of cervical dystonia, COVID-19 infection, and ADHD. She reported longstanding difficulty with attention and distractively and stated she was diagnosed with ADHD in her 30s. She reported worsening of attentional problems around 2013, which she stated may have been related to burnout at her job. She reported notable cognitive changes a few months after recovering from COVID-19 infection, characterized by worsening memory and distractibility. She specified her memory has improved but not to her baseline. She reported continued distractibility and stated it seems as if her  ADHD got worse.  She specified her  brain fog  has improved and she is able to work, but she described being distracted by numerous physical complaints. Specifically, a couple of months after recovering from COVID-19, she experienced onset of symptoms including hair loss, weight loss, tremor, SOB, sensations of vibration and crawling under the skin, fatigue, difficulty swallowing, and feeling as if her neck is being pulled back and down. She stated some symptoms have improved including fatigue, and SOB but she reported continued physical discomfort related to neck pulling and crawling under the skin sensations. She stated these physical complaints consume her attention. She was diagnosed with cervical dystonia, and she reported Botox injections have been helpful. Functionally, she described difficulty recalling if she took her medications, often paying bills late, and stated meal preparation is more effortful, but she otherwise denied difficulty with activities of daily living. Emotionally, she stated feeling a bit more down in the context of  physical discomfort, recently ending a romantic relationship, and moving to [place]. She admitted to health-related anxiety but stated  it is not overwhelming.     Neurodiagnostic Findings   ? Brain MRI w/o contrast (6/26/2018) Impression: No acute intracranial hemorrhage, acute infarction or intracranial mass lesion.  ? Head MRA w/wo contrast (6/26/2018) Impression: Unremarkable MRA of the head.    Medical History  ? Cervical dystonia  ? COVID-19 infection (3/2020) with prominent fatigue; no hospitalization required; recovered with onset of unusual symptoms a couple of months later.  ? Reported stroke-like episode of acute onset slurred speech and weakness (2018) with normal neuroimaging; reportedly thought to be anxiety-related    Health Behaviors   ? Sleep: Variable sleep quality; delay in onset due to physical discomfort; denied gasping arousals or parasomnic behaviors; reported she hardly slept the night before this evaluation  ? Exercise: Walking nearly every day  ? Pain: Pain behind right eye and discomfort associated with  pulling  sensation, rated 3/10 at the time of this evaluation    Psychiatric History  ? Ms. Boston reported current mood is  more down  in the context of several life changes/transitions (recently ended a long-term relationship, moved back to [place], etc.)  ? She indicated a history of situational depression in the context of relationship stress; she denied prominent symptoms of depression currently (e.g., denied anhedonia etc.).  ? She reported mild anxiety related to her health but stated  it is not overwhelming   ? She otherwise denied history of hypomanic or manic mood symptoms, alteration of sensory perceptual processes or disordered thought.  ? Suicidality/ Self-harm: She denied any recent or past suicidal ideation, plan, or intent.   ? Psychiatric treatment: Currently prescribed duloxetine; followed by psychiatry; not currently engaged in individual psychotherapy but has  participated in therapy in the past    Substance Use History  ? Alcohol: Minimal; 1 beer per week or less  ? Nicotine: Using low nicotine vaporizer  ? Cannabis: 1-2 times per week  ? Problematic Substance Use: Denied    Current Medications (per patient report & EMR)  Vyvanse  duloxetine  baclofen  Sinemet (pt. has not started taking this)  vitamin D  vitamin B12  Botox injections    Developmental, Educational, & Occupational History  ? Childhood behavioral / emotional / academic problems: Inattentive and hyperactive in childhood; diagnosed with ADHD in her 30s  ? Native Language: English  ? Education: Graduated high school on time; obtained bachelor s degree from [employer] and a master s degree in political/international economy from [employer]  ? Occupation: Consulting for [employer]    Psychosocial History  ? Raised in [place]  ? Marital: Single  ? Housing: Currently lives with her brother and his 4 roommates  ? Psychosocial support: Strong social support network of family    Family History  ? No known family history of dementia or neurological disorders    RESULTS  See separate abstract for list of neuropsychological measures and test scores. Descriptive ranges are based on American Academy of Clinical Neuropsychology (2020) consensus guidelines, or test manuals where appropriate. All standardized scores are adjusted for age and additional adjustments for demographic factors such as education were performed where applicable.    PRE-MORBID ABILITY: Premorbid abilities were estimated within the average range based on single word reading ability and demographic factors including sex, ethnicity, education, occupation, and geographic region.  ATTENTION/EXECUTIVE FUNCTIONS: Immediate auditory attention and working memory performances were below average. Verbal abstract reasoning performance was average. Performance on a test of set-shifting/cognitive flexibility was exceptionally low and  notable for one error. Copy of a complex figure requiring planning and organization was within normal limits. Psychomotor processing speed performances were low average to average.  LANGUAGE: Confrontation naming performance was average. Letter-cue verbal fluency performance was below average. Semantic verbal fluency performance was below average.   VISUOSPATIAL PROCESSING: Copy of increasingly intricate figures was high average. Copy of a complex figure was within normal limits. There was no evidence of spatial inattention or shasta visuospatial disturbance.    LEARNING AND MEMORY: Initial encoding of a word list over multiple learning trials was average with low average learning slope notable for recalling most words after the second trial and fewer words on subsequent learning trails. Delayed recall of the list was average to above average, but she was not aided with cues recalling fewer words with category cueing compared to free recall. Recognition of the word list was high average and without error. Initial encoding of contextualized verbal information in the form of short stories was low average and delayed retrieval was average as she was able to recall most of the details initially encoded. Recognition of story details was high average. Encoding of visual information was low average and delayed retrieval was average. Recognition among distractors was high average and without error.   MOTOR: Fine-motor dexterity was below average with the dominant (right) hand, and low average with the non-dominant (left) hand. Motor findings were not clearly lateralized.   PSYCHOLOGICAL AND BEHAVIORAL: She endorsed moderate symptoms of depression and minimal symptoms of anxiety. On a self-report measure of personality and psychopathology, her profile was suggestive of possible over-reporting as indicated by an unusual combination of responses that is associated with noncredible memory complaints. Scores on the cognitive  complaints scale should be interpreted cautiously. She endorsed multiple somatic complaints that may include head pain and neurological and gastrointestinal symptoms. Specifically, she reported vague neurological complaints and experiencing poor health and feeling weak or tired. She also reported engaging in problematic impulsive behavior.  PERFORMANCE VALIDITY: Performance on neuropsychological tests is dependent upon a number of factors, including sufficient engagement and motivation, to reliably establish an examinee s level of cognitive functioning.  Based upon observations made throughout the evaluation, the patient did not appear to deliberately perform in a suboptimal manner and demonstrated good frustration tolerance on cognitively challenging tasks. Score on one imbedded index of performance validity was slightly below established cut-offs; however, scores on other dedicated and imbedded indices of performance validity were in the valid range. Overall, test results are believed to accurately represent the patient s current neurocognitive status.    BEHAVIORAL OBSERVATIONS  ? Presented on time and was unaccompanied  ? Alert, oriented to self, time, place, and circumstance; attentive and focused while undergoing testing  ? Appearance: Well-groomed, casually dressed  ? Gait/Posture: No abnormalities noted  ? Sensorimotor: No abnormalities noted  ? Behavior: Cooperative, pleasant, no behavioral abnormalities noted  ? Speech: Fluent, articulate, normal rate, prosody, and volume; no conversational word finding difficulty  ? Thought process: Logical, linear, and goal-directed  ? Thought content: Logical, appropriate   ? Affect: Broad, responsive, consistent with reported mood; good eye contact  ? Mood: Euthymic  ? Insight and Judgment: Intact  ? Approach to testing: Efficient, deliberate; good frustration on cognitively demanding tasks  ? Rapport: Easily established and maintained      Activities included in this  evaluation: CPT Code #Units Time   Psychiatric diagnostic interview [id number] --   Test evaluation services by professional; first hour. 28858 1 2:38   Test evaluation services by professional, additional hour (+) [id number] 2    Test administration and scoring by technician, first 30 mins [id number] 1 3:46   Test administration and scoring by technician, additional 30 mins (+) 16000 7    Dx: R41.9; F90.0        Again, thank you for allowing me to participate in the care of your patient.      Sincerely,    JIMY HUSSEIN, PhD

## 2022-08-05 NOTE — CONFIDENTIAL NOTE
Provider: CE      Tech: EVAN      Patient Name: Inez Boston      : [birth year]      MRN: [id number]      ZAMUDIO: 8/3/22      Age: 46      Education: 18      Ethnicity: C      Handedness: Right      Station: OP             NEUROPSYCHOLOGICAL TESTS RAW SCORE STANDARDIZED SCORE* DESCRIPTIVE RANGE**   PREMORBID ABILITY       WAIS-IV ACS Test of Premorbid Functioning (Estimated FSIQ) 49 SS= 106 Average     Estimated FSIQ = 109 Average          ATTENTION AND EXECUTIVE FUNCTIONS RAW SCORE STANDARDIZED SCORE* DESCRIPTIVE RANGE**   Wechsler Adult Intelligence Scale - 4th Edition (WAIS-IV)     Digit Span Forward 8 ss= 7 Low Average   Digit Span Backward 5 ss= 6 Low Average   Digit Span Sequencing 9 ss= 8 Average   Digit Span Total 21 ss= 7 Low Average     TSs,r,e = 31 Below Average   Coding 70 ss= 11 Average     TSs,r,e = 45 Average   Similarities 30 ss= 12 High Average     TSs,r,e = 49 Average   Trail Making Test (Time/errors)        Part A s,r,e 34/0 TS= 37 Low Average   Part B s,r,e 110/1 TS= 27 Exceptionally Low          LANGUAGE RAW SCORE STANDARDIZED SCORE* DESCRIPTIVE RANGE**   Neuropsychological Assessment Battery (NAB) Naming s,e 30 TS= 53 Average   Letter-Cue Verbal Fluency (FAS) s,r,e 11-8-12 (31) TS= 34 Below Average   Animal Fluency s,r,e 17 TS=  36 Below Average          VISUOSPATIAL PROCESSING RAW SCORE STANDARDIZED SCORE* DESCRIPTIVE RANGE**   Elijah Complex Figure Test (RCFT) Copy 33 %ile= >16 WNL   WMS-IV Visual Reproduction Copy 43 %= >75 High Average          LEARNING & MEMORY RAW SCORE STANDARDIZED SCORE* DESCRIPTIVE RANGE**   Wechsler Memory Scale-4th Edition (WMS-IV)      Logical Memory I 25 ss= 10 Average     TSs,r,e = 42 Low Average   Logical Memory II 22 ss= 10 Average     TSs,r,e = 44 Average   Logical Memory Recognition 27 %= >75 High Average   Visual Reproduction I 33 ss= 9 Average     TSs,r,e = 41 Low Average   Visual Reproduction II 28 ss= 11 Average     TSs,r,e = 51 Average   Visual Reproduction  Recognition 7 %= >75 High Average   [place] Verbal Learning Test - 3rd Edition (CVLT-3; Standard Form)     Total Trials 1-5 se 1-40-56-13-12 (60) TS=  56 Average   Trial 1 se 8 TS= 55 Average   Trial 5se 12 TS= 44 Average   Learning [place] 0.7 ss= 7 Low Average   SD Free Recall se 14 TS= 58 High Average   SD Cued Recall se 12 TS= 45 Average   LD Free Recall se 16 TS= 65 Above Average   LD Cued Recall se 15 TS= 55 Average   Total Intrusions se 7 TS= 40 Low Average   Total Recognition Hits se 16 TS= 54 Average   False Positives se 0 TS= 55 Average   d' se 4 TS= 59 High Average          MOTOR  RAW SCORE STANDARDIZED SCORE* DESCRIPTIVE RANGE**   Grooved Pegboard Test       RH s,r,e 71 TS= 36 Below Average   LH s,r,e 77 TS= 38 Low Average          PSYCHOLOGICAL & BEHAVIORAL SYMPTOMS RAW SCORE STANDARDIZED SCORE* DESCRIPTIVE RANGE**   Guillen Anxiety Inventory (CON) 2 --  Minimal   Guillen Depression Inventory - 2nd Edition (BDI-II) 23 --  Moderate   [place] Multiphasic Personality Inventory - 3rd Edition (MMPI-3)     Validity (CRIN, VRIN, SARAH, F, Fp, Fs, FBS, RBS, L, K) -- TS= 54, 51, 67, 47, 50, 75, 67, 80, 56, 59 --   Substantive Scales (T?65 in order of elevation) RC1(72), NUC(77), MLS(68), IMP(66) Elevated          PERFORMANCE VALIDITY RAW SCORE   DESCRIPTIVE RANGE**   FC 16/16 --  Valid Range   RDS 6 --  Indeterminate Range   ACS Word Choice 50 --  Valid Range          * All standardized scores are adjusted for age. Superscripts denote additional adjustment for (s)ex, (r)ace/ethnicity, (l)anguage, and/or (e)ducation.   ** Descriptive ranges are based on American Academy of Clinical Neuropsychology (2020) consensus guidelines, or test manuals where appropriate.    WNL = within normal limits. DC = discontinued due to patient s inability to complete the test.    Standardized scores: TS = T-score; mean = 50, standard deviation =10; z = z-score; mean = 0, standard deviation = 1; ss = scaled score; mean = 10, standard  deviation = 3; MAS = [place] Older Adult Normative Study age adjusted scaled score; mean = 10, standard deviation = 3; SS = standard score; mean = 100, standard deviation = 15.

## 2022-08-05 NOTE — PROGRESS NOTES
NEUROPSYCHOLOGICAL EVALUATION  **CONFIDENTIAL**      Name: Inez Boston Education: Master s degree (18 years)    (age): 1975 (46 years) ZAMUDIO:  7/3/2022   Referral: Alec Wylie MD  Department of Neurology Handedness:  MRN (Epic): Right  [id number]     IDENTIFYING INFORMATION AND REASON FOR REFERRAL   Ms. Boston is a 46-year-old, right-handed, White female with a history of cervical dystonia, COVID-19 infection, and ADHD. This evaluation was requested to provide a comprehensive assessment of her current neuropsychological status to inform treatment planning.     IMPRESSION  See below for relevant background information and detailed test results. See separate abstract for supporting documentation including a list of neuropsychological measures and test scores.    Ms. Boston s neuropsychological profile revealed impaired performance on tests of immediate auditory attention and working memory, mental flexibility, and verbal fluency, in the context of other well preserved cognitive abilities. Fine-motor dexterity was mildly impaired with her dominant (right) hand, but motor findings were not clearly lateralized. Performance was within expectation across other cognitive tests including learning and memory, speeded processing, naming, visuospatial processing, abstract reasoning, and planning/organization. Memory for rote, unstructured verbal information (i.e., word list) represented a strength with delayed free recall performance in the high average to above average range.    Assessment of current psychological and emotional status revealed moderate symptoms of depression. On a self-report measure of personality and psychopathology, her profile was suggestive of possible over-reporting of cognitive complaints. She endorsed multiple somatic symptoms and specifically reported vague neurological complaints and malaise (i.e., experiencing poor health and feeling weak or tired.) She also reported engaging in problematic  impulsive behavior.    Overall, Ms. Boston exhibited impaired performance on select tests of attention/executive functioning and language, in the context of other cognitive performances within expectation. This cognitive profile is not suggestive of focal or lateralized cerebral dysfunction, and it does not raise concerns about acquired neurologic disease. The pattern of performances is diagnostically nonspecific, but could be consistent with effects of ADHD, pain, fatigue, and psychological distress. Depression, lack of refreshing sleep, fatigue, and pain all may intermittently interfere with cognitive effectiveness, typically through interfering with normal attention and efficiency of information processing. It is important to note that persistent attention to cognitive weaknesses and physical complaints may exacerbate those difficulties, which could further affect mood and hinder the ability to compensate for subtle difficulties observed day to day. Addressing mental health symptoms may not only aid in improvement of her mood and daily functioning but will also likely improve her daily cognitive efficiency.    RECOMMENDATIONS  1. Ms. Boston is encouraged to consider engaging in individual psychotherapy to address symptoms of depression and stress. [facility] can be reached at [phone]. Additionally, a number of therapists can be found in your local community through [email or web address].  2. Ms. Boston is encouraged to live a healthy lifestyle that promotes brain health including getting appropriate exercise, as advised by her healthcare providers, and eating healthy.    3. Ms. Boston is encouraged to utilize the following behavioral strategies to maximize cognitive functioning in her daily life:   -Eliminate distraction. Eliminating environmental distracters can facilitate attention and memory performance. For example, turning off music or the television while having a conversation, so  that all of your attention is focused on the task at hand.  -Work on decreasing interference from intrusive thoughts. When intrusive thoughts begin to interfere with your thinking, do not concentrate on them. Instead, observe them passively and calmly redirect your attention back to task.   -Engage in calming activities that increase focus on the present. Incorporating mindfulness techniques such as meditation, relaxation, yoga, and moderate cardiovascular exercise, into your daily routine will help keep you present-oriented and consequently improve attention and memory and decrease impulsive behavior.   -Pay mindful attention. You may find that you need to make a conscious effort to pay attention to conversations or when learning new information. When attention is not focused, it is difficult for the brain to learn new information. Thus, making a mindful effort to pay attention as you go through daily tasks will assist and facilitate memory recall later on.  -Allow for time. Take the time needed to learn and recall information. Some people tend to worry if they can't immediately recall something. Instead, you should take time to express a thought or complete a task rather than be critical or harsh on yourself.  -Use external aids to increase structure in daily life. Ongoing use of compensatory strategies such as lists, notes, and a centralized calendar are supported. Tools such as smart phones with alarms and reminders are helpful in bringing structure to daily activities.  -Practice good sleep hygiene. Poor sleep can exacerbate cognitive difficulties and interfere with attention and memory. The sleep foundation has several recommendations for improving sleep quality including:  -Set a sleep schedule with a nightly routine and fixed wakeup time. Soft music, light stretching, reading and/or relaxation exercises (e.g., meditation, deep breathing) can be helpful to wind down before bed. Avoid bright lights and  electronics at least 30 minutes before bed.  -Keep naps short and limited to the early afternoon.  -Avoid caffeine in the afternoon or evening. Avoid eating dinner late.  -Get daylight exposure (sunlight) during the day to encourage quality sleep at night  -Optimize your bedroom with a comfortable mattress, pillow, and bedding; use heavy curtains or an eye mask to prevent light from interrupting your sleep. Light smells, such as lavender, can induce a calmer state of mind and cultivate a positive space for sleep.    -For more information, a simple set of guidelines can be found here: https://www.sleepfoundation.org/sleep-topics/sleep-hygiene  4. The current evaluation will serve as an objective cognitive baseline against which results of future evaluations may be compared.  No follow-up is currently indicated; however, Ms. Boston may be referred for a repeat neuropsychological evaluation if there is significant change in cognitive status in the future.     Thank you for the opportunity to participate in Ms. Boston s care.  These findings and recommendations were reviewed with the patient in a separate feedback session on 8/5/2022 and all her questions were answered. If you have any questions regarding this evaluation, please do not hesitate to contact me.       Gloria Bhagat, Ph.D.,   Clinical Neuropsychologist    RELEVANT BACKGROUND INFORMATION AND SUPPORTING DOCUMENTATION  Gathered from a clinical interview with the patient and reviews of electronic medical record.    History of Presenting Problem(s)  Ms. Boston presented with a history of cervical dystonia, COVID-19 infection, and ADHD. She reported longstanding difficulty with attention and distractively and stated she was diagnosed with ADHD in her 30s. She reported worsening of attentional problems around 2013, which she stated may have been related to burnout at her job. She reported notable cognitive changes a few months after recovering from COVID-19  infection, characterized by worsening memory and distractibility. She specified her memory has improved but not to her baseline. She reported continued distractibility and stated it seems as if her  ADHD got worse.  She specified her  brain fog  has improved and she is able to work, but she described being distracted by numerous physical complaints. Specifically, a couple of months after recovering from COVID-19, she experienced onset of symptoms including hair loss, weight loss, tremor, SOB, sensations of vibration and crawling under the skin, fatigue, difficulty swallowing, and feeling as if her neck is being pulled back and down. She stated some symptoms have improved including fatigue, and SOB but she reported continued physical discomfort related to neck pulling and crawling under the skin sensations. She stated these physical complaints consume her attention. She was diagnosed with cervical dystonia, and she reported Botox injections have been helpful. Functionally, she described difficulty recalling if she took her medications, often paying bills late, and stated meal preparation is more effortful, but she otherwise denied difficulty with activities of daily living. Emotionally, she stated feeling a bit more down in the context of physical discomfort, recently ending a romantic relationship, and moving to [place]. She admitted to health-related anxiety but stated  it is not overwhelming.     Neurodiagnostic Findings   ? Brain MRI w/o contrast (6/26/2018) Impression: No acute intracranial hemorrhage, acute infarction or intracranial mass lesion.  ? Head MRA w/wo contrast (6/26/2018) Impression: Unremarkable MRA of the head.    Medical History  ? Cervical dystonia  ? COVID-19 infection (3/2020) with prominent fatigue; no hospitalization required; recovered with onset of unusual symptoms a couple of months later.  ? Reported stroke-like episode of acute onset slurred speech and weakness (2018) with normal  neuroimaging; reportedly thought to be anxiety-related    Health Behaviors   ? Sleep: Variable sleep quality; delay in onset due to physical discomfort; denied gasping arousals or parasomnic behaviors; reported she hardly slept the night before this evaluation  ? Exercise: Walking nearly every day  ? Pain: Pain behind right eye and discomfort associated with  pulling  sensation, rated 3/10 at the time of this evaluation    Psychiatric History  ? Ms. Boston reported current mood is  more down  in the context of several life changes/transitions (recently ended a long-term relationship, moved back to [place], etc.)  ? She indicated a history of situational depression in the context of relationship stress; she denied prominent symptoms of depression currently (e.g., denied anhedonia etc.).  ? She reported mild anxiety related to her health but stated  it is not overwhelming   ? She otherwise denied history of hypomanic or manic mood symptoms, alteration of sensory perceptual processes or disordered thought.  ? Suicidality/ Self-harm: She denied any recent or past suicidal ideation, plan, or intent.   ? Psychiatric treatment: Currently prescribed duloxetine; followed by psychiatry; not currently engaged in individual psychotherapy but has participated in therapy in the past    Substance Use History  ? Alcohol: Minimal; 1 beer per week or less  ? Nicotine: Using low nicotine vaporizer  ? Cannabis: 1-2 times per week  ? Problematic Substance Use: Denied    Current Medications (per patient report & EMR)  Vyvanse  duloxetine  baclofen  Sinemet (pt. has not started taking this)  vitamin D  vitamin B12  Botox injections    Developmental, Educational, & Occupational History  ? Childhood behavioral / emotional / academic problems: Inattentive and hyperactive in childhood; diagnosed with ADHD in her 30s  ? Native Language: English  ? Education: Graduated high school on time; obtained bachelor s degree from [employer]  [place] and a master s degree in political/international economy from [employer]  ? Occupation: Consulting for [employer]    Psychosocial History  ? Raised in [place]  ? Marital: Single  ? Housing: Currently lives with her brother and his 4 roommates  ? Psychosocial support: Strong social support network of family    Family History  ? No known family history of dementia or neurological disorders    RESULTS  See separate abstract for list of neuropsychological measures and test scores. Descriptive ranges are based on American Academy of Clinical Neuropsychology (2020) consensus guidelines, or test manuals where appropriate. All standardized scores are adjusted for age and additional adjustments for demographic factors such as education were performed where applicable.    PRE-MORBID ABILITY: Premorbid abilities were estimated within the average range based on single word reading ability and demographic factors including sex, ethnicity, education, occupation, and geographic region.  ATTENTION/EXECUTIVE FUNCTIONS: Immediate auditory attention and working memory performances were below average. Verbal abstract reasoning performance was average. Performance on a test of set-shifting/cognitive flexibility was exceptionally low and notable for one error. Copy of a complex figure requiring planning and organization was within normal limits. Psychomotor processing speed performances were low average to average.  LANGUAGE: Confrontation naming performance was average. Letter-cue verbal fluency performance was below average. Semantic verbal fluency performance was below average.   VISUOSPATIAL PROCESSING: Copy of increasingly intricate figures was high average. Copy of a complex figure was within normal limits. There was no evidence of spatial inattention or shasta visuospatial disturbance.    LEARNING AND MEMORY: Initial encoding of a word list over multiple learning trials was average with low average learning  slope notable for recalling most words after the second trial and fewer words on subsequent learning trails. Delayed recall of the list was average to above average, but she was not aided with cues recalling fewer words with category cueing compared to free recall. Recognition of the word list was high average and without error. Initial encoding of contextualized verbal information in the form of short stories was low average and delayed retrieval was average as she was able to recall most of the details initially encoded. Recognition of story details was high average. Encoding of visual information was low average and delayed retrieval was average. Recognition among distractors was high average and without error.   MOTOR: Fine-motor dexterity was below average with the dominant (right) hand, and low average with the non-dominant (left) hand. Motor findings were not clearly lateralized.   PSYCHOLOGICAL AND BEHAVIORAL: She endorsed moderate symptoms of depression and minimal symptoms of anxiety. On a self-report measure of personality and psychopathology, her profile was suggestive of possible over-reporting as indicated by an unusual combination of responses that is associated with noncredible memory complaints. Scores on the cognitive complaints scale should be interpreted cautiously. She endorsed multiple somatic complaints that may include head pain and neurological and gastrointestinal symptoms. Specifically, she reported vague neurological complaints and experiencing poor health and feeling weak or tired. She also reported engaging in problematic impulsive behavior.  PERFORMANCE VALIDITY: Performance on neuropsychological tests is dependent upon a number of factors, including sufficient engagement and motivation, to reliably establish an examinee s level of cognitive functioning.  Based upon observations made throughout the evaluation, the patient did not appear to deliberately perform in a suboptimal manner  and demonstrated good frustration tolerance on cognitively challenging tasks. Score on one imbedded index of performance validity was slightly below established cut-offs; however, scores on other dedicated and imbedded indices of performance validity were in the valid range. Overall, test results are believed to accurately represent the patient s current neurocognitive status.    BEHAVIORAL OBSERVATIONS  ? Presented on time and was unaccompanied  ? Alert, oriented to self, time, place, and circumstance; attentive and focused while undergoing testing  ? Appearance: Well-groomed, casually dressed  ? Gait/Posture: No abnormalities noted  ? Sensorimotor: No abnormalities noted  ? Behavior: Cooperative, pleasant, no behavioral abnormalities noted  ? Speech: Fluent, articulate, normal rate, prosody, and volume; no conversational word finding difficulty  ? Thought process: Logical, linear, and goal-directed  ? Thought content: Logical, appropriate   ? Affect: Broad, responsive, consistent with reported mood; good eye contact  ? Mood: Euthymic  ? Insight and Judgment: Intact  ? Approach to testing: Efficient, deliberate; good frustration on cognitively demanding tasks  ? Rapport: Easily established and maintained      Activities included in this evaluation: CPT Code #Units Time   Psychiatric diagnostic interview [id number] --   Test evaluation services by professional; first hour. [id number] 1 2:38   Test evaluation services by professional, additional hour (+) [id number]    Test administration and scoring by technician, first 30 mins [id number] 1 3:46   Test administration and scoring by technician, additional 30 mins (+) [id number]    Dx: R41.9; F90.0

## 2022-09-13 ENCOUNTER — OFFICE VISIT (OUTPATIENT)
Dept: PHYSICAL MEDICINE AND REHAB | Facility: CLINIC | Age: 47
End: 2022-09-13
Payer: COMMERCIAL

## 2022-09-13 VITALS — DIASTOLIC BLOOD PRESSURE: 76 MMHG | SYSTOLIC BLOOD PRESSURE: 113 MMHG | HEART RATE: 91 BPM

## 2022-09-13 DIAGNOSIS — G24.3 SPASMODIC TORTICOLLIS: Primary | ICD-10-CM

## 2022-09-13 PROCEDURE — 95874 GUIDE NERV DESTR NEEDLE EMG: CPT | Performed by: PHYSICAL MEDICINE & REHABILITATION

## 2022-09-13 PROCEDURE — 64646 CHEMODENERV TRUNK MUSC 1-5: CPT | Mod: 59 | Performed by: PHYSICAL MEDICINE & REHABILITATION

## 2022-09-13 PROCEDURE — 64616 CHEMODENERV MUSC NECK DYSTON: CPT | Mod: 59 | Performed by: PHYSICAL MEDICINE & REHABILITATION

## 2022-09-13 PROCEDURE — 96372 THER/PROPH/DIAG INJ SC/IM: CPT | Performed by: PHYSICAL MEDICINE & REHABILITATION

## 2022-09-13 NOTE — PROGRESS NOTES
"  [facility]     PM&R CLINIC NOTE  BOTULINUM TOXIN PROCEDURE      HPI  Chief Complaint   Patient presents with     Botox     Inez Boston is a 47 year old female with a history of  who presents to clinic for botulinum toxin injections for management of cervical dystonia.      SINCE LAST VISIT  Inez Boston was last seen here in clinic on 6/21/2022, at which time she received 190 units of Botox , which was an increase from previous dose of 150 units of Botox.     Patient denies new medical diagnoses, illnesses, hospitalizations, emergency room visits, and injuries since the previous injection with botulinum neurotoxin. She continues to work with PT on minimizing her dystonic movements.     RESPONSE TO PREVIOUS TREATMENT    Side effects: No problems reported.    Pain Improvement: Yes. Approximately 60% improvement. Duration of Benefit:  9-10 weeks followed by gradual decrease in benefit over the last few weeks.     Dystonia Improvement: Yes. Approximately 60% improvement. Botox has been extremely beneficial with regards to her dystonia.   Duration of Benefit:   9-10 weeks followed by gradual decrease in benefit. When Botox is working, \"the pulling is not as hard\" and it \"makes me functional.\"       PHYSICAL EXAM  VS: /76 (BP Location: Right arm, Patient Position: Sitting)   Pulse 91    GEN: Pleasant and cooperative, in no acute distress  HEENT: Right mouth drooping. No asymmetry with eyebrow lift.  HEAD AND NECK: Limited ROM with rotation to the left  HEAD, NECK AND TRUNK PATTERN:   Head & Neck Flexion:  Present  Head & Neck Rotation:   Right  Head & Neck Lateral Bend:   Right  Shoulder Elevation:   Left  JAW AND FACIAL PATTERN:   Jaw Clenching:   Bilateral  VOCAL INVOLVEMENT:   No  SPASMS: Dystonic movements with irritation and facial movements.     ALLERGIES  No Known Allergies    CURRENT MEDICATIONS    Current Outpatient Medications:      acetaminophen (TYLENOL) 325 " MG tablet, Take 3 tablets (975 mg) by mouth every 6 hours as needed for mild pain, Disp: 50 tablet, Rfl: 0     aspirin 81 MG EC tablet, Take 81 mg by mouth every morning, Disp: , Rfl:      B Complex Vitamins (VITAMIN-B COMPLEX PO), Take 1 tablet by mouth every other day, Disp: , Rfl:      baclofen (LIORESAL) 10 MG tablet, nightly as needed, Disp: , Rfl:      cholecalciferol 50 MCG (2000 UT) tablet, 2,000 Units every morning, Disp: , Rfl:      DULoxetine (CYMBALTA) 20 MG capsule, daily, Disp: , Rfl:      lisdexamfetamine (VYVANSE) 40 MG capsule, Take 40 mg by mouth every morning, Disp: , Rfl:      carbidopa-levodopa (SINEMET)  MG tablet, 1/2 tab by mouth daily x 3 days then 1 tab by mouth daily for one week then 1 tab twice daily (medication should be taken with bread or applesauce - not protein to be more effective) (Patient not taking: Reported on 2022), Disp: 60 tablet, Rfl: 11    Current Facility-Administered Medications:      botulinum toxin type A (BOTOX) 100 units injection 200 Units, 200 Units, Intramuscular, Q90 Days, Elizabeth Fischer MD     botulinum toxin type A (BOTOX) 100 units injection 200 Units, 200 Units, Intramuscular, Q90 Days, Elizabeth Fischer MD, 190 Units at 22 1401       BOTULINUM NEUROTOXIN INJECTION PROCEDURES    VERIFICATION OF PATIENT IDENTIFICATION AND PROCEDURE     Initials   Patient Name SES   Patient  SES   Procedure Verified by: SES     Prior to the start of the procedure and with procedural staff participation, I verbally confirmed the patient s identity using two indicators, relevant allergies, that the procedure was appropriate and matched the consent or emergent situation, and that the correct equipment/implants were available. Immediately prior to starting the procedure I conducted the Time Out with the procedural staff and re-confirmed the patient s name, procedure, and site/side. (The Joint Commission universal protocol was followed.)   Yes    Sedation (Moderate or Deep): None    ABOVE ASSESSMENTS PERFORMED BY    Elizabeth Fischer MD      INDICATIONS FOR PROCEDURES  Inez Boston is a 47 year old patient with involuntary movements secondary to the diagnosis of cervical and generalized dystonia. Her baseline symptoms have been recalcitrant to oral medications and conservative therapy.  She is here today for reinjection with Botox.    GOAL OF PROCEDURE  The goal of this procedure is to increase active range of motion, improve volitional motor control, decrease pain  and enhance functional independence.      TOTAL DOSE ADMINISTERED  Dose Administered:  200 units  Botox (Botulinum Toxin Type A)       2:1 Dilution   Unavoidable Drug Waste: No  Diluent Used:  0.25% bupivacaine (Lot: [id number], Exp: 12/2025, NDC: 62306-304-57)  Total Volume of Diluent Used:  4 ml  Lot # [id number] with Expiration Date:  08/2024  NDC #: Botox 100u ([address]-33)      CONSENT  The risks, benefits, and treatment options were discussed with Inez Boston and she agreed to proceed.    Written consent was obtained by [facility].     EQUIPMENT USED  Needle-37mm stimulating/recording  Needle-30 gauge  EMG/NCS Machine    SKIN PREPARATION  Skin preparation was performed using an alcohol wipe.    GUIDANCE DESCRIPTION  Electro-myographic guidance was necessary throughout the neck portion of the procedure to accurately identify all areas of dystonic muscles while avoiding injection of non-dystonic muscles, neighboring nerves and nearby vascular structures.     AREA/MUSCLE INJECTED: 200 UNITS BOTOX = TOTAL DOSE, 2:1 DILUTION    1. NECK & SHOULDER GIRDLE MUSCLES: 50 UNITS BOTOX = TOTAL DOSE    Right Upper Trapezius (upper cervical) - 10 units of Botox at 3 site/s.   Left Upper Trapezius (upper cervical) - 5 units of Botox at 2 site/s.    Right Splenius Cervicis - 5 units of Botox at 1 site/s.   Left Splenius Cervicis -  5 units of Botox at 1 site/s.    Right Levator Scapulae - 10 units of Botox  at 1 site/s.   Left Levator Scapulae - 5 units of Botox at 1 site/s.    Right Rhomboids - 5 units at 2 site/s.   Left Rhomboids - 5 units at 2 site/s.     2. TRUNK MUSCLES: 50 UNITS BOTOX = TOTAL DOSE     Right Quadratus Lumborum - 5 units of Botox at 2 site/s.   Left Quadratus Lumborum - 5 units of Botox at 2 site/s.     Right Latissimus Dorsi - 5 units of Botox at 2 site/s.    Left Latissimus Dorsi - 5 units of Botox at 2 site/s.      Right Rectus Abdominis - 15 units of Botox at 3 site/s.    Left Rectus Abdominis - 15 units of Botox at 3 site/s.     3. FACIAL & SCALP MUSCLES: 100 UNITS BOTOX = TOTAL DOSE    Right Frontalis - 10 units of Botox at 2 site/s.  Left Frontalis - 10 units of Botox at 2 site/s.    Right Temporalis - 20 units of Botox at 4 site/s.  Left Temporalis - 20 units of Botox at 4 site/s.    Left Occipitalis - 15 units of Botox at 3 site/s.    Right  - 2.5 units of Botox at 1 site/s.   Left  - 2.5 units of Botox at 1 site/s.    Procerus - 5 units of Botox at 1 site/s.    Right Orbicularis Oculi - 7.5 units of Botox at 3 site/s.  Left Orbicularis Oculi - 7.5 units of Botox at 3 site/s.       RESPONSE TO PROCEDURE  Inez Boston tolerated the procedure well and there were no immediate complications. She was allowed to recover for an appropriate period of time and was discharged home in stable condition.    ASSESSMENT AND PLAN   1. Botulinum toxin injections: Slight increase in dose from 190 to 200 units of Botox today. Multiple new areas were included in today's injections including the facial and abdominal muscles to address involuntary eye closing and abdominal crunching. Patient will continue to monitor response to Botox and report at next appointment.   2. Since so many areas are involved by her dystonia, plan will be to pursue an increase in dose so that we can more optimally treat her trunk, extremity and facial muscles. 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    3. Referrals: None.   4. Follow up: Inez Boston was rescheduled for the next series of injections in 12 weeks, at which time we will evaluate response to today's injections. she may call the clinic prior with any questions or concerns prior to the next appointment.

## 2022-09-13 NOTE — LETTER
"    9/13/2022         RE: Inez Boston  [address] [place] [facility] [place] [address]        Dear Colleague,    Thank you for referring your patient, Inez Boston, to the [facility]. Please see a copy of my visit note below.      [facility]     PM&R CLINIC NOTE  BOTULINUM TOXIN PROCEDURE      HPI  Chief Complaint   Patient presents with     Botox     Inez Boston is a 47 year old female with a history of  who presents to clinic for botulinum toxin injections for management of cervical dystonia.      SINCE LAST VISIT  Inez Boston was last seen here in clinic on 6/21/2022, at which time she received 190 units of Botox , which was an increase from previous dose of 150 units of Botox.     Patient denies new medical diagnoses, illnesses, hospitalizations, emergency room visits, and injuries since the previous injection with botulinum neurotoxin. She continues to work with PT on minimizing her dystonic movements.     RESPONSE TO PREVIOUS TREATMENT    Side effects: No problems reported.    Pain Improvement: Yes. Approximately 60% improvement. Duration of Benefit:  9-10 weeks followed by gradual decrease in benefit over the last few weeks.     Dystonia Improvement: Yes. Approximately 60% improvement. Botox has been extremely beneficial with regards to her dystonia.   Duration of Benefit:   9-10 weeks followed by gradual decrease in benefit. When Botox is working, \"the pulling is not as hard\" and it \"makes me functional.\"       PHYSICAL EXAM  VS: /76 (BP Location: Right arm, Patient Position: Sitting)   Pulse 91    GEN: Pleasant and cooperative, in no acute distress  HEENT: Right mouth drooping. No asymmetry with eyebrow lift.  HEAD AND NECK: Limited ROM with rotation to the left  HEAD, NECK AND TRUNK PATTERN:   Head & Neck Flexion:  Present  Head & Neck Rotation:   Right  Head & Neck Lateral Bend:   Right  Shoulder Elevation:   Left  JAW AND " FACIAL PATTERN:   Jaw Clenching:   Bilateral  VOCAL INVOLVEMENT:   No  SPASMS: Dystonic movements with irritation and facial movements.     ALLERGIES  No Known Allergies    CURRENT MEDICATIONS    Current Outpatient Medications:      acetaminophen (TYLENOL) 325 MG tablet, Take 3 tablets (975 mg) by mouth every 6 hours as needed for mild pain, Disp: 50 tablet, Rfl: 0     aspirin 81 MG EC tablet, Take 81 mg by mouth every morning, Disp: , Rfl:      B Complex Vitamins (VITAMIN-B COMPLEX PO), Take 1 tablet by mouth every other day, Disp: , Rfl:      baclofen (LIORESAL) 10 MG tablet, nightly as needed, Disp: , Rfl:      cholecalciferol 50 MCG (2000 UT) tablet, 2,000 Units every morning, Disp: , Rfl:      DULoxetine (CYMBALTA) 20 MG capsule, daily, Disp: , Rfl:      lisdexamfetamine (VYVANSE) 40 MG capsule, Take 40 mg by mouth every morning, Disp: , Rfl:      carbidopa-levodopa (SINEMET)  MG tablet, 1/2 tab by mouth daily x 3 days then 1 tab by mouth daily for one week then 1 tab twice daily (medication should be taken with bread or applesauce - not protein to be more effective) (Patient not taking: Reported on 2022), Disp: 60 tablet, Rfl: 11    Current Facility-Administered Medications:      botulinum toxin type A (BOTOX) 100 units injection 200 Units, 200 Units, Intramuscular, Q90 Days, Elizabeth Fischer MD     botulinum toxin type A (BOTOX) 100 units injection 200 Units, 200 Units, Intramuscular, Q90 Days, Elizabeth Fischer MD, 190 Units at 22 1401       BOTULINUM NEUROTOXIN INJECTION PROCEDURES    VERIFICATION OF PATIENT IDENTIFICATION AND PROCEDURE     Initials   Patient Name SES   Patient  SES   Procedure Verified by: SES     Prior to the start of the procedure and with procedural staff participation, I verbally confirmed the patient s identity using two indicators, relevant allergies, that the procedure was appropriate and matched the consent or emergent situation, and that the  correct equipment/implants were available. Immediately prior to starting the procedure I conducted the Time Out with the procedural staff and re-confirmed the patient s name, procedure, and site/side. (The Joint Commission universal protocol was followed.)  Yes    Sedation (Moderate or Deep): None    ABOVE ASSESSMENTS PERFORMED BY    Elizabeth Fischer MD      INDICATIONS FOR PROCEDURES  Inez Boston is a 47 year old patient with involuntary movements secondary to the diagnosis of cervical and generalized dystonia. Her baseline symptoms have been recalcitrant to oral medications and conservative therapy.  She is here today for reinjection with Botox.    GOAL OF PROCEDURE  The goal of this procedure is to increase active range of motion, improve volitional motor control, decrease pain  and enhance functional independence.      TOTAL DOSE ADMINISTERED  Dose Administered:  200 units  Botox (Botulinum Toxin Type A)       2:1 Dilution   Unavoidable Drug Waste: No  Diluent Used:  0.25% bupivacaine (Lot: [id number], Exp: 12/2025, NDC: 88369-168-06)  Total Volume of Diluent Used:  4 ml  Lot # [id number] with Expiration Date:  08/2024  NDC #: Botox 100u ([address]-94)      CONSENT  The risks, benefits, and treatment options were discussed with Inez Boston and she agreed to proceed.    Written consent was obtained by [facility].     EQUIPMENT USED  Needle-37mm stimulating/recording  Needle-30 gauge  EMG/NCS Machine    SKIN PREPARATION  Skin preparation was performed using an alcohol wipe.    GUIDANCE DESCRIPTION  Electro-myographic guidance was necessary throughout the neck portion of the procedure to accurately identify all areas of dystonic muscles while avoiding injection of non-dystonic muscles, neighboring nerves and nearby vascular structures.     AREA/MUSCLE INJECTED: 200 UNITS BOTOX = TOTAL DOSE, 2:1 DILUTION    1. NECK & SHOULDER GIRDLE MUSCLES: 50 UNITS BOTOX = TOTAL DOSE    Right Upper Trapezius (upper cervical) - 10  units of Botox at 3 site/s.   Left Upper Trapezius (upper cervical) - 5 units of Botox at 2 site/s.    Right Splenius Cervicis - 5 units of Botox at 1 site/s.   Left Splenius Cervicis -  5 units of Botox at 1 site/s.    Right Levator Scapulae - 10 units of Botox at 1 site/s.   Left Levator Scapulae - 5 units of Botox at 1 site/s.    Right Rhomboids - 5 units at 2 site/s.   Left Rhomboids - 5 units at 2 site/s.     2. TRUNK MUSCLES: 50 UNITS BOTOX = TOTAL DOSE     Right Quadratus Lumborum - 5 units of Botox at 2 site/s.   Left Quadratus Lumborum - 5 units of Botox at 2 site/s.     Right Latissimus Dorsi - 5 units of Botox at 2 site/s.    Left Latissimus Dorsi - 5 units of Botox at 2 site/s.      Right Rectus Abdominis - 15 units of Botox at 3 site/s.    Left Rectus Abdominis - 15 units of Botox at 3 site/s.     3. FACIAL & SCALP MUSCLES: 100 UNITS BOTOX = TOTAL DOSE    Right Frontalis - 10 units of Botox at 2 site/s.  Left Frontalis - 10 units of Botox at 2 site/s.    Right Temporalis - 20 units of Botox at 4 site/s.  Left Temporalis - 20 units of Botox at 4 site/s.    Left Occipitalis - 15 units of Botox at 3 site/s.    Right  - 2.5 units of Botox at 1 site/s.   Left  - 2.5 units of Botox at 1 site/s.    Procerus - 5 units of Botox at 1 site/s.    Right Orbicularis Oculi - 7.5 units of Botox at 3 site/s.  Left Orbicularis Oculi - 7.5 units of Botox at 3 site/s.       RESPONSE TO PROCEDURE  Inez Boston tolerated the procedure well and there were no immediate complications. She was allowed to recover for an appropriate period of time and was discharged home in stable condition.    ASSESSMENT AND PLAN   1. Botulinum toxin injections: Slight increase in dose from 190 to 200 units of Botox today. Multiple new areas were included in today's injections including the facial and abdominal muscles to address involuntary eye closing and abdominal crunching. Patient will continue to monitor response to  Botox and report at next appointment.   2. Since so many areas are involved by her dystonia, plan will be to pursue an increase in dose so that we can more optimally treat her trunk, extremity and facial muscles. 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   3. Referrals: None.   4. Follow up: Inez Boston was rescheduled for the next series of injections in 12 weeks, at which time we will evaluate response to today's injections. she may call the clinic prior with any questions or concerns prior to the next appointment.           Again, thank you for allowing me to participate in the care of your patient.        Sincerely,        Elizabeth Fischer MD

## 2022-09-14 DIAGNOSIS — G24.3 CERVICAL DYSTONIA: Primary | ICD-10-CM

## 2022-09-14 DIAGNOSIS — G24.4 IDIOPATHIC OROFACIAL DYSTONIA: ICD-10-CM

## 2022-09-14 DIAGNOSIS — G24.1 GENETIC TORSION DYSTONIA: ICD-10-CM

## 2022-09-14 RX ORDER — BUPIVACAINE HYDROCHLORIDE 2.5 MG/ML
4 INJECTION, SOLUTION EPIDURAL; INFILTRATION; INTRACAUDAL ONCE
Status: COMPLETED | OUTPATIENT
Start: 2022-09-14 | End: 2022-09-14

## 2022-09-14 RX ADMIN — BUPIVACAINE HYDROCHLORIDE 10 MG: 2.5 INJECTION, SOLUTION EPIDURAL; INFILTRATION; INTRACAUDAL at 09:23

## 2022-10-05 ENCOUNTER — LAB (OUTPATIENT)
Dept: INFUSION THERAPY | Facility: CLINIC | Age: 47
End: 2022-10-05
Attending: INTERNAL MEDICINE
Payer: COMMERCIAL

## 2022-10-05 ENCOUNTER — ONCOLOGY VISIT (OUTPATIENT)
Dept: ONCOLOGY | Facility: CLINIC | Age: 47
End: 2022-10-05
Attending: INTERNAL MEDICINE
Payer: COMMERCIAL

## 2022-10-05 VITALS
RESPIRATION RATE: 16 BRPM | SYSTOLIC BLOOD PRESSURE: 114 MMHG | HEART RATE: 68 BPM | DIASTOLIC BLOOD PRESSURE: 75 MMHG | OXYGEN SATURATION: 98 %

## 2022-10-05 DIAGNOSIS — Z80.3 FAMILY HISTORY OF MALIGNANT NEOPLASM OF BREAST: Primary | ICD-10-CM

## 2022-10-05 DIAGNOSIS — Z91.89 AT HIGH RISK FOR BREAST CANCER: ICD-10-CM

## 2022-10-05 LAB
BASOPHILS # BLD AUTO: 0.1 10E3/UL (ref 0–0.2)
BASOPHILS NFR BLD AUTO: 1 %
EOSINOPHIL # BLD AUTO: 0.2 10E3/UL (ref 0–0.7)
EOSINOPHIL NFR BLD AUTO: 2 %
ERYTHROCYTE [DISTWIDTH] IN BLOOD BY AUTOMATED COUNT: 12.3 % (ref 10–15)
HCT VFR BLD AUTO: 36.6 % (ref 35–47)
HGB BLD-MCNC: 11.8 G/DL (ref 11.7–15.7)
HOLD SPECIMEN: NORMAL
HOLD SPECIMEN: NORMAL
IMM GRANULOCYTES # BLD: 0 10E3/UL
IMM GRANULOCYTES NFR BLD: 0 %
LYMPHOCYTES # BLD AUTO: 3.2 10E3/UL (ref 0.8–5.3)
LYMPHOCYTES NFR BLD AUTO: 33 %
MCH RBC QN AUTO: 29.9 PG (ref 26.5–33)
MCHC RBC AUTO-ENTMCNC: 32.2 G/DL (ref 31.5–36.5)
MCV RBC AUTO: 93 FL (ref 78–100)
MONOCYTES # BLD AUTO: 0.6 10E3/UL (ref 0–1.3)
MONOCYTES NFR BLD AUTO: 6 %
NEUTROPHILS # BLD AUTO: 5.7 10E3/UL (ref 1.6–8.3)
NEUTROPHILS NFR BLD AUTO: 58 %
NRBC # BLD AUTO: 0 10E3/UL
NRBC BLD AUTO-RTO: 0 /100
PLATELET # BLD AUTO: 499 10E3/UL (ref 150–450)
RBC # BLD AUTO: 3.94 10E6/UL (ref 3.8–5.2)
WBC # BLD AUTO: 9.7 10E3/UL (ref 4–11)

## 2022-10-05 PROCEDURE — 99213 OFFICE O/P EST LOW 20 MIN: CPT | Performed by: INTERNAL MEDICINE

## 2022-10-05 PROCEDURE — 85025 COMPLETE CBC W/AUTO DIFF WBC: CPT | Performed by: INTERNAL MEDICINE

## 2022-10-05 PROCEDURE — G0463 HOSPITAL OUTPT CLINIC VISIT: HCPCS

## 2022-10-05 PROCEDURE — 36415 COLL VENOUS BLD VENIPUNCTURE: CPT

## 2022-10-05 ASSESSMENT — PAIN SCALES - GENERAL: PAINLEVEL: MILD PAIN (3)

## 2022-10-05 NOTE — PROGRESS NOTES
"Oncology Rooming Note    October 5, 2022 11:25 AM   Inez Boston is a 47 year old female who presents for:    Chief Complaint   Patient presents with     Oncology Clinic Visit     Initial Vitals: /75   Pulse 68   Resp 16   SpO2 98%  Estimated body mass index is 24.63 kg/m  as calculated from the following:    Height as of 5/31/22: 1.727 m (5' 8\").    Weight as of 7/18/22: 73.5 kg (162 lb). There is no height or weight on file to calculate BSA.  Mild Pain (3) Comment: Data Unavailable   No LMP recorded. (Menstrual status: Irregular Periods).  Allergies reviewed: Yes  Medications reviewed: Yes    Medications: Medication refills not needed today.  Pharmacy name entered into [employer]: [facility] #[id number] - [address]    Clinical concerns: no      Tammie Mulligan CMA            "

## 2022-10-05 NOTE — PROGRESS NOTES
Medical Assistant Note:  Inez Boston presents today for blood draw.    Patient seen by provider today: Yes: john.   present during visit today: Not Applicable.    Concerns: No Concerns.    Procedure:  Lab draw site: lac, Needle type: bf, Gauge: 23.    Post Assessment:  Labs drawn without difficulty: Yes.    Discharge Plan:  Departure Mode: Ambulatory.    Face to Face Time: 5 min.    Adilene Mcdaniel, CMA

## 2022-10-05 NOTE — LETTER
"    10/5/2022         RE: Inez Boston  [address]  [facility] [address]        Dear Colleague,    Thank you for referring your patient, Inez Boston, to the [facility]. Please see a copy of my visit note below.    Oncology Rooming Note    October 5, 2022 11:25 AM   Inez Boston is a 47 year old female who presents for:    Chief Complaint   Patient presents with     Oncology Clinic Visit     Initial Vitals: /75   Pulse 68   Resp 16   SpO2 98%  Estimated body mass index is 24.63 kg/m  as calculated from the following:    Height as of 5/31/22: 1.727 m (5' 8\").    Weight as of 7/18/22: 73.5 kg (162 lb). There is no height or weight on file to calculate BSA.  Mild Pain (3) Comment: Data Unavailable   No LMP recorded. (Menstrual status: Irregular Periods).  Allergies reviewed: Yes  Medications reviewed: Yes    Medications: Medication refills not needed today.  Pharmacy name entered into [employer]: [facility] #[id number] [address]    Clinical concerns: no      Tammie Mulligan, [facility]    Hematology/Oncology return patient visit       Today's Date:  October 5, 2022    Reason for Consult: Thrombocytosis      HISTORY OF PRESENT ILLNESS: Inez Boston is a 47 year old female who presents for further evaluation of thrombocytosis.  She has had chronic thrombocytosis since at least 2017 at that time her platelets were in the 500s and then dropped down to 400s and back in the 500s again.  She recently moved to [place] from [place] and saw a hematologist last June in [place].  Blood work was obtained at [place] which showed iron saturation of 24% JAK2 V6 1 7 mutation was negative.  Patient had thrombocytosis prior to her Covid infection in October 2020.  Inez has started on aspirin 81 mg a day.      Of note she also has family history of breast cancer her mother had breast cancer the age of 47.  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  She is working with neurology for her dystonia issues.  She states baclofen which helps.  Her platelets are 499.      REVIEW OF SYSTEMS:   14 point ROS was reviewed and is negative other than as noted above in HPI.       HOME MEDICATIONS:  Current Outpatient Medications   Medication Sig Dispense Refill     aspirin 81 MG EC tablet Take 81 mg by mouth every morning 2 baby aspirin a day       B Complex Vitamins (VITAMIN-B COMPLEX PO) Take 1 tablet by mouth every other day       baclofen (LIORESAL) 10 MG tablet nightly as needed       cholecalciferol 50 MCG (2000 UT) tablet 2,000 Units every morning       DULoxetine (CYMBALTA) 20 MG capsule daily       lisdexamfetamine (VYVANSE) 40 MG capsule Take 40 mg by mouth every morning       carbidopa-levodopa (SINEMET)  MG tablet 1/2 tab by mouth daily x 3 days then 1 tab by mouth daily for one week then 1 tab twice daily (medication should be taken with bread or applesauce - not protein to be more effective) (Patient not taking: Reported on 10/5/2022) 60 tablet 11         ALLERGIES:  No Known Allergies      PAST MEDICAL HISTORY:  Status post Covid infection  Thrombocytosis    PAST SURGICAL HISTORY:  Past Surgical History:   Procedure Laterality Date     COLONOSCOPY       LAPAROSCOPIC OOPHORECTOMY Left 6/3/2022    Procedure: Laparoscopic oophorectomy;  Surgeon: Elda Mcpherson MD;  [facility]     LAPAROSCOPIC OOPHORECTOMY  6/3/2022    Procedure: ;  Surgeon: Elda Mcpherson MD;  [facility]     LAPAROSCOPIC OOPHORECTOMY  6/3/2022    Procedure: ;  Surgeon: Elda Mcpherson MD;  [facility]     LAPAROSCOPIC SALPINGOTOMY Bilateral 6/3/2022    Procedure: bilateral salpingectomy;  Surgeon: Elda Mcpherson MD;  [facility]     LAPAROSCOPY      6/3/2022     MYRINGOTOMY W/ TUBES      as a child         SOCIAL  HISTORY:  Social History     Socioeconomic History     Marital status:      Spouse name: Not on file     Number of children: Not on file     Years of education: Not on file     Highest education level: Not on file   Occupational History     Not on file   Tobacco Use     Smoking status: Never Smoker     Smokeless tobacco: Never Used   Vaping Use     Vaping Use: Some days     Substances: Nicotine     Devices: Pre-filled pod   Substance and Sexual Activity     Alcohol use: Yes     Alcohol/week: 2.0 standard drinks     Types: 2 Cans of beer per week     Comment: a couple times a week     Drug use: Yes     Types: Marijuana     Sexual activity: Not on file   Other Topics Concern     Not on file   Social History Narrative    n looking at Inez's family history, it is possible that a cancer susceptibility gene is present due to the family history of early onset breast cancer in her mother and also the history of early onset colon cancer in her maternal grandmother.  In additional, although the available details from the extended family is somewhat limited, the history of a possible uterine cancer in her maternal grandmother's sister (as uterine and colon cancer can be related to each other in some hereditary cancer conditions) and also the early onset cancer in her maternal grandmother's mother is also further supportive of the question about a possible hereditary cancer risk factor in Inez's mother's side of the family.     Social Determinants of Health     Financial Resource Strain: Not on file   Food Insecurity: Not on file   Transportation Needs: Not on file   Physical Activity: Not on file   Stress: Not on file   Social Connections: Not on file   Intimate Partner Violence: Not At Risk     Fear of Current or Ex-Partner: No     Emotionally Abused: No     Physically Abused: No     Sexually Abused: No   Housing Stability: Not on file     Non-smoker.  Social alcohol use.  Currently works for the [employer]  remotely    FAMILY HISTORY:  Mother had breast cancer at age of 47    PHYSICAL EXAM:  Vital signs:  /75   Pulse 68   Resp 16   SpO2 98%    ECO  I did not examine her today        LABS:  Platelets 499    PATHOLOGY:  Peripheral blood morphology did not reveal any evidence of dysplasia    IMAGING:  None    ASSESSMENT/PLAN:  Inez is a very pleasant 46-year-old female with history of chronic thrombocytosis most recent platelet count 429 suggestive of thrombocytosis resolving    JAK2 mutation negative, bone marrow biopsy not completed and she is on aspirin 162 mg again. She is in low risk category for ET (would be proven by bone marrow but patient does not want to pursue that at this time.      with her RAD50 VUS she should alternate mamogram with MRI, also FH of breast cancer        1.  Thrombocytosis fluctuates, ? Likely related to reactive state, continue to follow yearly, she would like her PCP to follow and if rising >700 K she is to return, we can  Pursue bone marrow biopsy at that time, continue aspirin 162 mg a day     2.  Family history significant.  With RAD 50 VUS and family history suggestive of breast cancer would recommend MRIs alternating with mammograms . Next mammogram in 2022      All questions answered to her satisfaction.  Total time spent  20 min   See us as needed, she would like to follow up with pcp I will put orders in for mammogram for 6 months       Nam Caceres MD  Hematology/Oncology  [facility]      Again, thank you for allowing me to participate in the care of your patient.        Sincerely,        Nam Caceres MD

## 2022-10-05 NOTE — PROGRESS NOTES
[facility]    Hematology/Oncology return patient visit       Today's Date:  October 5, 2022    Reason for Consult: Thrombocytosis      HISTORY OF PRESENT ILLNESS: Inez Boston is a 47 year old female who presents for further evaluation of thrombocytosis.  She has had chronic thrombocytosis since at least 2017 at that time her platelets were in the 500s and then dropped down to 400s and back in the 500s again.  She recently moved to [place] from [place] and saw a hematologist last June in [place].  Blood work was obtained at [place] which showed iron saturation of 24% JAK2 V6 1 7 mutation was negative.  Patient had thrombocytosis prior to her Covid infection in October 2020.  Inez has started on aspirin 81 mg a day.      Of note she also has family history of breast cancer her mother had breast cancer the age of 47.  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  She is working with neurology for her dystonia issues.  She states baclofen which helps.  Her platelets are 499.      REVIEW OF SYSTEMS:   14 point ROS was reviewed and is negative other than as noted above in HPI.       HOME MEDICATIONS:  Current Outpatient Medications   Medication Sig Dispense Refill     aspirin 81 MG EC tablet Take 81 mg by mouth every morning 2 baby aspirin a day       B Complex Vitamins (VITAMIN-B COMPLEX PO) Take 1 tablet by mouth every other day       baclofen (LIORESAL) 10 MG tablet nightly as needed       cholecalciferol 50 MCG (2000 UT) tablet 2,000 Units every morning       DULoxetine (CYMBALTA) 20 MG capsule daily       lisdexamfetamine (VYVANSE) 40 MG capsule Take 40 mg by mouth every morning       carbidopa-levodopa (SINEMET)  MG tablet 1/2 tab by mouth daily x 3 days then 1 tab by mouth daily for one week then 1 tab twice daily (medication should be taken with bread or applesauce - not protein to  be more effective) (Patient not taking: Reported on 10/5/2022) 60 tablet 11         ALLERGIES:  No Known Allergies      PAST MEDICAL HISTORY:  Status post Covid infection  Thrombocytosis    PAST SURGICAL HISTORY:  Past Surgical History:   Procedure Laterality Date     COLONOSCOPY       LAPAROSCOPIC OOPHORECTOMY Left 6/3/2022    Procedure: Laparoscopic oophorectomy;  Surgeon: Elda Mcpherson MD;  [facility]     LAPAROSCOPIC OOPHORECTOMY  6/3/2022    Procedure: ;  Surgeon: Elda Mcpherson MD;  [facility]     LAPAROSCOPIC OOPHORECTOMY  6/3/2022    Procedure: ;  Surgeon: Elda Mcpherson MD;  [facility]     LAPAROSCOPIC SALPINGOTOMY Bilateral 6/3/2022    Procedure: bilateral salpingectomy;  Surgeon: Elda Mcpherson MD;  [facility]     LAPAROSCOPY      6/3/2022     MYRINGOTOMY W/ TUBES      as a child         SOCIAL HISTORY:  Social History     Socioeconomic History     Marital status:      Spouse name: Not on file     Number of children: Not on file     Years of education: Not on file     Highest education level: Not on file   Occupational History     Not on file   Tobacco Use     Smoking status: Never Smoker     Smokeless tobacco: Never Used   Vaping Use     Vaping Use: Some days     Substances: Nicotine     Devices: Pre-filled pod   Substance and Sexual Activity     Alcohol use: Yes     Alcohol/week: 2.0 standard drinks     Types: 2 Cans of beer per week     Comment: a couple times a week     Drug use: Yes     Types: Marijuana     Sexual activity: Not on file   Other Topics Concern     Not on file   Social History Narrative    n looking at Inez's family history, it is possible that a cancer susceptibility gene is present due to the family history of early onset breast cancer in her mother and also the history of early onset colon cancer in her maternal grandmother.  In additional, although the available details from the extended family is somewhat limited, the history of a  possible uterine cancer in her maternal grandmother's sister (as uterine and colon cancer can be related to each other in some hereditary cancer conditions) and also the early onset cancer in her maternal grandmother's mother is also further supportive of the question about a possible hereditary cancer risk factor in Inez's mother's side of the family.     Social Determinants of Health     Financial Resource Strain: Not on file   Food Insecurity: Not on file   Transportation Needs: Not on file   Physical Activity: Not on file   Stress: Not on file   Social Connections: Not on file   Intimate Partner Violence: Not At Risk     Fear of Current or Ex-Partner: No     Emotionally Abused: No     Physically Abused: No     Sexually Abused: No   Housing Stability: Not on file     Non-smoker.  Social alcohol use.  Currently works for the [employer] remotely    FAMILY HISTORY:  Mother had breast cancer at age of 47    PHYSICAL EXAM:  Vital signs:  /75   Pulse 68   Resp 16   SpO2 98%    ECO  I did not examine her today        LABS:  Platelets 499    PATHOLOGY:  Peripheral blood morphology did not reveal any evidence of dysplasia    IMAGING:  None    ASSESSMENT/PLAN:  Inez is a very pleasant 46-year-old female with history of chronic thrombocytosis most recent platelet count 429 suggestive of thrombocytosis resolving    JAK2 mutation negative, bone marrow biopsy not completed and she is on aspirin 162 mg again. She is in low risk category for ET (would be proven by bone marrow but patient does not want to pursue that at this time.      with her RAD50 VUS she should alternate mamogram with MRI, also FH of breast cancer        1.  Thrombocytosis fluctuates, ? Likely related to reactive state, continue to follow yearly, she would like her PCP to follow and if rising >700 K she is to return, we can  Pursue bone marrow biopsy at that time, continue aspirin 162 mg a day     2.  Family history significant.  With RAD 50 VUS and  family history suggestive of breast cancer would recommend MRIs alternating with mammograms . Next mammogram in jan 2022      All questions answered to her satisfaction.  Total time spent  20 min   See us as needed, she would like to follow up with pcp I will put orders in for mammogram for 6 months       Nam Caceres MD  Hematology/Oncology  [facility]

## 2022-12-06 ENCOUNTER — OFFICE VISIT (OUTPATIENT)
Dept: PHYSICAL MEDICINE AND REHAB | Facility: CLINIC | Age: 47
End: 2022-12-06
Payer: COMMERCIAL

## 2022-12-06 VITALS — SYSTOLIC BLOOD PRESSURE: 108 MMHG | DIASTOLIC BLOOD PRESSURE: 75 MMHG | HEART RATE: 95 BPM

## 2022-12-06 DIAGNOSIS — G24.1 GENETIC TORSION DYSTONIA: Primary | ICD-10-CM

## 2022-12-06 DIAGNOSIS — G24.3 CERVICAL DYSTONIA: ICD-10-CM

## 2022-12-06 DIAGNOSIS — G24.4 IDIOPATHIC OROFACIAL DYSTONIA: ICD-10-CM

## 2022-12-06 PROCEDURE — 64643 CHEMODENERV 1 EXTREM 1-4 EA: CPT | Performed by: PHYSICAL MEDICINE & REHABILITATION

## 2022-12-06 PROCEDURE — 64616 CHEMODENERV MUSC NECK DYSTON: CPT | Mod: 50 | Performed by: PHYSICAL MEDICINE & REHABILITATION

## 2022-12-06 PROCEDURE — 64642 CHEMODENERV 1 EXTREMITY 1-4: CPT | Mod: 51 | Performed by: PHYSICAL MEDICINE & REHABILITATION

## 2022-12-06 PROCEDURE — 95874 GUIDE NERV DESTR NEEDLE EMG: CPT | Performed by: PHYSICAL MEDICINE & REHABILITATION

## 2022-12-06 PROCEDURE — 64612 DESTROY NERVE FACE MUSCLE: CPT | Mod: 50 | Performed by: PHYSICAL MEDICINE & REHABILITATION

## 2022-12-06 PROCEDURE — 64646 CHEMODENERV TRUNK MUSC 1-5: CPT | Performed by: PHYSICAL MEDICINE & REHABILITATION

## 2022-12-06 NOTE — PROGRESS NOTES
"  [facility]     PM&R CLINIC NOTE  BOTULINUM TOXIN PROCEDURE      HPI  Chief Complaint   Patient presents with     Procedure     Botox       Inez Boston is a 47 year old female with a history of  who presents to clinic for botulinum toxin injections for management of cervical dystonia.      SINCE LAST VISIT  Inez Boston was last seen here in clinic on 9/13/2022, at which time she received 200 units of Botox, which was an increase from previous dose of 190 units of Botox. Authorization for a higher     Patient denies new medical diagnoses, illnesses, hospitalizations, emergency room visits, and injuries since the previous injection with botulinum neurotoxin.     Lately, her eyes have been the most bothersome due to the fact that the muscles around her eyes are seeming to pull and this makes her vision blurry (far vision is worse than near vision). For this reason, she keeps her eyes closed whenever she can    RESPONSE TO PREVIOUS TREATMENT    Side effects: No problems reported.    Pain Improvement: Yes. Approximately 60% improvement. Duration of Benefit:  9-10 weeks followed by gradual decrease in benefit over the last few weeks.     Dystonia Improvement: Yes. Approximately 60% improvement. Botox has been extremely beneficial with regards to her dystonia.   Duration of Benefit:   9-10 weeks followed by gradual decrease in benefit. When Botox is working, \"the pulling is not as hard.\"       PHYSICAL EXAM  VS: /75 (BP Location: Right arm, Patient Position: Sitting, Cuff Size: Adult Regular)   Pulse 95    GEN: Pleasant and cooperative, in no acute distress  HEENT: Right mouth drooping. No asymmetry with eyebrow lift.  HEAD AND NECK: Limited ROM with rotation to the left  HEAD, NECK AND TRUNK PATTERN:   Head & Neck Flexion:  Present  Head & Neck Rotation:   Right  Head & Neck Lateral Bend:   Right  Shoulder Elevation:   Left  JAW AND FACIAL PATTERN:   Jaw Clenching:   " Bilateral  VOCAL INVOLVEMENT:   No  SPASMS: Dystonic movements with irritation and facial movements.       ALLERGIES  No Known Allergies    CURRENT MEDICATIONS    Current Outpatient Medications:      aspirin 81 MG EC tablet, Take 81 mg by mouth every morning 2 baby aspirin a day, Disp: , Rfl:      B Complex Vitamins (VITAMIN-B COMPLEX PO), Take 1 tablet by mouth every other day, Disp: , Rfl:      baclofen (LIORESAL) 10 MG tablet, nightly as needed, Disp: , Rfl:      cholecalciferol 50 MCG (2000 UT) tablet, 2,000 Units every morning, Disp: , Rfl:      DULoxetine (CYMBALTA) 20 MG capsule, daily, Disp: , Rfl:      lisdexamfetamine (VYVANSE) 40 MG capsule, Take 40 mg by mouth every morning, Disp: , Rfl:      carbidopa-levodopa (SINEMET)  MG tablet, 1/2 tab by mouth daily x 3 days then 1 tab by mouth daily for one week then 1 tab twice daily (medication should be taken with bread or applesauce - not protein to be more effective) (Patient not taking: Reported on 10/5/2022), Disp: 60 tablet, Rfl: 11    Current Facility-Administered Medications:      botulinum toxin type A (BOTOX) 100 units injection 200 Units, 200 Units, Intramuscular, Q90 Days, Elizabeth Fischer MD, 200 Units at 22 0923     botulinum toxin type A (BOTOX) 100 units injection 200 Units, 200 Units, Intramuscular, Q90 Days, Elizabeth Fischer MD, 190 Units at 22 1401     botulinum toxin type A (BOTOX) 100 units injection 400 Units, 400 Units, Intramuscular, Q90 Days, Elizabeth Fischer MD       BOTULINUM NEUROTOXIN INJECTION PROCEDURES    VERIFICATION OF PATIENT IDENTIFICATION AND PROCEDURE     Initials   Patient Name SES   Patient  SES   Procedure Verified by: SES     Prior to the start of the procedure and with procedural staff participation, I verbally confirmed the patient s identity using two indicators, relevant allergies, that the procedure was appropriate and matched the consent or emergent situation, and that  the correct equipment/implants were available. Immediately prior to starting the procedure I conducted the Time Out with the procedural staff and re-confirmed the patient s name, procedure, and site/side. (The Joint Commission universal protocol was followed.)  Yes    Sedation (Moderate or Deep): None    ABOVE ASSESSMENTS PERFORMED BY    Elizabeth Fischer MD      INDICATIONS FOR PROCEDURES  Inez Boston is a 47 year old patient with involuntary movements secondary to the diagnosis of cervical and generalized dystonia. Her baseline symptoms have been recalcitrant to oral medications and conservative therapy.  She is here today for reinjection with Botox.    GOAL OF PROCEDURE  The goal of this procedure is to increase active range of motion, improve volitional motor control, decrease pain  and enhance functional independence.      TOTAL DOSE ADMINISTERED  Dose Administered:  280 units  Botox (Botulinum Toxin Type A)       2:1 Dilution   Unavoidable Drug Waste: No  Diluent Used:  0.25% bupivacaine (Lot: [id number], Exp: 12/2025, NDC: 27885-165-32)  Total Volume of Diluent Used: 5.6 ml  Lot # [id number] with Expiration Date:  02/2025  NDC #: Botox 100u ([address]-34)      CONSENT  The risks, benefits, and treatment options were discussed with Inez Boston and she agreed to proceed.    Written consent was obtained by [facility].     EQUIPMENT USED  Needle-37mm stimulating/recording  Needle-30 gauge  EMG/NCS Machine    SKIN PREPARATION  Skin preparation was performed using an alcohol wipe.    GUIDANCE DESCRIPTION  Electro-myographic guidance was necessary throughout the neck portion of the procedure to accurately identify all areas of dystonic muscles while avoiding injection of non-dystonic muscles, neighboring nerves and nearby vascular structures.     AREA/MUSCLE INJECTED: 280 UNITS BOTOX = TOTAL DOSE, 2:1 DILUTION    1. NECK & UPPER EXTREMITY MUSCLES: 85 UNITS BOTOX = TOTAL DOSE    Right Upper Trapezius (upper cervical) - 15  units of Botox at 3 site/s.   Left Upper Trapezius (upper cervical) - 10 units of Botox at 2 site/s.    Right Splenius Cervicis - 5 units of Botox at 1 site/s.   Left Splenius Cervicis -  5 units of Botox at 1 site/s.    Right Levator Scapulae - 10 units of Botox at 1 site/s.   Left Levator Scapulae - 10 units of Botox at 1 site/s.    Right Pectoralis Minor - 5 units at 1 site/s.   Left Pectoralis Minor - 5 units at 1 site/s.     Right Rhomboids - 10 units at 2 site/s.   Left Rhomboids - 10 units at 2 site/s.     2. TRUNK MUSCLES: 65 UNITS BOTOX = TOTAL DOSE     Right Quadratus Lumborum - 10 units of Botox at 2 site/s.   Left Quadratus Lumborum - 15 units of Botox at 2 site/s.     Right Latissimus Dorsi - 5 units of Botox at 2 site/s.    Left Latissimus Dorsi - 5 units of Botox at 2 site/s.      Right Rectus Abdominis - 15 units of Botox at 3 site/s.    Left Rectus Abdominis - 15 units of Botox at 3 site/s.     3. FACIAL & SCALP MUSCLES: 130 UNITS BOTOX = TOTAL DOSE    Right Frontalis - 10 units of Botox at 2 site/s.  Left Frontalis - 10 units of Botox at 2 site/s.    Right Temporalis - 30 units of Botox at 4 site/s.  Left Temporalis - 30 units of Botox at 4 site/s.    Left Occipitalis - 20 units of Botox at 4 site/s.    Right  - 5 units of Botox at 1 site/s.   Left  - 5 units of Botox at 1 site/s.    Procerus - 5 units of Botox at 1 site/s.    Right Orbicularis Oculi - 7.5 units of Botox at 3 site/s.  Left Orbicularis Oculi - 7.5 units of Botox at 3 site/s.       RESPONSE TO PROCEDURE  Inez Boston tolerated the procedure well and there were no immediate complications. She was allowed to recover for an appropriate period of time and was discharged home in stable condition.    ASSESSMENT AND PLAN   1. Botulinum toxin injections: Dose of Botox was increased from 200 units to 280 units in hopes of increasing effectiveness and prolonging duration of benefit of injections. Patient will continue to  monitor response to Botox and report at next appointment.   2. Referrals: Optometry referral was placed today for a comprehensive eye exam as well as evaluation of the musculature around the eyes, which the patient perceives as being dystonic, which interferes with her vision.   3. Follow up: Inez Boston was rescheduled for the next series of injections in 12 weeks, at which time we will evaluate response to today's injections. she may call the clinic prior with any questions or concerns prior to the next appointment.

## 2022-12-06 NOTE — LETTER
"    12/6/2022         RE: Inez Boston  [address] [facility] [address]        Dear Colleague,    Thank you for referring your patient, Inez Boston, to the [facility]. Please see a copy of my visit note below.      [facility]     PM&R CLINIC NOTE  BOTULINUM TOXIN PROCEDURE      HPI  Chief Complaint   Patient presents with     Procedure     Botox       Inez Boston is a 47 year old female with a history of  who presents to clinic for botulinum toxin injections for management of cervical dystonia.      SINCE LAST VISIT  Inez Boston was last seen here in clinic on 9/13/2022, at which time she received 200 units of Botox, which was an increase from previous dose of 190 units of Botox. Authorization for a higher     Patient denies new medical diagnoses, illnesses, hospitalizations, emergency room visits, and injuries since the previous injection with botulinum neurotoxin.     Lately, her eyes have been the most bothersome due to the fact that the muscles around her eyes are seeming to pull and this makes her vision blurry (far vision is worse than near vision). For this reason, she keeps her eyes closed whenever she can    RESPONSE TO PREVIOUS TREATMENT    Side effects: No problems reported.    Pain Improvement: Yes. Approximately 60% improvement. Duration of Benefit:  9-10 weeks followed by gradual decrease in benefit over the last few weeks.     Dystonia Improvement: Yes. Approximately 60% improvement. Botox has been extremely beneficial with regards to her dystonia.   Duration of Benefit:   9-10 weeks followed by gradual decrease in benefit. When Botox is working, \"the pulling is not as hard.\"       PHYSICAL EXAM  VS: /75 (BP Location: Right arm, Patient Position: Sitting, Cuff Size: Adult Regular)   Pulse 95    GEN: Pleasant and cooperative, in no acute distress  HEENT: Right mouth drooping. No asymmetry with eyebrow " lift.  HEAD AND NECK: Limited ROM with rotation to the left  HEAD, NECK AND TRUNK PATTERN:   Head & Neck Flexion:  Present  Head & Neck Rotation:   Right  Head & Neck Lateral Bend:   Right  Shoulder Elevation:   Left  JAW AND FACIAL PATTERN:   Jaw Clenching:   Bilateral  VOCAL INVOLVEMENT:   No  SPASMS: Dystonic movements with irritation and facial movements.       ALLERGIES  No Known Allergies    CURRENT MEDICATIONS    Current Outpatient Medications:      aspirin 81 MG EC tablet, Take 81 mg by mouth every morning 2 baby aspirin a day, Disp: , Rfl:      B Complex Vitamins (VITAMIN-B COMPLEX PO), Take 1 tablet by mouth every other day, Disp: , Rfl:      baclofen (LIORESAL) 10 MG tablet, nightly as needed, Disp: , Rfl:      cholecalciferol 50 MCG (2000 UT) tablet, 2,000 Units every morning, Disp: , Rfl:      DULoxetine (CYMBALTA) 20 MG capsule, daily, Disp: , Rfl:      lisdexamfetamine (VYVANSE) 40 MG capsule, Take 40 mg by mouth every morning, Disp: , Rfl:      carbidopa-levodopa (SINEMET)  MG tablet, 1/2 tab by mouth daily x 3 days then 1 tab by mouth daily for one week then 1 tab twice daily (medication should be taken with bread or applesauce - not protein to be more effective) (Patient not taking: Reported on 10/5/2022), Disp: 60 tablet, Rfl: 11    Current Facility-Administered Medications:      botulinum toxin type A (BOTOX) 100 units injection 200 Units, 200 Units, Intramuscular, Q90 Days, Elizabeth Fischer MD, 200 Units at 22 0923     botulinum toxin type A (BOTOX) 100 units injection 200 Units, 200 Units, Intramuscular, Q90 Days, Elizabeth Fischer MD, 190 Units at 22 1401     botulinum toxin type A (BOTOX) 100 units injection 400 Units, 400 Units, Intramuscular, Q90 Days, Elizabeth Fischer MD       BOTULINUM NEUROTOXIN INJECTION PROCEDURES    VERIFICATION OF PATIENT IDENTIFICATION AND PROCEDURE     Initials   Patient Name SES   Patient  SES   Procedure Verified by:  SES     Prior to the start of the procedure and with procedural staff participation, I verbally confirmed the patient s identity using two indicators, relevant allergies, that the procedure was appropriate and matched the consent or emergent situation, and that the correct equipment/implants were available. Immediately prior to starting the procedure I conducted the Time Out with the procedural staff and re-confirmed the patient s name, procedure, and site/side. (The Joint Commission universal protocol was followed.)  Yes    Sedation (Moderate or Deep): None    ABOVE ASSESSMENTS PERFORMED BY    Elizabeth Fischer MD      INDICATIONS FOR PROCEDURES  Inez Boston is a 47 year old patient with involuntary movements secondary to the diagnosis of cervical and generalized dystonia. Her baseline symptoms have been recalcitrant to oral medications and conservative therapy.  She is here today for reinjection with Botox.    GOAL OF PROCEDURE  The goal of this procedure is to increase active range of motion, improve volitional motor control, decrease pain  and enhance functional independence.      TOTAL DOSE ADMINISTERED  Dose Administered:  280 units  Botox (Botulinum Toxin Type A)       2:1 Dilution   Unavoidable Drug Waste: No  Diluent Used:  0.25% bupivacaine (Lot: [id number], Exp: 12/2025, NDC: 62657-555-33)  Total Volume of Diluent Used: 5.6 ml  Lot # [id number] with Expiration Date:  02/2025  NDC #: Botox 100u ([address]-77)      CONSENT  The risks, benefits, and treatment options were discussed with Inez Boston and she agreed to proceed.    Written consent was obtained by [facility].     EQUIPMENT USED  Needle-37mm stimulating/recording  Needle-30 gauge  EMG/NCS Machine    SKIN PREPARATION  Skin preparation was performed using an alcohol wipe.    GUIDANCE DESCRIPTION  Electro-myographic guidance was necessary throughout the neck portion of the procedure to accurately identify all areas of dystonic muscles while avoiding  injection of non-dystonic muscles, neighboring nerves and nearby vascular structures.     AREA/MUSCLE INJECTED: 280 UNITS BOTOX = TOTAL DOSE, 2:1 DILUTION    1. NECK & UPPER EXTREMITY MUSCLES: 85 UNITS BOTOX = TOTAL DOSE    Right Upper Trapezius (upper cervical) - 15 units of Botox at 3 site/s.   Left Upper Trapezius (upper cervical) - 10 units of Botox at 2 site/s.    Right Splenius Cervicis - 5 units of Botox at 1 site/s.   Left Splenius Cervicis -  5 units of Botox at 1 site/s.    Right Levator Scapulae - 10 units of Botox at 1 site/s.   Left Levator Scapulae - 10 units of Botox at 1 site/s.    Right Pectoralis Minor - 5 units at 1 site/s.   Left Pectoralis Minor - 5 units at 1 site/s.     Right Rhomboids - 10 units at 2 site/s.   Left Rhomboids - 10 units at 2 site/s.     2. TRUNK MUSCLES: 65 UNITS BOTOX = TOTAL DOSE     Right Quadratus Lumborum - 10 units of Botox at 2 site/s.   Left Quadratus Lumborum - 15 units of Botox at 2 site/s.     Right Latissimus Dorsi - 5 units of Botox at 2 site/s.    Left Latissimus Dorsi - 5 units of Botox at 2 site/s.      Right Rectus Abdominis - 15 units of Botox at 3 site/s.    Left Rectus Abdominis - 15 units of Botox at 3 site/s.     3. FACIAL & SCALP MUSCLES: 130 UNITS BOTOX = TOTAL DOSE    Right Frontalis - 10 units of Botox at 2 site/s.  Left Frontalis - 10 units of Botox at 2 site/s.    Right Temporalis - 30 units of Botox at 4 site/s.  Left Temporalis - 30 units of Botox at 4 site/s.    Left Occipitalis - 20 units of Botox at 4 site/s.    Right  - 5 units of Botox at 1 site/s.   Left  - 5 units of Botox at 1 site/s.    Procerus - 5 units of Botox at 1 site/s.    Right Orbicularis Oculi - 7.5 units of Botox at 3 site/s.  Left Orbicularis Oculi - 7.5 units of Botox at 3 site/s.       RESPONSE TO PROCEDURE  Inez Boston tolerated the procedure well and there were no immediate complications. She was allowed to recover for an appropriate period of time  and was discharged home in stable condition.    ASSESSMENT AND PLAN   1. Botulinum toxin injections: Dose of Botox was increased from 200 units to 280 units in hopes of increasing effectiveness and prolonging duration of benefit of injections. Patient will continue to monitor response to Botox and report at next appointment.   2. Referrals: Optometry referral was placed today for a comprehensive eye exam as well as evaluation of the musculature around the eyes, which the patient perceives as being dystonic, which interferes with her vision.   3. Follow up: Inez Boston was rescheduled for the next series of injections in 12 weeks, at which time we will evaluate response to today's injections. she may call the clinic prior with any questions or concerns prior to the next appointment.         Again, thank you for allowing me to participate in the care of your patient.        Sincerely,        Elizabeth Fischer MD

## 2022-12-07 ENCOUNTER — TELEPHONE (OUTPATIENT)
Dept: OPHTHALMOLOGY | Facility: CLINIC | Age: 47
End: 2022-12-07

## 2022-12-07 NOTE — TELEPHONE ENCOUNTER
Ok for Dr. King or Dr. Sidhu per neuro-ophthalmology team    Note to patient communicator to assist in scheduling    Donovan Sherman RN 4:48 PM 12/07/22                 [facility] Call Center    Phone Message    May a detailed message be left on voicemail: yes     Reason for Call: Appointment Intake    Referring Provider Name: Elizabeth Fischer MD  Diagnosis and/or Symptoms: Genetic torsion dystonia; periorbital muscle dystonia? Interferes with vision; please send to Neuro-optometry - Dr. Sidhu.    Dx not listed in protocols.    Action Taken: Message routed to:  [facility]    Travel Screening: Not Applicable

## 2022-12-08 ENCOUNTER — TELEPHONE (OUTPATIENT)
Dept: OPHTHALMOLOGY | Facility: CLINIC | Age: 47
End: 2022-12-08

## 2022-12-08 NOTE — TELEPHONE ENCOUNTER
It can be Dr. Carpio or Dr. Sidhu.  Just needs full eye exam and to evaluate muscles around the eyes     [place] Communication Facilitator on 12/8/2022 at 9:50 AM

## 2022-12-09 NOTE — TELEPHONE ENCOUNTER
[facility]    Phone Message    May a detailed message be left on voicemail: yes     Reason for Call: Other: Patient is calling back.  Please try again.  Thank you.     Action Taken: Message routed to:  [facility] Ophthalmology    Travel Screening: Not Applicable

## 2022-12-19 ENCOUNTER — OFFICE VISIT (OUTPATIENT)
Dept: OPHTHALMOLOGY | Facility: CLINIC | Age: 47
End: 2022-12-19
Payer: COMMERCIAL

## 2022-12-19 DIAGNOSIS — Z53.9 ERRONEOUS ENCOUNTER--DISREGARD: Primary | ICD-10-CM

## 2022-12-19 DIAGNOSIS — G24.1 GENETIC TORSION DYSTONIA: ICD-10-CM

## 2022-12-19 ASSESSMENT — CONF VISUAL FIELD
OD_INFERIOR_NASAL_RESTRICTION: 0
OD_SUPERIOR_TEMPORAL_RESTRICTION: 0
OS_SUPERIOR_TEMPORAL_RESTRICTION: 0
OS_INFERIOR_NASAL_RESTRICTION: 0
OS_SUPERIOR_NASAL_RESTRICTION: 0
OD_SUPERIOR_NASAL_RESTRICTION: 0
OD_INFERIOR_TEMPORAL_RESTRICTION: 0
OS_INFERIOR_TEMPORAL_RESTRICTION: 0

## 2022-12-19 ASSESSMENT — VISUAL ACUITY: CORRECTION_TYPE: CONTACTS

## 2022-12-19 NOTE — NURSING NOTE
"Chief Complaints and History of Present Illnesses   Patient presents with     Annual Eye Exam     Pt here for consult  with hx of Genetic torsion dystonia: periorbital muscle dystonia.      Chief Complaint(s) and History of Present Illness(es)     Annual Eye Exam            Laterality: both eyes    Associated symptoms: dryness and tearing    Comments: Pt here for consult  with hx of Genetic torsion dystonia: periorbital muscle dystonia.           Comments    Pt last eye exam was 4 months ago- with an optometrist. Pt notes due to her genetic condition her eye muscles feel pulling, pinching and tightening with blurred vision in the mornings. Pt does get botox around eyes. Pt feels pressure temporally left eye - \"like someone is pushing on my eye and then I see flashing\". She also has trouble opening her eyes in the morning. Notes she also feels like she wants to close them throughout the day. She endorses excessive watering and crusting. Pt was told my her neurologist to see neuro ophthalmology.   Pt wears glasses and contacts- notes losing contacts from rubbing and feels like they fold in her eyes without touching- wears Acuvue Oasys - 2 week lenses.   MODESTO SOUSA 2:45 PM December 19, 2022                    "

## 2022-12-20 NOTE — PROGRESS NOTES
Patient had comprehensive eye exam by optometrist 4 months ago; not examined.  This encounter was opened in error. Please disregard.

## 2022-12-21 ENCOUNTER — TELEPHONE (OUTPATIENT)
Dept: OPHTHALMOLOGY | Facility: CLINIC | Age: 47
End: 2022-12-21

## 2022-12-21 DIAGNOSIS — G24.1 GENETIC TORSION DYSTONIA: ICD-10-CM

## 2022-12-21 DIAGNOSIS — G24.4 IDIOPATHIC OROFACIAL DYSTONIA: ICD-10-CM

## 2022-12-21 DIAGNOSIS — G24.3 CERVICAL DYSTONIA: Primary | ICD-10-CM

## 2022-12-21 NOTE — TELEPHONE ENCOUNTER
Patient returned VM regarding scheduling with either  or  for: Genetic torsion dystonia; periorbital muscle dystonia. Scheduled patient accordingly and added to the wait-list. Sent Reminder letter to confirmed address.-Per Patient

## 2022-12-21 NOTE — TELEPHONE ENCOUNTER
LVM for patient regarding scheduling with either  or  for: Genetic torsion dystonia; periorbital muscle dystonia. Provided Eye Clinic and direct number for scheduling needs.

## 2022-12-23 NOTE — TELEPHONE ENCOUNTER
FUTURE VISIT INFORMATION      FUTURE VISIT INFORMATION:    Date: 2/15/23    Time: 7:15am    [place] [facility] [place]  REFERRAL INFORMATION:    Referring provider:  Elizabeth Fischer MD    Referring providers clinic:  [facility] med    Reason for visit/diagnosis   Genetic torsion dystonia; periorbital muscle dystonia    RECORDS REQUESTED FROM:       Clinic name Comments Records Status Imaging Status   [facility] Phys Med OV/referral 12/6/22 EPIC    [facility] Eye OV/referral 12/19/22 [facility]    Imaging MR Brain done 10/22/20  MR Brain done 6/26/18  CT Head/Brain done 6/25/18 [facility]

## 2023-01-10 NOTE — TELEPHONE ENCOUNTER
FUTURE VISIT INFORMATION      FUTURE VISIT INFORMATION:    Date: 1/11/23    Time: 7:15am    Location: [facility] [place]  REFERRAL INFORMATION:    Referring provider:  Elizabeth Fischer MD    Referring providers clinic:  [facility] med    Reason for visit/diagnosis   Genetic torsion dystonia; periorbital muscle dystonia     RECORDS REQUESTED FROM:         Clinic name Comments Records Status Imaging Status   [facility] Phys Med OV/referral 12/6/22 EPIC     [facility] Eye OV/referral 12/19/22 [facility]     Imaging MR Brain done 10/22/20  MR Brain done 6/26/18  CT Head/Brain done 6/25/18 [facility]

## 2023-01-11 ENCOUNTER — PRE VISIT (OUTPATIENT)
Dept: OPHTHALMOLOGY | Facility: CLINIC | Age: 48
End: 2023-01-11

## 2023-01-11 ENCOUNTER — OFFICE VISIT (OUTPATIENT)
Dept: OPHTHALMOLOGY | Facility: CLINIC | Age: 48
End: 2023-01-11
Payer: COMMERCIAL

## 2023-01-11 DIAGNOSIS — H53.10 SUBJECTIVE VISUAL DISTURBANCE: Primary | ICD-10-CM

## 2023-01-11 DIAGNOSIS — H04.123 DRY EYE SYNDROME OF BOTH EYES: Primary | ICD-10-CM

## 2023-01-11 DIAGNOSIS — G24.5 BLEPHAROSPASM: ICD-10-CM

## 2023-01-11 DIAGNOSIS — G24.5 BLEPHAROSPASM: Primary | ICD-10-CM

## 2023-01-11 PROCEDURE — 99203 OFFICE O/P NEW LOW 30 MIN: CPT | Mod: GC | Performed by: OPHTHALMOLOGY

## 2023-01-11 ASSESSMENT — VISUAL ACUITY
METHOD: SNELLEN - LINEAR
OD_SC: 20/20
OS_SC: 20/30
OS_SC+: -2
OS_PH_SC+: -1
OS_PH_SC: 20/20

## 2023-01-11 ASSESSMENT — CONF VISUAL FIELD
OD_SUPERIOR_TEMPORAL_RESTRICTION: 0
OS_INFERIOR_TEMPORAL_RESTRICTION: 0
OD_NORMAL: 1
OS_INFERIOR_NASAL_RESTRICTION: 0
OS_NORMAL: 1
OD_INFERIOR_TEMPORAL_RESTRICTION: 0
OD_INFERIOR_NASAL_RESTRICTION: 0
OS_SUPERIOR_NASAL_RESTRICTION: 0
OS_SUPERIOR_TEMPORAL_RESTRICTION: 0
OD_SUPERIOR_NASAL_RESTRICTION: 0

## 2023-01-11 ASSESSMENT — CUP TO DISC RATIO
OD_RATIO: 0.3
OS_RATIO: 0.3

## 2023-01-11 ASSESSMENT — TONOMETRY
IOP_METHOD: ICARE
OD_IOP_MMHG: 24
OS_IOP_MMHG: 24

## 2023-01-11 NOTE — NURSING NOTE
"Chief Complaints and History of Present Illnesses   Patient presents with     genetic torsion dystonia     Patient stats mattering daily. Patient states pressure each eye. Patient states seeing \"sparkles\" out of left eye. Patient had pain left eye weekly.  MODESTO Madden January 11, 2023 7:46 AM      Chief Complaint(s) and History of Present Illness(es)     genetic torsion dystonia            Comments: Patient stats mattering daily. Patient states pressure each eye. Patient states seeing \"sparkles\" out of left eye. Patient had pain left eye weekly.  MODESTO Madden January 11, 2023 7:46 AM               "

## 2023-01-11 NOTE — PROGRESS NOTES
Inez Boston is a 47 year old female with the following diagnoses:   1. Dry eye syndrome of both eyes    2. Blepharospasm         Patient was sent for consultation by Dr. 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    HPI:    Patient describes a history of alternating monocular visual disturbance since Spring 2020, in the setting of diffuse dystonia that occurred a few months after COVID infection. This is constantly present in one or the other eye; this is predominantly a diffuse blur, although she will have episodes of triangle-like distortion in the right eye  that resolve with gentle massage of the eyes. She will have intermittent horizontal diplopia, which is monocular and can affect either eye. She has a constant positive visual phenomena including photopsias when her eyes are closed, which appear to be rapidly moving and akin to sparkles.    She has a constant pressure sensation in both eyes, which seems worst at the temporal aspect of her left eye; there is an associated clenching sensation in this area which compels her to rub the eye. She has a 'tangled' sensation of tightness and contortion in the eyelids that feels as if the lids are crushing the eyes. She has a straining sensation when looking up, which she tries to avoid; no discomfort in other directions of gaze, although the clarity of vision can fluctuate in far gazes. She has received periocular Botox injections which help with the tightness/crushing/pressure sensation but do not completely eliminate it, and does not affect her visual symptoms. Her last injection was in December.   She says her eyes just want to close all the time. She has to work on opening.  She has not tried any lubrication to see if it helps.  She thinks the Botox injections changes where it is but not necessarily improved.      She has a constant quivering sensation toward the nasal aspect of the right eye, although this can occur bilaterally; this is not associated with  oscillopsia. She initially had difficulty chewing and swallowing, occasionally with episodes of choking initially but this has improved recently.    She has occasional FBS that seems worst in the morning with gooey discharge; she will use a warm washcloth twice a day to remove this debris, and will otherwise find small flakes of discharge that she removes.    Prior to Spring 2020 she denies any similar issues with her eyes, although she had chronically worn contact lenses and had very seldom/self-limited episodes of eyelid quivering (lasting about 15 minutes). No history of eye surgery or head/neck trauma.     No changes to CL /type.      Independent historians:  Patient    Review of outside testin/5/21 -- MRI Pituitary [facility]    Otherwise the pituitary gland appears normal. Midline infundibulum without optic   chiasm displacement or cavernous sinus abnormality.     Several small bifrontal subcortical T2 hyperintensities are stable and   nonspecific but can be seen with small vessel change or in some migraineurs.      10/22/20 -- MRI Brain [facility]    IMPRESSION:   No acute infarct, hemorrhage, hydrocephalus or abnormal intraparenchymal enhancement.     A few nonspecific scattered punctate foci of T2/FLAIR signal hyperintensity in the bifrontal white matter, of indeterminate clinical significance, possibly sequelae of chronic migraine headaches versus other chronic infectious/inflammatory insult.     Punctate 1.5 mm focus of hypoenhancement in the central-posterior aspect of the pituitary. Finding is nonspecific and may represent tiny Rathke's cleft cyst versus microadenoma. Recommend 6-month follow-up MRI Pituitary with and without contrast.     Pituitary infundibulum is at the upper limits of normal in size, of indeterminate clinical significance; attention on follow-up.       My interpretation performed today of outside testing:  I have independently reviewed MRI Pituitary performed 21. No abnormal  "FLAIR hyperintensity in the orbits or elsewhere along the visual pathways, unremarkable orbits within limitations of technique.      Review of outside clinical notes:    12/6/22 -- Visit with Dr. Fischer    her eyes have been the most bothersome due to the fact that the muscles around her eyes are seeming to pull and this makes her vision blurry (far vision is worse than near vision). For this reason, she keeps her eyes closed whenever she can    Right Frontalis - 10 units of Botox at 2 site/s.  Left Frontalis - 10 units of Botox at 2 site/s.     Right Temporalis - 30 units of Botox at 4 site/s.  Left Temporalis - 30 units of Botox at 4 site/s.     Left Occipitalis - 20 units of Botox at 4 site/s.     Right  - 5 units of Botox at 1 site/s.              Left  - 5 units of Botox at 1 site/s.     Procerus - 5 units of Botox at 1 site/s.     Right Orbicularis Oculi - 7.5 units of Botox at 3 site/s.  Left Orbicularis Oculi - 7.5 units of Botox at 3 site/s.     6/21/22 -- Neurology visit with Dr. Wylie    The patient reports that she became ill with COVID in 3/2020, and made essentially a full recovery without any other significant medical intervention. Then in 5/2020 she started to develop worsening fatigue, unintentional weight loss, hair loss, and brain fog. She also developed a feeling of \"my skin is on wrong,\" which is the most bothersome of her complaints and is ongoing to date. Specifically, she has extreme \"tightness and pulling\" sensation that starts in her toes and travels to the top of her head. She states that it feels like her skin is covered in \"tight bands\" with a constant \"trembling and rumbling\" sensation throughout her body. She also has an associated sensation that she cannot sit still at times, and frequently has to move around to stay comfortable.      She states that there is an area behind her eyes and ears that is the \"epicenter\" of all of her symptoms. She feels as though all " of her muscles are in spasm, but it is more severe above her shoulders in the head/neck/upper shoulder area.      Past medical history:    Patient Active Problem List   Diagnosis     Pelvic pain in female     Urge incontinence     Anxiety     Attention deficit hyperactivity disorder (ADHD), predominantly inattentive type     Dysesthesia     Dystonia     History of disease     History of severe acute respiratory syndrome coronavirus 2 (SARS-CoV-2) disease         Medications:   aspirin  baclofen  botulinum toxin type A  carbidopa-levodopa  cholecalciferol  DULoxetine  lisdexamfetamine  VITAMIN-B COMPLEX PO      Family history / social history:  No history of blindness in the family. Patient's family history includes Attention Deficit Disorder in her brother and mother; Breast Cancer (age of onset: 45) in her mother; Colon Cancer in her maternal grandmother; Congenital heart disease in her paternal grandmother; Heart Failure in her paternal grandfather; Hyperlipidemia in her maternal grandfather and maternal grandmother; Osteoporosis in her mother; Thyroid Disease in her paternal grandmother; Urinary tract infection in her maternal grandmother and mother; Uterine Cancer in an other family member.     Patient  reports that she has never smoked. She has never used smokeless tobacco. She reports current alcohol use of about 2.0 standard drinks per week. She reports current drug use. Drug: Marijuana.       Exam:  Uncorrected distance visual acuity was 20/20 in the right eye and 20/30 -2 in the left eye. Intraocular pressure was 24 in the right eye and 24 in the left eye using ICare.  Color vision 11/11 right eye and 11/11 left eye.  Pupils isocoric.  Anterior segment exam with mild to moderate SPEE OU.  Fundus exam unremarkable. Strabismus exam without heterophoria.    Discussion of management / interpretation with another provider:   None    Assessment/Plan:   It is my impression that patient has ocular pain and visual  disturbance without evidence of an insidious vision-threatening process. Her story sounds most consistent with dry eyes that is possibly worsened with Botox injections producing lagophthalmos.  She has eye pain, waxing and waning blurred vision, discharge upon awakening, and intermittent monocular double vision which are all symptoms that can be seen with dry eye syndrome.      I asked the patient to use artificial tear ointment at night and artificial tears throughout the day, as well as warm compresses to the eyelid margin  on a regular basis.  I asked the patient to take fish oil capsules 2x/day for a month and then 1x/d thereafter.   If that is not beneficial we could consider punctal plugs, corticosteroid eye drops and Restasis.      She indicates that the botulinum toxin injections around the eyes helps to some degree but does not completely mitigate her symptoms.  I told her that she could consider modifying Botox injection sites to see if has better response to the areas around the eyes.   She will discuss this with Dr. Fischer.             Attending Physician Attestation:  Complete documentation of historical and exam elements from today's encounter can be found in the full encounter summary report (not reduplicated in this progress note).  I personally obtained the chief complaint(s) and history of present illness.  I confirmed and edited as necessary the review of systems, past medical/surgical history, family history, social history, and examination findings as documented by others; and I examined the patient myself.  I personally reviewed the relevant tests, images, and reports as documented above.  I formulated and edited as necessary the assessment and plan and discussed the findings and management plan with the patient and family. - Ephraim Alexis MD PhD  Fellow, Neuro-Ophthalmology

## 2023-01-11 NOTE — Clinical Note
1/11/2023       RE: Inez Boston  [address] [facility] [address]     Dear Colleague,    Thank you for referring your patient, Inez Boston, to the [facility] at [facility]. Please see a copy of my visit note below.        Inez Boston is a 47 year old female with the following diagnoses:   No diagnosis found.     Patient was sent for consultation by Dr. 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    HPI:    Patient describes a history of alternating monocular visual disturbance since Spring 2020, in the setting of diffuse dystonia that occurred a few months after COVID infection. This is constantly present in one or the other eye; this is predominantly a diffuse blur, although she will have episodes of triangle-like distortion in the right eye  that resolve with gentle massage of the eyes. She will have intermittent horizontal diplopia, which is monocular and can affect either eye. She has a constant positive visual phenomena including photopsias when her eyes are closed, which appear to be rapidly moving and akin to sparkles.    She has a constant pressure sensation in both eyes, which seems worst at the temporal aspect of her left eye; there is an associated clenching sensation in this area which compels her to rub the eye. She has a 'tangled' sensation of tightness and contortion in the eyelids that feels as if the lids are crushing the eyes. She has a straining sensation when looking up, which she tries to avoid; no discomfort in other directions of gaze, although the clarity of vision can fluctuate in far gazes. She has received periocular Botox injections which help with the tightness/crushing/pressure sensation but do not completely eliminate it, and does not affect her visual symptoms.    She has a constant quivering sensation toward the nasal aspect of the right eye, although this can occur bilaterally; this is not  associated with oscillopsia. She initially had difficulty chewing and swallowing, occasionally with episodes of choking initially but this has improved recently.    She has occasional FBS that seems worst in the morning with gooey discharge; she will use a warm washcloth twice a day to remove this debris, and will otherwise find small flakes of discharge that she removes.    Prior to Spring 2020 she denies any similar issues with her eyes, although she had chronically worn contact lenses and had very seldom/self-limited episodes of eyelid quivering (lasting about 15 minutes). No history of eye surgery or head/neck trauma.     No changes to CL /type.      Independent historians:  Patient    Review of outside testin/5/21 -- MRI Pituitary [facility]    Otherwise the pituitary gland appears normal. Midline infundibulum without optic   chiasm displacement or cavernous sinus abnormality.     Several small bifrontal subcortical T2 hyperintensities are stable and   nonspecific but can be seen with small vessel change or in some migraineurs.      10/22/20 -- MRI Brain [facility]    IMPRESSION:   No acute infarct, hemorrhage, hydrocephalus or abnormal intraparenchymal enhancement.     A few nonspecific scattered punctate foci of T2/FLAIR signal hyperintensity in the bifrontal white matter, of indeterminate clinical significance, possibly sequelae of chronic migraine headaches versus other chronic infectious/inflammatory insult.     Punctate 1.5 mm focus of hypoenhancement in the central-posterior aspect of the pituitary. Finding is nonspecific and may represent tiny Rathke's cleft cyst versus microadenoma. Recommend 6-month follow-up MRI Pituitary with and without contrast.     Pituitary infundibulum is at the upper limits of normal in size, of indeterminate clinical significance; attention on follow-up.       My interpretation performed today of outside testing:  I have independently reviewed MRI Pituitary performed  "2/5/21. No abnormal FLAIR hyperintensity in the orbits or elsewhere along the visual pathways, unremarkable orbits within limitations of technique.      Review of outside clinical notes:    12/6/22 -- Visit with Dr. Fischer    her eyes have been the most bothersome due to the fact that the muscles around her eyes are seeming to pull and this makes her vision blurry (far vision is worse than near vision). For this reason, she keeps her eyes closed whenever she can    Right Frontalis - 10 units of Botox at 2 site/s.  Left Frontalis - 10 units of Botox at 2 site/s.     Right Temporalis - 30 units of Botox at 4 site/s.  Left Temporalis - 30 units of Botox at 4 site/s.     Left Occipitalis - 20 units of Botox at 4 site/s.     Right  - 5 units of Botox at 1 site/s.              Left  - 5 units of Botox at 1 site/s.     Procerus - 5 units of Botox at 1 site/s.     Right Orbicularis Oculi - 7.5 units of Botox at 3 site/s.  Left Orbicularis Oculi - 7.5 units of Botox at 3 site/s.     6/21/22 -- Neurology visit with Dr. Wylie    The patient reports that she became ill with COVID in 3/2020, and made essentially a full recovery without any other significant medical intervention. Then in 5/2020 she started to develop worsening fatigue, unintentional weight loss, hair loss, and brain fog. She also developed a feeling of \"my skin is on wrong,\" which is the most bothersome of her complaints and is ongoing to date. Specifically, she has extreme \"tightness and pulling\" sensation that starts in her toes and travels to the top of her head. She states that it feels like her skin is covered in \"tight bands\" with a constant \"trembling and rumbling\" sensation throughout her body. She also has an associated sensation that she cannot sit still at times, and frequently has to move around to stay comfortable.      She states that there is an area behind her eyes and ears that is the \"epicenter\" of all of her symptoms. She " feels as though all of her muscles are in spasm, but it is more severe above her shoulders in the head/neck/upper shoulder area.      Past medical history:    Patient Active Problem List   Diagnosis     Pelvic pain in female     Urge incontinence     Anxiety     Attention deficit hyperactivity disorder (ADHD), predominantly inattentive type     Dysesthesia     Dystonia     History of disease     History of severe acute respiratory syndrome coronavirus 2 (SARS-CoV-2) disease         Medications:   aspirin  baclofen  botulinum toxin type A  carbidopa-levodopa  cholecalciferol  DULoxetine  lisdexamfetamine  VITAMIN-B COMPLEX PO      Family history / social history:  No history of blindness in the family. Patient's family history includes Attention Deficit Disorder in her brother and mother; Breast Cancer (age of onset: 45) in her mother; Colon Cancer in her maternal grandmother; Congenital heart disease in her paternal grandmother; Heart Failure in her paternal grandfather; Hyperlipidemia in her maternal grandfather and maternal grandmother; Osteoporosis in her mother; Thyroid Disease in her paternal grandmother; Urinary tract infection in her maternal grandmother and mother; Uterine Cancer in an other family member.     Patient  reports that she has never smoked. She has never used smokeless tobacco. She reports current alcohol use of about 2.0 standard drinks per week. She reports current drug use. Drug: Marijuana.       Exam:  Uncorrected distance visual acuity was 20/20 in the right eye and 20/30 -2 in the left eye. Intraocular pressure was 24 in the right eye and 24 in the left eye using ICare.  Color vision 11/11 right eye and 11/11 left eye.  Pupils isocoric.  Anterior segment exam with mild to moderate SPEE OU.  Fundus exam unremarkable. Strabismus exam without heterophoria.    Tests ordered and interpreted today:          Discussion of management / interpretation with another provider:    None***    Assessment/Plan:   It is my impression that patient has ocular pain and visual disturbance without evidence of an insidious vision-threatening process. There is mild to moderate SPEE, which could contribute to some of her visual disturbances although she usually wears contact lenses. I suspect that orbicularis spasms and other facial dystonia may be placing pressure on the globe and causing transient vision changes.        @ATT@    Mckay Alexis MD PhD  Fellow, Neuro-Ophthalmology          Inez Boston is a 47 year old female with the following diagnoses:   No diagnosis found.     Patient was sent for consultation by Dr. 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    HPI:    Patient describes a history of alternating monocular visual disturbance since Spring 2020, in the setting of diffuse dystonia that occurred a few months after COVID infection. This is constantly present in one or the other eye; this is predominantly a diffuse blur, although she will have episodes of triangle-like distortion in the right eye  that resolve with gentle massage of the eyes. She will have intermittent horizontal diplopia, which is monocular and can affect either eye. She has a constant positive visual phenomena including photopsias when her eyes are closed, which appear to be rapidly moving and akin to sparkles.    She has a constant pressure sensation in both eyes, which seems worst at the temporal aspect of her left eye; there is an associated clenching sensation in this area which compels her to rub the eye. She has a 'tangled' sensation of tightness and contortion in the eyelids that feels as if the lids are crushing the eyes. She has a straining sensation when looking up, which she tries to avoid; no discomfort in other directions of gaze, although the clarity of vision can fluctuate in far gazes. She has received periocular Botox injections which help with the tightness/crushing/pressure sensation but do not  completely eliminate it, and does not affect her visual symptoms. Her last injection was in December.   She says her eyes just want to close all the time. She has to work on opening.  She has not tried any lubrication to see if it helps.  She thinks the Botox injections changes where it is but not necessarily improved.      She has a constant quivering sensation toward the nasal aspect of the right eye, although this can occur bilaterally; this is not associated with oscillopsia. She initially had difficulty chewing and swallowing, occasionally with episodes of choking initially but this has improved recently.    She has occasional FBS that seems worst in the morning with gooey discharge; she will use a warm washcloth twice a day to remove this debris, and will otherwise find small flakes of discharge that she removes.    Prior to Spring 2020 she denies any similar issues with her eyes, although she had chronically worn contact lenses and had very seldom/self-limited episodes of eyelid quivering (lasting about 15 minutes). No history of eye surgery or head/neck trauma.     No changes to CL /type.      Independent historians:  Patient    Review of outside testin/5/21 -- MRI Pituitary [facility]    Otherwise the pituitary gland appears normal. Midline infundibulum without optic   chiasm displacement or cavernous sinus abnormality.     Several small bifrontal subcortical T2 hyperintensities are stable and   nonspecific but can be seen with small vessel change or in some migraineurs.      10/22/20 -- MRI Brain [facility]    IMPRESSION:   No acute infarct, hemorrhage, hydrocephalus or abnormal intraparenchymal enhancement.     A few nonspecific scattered punctate foci of T2/FLAIR signal hyperintensity in the bifrontal white matter, of indeterminate clinical significance, possibly sequelae of chronic migraine headaches versus other chronic infectious/inflammatory insult.     Punctate 1.5 mm focus of hypoenhancement  "in the central-posterior aspect of the pituitary. Finding is nonspecific and may represent tiny Rathke's cleft cyst versus microadenoma. Recommend 6-month follow-up MRI Pituitary with and without contrast.     Pituitary infundibulum is at the upper limits of normal in size, of indeterminate clinical significance; attention on follow-up.       My interpretation performed today of outside testing:  I have independently reviewed MRI Pituitary performed 2/5/21. No abnormal FLAIR hyperintensity in the orbits or elsewhere along the visual pathways, unremarkable orbits within limitations of technique.      Review of outside clinical notes:    12/6/22 -- Visit with Dr. Fischer    her eyes have been the most bothersome due to the fact that the muscles around her eyes are seeming to pull and this makes her vision blurry (far vision is worse than near vision). For this reason, she keeps her eyes closed whenever she can    Right Frontalis - 10 units of Botox at 2 site/s.  Left Frontalis - 10 units of Botox at 2 site/s.     Right Temporalis - 30 units of Botox at 4 site/s.  Left Temporalis - 30 units of Botox at 4 site/s.     Left Occipitalis - 20 units of Botox at 4 site/s.     Right  - 5 units of Botox at 1 site/s.              Left  - 5 units of Botox at 1 site/s.     Procerus - 5 units of Botox at 1 site/s.     Right Orbicularis Oculi - 7.5 units of Botox at 3 site/s.  Left Orbicularis Oculi - 7.5 units of Botox at 3 site/s.     6/21/22 -- Neurology visit with Dr. Wylie    The patient reports that she became ill with COVID in 3/2020, and made essentially a full recovery without any other significant medical intervention. Then in 5/2020 she started to develop worsening fatigue, unintentional weight loss, hair loss, and brain fog. She also developed a feeling of \"my skin is on wrong,\" which is the most bothersome of her complaints and is ongoing to date. Specifically, she has extreme \"tightness and pulling\" " "sensation that starts in her toes and travels to the top of her head. She states that it feels like her skin is covered in \"tight bands\" with a constant \"trembling and rumbling\" sensation throughout her body. She also has an associated sensation that she cannot sit still at times, and frequently has to move around to stay comfortable.      She states that there is an area behind her eyes and ears that is the \"epicenter\" of all of her symptoms. She feels as though all of her muscles are in spasm, but it is more severe above her shoulders in the head/neck/upper shoulder area.      Past medical history:    Patient Active Problem List   Diagnosis     Pelvic pain in female     Urge incontinence     Anxiety     Attention deficit hyperactivity disorder (ADHD), predominantly inattentive type     Dysesthesia     Dystonia     History of disease     History of severe acute respiratory syndrome coronavirus 2 (SARS-CoV-2) disease         Medications:   aspirin  baclofen  botulinum toxin type A  carbidopa-levodopa  cholecalciferol  DULoxetine  lisdexamfetamine  VITAMIN-B COMPLEX PO      Family history / social history:  No history of blindness in the family. Patient's family history includes Attention Deficit Disorder in her brother and mother; Breast Cancer (age of onset: 45) in her mother; Colon Cancer in her maternal grandmother; Congenital heart disease in her paternal grandmother; Heart Failure in her paternal grandfather; Hyperlipidemia in her maternal grandfather and maternal grandmother; Osteoporosis in her mother; Thyroid Disease in her paternal grandmother; Urinary tract infection in her maternal grandmother and mother; Uterine Cancer in an other family member.     Patient  reports that she has never smoked. She has never used smokeless tobacco. She reports current alcohol use of about 2.0 standard drinks per week. She reports current drug use. Drug: Marijuana.       Exam:  Uncorrected distance visual acuity was 20/20 " in the right eye and 20/30 -2 in the left eye. Intraocular pressure was 24 in the right eye and 24 in the left eye using ICare.  Color vision 11/11 right eye and 11/11 left eye.  Pupils isocoric.  Anterior segment exam with mild to moderate SPEE OU.  Fundus exam unremarkable. Strabismus exam without heterophoria.    Discussion of management / interpretation with another provider:   None    Assessment/Plan:   It is my impression that patient has ocular pain and visual disturbance without evidence of an insidious vision-threatening process. Her story sounds consistent with dry eyes that is possibly worsened with Botox injections producing lagophthalmos.  I asked the patient to use artificial tear ointment at night and artificial tears throughout the day, as well as warm compresses to the eyelid margin  on a regular basis.  I asked the patient to take fish oil capsules 2x/day for a month and then 1x/d thereafter.   If that is not beneficial we could consider punctal plugs, corticosteroid eye drops and Restasis.           Attending Physician Attestation:  Complete documentation of historical and exam elements from today's encounter can be found in the full encounter summary report (not reduplicated in this progress note).  I personally obtained the chief complaint(s) and history of present illness.  I confirmed and edited as necessary the review of systems, past medical/surgical history, family history, social history, and examination findings as documented by others; and I examined the patient myself.  I personally reviewed the relevant tests, images, and reports as documented above.  I formulated and edited as necessary the assessment and plan and discussed the findings and management plan with the patient and family. - Ephraim Alexis MD PhD  Fellow, Neuro-Ophthalmology        Again, thank you for allowing me to participate in the care of your patient.      Sincerely,    Ephraim Wu MD

## 2023-01-11 NOTE — PATIENT INSTRUCTIONS
You have been diagnosed with Dry eye syndrome.  Symptoms of Dry eye syndrome can include double vision, eye pain, blurry vision, stinging, burning, tearing, eye redness.      Some useful instructions are listed below:     1.  Use artificial tears on a regular basis.  If you use artificial tear drops with preservative, you can use them up to 4-6 times per day. If you use artificial tear drops without preservatives, then you can use them more often.      2.  Wash your eyelashes with hot water.  Do not make the water so hot that you burn yourself, but the water should be more than just warm.  Often patients will wash their eyelashes in the shower under the hot water.  You should rub gently along the base of your eyelashes.      3.  Taking fish oil capsules twice a day for a month and then once a day after that can help improve Dry eye symptoms.      4.  Drink a lot of water.  Hydration can be helpful.      5.  Humidify your environment.      6.  If this is not beneficial, other treatments can include punctal plugs or cautery, corticosteroid eye drops, Restasis, Lipiflow, or autologous serum.  If you are still struggling with dry eye symptoms, call Dr. Wu's office for other therapies or a referral to a dry eye specialist.       7.  ARTIFICIAL TEAR OINTMENT AT NIGHT

## 2023-01-11 NOTE — LETTER
2023         RE:  :  MRN: Inez Boston  1975  [id number]     Dear Dr. Fischer,    Thank you for asking me to see your very pleasant patient, Inez Boston, in neuro-ophthalmic consultation.  I would like to thank you for sending your records and I have summarized them in the history of present illness. My assessment and plan are below.  For further details, please see my attached clinic note.      Inez Boston is a 47 year old female with the following diagnoses:   1. Dry eye syndrome of both eyes    2. Blepharospasm      Patient was sent for consultation by Dr. 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    HPI: Patient describes a history of alternating monocular visual disturbance since Spring 2020, in the setting of diffuse dystonia that occurred a few months after COVID infection. This is constantly present in one or the other eye; this is predominantly a diffuse blur, although she will have episodes of triangle-like distortion in the right eye  that resolve with gentle massage of the eyes. She will have intermittent horizontal diplopia, which is monocular and can affect either eye. She has a constant positive visual phenomena including photopsias when her eyes are closed, which appear to be rapidly moving and akin to sparkles.    She has a constant pressure sensation in both eyes, which seems worst at the temporal aspect of her left eye; there is an associated clenching sensation in this area which compels her to rub the eye. She has a 'tangled' sensation of tightness and contortion in the eyelids that feels as if the lids are crushing the eyes. She has a straining sensation when looking up, which she tries to avoid; no discomfort in other directions of gaze, although the clarity of vision can fluctuate in far gazes. She has received periocular Botox injections which help with the tightness/crushing/pressure sensation but do not completely eliminate it, and does not affect her visual symptoms.  Her last injection was in December.   She says her eyes just want to close all the time. She has to work on opening.  She has not tried any lubrication to see if it helps.  She thinks the Botox injections changes where it is but not necessarily improved.      She has a constant quivering sensation toward the nasal aspect of the right eye, although this can occur bilaterally; this is not associated with oscillopsia. She initially had difficulty chewing and swallowing, occasionally with episodes of choking initially but this has improved recently.    She has occasional FBS that seems worst in the morning with gooey discharge; she will use a warm washcloth twice a day to remove this debris, and will otherwise find small flakes of discharge that she removes.    Prior to Spring 2020 she denies any similar issues with her eyes, although she had chronically worn contact lenses and had very seldom/self-limited episodes of eyelid quivering (lasting about 15 minutes). No history of eye surgery or head/neck trauma.     No changes to CL /type.    Independent historians: Patient    Review of outside testin21 -- MRI Pituitary WWO    Otherwise the pituitary gland appears normal. Midline infundibulum without optic   chiasm displacement or cavernous sinus abnormality.     Several small bifrontal subcortical T2 hyperintensities are stable and   nonspecific but can be seen with small vessel change or in some migraineurs.      10/22/20 -- MRI Brain WWO    IMPRESSION:   No acute infarct, hemorrhage, hydrocephalus or abnormal intraparenchymal enhancement.     A few nonspecific scattered punctate foci of T2/FLAIR signal hyperintensity in the bifrontal white matter, of indeterminate clinical significance, possibly sequelae of chronic migraine headaches versus other chronic infectious/inflammatory insult.     Punctate 1.5 mm focus of hypoenhancement in the central-posterior aspect of the pituitary. Finding is nonspecific  "and may represent tiny Rathke's cleft cyst versus microadenoma. Recommend 6-month follow-up MRI Pituitary with and without contrast.     Pituitary infundibulum is at the upper limits of normal in size, of indeterminate clinical significance; attention on follow-up.     My interpretation performed today of outside testing: I have independently reviewed MRI Pituitary performed 2/5/21. No abnormal FLAIR hyperintensity in the orbits or elsewhere along the visual pathways, unremarkable orbits within limitations of technique.    Review of outside clinical notes:  12/6/22 -- Visit with Dr. Fischer    her eyes have been the most bothersome due to the fact that the muscles around her eyes are seeming to pull and this makes her vision blurry (far vision is worse than near vision). For this reason, she keeps her eyes closed whenever she can    Right Frontalis - 10 units of Botox at 2 site/s.  Left Frontalis - 10 units of Botox at 2 site/s.     Right Temporalis - 30 units of Botox at 4 site/s.  Left Temporalis - 30 units of Botox at 4 site/s.     Left Occipitalis - 20 units of Botox at 4 site/s.     Right  - 5 units of Botox at 1 site/s.              Left  - 5 units of Botox at 1 site/s.     Procerus - 5 units of Botox at 1 site/s.     Right Orbicularis Oculi - 7.5 units of Botox at 3 site/s.  Left Orbicularis Oculi - 7.5 units of Botox at 3 site/s.     6/21/22 -- Neurology visit with Dr. Wylie    The patient reports that she became ill with COVID in 3/2020, and made essentially a full recovery without any other significant medical intervention. Then in 5/2020 she started to develop worsening fatigue, unintentional weight loss, hair loss, and brain fog. She also developed a feeling of \"my skin is on wrong,\" which is the most bothersome of her complaints and is ongoing to date. Specifically, she has extreme \"tightness and pulling\" sensation that starts in her toes and travels to the top of her head. She " "states that it feels like her skin is covered in \"tight bands\" with a constant \"trembling and rumbling\" sensation throughout her body. She also has an associated sensation that she cannot sit still at times, and frequently has to move around to stay comfortable.      She states that there is an area behind her eyes and ears that is the \"epicenter\" of all of her symptoms. She feels as though all of her muscles are in spasm, but it is more severe above her shoulders in the head/neck/upper shoulder area.    Past medical history:  Patient Active Problem List   Diagnosis     Pelvic pain in female     Urge incontinence     Anxiety     Attention deficit hyperactivity disorder (ADHD), predominantly inattentive type     Dysesthesia     Dystonia     History of disease     History of severe acute respiratory syndrome coronavirus 2 (SARS-CoV-2) disease     Medications:   aspirin  baclofen  botulinum toxin type A  carbidopa-levodopa  cholecalciferol  DULoxetine  lisdexamfetamine  VITAMIN-B COMPLEX PO    Family history / social history: No history of blindness in the family. Patient's family history includes Attention Deficit Disorder in her brother and mother; Breast Cancer (age of onset: 45) in her mother; Colon Cancer in her maternal grandmother; Congenital heart disease in her paternal grandmother; Heart Failure in her paternal grandfather; Hyperlipidemia in her maternal grandfather and maternal grandmother; Osteoporosis in her mother; Thyroid Disease in her paternal grandmother; Urinary tract infection in her maternal grandmother and mother; Uterine Cancer in an other family member.     Patient  reports that she has never smoked. She has never used smokeless tobacco. She reports current alcohol use of about 2.0 standard drinks per week. She reports current drug use. Drug: Marijuana.     Exam: Uncorrected distance visual acuity was 20/20 in the right eye and 20/30 -2 in the left eye. Intraocular pressure was 24 in the right " eye and 24 in the left eye using ICare.  Color vision 11/11 right eye and 11/11 left eye.  Pupils isocoric.  Anterior segment exam with mild to moderate SPEE OU.  Fundus exam unremarkable. Strabismus exam without heterophoria.    Discussion of management / interpretation with another provider:  None    Assessment/Plan: It is my impression that patient has ocular pain and visual disturbance without evidence of an insidious vision-threatening process. Her story sounds most consistent with dry eyes that is possibly worsened with Botox injections producing lagophthalmos.  She has eye pain, waxing and waning blurred vision, discharge upon awakening, and intermittent monocular double vision which are all symptoms that can be seen with dry eye syndrome.     I asked the patient to use artificial tear ointment at night and artificial tears throughout the day, as well as warm compresses to the eyelid margin  on a regular basis.  I asked the patient to take fish oil capsules 2x/day for a month and then 1x/d thereafter.   If that is not beneficial we could consider punctal plugs, corticosteroid eye drops and Restasis.      She indicates that the botulinum toxin injections around the eyes helps to some degree but does not completely mitigate her symptoms.  I told her that she could consider modifying Botox injection sites to see if has better response to the areas around the eyes.   She will discuss this with Dr. Fischer.      Again, thank you for allowing me to participate in the care of your patient.      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

## 2023-01-15 NOTE — PROGRESS NOTES
HPI:    Last visit with us 7/18/2022 and additional details in that note. Overall stable. She is continues to get botox injections for dystonia and finds these quite helpful. She is not taking Sinemet because of nausea. She remains on Vyvanse and Cymbalta. No other HEENT, cardiopulmonary, abdominal, , GYN, neurological, systemic, psychiatric, lymphatic, endocrine, vascular complaints.       Past Medical History:   Diagnosis Date     ADHD (attention deficit hyperactivity disorder)      Anxiety      Cervical dystonia      Thrombocytosis      PE:    Vitals noted, gen, nad, cooperative, alert, neck supple nl rom, lungs with good air movement, RRR, S1, S2, no MRG, abdomen, no acute findings. She does have some periodic motor twitching/spasms.       A/P:    1. Immunizations;  COVID vaccine Pfizer x 2 (bi-valent 9/29/2022).  Moderna x 2. Td/Tdap  3/21/2022. Influenza 9/29/2022.   2. Hematology appt. With Dr. Caceres 10/5/2022 regarding thrombocytosis. She is on ASA    3. PMR follow up with Dr. Fischer 12/6/2022 regarding neck and myofacial pain. On baclofen more for sleep. She has 3/2/2023 follow up with Dr. Fischer   4. GYN note Dr. Mcpherson, 6/23/2022, 3/11/2022 and 2/28/2022 regarding endometrial bx. plevic mass and pelvic pain. She had surgery 6/3/2022  5. On Cymbalta and Vyvanse and she finds this beneficial   6. Mammogram 1/11/2022; breast MRI imaging 7/6/2022.   7. She has seen PT for groin pain 2/24/2022 and appt. 3/21/2022  8. Genetic counseling given family history significant for breast cancer on 4/28/2022 and 5/24/2022.   9. Colonoscopy 12/7/2021 and repeat in 10 years   10. Lipids on 1/11/2022 with 175 and HDL 80. Will hold on statin  given her musculoskeletal complaints. Ordered 7/18/2022 and if still elevated consider to  have her see Dr. Bisi Wilkinson [facility]   11. Seen Neurology 6/21/2022, Dr. Wylie for cervical dystonia. She is not taking  Sinemet (nausea). She remains on Botox.   12.  Neuropsychiatric testing was done 8/3/2022.  13. She was seen 1/11/2023 by Dr. Wu, Neurophthalmology      30 minutes spent on the date of the encounter doing chart review, history and exam, documentation and further activities as noted above

## 2023-01-16 ENCOUNTER — OFFICE VISIT (OUTPATIENT)
Dept: INTERNAL MEDICINE | Facility: CLINIC | Age: 48
End: 2023-01-16
Payer: COMMERCIAL

## 2023-01-16 ENCOUNTER — LAB (OUTPATIENT)
Dept: LAB | Facility: CLINIC | Age: 48
End: 2023-01-16
Payer: COMMERCIAL

## 2023-01-16 VITALS
OXYGEN SATURATION: 97 % | HEART RATE: 74 BPM | DIASTOLIC BLOOD PRESSURE: 74 MMHG | WEIGHT: 165.4 LBS | HEIGHT: 68 IN | BODY MASS INDEX: 25.07 KG/M2 | SYSTOLIC BLOOD PRESSURE: 125 MMHG

## 2023-01-16 DIAGNOSIS — G24.1 DYSTONIA, TORSION, FRAGMENTS OF: Primary | ICD-10-CM

## 2023-01-16 DIAGNOSIS — E78.00 HIGH CHOLESTEROL: ICD-10-CM

## 2023-01-16 LAB
CHOLEST SERPL-MCNC: 223 MG/DL
HDLC SERPL-MCNC: 61 MG/DL
LDLC SERPL CALC-MCNC: 142 MG/DL
NONHDLC SERPL-MCNC: 162 MG/DL
TRIGL SERPL-MCNC: 100 MG/DL

## 2023-01-16 PROCEDURE — 80061 LIPID PANEL: CPT | Performed by: PATHOLOGY

## 2023-01-16 PROCEDURE — 99214 OFFICE O/P EST MOD 30 MIN: CPT | Performed by: INTERNAL MEDICINE

## 2023-01-16 PROCEDURE — 36415 COLL VENOUS BLD VENIPUNCTURE: CPT | Performed by: PATHOLOGY

## 2023-01-16 NOTE — NURSING NOTE
Inez Boston is a 47 year old female patient that presents today in clinic for the following:    Chief Complaint   Patient presents with     Follow Up     Pt states no new health concerns     The patient's allergies and medications were reviewed as noted. A set of vitals were recorded as noted without incident. The patient does not have any other questions for the provider.    Ely Batista, EMT at 9:02 AM on 1/16/2023

## 2023-01-31 NOTE — ANESTHESIA POSTPROCEDURE EVALUATION
Patient: Inez Boston    Procedure: Procedure(s):  bilateral salpingectomy  Laparoscopic oophorectomy       Anesthesia Type:  General    Note:  Disposition: Outpatient   Postop Pain Control: Uneventful            Sign Out: Well controlled pain   PONV: No   Neuro/Psych: Uneventful            Sign Out: Acceptable/Baseline neuro status   Airway/Respiratory: Uneventful            Sign Out: Acceptable/Baseline resp. status   CV/Hemodynamics: Uneventful            Sign Out: Acceptable CV status; No obvious hypovolemia; No obvious fluid overload   Other NRE:    DID A NON-ROUTINE EVENT OCCUR?            Last vitals:  Vitals Value Taken Time   BP 94/53 06/03/22 1115   Temp 36.8  C (98.2  F) 06/03/22 1100   Pulse 95 06/03/22 1118   Resp 8 06/03/22 1118   SpO2 95 % 06/03/22 1118   Vitals shown include unvalidated device data.    Electronically Signed By: Georgie Byrnes MD  Allison 3, 2022  11:19 AM   Home

## 2023-02-15 ENCOUNTER — PRE VISIT (OUTPATIENT)
Dept: OPHTHALMOLOGY | Facility: CLINIC | Age: 48
End: 2023-02-15

## 2023-03-02 ENCOUNTER — TELEPHONE (OUTPATIENT)
Dept: PHYSICAL MEDICINE AND REHAB | Facility: CLINIC | Age: 48
End: 2023-03-02

## 2023-03-02 ENCOUNTER — OFFICE VISIT (OUTPATIENT)
Dept: PHYSICAL MEDICINE AND REHAB | Facility: CLINIC | Age: 48
End: 2023-03-02
Payer: COMMERCIAL

## 2023-03-02 VITALS
BODY MASS INDEX: 25.09 KG/M2 | SYSTOLIC BLOOD PRESSURE: 111 MMHG | WEIGHT: 165 LBS | HEART RATE: 95 BPM | DIASTOLIC BLOOD PRESSURE: 73 MMHG | OXYGEN SATURATION: 98 %

## 2023-03-02 DIAGNOSIS — G24.4 IDIOPATHIC OROFACIAL DYSTONIA: ICD-10-CM

## 2023-03-02 DIAGNOSIS — G24.3 CERVICAL DYSTONIA: Primary | ICD-10-CM

## 2023-03-02 DIAGNOSIS — G24.1 GENETIC TORSION DYSTONIA: ICD-10-CM

## 2023-03-02 DIAGNOSIS — G24.3 SPASMODIC TORTICOLLIS: ICD-10-CM

## 2023-03-02 PROCEDURE — 64616 CHEMODENERV MUSC NECK DYSTON: CPT | Mod: 50 | Performed by: PHYSICAL MEDICINE & REHABILITATION

## 2023-03-02 PROCEDURE — 64642 CHEMODENERV 1 EXTREMITY 1-4: CPT | Mod: 51 | Performed by: PHYSICAL MEDICINE & REHABILITATION

## 2023-03-02 PROCEDURE — 95874 GUIDE NERV DESTR NEEDLE EMG: CPT | Performed by: PHYSICAL MEDICINE & REHABILITATION

## 2023-03-02 PROCEDURE — 64643 CHEMODENERV 1 EXTREM 1-4 EA: CPT | Performed by: PHYSICAL MEDICINE & REHABILITATION

## 2023-03-02 PROCEDURE — 64612 DESTROY NERVE FACE MUSCLE: CPT | Mod: 50 | Performed by: PHYSICAL MEDICINE & REHABILITATION

## 2023-03-02 PROCEDURE — 64646 CHEMODENERV TRUNK MUSC 1-5: CPT | Performed by: PHYSICAL MEDICINE & REHABILITATION

## 2023-03-02 RX ORDER — BUPIVACAINE HYDROCHLORIDE 5 MG/ML
6 INJECTION, SOLUTION EPIDURAL; INTRACAUDAL ONCE
Status: COMPLETED | OUTPATIENT
Start: 2023-03-02 | End: 2023-03-02

## 2023-03-02 RX ADMIN — BUPIVACAINE HYDROCHLORIDE 6 ML: 5 INJECTION, SOLUTION EPIDURAL; INTRACAUDAL at 09:37

## 2023-03-02 ASSESSMENT — PAIN SCALES - GENERAL: PAINLEVEL: MILD PAIN (3)

## 2023-03-02 NOTE — PROGRESS NOTES
"  [facility]     PM&R CLINIC NOTE  BOTULINUM TOXIN PROCEDURE      HPI  Chief Complaint   Patient presents with     RECHECK     Botox injection confirmed with patient     Inez Boston is a 47 year old female who presents to clinic for botulinum toxin injections for management of cervical dystonia.      SINCE LAST VISIT  Inez Boston was last seen here in clinic on 12/21/2022, at which time she received 280 units of Botox, which was an increase from previous dose of 200 units of Botox.     Patient denies new medical diagnoses, illnesses, hospitalizations, emergency room visits, and injuries since the previous injection with botulinum neurotoxin.     Patient notes that her left eye has been most bothersome, stating her eyelids \"feel like they are wrapped up like an accordion.\"  Continues to keep her eyes closed whenever she can.  Patient is likely switching insurance and would like to be contacted regarding prior auth/approve required to maintain her botox coverage.      RESPONSE TO PREVIOUS TREATMENT  Side effects: No problems reported.    Pain Improvement: Yes. Approximately 75% improvement. Duration of Benefit:  9-10 weeks followed by gradual decrease in benefit over the last few weeks.     Dystonia Improvement: Yes. Approximately 70% improvement. Botox has been extremely beneficial with regards to her dystonia.   Duration of Benefit:   9-10 weeks followed by gradual decrease in benefit. When Botox is working, states \"the band is not as tight around her body\"       PHYSICAL EXAM  VS: /73 (BP Location: Right arm, Patient Position: Sitting, Cuff Size: Adult Large)   Pulse 95   Wt 74.8 kg (165 lb)   SpO2 98%   BMI 25.09 kg/m     GEN: Pleasant and cooperative, in no acute distress  HEENT: No asymmetry with eyebrow lift.  SPASMS: Dystonic movements with irritation and facial movements.       ALLERGIES  No Known Allergies    CURRENT MEDICATIONS    Current Outpatient " Medications:      aspirin 81 MG EC tablet, Take 81 mg by mouth every morning 2 baby aspirin a day, Disp: , Rfl:      B Complex Vitamins (VITAMIN-B COMPLEX PO), Take 1 tablet by mouth every other day, Disp: , Rfl:      baclofen (LIORESAL) 10 MG tablet, nightly as needed, Disp: , Rfl:      cholecalciferol 50 MCG (2000 UT) tablet, 2,000 Units every morning, Disp: , Rfl:      DULoxetine (CYMBALTA) 20 MG capsule, daily, Disp: , Rfl:      lisdexamfetamine (VYVANSE) 40 MG capsule, Take 40 mg by mouth every morning, Disp: , Rfl:      Omega-3 Fatty Acids (FISH OIL OMEGA-3 PO), , Disp: , Rfl:      carbidopa-levodopa (SINEMET)  MG tablet, 1/2 tab by mouth daily x 3 days then 1 tab by mouth daily for one week then 1 tab twice daily (medication should be taken with bread or applesauce - not protein to be more effective) (Patient not taking: Reported on 2023), Disp: 60 tablet, Rfl: 11    Current Facility-Administered Medications:      botulinum toxin type A (BOTOX) 100 units injection 100 Units, 100 Units, Intramuscular, Q90 Days, Ephraim Wu MD     botulinum toxin type A (BOTOX) 100 units injection 400 Units, 400 Units, Intramuscular, Q90 Days, Alanis Corbin MD, 300 Units at 23 0855     botulinum toxin type A (BOTOX) 100 units injection 400 Units, 400 Units, Intramuscular, Q90 Days, Elizabeth Fischer MD, 280 Units at 22 1514       BOTULINUM NEUROTOXIN INJECTION PROCEDURES    VERIFICATION OF PATIENT IDENTIFICATION AND PROCEDURE     Initials   Patient Name PS   Patient  PS   Procedure Verified by: PS     Prior to the start of the procedure and with procedural staff participation, I verbally confirmed the patient s identity using two indicators, relevant allergies, that the procedure was appropriate and matched the consent or emergent situation, and that the correct equipment/implants were available. Immediately prior to starting the procedure I conducted the Time Out with the procedural staff  and re-confirmed the patient s name, procedure, and site/side. (The Joint Commission universal protocol was followed.)  Yes    Sedation (Moderate or Deep): None    ABOVE ASSESSMENTS PERFORMED BY  Alanis Corbin MD      INDICATIONS FOR PROCEDURES  Inez Boston is a 47 year old patient with involuntary movements secondary to the diagnosis of cervical and generalized dystonia. Her baseline symptoms have been recalcitrant to oral medications and conservative therapy.  She is here today for reinjection with Botox.    GOAL OF PROCEDURE  The goal of this procedure is to increase active range of motion, improve volitional motor control, decrease pain  and enhance functional independence.      TOTAL DOSE ADMINISTERED  Dose Administered:  280 units  Botox (Botulinum Toxin Type A)       2:1 Dilution   Unavoidable Drug Waste: Yes  Amount of drug waste (mL): 20 units.  Reason for waste:  Single use vial  Diluent Used:  0.25% bupivacaine   Total Volume of Diluent Used: 5.6 ml  See MAR  NDC #: Botox 100u ([address]-68)      CONSENT  The risks, benefits, and treatment options were discussed with Inez Boston and she agreed to proceed.    Written consent was obtained by PS.     EQUIPMENT USED  Needle-37mm stimulating/recording  Needle-30 gauge  EMG/NCS Machine    SKIN PREPARATION  Skin preparation was performed using an alcohol wipe.    GUIDANCE DESCRIPTION  Electro-myographic guidance was necessary throughout the neck portion of the procedure to accurately identify all areas of dystonic muscles while avoiding injection of non-dystonic muscles, neighboring nerves and nearby vascular structures.     AREA/MUSCLE INJECTED: 280 UNITS BOTOX = TOTAL DOSE, 2:1 DILUTION    1. NECK & UPPER EXTREMITY MUSCLES: 85 UNITS BOTOX = TOTAL DOSE    Right Upper Trapezius (upper cervical) - 15 units of Botox at 3 site/s.   Left Upper Trapezius (upper cervical) - 10 units of Botox at 2 site/s.    Right Splenius Cervicis - 5 units of Botox at 1  site/s.   Left Splenius Cervicis -  5 units of Botox at 1 site/s.    Right Levator Scapulae (upper cervical) - 10 units of Botox at 1 site/s.   Left Levator Scapulae (upper cervical) - 10 units of Botox at 1 site/s.    Right Pectoralis Minor - 5 units at 1 site/s.   Left Pectoralis Minor - 5 units at 1 site/s.     Right Rhomboids - 10 units at 2 site/s.   Left Rhomboids - 10 units at 2 site/s.     2. TRUNK MUSCLES: 65 UNITS BOTOX = TOTAL DOSE     Right Quadratus Lumborum - 10 units of Botox at 2 site/s.u   Left Quadratus Lumborum - 15 units of Botox at 2 site/s.     Right Latissimus Dorsi - 5 units of Botox at 2 site/s.    Left Latissimus Dorsi - 5 units of Botox at 2 site/s.      Right Rectus Abdominis - 15 units of Botox at 3 site/s.    Left Rectus Abdominis - 15 units of Botox at 3 site/s.     3. FACIAL & SCALP MUSCLES: 130 UNITS BOTOX = TOTAL DOSE    Right Frontalis - 10 units of Botox at 2 site/s.  Left Frontalis - 10 units of Botox at 2 site/s.    Right Temporalis - 30 units of Botox at 4 site/s.  Left Temporalis - 30 units of Botox at 4 site/s.    Left Occipitalis - 20 units of Botox at 4 site/s.    Right  - 5 units of Botox at 1 site/s.   Left  - 5 units of Botox at 1 site/s.    Procerus - 5 units of Botox at 1 site/s.    Right Orbicularis Oculi - 7.5 units of Botox at 3 site/s.  Left Orbicularis Oculi - 7.5 units of Botox at 3 site/s.       RESPONSE TO PROCEDURE  Inez Boston tolerated the procedure well and there were no immediate complications. She was allowed to recover for an appropriate period of time and was discharged home in stable condition.    ASSESSMENT AND PLAN   1. Botulinum toxin injections: Dose of Botox remained unchanged at 280 units as she had improvement in symptoms during this last injection cycle.  Patient will continue to monitor response to Botox and report at next appointment.  Of note in future may be easier to have patient position in PRONE position for upper  Trapezius, Levator Scapulae, Splenius Cervicis, & Rhomboids due to intermittent spasms during injection.  2. Nursing/Prior Authorization team will reach out to patient regarding potential insurance switch and botox coverage.  3. Follow up: Inez Boston was rescheduled for the next series of injections in 12 weeks, at which time we will evaluate response to today's injections. she may call the clinic prior with any questions or concerns prior to the next appointment.       Injections were done by Dr. Duglas Brady our resident physician under my direct supervision.     Alanis Corbin MD  Physical Medicine & Rehabilitation

## 2023-03-02 NOTE — TELEPHONE ENCOUNTER
Called patient per Dr. Corbin's request to review patient's questions on if she changes to a [place] [employer] [place] policy if we would know her coverage for Botox.     Advised patient that with [place] [employer] she will pick a plan under a specific insurance company that she selects from their major insurance carriers that are available as [place] [employer] insurance plans. Explained that each company will have individual policies for what things they cover and at what rate they will cover them. Advised she can request to review the coverage that they have listed for Botox for the individual policies to see what they will cover.     Patient verbalized understanding, stated she will plan to look into it further and will call back if she has any further questions.     ROSITA Layne RN Care Coordinator  [facility]   [facility] main phone # [phone] fax # [phone]

## 2023-03-02 NOTE — LETTER
"3/2/2023       RE: Inez Boston  [address] [facility] [address]     Dear Colleague,    Thank you for referring your patient, Inez Boston, to the [facility] [place] at [facility]. Please see a copy of my visit note below.      [facility]     PM&R CLINIC NOTE  BOTULINUM TOXIN PROCEDURE      HPI  Chief Complaint   Patient presents with     RECHECK     Botox injection confirmed with patient     Inez Boston is a 47 year old female who presents to clinic for botulinum toxin injections for management of cervical dystonia.      SINCE LAST VISIT  Inez Boston was last seen here in clinic on 12/21/2022, at which time she received 280 units of Botox, which was an increase from previous dose of 200 units of Botox.     Patient denies new medical diagnoses, illnesses, hospitalizations, emergency room visits, and injuries since the previous injection with botulinum neurotoxin.     Patient notes that her left eye has been most bothersome, stating her eyelids \"feel like they are wrapped up like an accordion.\"  Continues to keep her eyes closed whenever she can.  Patient is likely switching insurance and would like to be contacted regarding prior auth/approve required to maintain her botox coverage.      RESPONSE TO PREVIOUS TREATMENT  Side effects: No problems reported.    Pain Improvement: Yes. Approximately 75% improvement. Duration of Benefit:  9-10 weeks followed by gradual decrease in benefit over the last few weeks.     Dystonia Improvement: Yes. Approximately 70% improvement. Botox has been extremely beneficial with regards to her dystonia.   Duration of Benefit:   9-10 weeks followed by gradual decrease in benefit. When Botox is working, states \"the band is not as tight around her body\"       PHYSICAL EXAM  VS: /73 (BP Location: Right arm, Patient Position: " Sitting, Cuff Size: Adult Large)   Pulse 95   Wt 74.8 kg (165 lb)   SpO2 98%   BMI 25.09 kg/m     GEN: Pleasant and cooperative, in no acute distress  HEENT: No asymmetry with eyebrow lift.  SPASMS: Dystonic movements with irritation and facial movements.       ALLERGIES  No Known Allergies    CURRENT MEDICATIONS    Current Outpatient Medications:      aspirin 81 MG EC tablet, Take 81 mg by mouth every morning 2 baby aspirin a day, Disp: , Rfl:      B Complex Vitamins (VITAMIN-B COMPLEX PO), Take 1 tablet by mouth every other day, Disp: , Rfl:      baclofen (LIORESAL) 10 MG tablet, nightly as needed, Disp: , Rfl:      cholecalciferol 50 MCG (2000 UT) tablet, 2,000 Units every morning, Disp: , Rfl:      DULoxetine (CYMBALTA) 20 MG capsule, daily, Disp: , Rfl:      lisdexamfetamine (VYVANSE) 40 MG capsule, Take 40 mg by mouth every morning, Disp: , Rfl:      Omega-3 Fatty Acids (FISH OIL OMEGA-3 PO), , Disp: , Rfl:      carbidopa-levodopa (SINEMET)  MG tablet, 1/2 tab by mouth daily x 3 days then 1 tab by mouth daily for one week then 1 tab twice daily (medication should be taken with bread or applesauce - not protein to be more effective) (Patient not taking: Reported on 2023), Disp: 60 tablet, Rfl: 11    Current Facility-Administered Medications:      botulinum toxin type A (BOTOX) 100 units injection 100 Units, 100 Units, Intramuscular, Q90 Days, Ephraim Wu MD     botulinum toxin type A (BOTOX) 100 units injection 400 Units, 400 Units, Intramuscular, Q90 Days, Alanis Corbin MD, 300 Units at 23 0855     botulinum toxin type A (BOTOX) 100 units injection 400 Units, 400 Units, Intramuscular, Q90 Days, Elizabeth Fischer MD, 280 Units at 22 1514       BOTULINUM NEUROTOXIN INJECTION PROCEDURES    VERIFICATION OF PATIENT IDENTIFICATION AND PROCEDURE     Initials   Patient Name PS   Patient  PS   Procedure Verified by: PS     Prior to the start of the procedure and with  procedural staff participation, I verbally confirmed the patient s identity using two indicators, relevant allergies, that the procedure was appropriate and matched the consent or emergent situation, and that the correct equipment/implants were available. Immediately prior to starting the procedure I conducted the Time Out with the procedural staff and re-confirmed the patient s name, procedure, and site/side. (The Joint Commission universal protocol was followed.)  Yes    Sedation (Moderate or Deep): None    ABOVE ASSESSMENTS PERFORMED BY  Alanis Corbin MD      INDICATIONS FOR PROCEDURES  Inez Boston is a 47 year old patient with involuntary movements secondary to the diagnosis of cervical and generalized dystonia. Her baseline symptoms have been recalcitrant to oral medications and conservative therapy.  She is here today for reinjection with Botox.    GOAL OF PROCEDURE  The goal of this procedure is to increase active range of motion, improve volitional motor control, decrease pain  and enhance functional independence.      TOTAL DOSE ADMINISTERED  Dose Administered:  280 units  Botox (Botulinum Toxin Type A)       2:1 Dilution   Unavoidable Drug Waste: No  Diluent Used:  0.25% bupivacaine   Total Volume of Diluent Used: 5.6 ml  See MAR  NDC #: Botox 100u ([address]-02)      CONSENT  The risks, benefits, and treatment options were discussed with Inez Boston and she agreed to proceed.    Written consent was obtained by PS.     EQUIPMENT USED  Needle-37mm stimulating/recording  Needle-30 gauge  EMG/NCS Machine    SKIN PREPARATION  Skin preparation was performed using an alcohol wipe.    GUIDANCE DESCRIPTION  Electro-myographic guidance was necessary throughout the neck portion of the procedure to accurately identify all areas of dystonic muscles while avoiding injection of non-dystonic muscles, neighboring nerves and nearby vascular structures.     AREA/MUSCLE INJECTED: 280 UNITS BOTOX = TOTAL DOSE, 2:1  DILUTION    1. NECK & UPPER EXTREMITY MUSCLES: 85 UNITS BOTOX = TOTAL DOSE    Right Upper Trapezius (upper cervical) - 15 units of Botox at 3 site/s.   Left Upper Trapezius (upper cervical) - 10 units of Botox at 2 site/s.    Right Splenius Cervicis - 5 units of Botox at 1 site/s.   Left Splenius Cervicis -  5 units of Botox at 1 site/s.    Right Levator Scapulae (upper cervical) - 10 units of Botox at 1 site/s.   Left Levator Scapulae (upper cervical) - 10 units of Botox at 1 site/s.    Right Pectoralis Minor - 5 units at 1 site/s.   Left Pectoralis Minor - 5 units at 1 site/s.     Right Rhomboids - 10 units at 2 site/s.   Left Rhomboids - 10 units at 2 site/s.     2. TRUNK MUSCLES: 65 UNITS BOTOX = TOTAL DOSE     Right Quadratus Lumborum - 10 units of Botox at 2 site/s.u   Left Quadratus Lumborum - 15 units of Botox at 2 site/s.     Right Latissimus Dorsi - 5 units of Botox at 2 site/s.    Left Latissimus Dorsi - 5 units of Botox at 2 site/s.      Right Rectus Abdominis - 15 units of Botox at 3 site/s.    Left Rectus Abdominis - 15 units of Botox at 3 site/s.     3. FACIAL & SCALP MUSCLES: 130 UNITS BOTOX = TOTAL DOSE    Right Frontalis - 10 units of Botox at 2 site/s.  Left Frontalis - 10 units of Botox at 2 site/s.    Right Temporalis - 30 units of Botox at 4 site/s.  Left Temporalis - 30 units of Botox at 4 site/s.    Left Occipitalis - 20 units of Botox at 4 site/s.    Right  - 5 units of Botox at 1 site/s.   Left  - 5 units of Botox at 1 site/s.    Procerus - 5 units of Botox at 1 site/s.    Right Orbicularis Oculi - 7.5 units of Botox at 3 site/s.  Left Orbicularis Oculi - 7.5 units of Botox at 3 site/s.       RESPONSE TO PROCEDURE  Inez Boston tolerated the procedure well and there were no immediate complications. She was allowed to recover for an appropriate period of time and was discharged home in stable condition.    ASSESSMENT AND PLAN   1. Botulinum toxin injections: Dose of  Botox remained unchanged at 280 units as she had improvement in symptoms during this last injection cycle.  Patient will continue to monitor response to Botox and report at next appointment.  Of note in future may be easier to have patient position in PRONE position for upper Trapezius, Levator Scapulae, Splenius Cervicis, & Rhomboids due to intermittent spasms during injection.  2. Nursing/Prior Authorization team will reach out to patient regarding potential insurance switch and botox coverage.  3. Follow up: Inez Boston was rescheduled for the next series of injections in 12 weeks, at which time we will evaluate response to today's injections. she may call the clinic prior with any questions or concerns prior to the next appointment.       Injections were done by Dr. Duglas Brady our resident physician under my direct supervision.     Alanis Corbin MD  Physical Medicine & Rehabilitation

## 2023-03-02 NOTE — NURSING NOTE
Chief Complaint   Patient presents with     RECHECK     Botox injection confirmed with patient     Cherri Perla CMA at 7:58 AM on 3/2/2023.

## 2023-04-02 ENCOUNTER — HEALTH MAINTENANCE LETTER (OUTPATIENT)
Age: 48
End: 2023-04-02

## 2023-05-03 ENCOUNTER — TELEPHONE (OUTPATIENT)
Dept: BEHAVIORAL HEALTH | Facility: CLINIC | Age: 48
End: 2023-05-03
Payer: COMMERCIAL

## 2023-05-08 ENCOUNTER — HOSPITAL ENCOUNTER (OUTPATIENT)
Dept: BEHAVIORAL HEALTH | Facility: CLINIC | Age: 48
Discharge: HOME | End: 2023-05-08
Attending: FAMILY MEDICINE | Admitting: FAMILY MEDICINE
Payer: MEDICAID

## 2023-05-08 VITALS — HEIGHT: 68 IN | WEIGHT: 160 LBS | BODY MASS INDEX: 24.25 KG/M2

## 2023-05-08 DIAGNOSIS — F12.10 CANNABIS USE DISORDER, MILD, ABUSE: ICD-10-CM

## 2023-05-08 DIAGNOSIS — F17.200 TOBACCO USE DISORDER, MODERATE, DEPENDENCE: ICD-10-CM

## 2023-05-08 PROCEDURE — H0001 ALCOHOL AND/OR DRUG ASSESS: HCPCS | Mod: GT

## 2023-05-08 RX ORDER — DULOXETINE 40 MG/1
CAPSULE, DELAYED RELEASE ORAL
COMMUNITY
Start: 2023-02-12 | End: 2023-07-17

## 2023-05-08 ASSESSMENT — PATIENT HEALTH QUESTIONNAIRE - PHQ9
10. IF YOU CHECKED OFF ANY PROBLEMS, HOW DIFFICULT HAVE THESE PROBLEMS MADE IT FOR YOU TO DO YOUR WORK, TAKE CARE OF THINGS AT HOME, OR GET ALONG WITH OTHER PEOPLE: SOMEWHAT DIFFICULT
SUM OF ALL RESPONSES TO PHQ QUESTIONS 1-9: 6
SUM OF ALL RESPONSES TO PHQ QUESTIONS 1-9: 6

## 2023-05-08 ASSESSMENT — ANXIETY QUESTIONNAIRES
2. NOT BEING ABLE TO STOP OR CONTROL WORRYING: NOT AT ALL
7. FEELING AFRAID AS IF SOMETHING AWFUL MIGHT HAPPEN: NOT AT ALL
GAD7 TOTAL SCORE: 2
1. FEELING NERVOUS, ANXIOUS, OR ON EDGE: NOT AT ALL
GAD7 TOTAL SCORE: 2
IF YOU CHECKED OFF ANY PROBLEMS ON THIS QUESTIONNAIRE, HOW DIFFICULT HAVE THESE PROBLEMS MADE IT FOR YOU TO DO YOUR WORK, TAKE CARE OF THINGS AT HOME, OR GET ALONG WITH OTHER PEOPLE: NOT DIFFICULT AT ALL
4. TROUBLE RELAXING: SEVERAL DAYS
GAD7 TOTAL SCORE: 2
6. BECOMING EASILY ANNOYED OR IRRITABLE: NOT AT ALL
7. FEELING AFRAID AS IF SOMETHING AWFUL MIGHT HAPPEN: NOT AT ALL
8. IF YOU CHECKED OFF ANY PROBLEMS, HOW DIFFICULT HAVE THESE MADE IT FOR YOU TO DO YOUR WORK, TAKE CARE OF THINGS AT HOME, OR GET ALONG WITH OTHER PEOPLE?: NOT DIFFICULT AT ALL
5. BEING SO RESTLESS THAT IT IS HARD TO SIT STILL: SEVERAL DAYS
3. WORRYING TOO MUCH ABOUT DIFFERENT THINGS: NOT AT ALL

## 2023-05-08 ASSESSMENT — PAIN SCALES - GENERAL: PAINLEVEL: MODERATE PAIN (4)

## 2023-05-08 ASSESSMENT — COLUMBIA-SUICIDE SEVERITY RATING SCALE - C-SSRS
TOTAL  NUMBER OF ABORTED OR SELF INTERRUPTED ATTEMPTS LIFETIME: NO
TOTAL  NUMBER OF INTERRUPTED ATTEMPTS LIFETIME: NO
ATTEMPT LIFETIME: NO
6. HAVE YOU EVER DONE ANYTHING, STARTED TO DO ANYTHING, OR PREPARED TO DO ANYTHING TO END YOUR LIFE?: NO
1. HAVE YOU WISHED YOU WERE DEAD OR WISHED YOU COULD GO TO SLEEP AND NOT WAKE UP?: NO
2. HAVE YOU ACTUALLY HAD ANY THOUGHTS OF KILLING YOURSELF?: NO

## 2023-05-08 NOTE — PROGRESS NOTES
[facility]  Provider Name:  BOLIVAR [facility]     Telephone: [phone]      PATIENT'S NAME: Inez Boston  PREFERRED NAME: Inez  PRONOUNS: she/her/hers    MRN: [id number]  : 1975  ADDRESS:   [address] [place] [address]  E-MAIL: [email or web address]   ACCT. NUMBER:  [id number]  DATE OF SERVICE: May 8, 2023  START TIME: 2:59 pm  END TIME: 4:00 pm  PREFERRED PHONE: [phone]   May we leave a program related message: Yes  SERVICE MODALITY:  Video Visit:    Provider verified identity through the following two step process.  Patient provided:  Patient  (1975) and Patient's last 4 digits of N ([phone])    Telemedicine Visit: The patient's condition can be safely assessed and treated via synchronous audio and visual telemedicine encounter.      Reason for Telemedicine Visit: Services only offered telehealth    Originating Site (Patient Location): Patient's home    Distant Site (Provider Location): Provider Remote Setting    Consent:  The patient/guardian has verbally consented to: the potential risks and benefits of telemedicine (video visit) versus in person care; bill my insurance or make self-payment for services provided; and responsibility for payment of non-covered services.     Patient would like the video invitation sent by:  My Chart    Mode of Communication:  Video Conference via Amwell    EMERGENCY CONTACT:   Angelic Boston (mother)  Tel #: [phone]    UNIVERSAL ADULT SUBSTANCE USE DISORDER DIAGNOSTIC ASSESSMENT    Identifying Information:  The patient is a 47 year old, /White female of  descent.  The patient was referred for an assessment by self.  The patient attended the session alone.     Chief Complaint:   The reason for seeking services at this time is:  The patient reported the reason for participating in the substance use disorder assessment today on 2023 was due to the patient  having concerns about her vaping of nicotine and due to her parents confronting her about her vaping of nicotine.  The patient reported having a very stressful few years, when she had turned down a new job at the [employer] which would have required her to move away from her boyfriend/partner of 7 years and his daughter, due to developing long COVID in early 2020, and subsequently developing dystonia, which had been an ongoing medical condition continuing to cause significant problems and discomfort for the patient.  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  The patient reported she had been vaping nicotine on a daily basis for the past several years and she had been concerned about how quickly she had been using up a nicotine vape pen.  The patient reported her last use of nicotine was on 4/15/2023, but she reported she is still struggling with cravings and the temptation to return to using a nicotine vape pen.  The patient also reported she had been smoking a few hits of THC/cannabis around 1-time per week on average over the past 12-months, but she reported she felt it would be much easier to stop her use of THC/cannabis as she doesn't have the  same desire and cravings to use THC/cannabis as she does with vaping nicotine.  The patient was provided with information on [facility] [place] ([email or web address]://www.[facility]n.com/) a nicotine cessation program and she was encouraged to talk with her primary care provider regarding Chantix or a similar medication to assist her with nicotine cessation.  The patient also expressed a desire to work with a 1:1 mental health therapist, so this counselor informed the patient a referral would be made to the [facility] to assist the patient with setting up 1:1 therapy.  The patient first had a concern about having substance abuse issues with nicotine between 4-5 years.  The patient reported she had attempted to stop her use of nicotine on her own in the past, but she had been unsuccessful in her attempts to maintain abstinence from nicotine.  The patient denied having any history of participating in a substance use disorder treatment program.  The patient denied having any inpatient detoxification admissions or inpatient hospitalizations for withdrawal symptoms.  The patient is not currently receiving any substance use disorder treatment services.  The patient denied having any history of attending 12-step or other recovery support group meetings.  The patient does not appear to be in severe withdrawal, an imminent safety risk to self or others, or requiring immediate medical attention and may proceed with the assessment interview.    Social/Family History:  The patient reported growing up in [place], [place].  The patient reported being raised by both of her biological parents in the same family home.  The patient denied experiencing or witnessing any verbal, physical or sexual abuse in the family home.  The patient reported overall her childhood was happy.  The patient reported feeling supported by her mother and her father when she was growing up.  The patient reported being raised in the [employer] ([employer]).   The patient described current relationships with her family of origin as being close and good.      The patient describes her cultural background as being a /White female of  descent.  Cultural influences and impact on patient's life structure, values, norms, and healthcare: The patient denied cultural concerns had an impact on life structure, values, norms, or healthcare.  Contextual influences on patient's health include: Community Factors: The patient reported she had first started to drink alcohol and smoke THC/cannabis with her friends/peers in social situations.  The patient identified her preferred language to be English.  The patient reported she does not need the assistance of an  or other support involved in therapy.  The patient reported she is not currently involved in community connie activities.  The patient denied having any prior periods of recovery.    The patient reported experiencing significant delays in developmental tasks, such as having undiagnosed symptoms of ADHD.  The patient's highest education level was graduate school.  The patient identified the following learning problems: attention and concentration.  The patient reports she is able to understand written materials.    The patient reported the following relationship history: The patient denied having any history of being .  The patient identified as being heterosexual and she reported being single and not in a romantic relationship at this time.  The patient denied having any children.     The patient's current living/housing situation involves staying with someone.  The patient reported living with her brother and 1 roommate and she reported her housing is stable.  The patient denied having any concerns regarding her immediate living environment and/or neighborhood and she plans to continue to live there.  The patient identified her father, her mother and her brother as being her primary support  network at this time.  The patient identified the quality of her relationships with her support network as being close and good.  The patient would like the following people involved in treatment services if recommended: None at this time.     The patient reported engaging in the following recreational/leisure activities: reading, going for walks, going to watch music and consuming news and politics.  The patient reported engaging in the following recreation/leisure activities while using alcohol or other non-prescribed mood altering chemicals: Most of her past use of alcohol had been done in social situations and her use of THC/cannabis had been done both socially and alone.  The patient reported the following people are supportive of her recovery: her father, her mother and her brother.  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  The patient reported her income is obtained through parents.  The patient reported having financial stressors at this time, including having only minimal income at this time, money being tight at this time and having some debt related to taxes she needs to pay.  Cultural and socioeconomic factors do not affect the patient's access to services.    The patient denied having any substance related arrests or legal issues.  The patient denied having any history of being on court probation.    Patient's Strengths and Limitations:  The patient identified the following strengths or resources that will help her succeed in treatment: commitment to health and well being, family support and motivation.  Things that may interfere with the patient's success in treatment include: having ongoing multiple life stressors regarding unemployment, chronic medical problems, mental health issues and having ongoing cravings to use nicotine.     Assessments:  The following assessments were completed by patient for this visit:  PHQ9:        2/3/2022     2:27 PM 5/8/2023     2:43 PM   PHQ-9 SCORE   PHQ-9 Total Score MyChart  6 (Mild depression)   PHQ-9 Total Score 2 6     GAD7:       2/3/2022     2:27 PM 5/8/2023     2:44 PM   DUNCAN-7 SCORE   Total Score  2 (minimal anxiety)   Total Score 2 2     PROMIS 10-Global Health (all questions and answers displayed):       5/8/2023     2:46 PM   PROMIS 10   In general, would you say your health is: Good   In general, would you say your quality of life is: Fair   In general, how would you rate your physical health? Fair   In general, how would you rate your mental health, including your mood and your ability to think? Fair   In general, how would you rate your satisfaction with your social activities and relationships? Good   In general, please rate how well you carry out your usual social activities and roles Fair   To what extent are you able to carry out your everyday physical activities such as walking, climbing stairs, carrying groceries, or moving a chair? Completely   In the past 7 days, how often have you been bothered by emotional problems such as feeling anxious, depressed, or irritable? Sometimes   In the past 7 days, how would you rate your fatigue on average? Moderate   In the past 7 days, how would you rate your pain on average, where 0 means no pain, and 10 means worst imaginable pain? 3   In general, would you say your health is: 3   In general, would you say your quality of life is: 2   In general, how would you rate your physical health? 2   In general, how would you rate your mental health, including your mood and your ability to think? 2   In general, how would you rate your satisfaction with your social activities and relationships? 3   In general, please rate how well you carry out your usual social activities and roles. (This includes activities at home, at work and in your community, and responsibilities as a parent, child, spouse, employee, friend, etc.) 2   To what extent are you  able to carry out your everyday physical activities such as walking, climbing stairs, carrying groceries, or moving a chair? 5   In the past 7 days, how often have you been bothered by emotional problems such as feeling anxious, depressed, or irritable? 3   In the past 7 days, how would you rate your fatigue on average? 3   In the past 7 days, how would you rate your pain on average, where 0 means no pain, and 10 means worst imaginable pain? 3   Global Mental Health Score 10   Global Physical Health Score 14   PROMIS TOTAL - SUBSCORES 24     [place] Suicide Severity Rating Scale (Lifetime/Recent)      5/8/2023     3:00 PM   [place] Suicide Severity Rating (Lifetime/Recent)   1. Wish to be Dead (Lifetime) N   2. Non-Specific Active Suicidal Thoughts (Lifetime) N   Actual Attempt (Lifetime) N   Has subject engaged in non-suicidal self-injurious behavior? (Lifetime) N   Interrupted Attempts (Lifetime) N   Aborted or Self-Interrupted Attempt (Lifetime) N   Preparatory Acts or Behavior (Lifetime) N   Calculated C-SSRS Risk Score (Lifetime/Recent) No Risk Indicated     GAIN-sliding scale:      5/8/2023     3:00 PM   When was the last time that you had significant problems...   with feeling very trapped, lonely, sad, blue, depressed or hopeless about the future? Past month   with sleep trouble, such as bad dreams, sleeping restlessly, or falling asleep during the day? Past Month   with feeling very anxious, nervous, tense, scared, panicked or like something bad was going to happen? Past month   with becoming very distressed & upset when something reminded you of the past? Never   with thinking about ending your life or committing suicide? Never          5/8/2023     3:00 PM   When was the last time that you did the following things 2 or more times?   Lied or conned to get things you wanted or to avoid having to do something? 2 to 12 months ago   Had a hard time paying attention at school, work or home? Past month   Had a  hard time listening to instructions at school, work or home? Past month   Were a bully or threatened other people? Never   Started physical fights with other people? Never     Personal and Family Medical History:  The patient did report a family history of mental health concerns.  The patient reported family history includes Attention Deficit Disorder in her brother and mother; Breast Cancer (age of onset: 45) in her mother; Colon Cancer in her maternal grandmother; Congenital heart disease in her paternal grandmother; Depression in her brother and mother; Heart Failure in her paternal grandfather; Hyperlipidemia in her maternal grandfather and maternal grandmother; Osteoporosis in her mother; Substance Abuse in her paternal uncle; Thyroid Disease in her paternal grandmother; Urinary tract infection in her maternal grandmother and mother; Uterine Cancer in an other family member.    The patient reported the following previous mental health diagnoses: The patient reported a history of Anxiety disorder NOS.  The patient reported her primary mental health symptoms include: depression, anxiety, sleep problems, symptoms related to past traumatic life events and attentional problems and these do not impact her ability to function.  The patient has received mental health services in the past: The patient reported taking her prescribed psychotropic medications as prescribed.  The patient reported a history of working with a 1:1 mental health therapist in the past, but she denied working with a 1:1 mental health therapist at this time.  Psychiatric Hospitalizations: None.  The patient denies a history of civil commitment.  Current mental health services/providers include:  The patient reported taking her prescribed psychotropic medications as prescribed.  The patient reported a history of working with a 1:1 mental health therapist in the past, but she denied working with a 1:1 mental health therapist at this time.    The  patient has had a physical exam to rule out medical causes for current symptoms.  Date of last physical exam was within the past year. The patient was encouraged to follow up with PCP if symptoms were to develop.  The patient has a [facility] Provider, who is named Tony Segal.  The patient reported the following medical concerns:   Past Medical History:   Diagnosis Date     ADHD (attention deficit hyperactivity disorder)      Anxiety      Cervical dystonia      Thrombocytosis    The patient reported taking her medications as prescribed and following the recommendations of her healthcare providers.  The patient reported pain concerns including having chronic pain related to her dystonia.  The patient did not feel there was any need for additional help addressing this pain concerns.  The patient was uncertain if she was currently pregnant.  There are not significant appetite / nutritional concerns / weight changes.  The patient does  report having a history of an eating disorder.  The patient reported being Bulimic for a few months as a teenager, but she has not had symptoms of Bulmia since she was in her teens.  The patient does not report a history of head injury / trauma / cognitive impairment.      The patient reported current medications as:   Outpatient Medications Marked as Taking for the 5/8/23 encounter (Hospital Encounter) with Amador Funes LADC   Medication Sig     aspirin 81 MG EC tablet Take 81 mg by mouth every morning 2 baby aspirin a day     B Complex Vitamins (VITAMIN-B COMPLEX PO) Take 1 tablet by mouth every other day     baclofen (LIORESAL) 10 MG tablet Take 1 tablet (10 mg) by mouth nightly as needed     DULoxetine (CYMBALTA) 20 MG capsule daily     DULoxetine HCl 40 MG CPEP      Omega-3 Fatty Acids (FISH OIL OMEGA-3 PO)      Current Facility-Administered Medications for the 5/8/23 encounter (Hospital Encounter) with Amador Funes LADC   Medication     botulinum toxin  type A (BOTOX) 100 units injection 100 Units     botulinum toxin type A (BOTOX) 100 units injection 400 Units     botulinum toxin type A (BOTOX) 100 units injection 400 Units     Medication Adherence:  The patient reported taking her prescribed medications as prescribed.  The patient reported being able  to self-administer his medications.    Patient Allergies:    No Known Allergies    Medical History:    Past Medical History:   Diagnosis Date     ADHD (attention deficit hyperactivity disorder)      Anxiety      Cervical dystonia      Thrombocytosis      Substance Use:  The patient reported the following biological family members or relatives with chemical health issues: family history includes Substance Abuse in her paternal uncle.  The patient denied having any history of participating in a substance use disorder treatment program.  The patient denied having any inpatient detoxification admissions or inpatient hospitalizations for withdrawal symptoms.  The patient is not currently receiving any substance use disorder treatment services.  The patient denied having any history of attending 12-step or other recovery support group meetings.      Substance Age of first use Pattern and duration of use (include amounts and frequency) Date of last use     Withdrawal potential Route of use   Has used Alcohol 21 The patient reported her heaviest use of alcohol had been between the ages of 34 and 37, when she reported a pattern of drinking 4-5 beers 1-2 times per week.    The patient reported her longest period of time without drinking alcohol was for 3-4 months at a time and her return to drinking alcohol was due to drinking alcohol in social situations.     During the past 12-months, she reported a pattern of drinking between 1-2 beers 1 time per month.    5/6/2023    (3 Mimosa's) No Oral   Has used Marijuana   23 The patient reported her heaviest use of THC/cannabis had been during the past 12-months, when she reported a  pattern of smoking or vaping few hits of THC/cannabis 1 time per week on average.   5/1/2023    (few hits) No Smoked and Vaping   Has not used Amphetamines          Has not used Cocaine/crack           Has not used Hallucinogens        Has not used Inhalants        Has not used Heroin        Has not used Other Opiates        Has not used Benzodiazepines          Has not used Barbiturates        Has not used Over the counter medications        Has used Nicotine 34 The patient reported a history of vaping nicotine on a daily basis for the past several years with her last use of nicotine being on 4/15/2023.    4/15/2023 No  Vaping    Has use Caffeine 12 The patient reported a current pattern of drinking 1/2 cup of coffee around 3-4 times per week on average.    5/4/2023 No  Oral   Has not used other substances not listed above:  Identify:           The patient reported the following problems as a result of their substance use: relationship problems and family problems.  The patient is concerned about substance use.  The patient reported her recovery goal is: The patient's plan and goal is to abstain from THC/cannabis and from all other non-prescribed mood altering chemicals.     The patient reports experiencing the following withdrawal symptoms within the past 12 months: none, other than having some minor symptoms of withdrawal from nicotine and the following within the past 30 days: none, other than having some minor symptoms of withdrawal from nicotine. (DSM-11)  The patient reported having urges to use cannabis/THC and nicotine.  (DSM-4)  The patient reported she has not used more cannabis/THC than intended or over a longer period of time than intended.  (DSM-1)  The patient reported she has had unsuccessful attempts to cut down or control use of nicotine.  (DSM-2)  The patient reported her longest period of abstinence from nicotine had been since 4/15/2023.  The patient reported she has needed to use more nicotine  to achieve the same effect.  (DSM-10)  The patient does report diminished effect with use of same amount of nicotine.  (DSM-10)     The patient does not report a great deal of time is spent in activities necessary to obtain, use, or recover from cannabis/THC effects.  (DSM-3)  The patient does not report important social, occupational, or recreational activities are given up or reduced because of cannabis/THC use.  (DSM-7)  THC/Cannabis use is continued despite knowledge of having a persistent or recurrent physical or psychological problem that is likely to have been exacerbated by use.   (DSM-9)  The patient reported the following problem behaviors while under the influence of substances: None.  (DSM-6)  The patient denied having any recurrent use of cannabis/THC in physically hazardous situations.  (DSM-8)    The patient reported her substance use has not negatively impacted her ability to function in a school setting within the past 12-months.  (DSM-5)  The patient reported her substance use has not negatively impacted her ability to function in a work setting within the past 12-months.  (DSM-5)  The patient's demographics and history impact her recovery in the following ways: Community Factors: The patient reported she had first started to drink alcohol and smoke THC/cannabis with her friends/peers in social situations.  The patient reported engaging in the following recreation/leisure activities while using alcohol or other non-prescribed mood altering chemicals: Most of her past use of alcohol had been done in social situations and her use of THC/cannabis had been done both socially and alone.  The patient reported the following people are supportive of her recovery: her father, her mother and her brother.     The patient denied having current or past concerns regarding gambling and denied ever participating in a gambling treatment program.  The patient does not have other addictive behaviors she is concerned  about at this time.     Dimension Scale Ratings:    Dimension 1 -  Acute Intoxication/Withdrawal: 1 - Minor Problem    Dimension 2 - Biomedical: 2 - Moderate Problem    Dimension 3 - Emotional/Behavioral/Cognitive Conditions: 2 - Moderate Problem    Dimension 4 - Readiness to Change:  1 - Minor Problem    Dimension 5 - Relapse/Continued Use/ Continued Problem Potential: 2 - Moderate Problem    Dimension 6 - Recovery Environment:  2 - Moderate Problem    Significant Losses / Trauma / Abuse / Neglect Issues:   The patient did not serve in the .  There are indications or report of significant loss, trauma, abuse or neglect issues related to: The patient denied having any history of being verbally, emotionally, physically, or sexually abused.  The patient reported having a history of trauma issues due to having long COVID, going through a break-up with her boyfriend/partner of 7 years around 1 and half years ago and due to her brother's girlfriend/significant other committing suicide in 10/2021 when the patient was in town visiting.  The patient denied having any history of suicide attempts and denied having any current suicide ideation.  The patient denied having any history of self-injurious behavior.   Concerns for possible neglect are not present.    Safety Assessment:   The patient denies current homicidal ideation and behaviors.  The patient denies current self-injurious ideation and behaviors.    The patient denied risk behaviors associated with substance use.  The patient denied any high risk behaviors associated with mental health symptoms.  The patient reported the following current concerns for their personal safety: None.  The patient reported there are not any firearms in the home.     History of Safety Concerns:  The patient denied a history of homicidal ideation.     The patient denied a history of personal safety concerns.    The patient denied a history of assaultive behaviors.    The patient  denied having any history of sexual assault behaviors.  The patient denied having any history of being registered as a sex offender.    The patient denied a history of risk behaviors associated with substance use.  The patient denies any history of high risk behaviors associated with mental health symptoms.  The patient reported the following protective factors: positive relationships positive family connections, forward/future oriented thinking, dedication to family and friends, safe and stable environment, effectively controls impulses, regular physical activity, sense of belonging and purpose, living with other people, sense of meaning, healthy fear of risky behaviors or pain and sense of personal control or determination.    Risk Plan:  See Recommendations for Safety and Risk Management Plan    Review of Symptoms per patient report:  Substance Use:  cravings/urges to use.     Diagnostic Criteria:   2.)  There is persistent desire or unsuccessful efforts to cut down or control use of the substance.  Met for:  nicotine.  4.)  Craving, or a strong desire or urge to use the substance.  Met for:  cannabis/THC and nicotine.  9.)  Use of the substance is continued despite knowledge of having a persistent or recurrent physical or psychological problem that is likely to have been cause or exacerbated by the substance.  Met for:  cannabis/THC.  10.)  Tolerance:  either a need for markedly increased amounts of the substance to achieve the desired effect or a markedly diminished effect with continued use of the same amount of the substance.  Met for:  nicotine.  11.)  Withdrawal:  either patient endorses characteristic withdrawal syndrome for the substance or the substance (or closely related substance) is taken to relieve or avoid withdrawal symptoms.  Met for:  nicotine.    Collateral Contact Summary:   Collateral contacts contributing to this assessment:  The patient's electronic medical records were reviewed at time of  assessment.    No additional collateral data had been obtained at the time of this documentation.     If court related records were reviewed, summarize here: None    Information from collateral contacts supported/largely agreed with information from the client and associated risk ratings.    Information in this assessment was obtained from the medical record and provided by the patient who is a good historian.        The patient will have open access to her substance use disorder assessment medical record.    As evidenced by self report and criteria, the patient meets the following DSM-5 Diagnoses: (Sustained by DSM-5 Criteria Listed Above)      1.)  Tobacco Use Disorder Moderate - 305.10 (F17.200)  2.)  Cannabis Use Disorder Mild - 305.20 (F12.10)  3.)  Anxiety disorder NOS, per patient self-report  4.)  ADHD, per patient self-report  5.)  Rule out Depression NOS    Specify if: In early remission:  After full criteria for alcohol/drug use disorder were previously met, none of the criteria for alcohol/drug use disorder have been met for at least 3 months but for less than 12 months (with the exception that Criterion A4,  Craving or a strong desire or urge to use alcohol/drug  may be met).     In sustained remission:   After full criteria for alcohol use disorder were previously met, none of the criteria for alcohol/drug use disorder have been met at any time during a period of 12 months or longer (with the exception that Criterion A4,  Craving or strong desire or urge to use alcohol/drug  may be met).     Specify if:   This additional specifier is used if the individual is in an environment where access to alcohol is restricted.    Mild: Presence of 2-3 symptoms  Moderate: Presence of 4-5 symptoms  Severe: Presence of 6 or more symptoms    Recommendations:     1. Plan for Safety and Risk Management:     It was recommended the patient call 911 or go to the local Emergency Department should there be any significant  change in the above risk factors.            Report to child / adult protection services was NA.    2. PRIYANAK Referrals:      Recommendations:      1.)  It was recommended the patient abstain from THC/cannabis, nicotine and from all other non-prescribed mood altering chemicals.   2.)  Follow all of the recommendations of her medical and mental health providers.  3.)  Participate in 1:1 counseling sessions with an in-network 1:1 mental health therapist to help her address her current mental health issues, her grief and loss issues and her current life stressors.  9050.753 ORDER: A 9050.753 ORDER for a referral for this patient to work with a 1:1 therapist had been submitted to Divya Moreno the Care Coordinator on 5/9/2023.  4.)  Talk to her primary care provider regarding getting on Chantix or a similar medication to assist her with nicotine cessation  5.)  Work with [facility] [place] (https://[email or web address].[employer]/) or a similar nicotine cessation support program to help her stop her use of nicotine.  6.)  Additionally, if the patient were unable to maintain abstinence from THC/cannabis on her own, it would be recommended she have an updated substance use disorder assessment to potentially be provided with additional referrals.    The patient reported she was willing to follow the above recommendations.      The patient would like the following family or other support people involved in their treatment:  None at this time.  The patient does not have any history of opioid abuse.      3.  Mental Health Referrals:     The patient would benefit from participating in 1:1 counseling sessions with an in-network 1:1 mental health therapist to help her address her current mental health issues, her grief and loss issues and her current life stressors.  9050.753 ORDER: A 9050.753 ORDER for a referral for this patient to work with a 1:1 therapist had been submitted to Divya Moreno the Care Coordinator on 5/9/2023.    4. Clinical  Substantiation for the above recommendations: The patient does not appear to have any substance abuse issues which would require her to participate in an intensive outpatient substance use disorder treatment program.  The patient reported her main concerns were regarding her ongoing health problems related to her dystonia and related to her ongoing cravings to use nicotine.  The patient was provided with information on [facility] [place] (https://[email or web address]/) a nicotine cessation program and she was encouraged to talk with her primary care provider regarding Chantix or a similar medication to assist her with nicotine cessation.  The patient felt she would be able to stop her use of THC/cannabis on her own and she was encouraged to reach out to this counselor if she were having any difficulty stopping her use of THC/cannabis.     Past Medical History:   Diagnosis Date     ADHD (attention deficit hyperactivity disorder)      Anxiety      Cervical dystonia      Thrombocytosis        5.  Cultural Concerns:    The patient did not identify having any cultural concerns regarding mental health, physical health, or substance use issues.     6. Recommendations for treatment focus:      Alcohol / Substance Use - See #2. PRIYANKA Referrals above for details on recommendations.  Depressed Mood - See #3. Mental Health Referrals above for details on recommendations.  Anxiety - See #3. Mental Health Referrals above for details on recommendations.  Attentional Problems - See #3. Mental Health Referrals above for details.    7. Interactive Complexity: No     Provider Name/ Credentials:  BETH San  May 8, 2023

## 2023-05-09 PROBLEM — F17.200 TOBACCO USE DISORDER, MODERATE, DEPENDENCE: Status: ACTIVE | Noted: 2023-05-09

## 2023-05-09 PROBLEM — F12.10 CANNABIS USE DISORDER, MILD, ABUSE: Status: ACTIVE | Noted: 2023-05-09

## 2023-05-09 NOTE — PROGRESS NOTES
[address] [address]  5/9/2023    Inez Boston  [facility] [address]      Inez,    This is to verify that Inez Boston ([id number]) participated in a substance use disorder assessment via a video visit with BOLIVAR Whitt/BETH at [facility] in [place], [place] on 5/8/2023.    Recommendations:  1.)  It was recommended the patient abstain from THC/cannabis, nicotine and from all other non-prescribed mood altering chemicals.   2.)  Follow all of the recommendations of her medical and mental health providers.  3.)  Participate in 1:1 counseling sessions with an in-network 1:1 mental health therapist to help her address her current mental health issues, her grief and loss issues and her current life stressors.  9050.753 ORDER: A 9050.753 ORDER for a referral for this patient to work with a 1:1 therapist had been submitted to Divya Moreno the Care Coordinator on 5/9/2023.  4.)  Talk to her primary care provider regarding getting on Chantix or a similar medication to assist her with nicotine cessation  5.)  Work with [facility] [place] (https://www.[facility].com/) or a similar nicotine cessation support program to help her stop her use of nicotine.  6.)  Additionally, if the patient were unable to maintain abstinence from THC/cannabis on her own, it would be recommended she have an updated substance use disorder assessment to potentially be provided with additional referrals.    The patient reported she was willing to follow the above recommendations.      If you have any additional questions or concerns, please feel free to contact me by any of the contact methods listed below.    Sincerely,    BOLIVAR Trotter, LAMINEC  CD Evaluation Counselor  [address]  [address] [address]  [email or web address]  [email or web address]  Tel: [phone]  Fax: [phone]

## 2023-05-17 ENCOUNTER — ANCILLARY PROCEDURE (OUTPATIENT)
Dept: MAMMOGRAPHY | Facility: CLINIC | Age: 48
End: 2023-05-17
Attending: INTERNAL MEDICINE
Payer: COMMERCIAL

## 2023-05-17 DIAGNOSIS — Z80.3 FAMILY HISTORY OF MALIGNANT NEOPLASM OF BREAST: ICD-10-CM

## 2023-05-17 PROCEDURE — 77067 SCR MAMMO BI INCL CAD: CPT | Mod: GC | Performed by: RADIOLOGY

## 2023-05-17 PROCEDURE — 77063 BREAST TOMOSYNTHESIS BI: CPT | Mod: GC | Performed by: RADIOLOGY

## 2023-06-02 ENCOUNTER — OFFICE VISIT (OUTPATIENT)
Dept: PHYSICAL MEDICINE AND REHAB | Facility: CLINIC | Age: 48
End: 2023-06-02
Payer: COMMERCIAL

## 2023-06-02 VITALS — HEART RATE: 76 BPM | OXYGEN SATURATION: 97 % | DIASTOLIC BLOOD PRESSURE: 68 MMHG | SYSTOLIC BLOOD PRESSURE: 104 MMHG

## 2023-06-02 DIAGNOSIS — G24.4 IDIOPATHIC OROFACIAL DYSTONIA: ICD-10-CM

## 2023-06-02 DIAGNOSIS — R09.81 CONGESTION OF PARANASAL SINUS: ICD-10-CM

## 2023-06-02 DIAGNOSIS — G24.3 CERVICAL DYSTONIA: Primary | ICD-10-CM

## 2023-06-02 DIAGNOSIS — G24.1 GENETIC TORSION DYSTONIA: ICD-10-CM

## 2023-06-02 PROCEDURE — 64643 CHEMODENERV 1 EXTREM 1-4 EA: CPT | Performed by: PHYSICAL MEDICINE & REHABILITATION

## 2023-06-02 PROCEDURE — 64646 CHEMODENERV TRUNK MUSC 1-5: CPT | Performed by: PHYSICAL MEDICINE & REHABILITATION

## 2023-06-02 PROCEDURE — 99213 OFFICE O/P EST LOW 20 MIN: CPT | Mod: 25 | Performed by: PHYSICAL MEDICINE & REHABILITATION

## 2023-06-02 PROCEDURE — 64642 CHEMODENERV 1 EXTREMITY 1-4: CPT | Mod: 51 | Performed by: PHYSICAL MEDICINE & REHABILITATION

## 2023-06-02 PROCEDURE — 95874 GUIDE NERV DESTR NEEDLE EMG: CPT | Performed by: PHYSICAL MEDICINE & REHABILITATION

## 2023-06-02 PROCEDURE — 64612 DESTROY NERVE FACE MUSCLE: CPT | Mod: 50 | Performed by: PHYSICAL MEDICINE & REHABILITATION

## 2023-06-02 PROCEDURE — 64616 CHEMODENERV MUSC NECK DYSTON: CPT | Mod: 50 | Performed by: PHYSICAL MEDICINE & REHABILITATION

## 2023-06-02 ASSESSMENT — PAIN SCALES - GENERAL: PAINLEVEL: MILD PAIN (3)

## 2023-06-02 NOTE — PROGRESS NOTES
[facility]     PM&R CLINIC NOTE  BOTULINUM TOXIN PROCEDURE      HPI  Chief Complaint   Patient presents with     RECHECK     Botox injections confirmed with patient     Inez Boston is a 47 year old female with a history of  who presents to clinic for botulinum toxin injections for management of cervical dystonia.      SINCE LAST VISIT  Inez Boston was last seen here in clinic on 3/2/2023, at which time she received 280 units of Botox.     Patient denies new medical diagnoses, illnesses, hospitalizations, emergency room visits, and injuries since the previous injection with botulinum neurotoxin.     She did have one episode about a month ago during which she experienced severe cramping in her abdominal muscles which caused extreme pain and bowel incontinence. She had to lay in fetal position for the symptoms to pass, while she focused on deep breathing and relaxation. These symptoms eventually passed but it was quite distressing. She has had similar episodes in the past.       RESPONSE TO PREVIOUS TREATMENT    Side effects: No problems reported.    Pain Improvement: Yes. Approximately 50% improvement. Duration of Benefit:  9-10 weeks followed by gradual decrease in benefit over the last 3-4 weeks.     Dystonia Improvement: Yes. Approximately 50% improvement. Botox has been extremely beneficial with regards to her dystonia, however this past round has been less effective as compared to previous rounds of Botox.   Duration of Benefit:   9-10 weeks followed by gradual decrease in benefit. She has been having more involuntary movements in her facial muscles as well as the posterior muscles below the rib cages and the adductors in her lower extremities.       PHYSICAL EXAM  VS: /68 (BP Location: Left arm, Patient Position: Sitting, Cuff Size: Adult Regular)   Pulse 76   SpO2 97%    GEN: Pleasant and cooperative, in no acute distress  HEENT: Right mouth drooping. No  asymmetry with eyebrow lift.  HEAD AND NECK: Limited ROM with rotation to the left  HEAD, NECK AND TRUNK PATTERN:   Head & Neck Flexion:  Present  Head & Neck Rotation:   Right  Head & Neck Lateral Bend:   Right  Shoulder Elevation:   Left  JAW AND FACIAL PATTERN:   Jaw Clenching:   Bilateral  VOCAL INVOLVEMENT:   No  SPASMS: Dystonic movements with irritation and facial movements.       ALLERGIES  No Known Allergies    CURRENT MEDICATIONS    Current Outpatient Medications:      aspirin 81 MG EC tablet, Take 81 mg by mouth every morning 2 baby aspirin a day, Disp: , Rfl:      B Complex Vitamins (VITAMIN-B COMPLEX PO), Take 1 tablet by mouth every other day, Disp: , Rfl:      baclofen (LIORESAL) 10 MG tablet, Take 1 tablet (10 mg) by mouth nightly as needed, Disp: 30 tablet, Rfl: 1     cholecalciferol 50 MCG ( UT) tablet, 2,000 Units every morning, Disp: , Rfl:      DULoxetine HCl 40 MG CPEP, , Disp: , Rfl:      Omega-3 Fatty Acids (FISH OIL OMEGA-3 PO), , Disp: , Rfl:     Current Facility-Administered Medications:      botulinum toxin type A (BOTOX) 100 units injection 100 Units, 100 Units, Intramuscular, Q90 Days, Ephraim Wu MD     botulinum toxin type A (BOTOX) 100 units injection 400 Units, 400 Units, Intramuscular, Q90 Days, Elizabeth Fischer MD     botulinum toxin type A (BOTOX) 100 units injection 400 Units, 400 Units, Intramuscular, Q90 Days, Alanis Corbin MD, 300 Units at 23 0855     botulinum toxin type A (BOTOX) 100 units injection 400 Units, 400 Units, Intramuscular, Q90 Days, Elizabeth Fischer MD, 280 Units at 22 1514       BOTULINUM NEUROTOXIN INJECTION PROCEDURES    VERIFICATION OF PATIENT IDENTIFICATION AND PROCEDURE     Initials   Patient Name SES   Patient  SES   Procedure Verified by: SES     Prior to the start of the procedure and with procedural staff participation, I verbally confirmed the patient s identity using two indicators, relevant allergies, that  the procedure was appropriate and matched the consent or emergent situation, and that the correct equipment/implants were available. Immediately prior to starting the procedure I conducted the Time Out with the procedural staff and re-confirmed the patient s name, procedure, and site/side. (The Joint Commission universal protocol was followed.)  Yes    Sedation (Moderate or Deep): None    ABOVE ASSESSMENTS PERFORMED BY    Elizabeth Fischer MD      INDICATIONS FOR PROCEDURES  Inez Boston is a 47 year old patient with involuntary movements secondary to the diagnosis of cervical and generalized dystonia. Her baseline symptoms have been recalcitrant to oral medications and conservative therapy.  She is here today for reinjection with Botox.    GOAL OF PROCEDURE  The goal of this procedure is to increase active range of motion, improve volitional motor control, decrease pain  and enhance functional independence.      TOTAL DOSE ADMINISTERED  Dose Administered:  300 units  Botox (Botulinum Toxin Type A)       2:1 Dilution   Unavoidable Drug Waste: No  Diluent Used:  0.25% bupivacaine  Total Volume of Diluent Used: 6 ml  NDC #: Botox 100u ([address]-26)      CONSENT  The risks, benefits, and treatment options were discussed with Inez Boston and she agreed to proceed.    Written consent was obtained by [facility].     EQUIPMENT USED  Needle-37mm stimulating/recording  Needle-30 gauge  EMG/NCS Machine    SKIN PREPARATION  Skin preparation was performed using an alcohol wipe.    GUIDANCE DESCRIPTION  Electro-myographic guidance was necessary throughout the neck portion of the procedure to accurately identify all areas of dystonic muscles while avoiding injection of non-dystonic muscles, neighboring nerves and nearby vascular structures.     AREA/MUSCLE INJECTED: 300 UNITS BOTOX = TOTAL DOSE, 2:1 DILUTION    1. NECK & UPPER EXTREMITY MUSCLES: 85 UNITS BOTOX = TOTAL DOSE    Right Upper Trapezius (upper cervical) - 15 units of  Botox at 3 site/s.   Left Upper Trapezius (upper cervical) - 10 units of Botox at 2 site/s.    Right Splenius Cervicis - 5 units of Botox at 1 site/s.   Left Splenius Cervicis -  5 units of Botox at 1 site/s.    Right Levator Scapulae - 10 units of Botox at 1 site/s.   Left Levator Scapulae - 10 units of Botox at 1 site/s.    Right Pectoralis Minor - 5 units at 1 site/s.   Left Pectoralis Minor - 5 units at 1 site/s.     Right Rhomboids - 10 units at 2 site/s.   Left Rhomboids - 10 units at 2 site/s.     2. TRUNK MUSCLES: 70 UNITS BOTOX = TOTAL DOSE     Right Quadratus Lumborum - 10 units of Botox at 2 site/s.   Left Quadratus Lumborum - 15 units of Botox at 2 site/s.     Right Latissimus Dorsi - 10 units of Botox at 2 site/s.    Left Latissimus Dorsi - 15 units of Botox at 3 site/s.      Right Rectus Abdominis - 10 units of Botox at 3 site/s.    Left Rectus Abdominis - 10 units of Botox at 3 site/s.     3. LOWER EXTREMITY MUSCLES: 10 UNITS BOTOX = TOTAL DOSE   Right Adductor Alex - 5 units of Botox at 2 site/s.   Left Adductor Alex - 5 units of Botox at 2 site/s.     4. FACIAL & SCALP MUSCLES: 135 UNITS BOTOX = TOTAL DOSE    Right Frontalis - 10 units of Botox at 2 site/s.  Left Frontalis - 10 units of Botox at 2 site/s.    Right Temporalis - 30 units of Botox at 4 site/s.  Left Temporalis - 30 units of Botox at 4 site/s.    Left Occipitalis - 20 units of Botox at 4 site/s.    Right  - 5 units of Botox at 1 site/s.   Left  - 5 units of Botox at 1 site/s.    Procerus - 5 units of Botox at 1 site/s.    Right Orbicularis Oculi - 7.5 units of Botox at 3 site/s.  Left Orbicularis Oculi - 7.5 units of Botox at 3 site/s.     Right Mentalis - 2.5 units of Botox at 1 site/s.   Left Mentalis - 2.5 units of Botox at 1 site/s.       RESPONSE TO PROCEDURE  Inez Boston tolerated the procedure well and there were no immediate complications. She was allowed to recover for an appropriate period of time  and was discharged home in stable condition.    ASSESSMENT AND PLAN   1. Botulinum toxin injections: Dose of Botox was increased from 280 units to 300 units in hopes of increasing effectiveness and prolonging duration of benefit of injections. Patient will continue to monitor response to Botox and report at next appointment.   2. Referrals: ENT referral placed to address significant congestion in frontal/paranasal sinuses.    3. Follow up: Inez Boston was rescheduled for the next series of injections in 12 weeks, at which time we will evaluate response to today's injections. she may call the clinic prior with any questions or concerns prior to the next appointment.     I spent a total of 22 minutes, face-to-face and managing the care of Inez Boston, excluding the procedure. Over 50% of my time was spent counseling the patient and coordinating care. Please see note for details.

## 2023-06-02 NOTE — LETTER
6/2/2023       RE: Inez Boston  [address] [facility] [address]     Dear Colleague,    Thank you for referring your patient, Inez Boston, to the [facility] PHYSICAL MEDICINE AND REHABILITATION CLINIC [place] at [facility]. Please see a copy of my visit note below.      [facility]     PM&R CLINIC NOTE  BOTULINUM TOXIN PROCEDURE      HPI  Chief Complaint   Patient presents with    RECHECK     Botox injections confirmed with patient     Inez Boston is a 47 year old female with a history of  who presents to clinic for botulinum toxin injections for management of cervical dystonia.      SINCE LAST VISIT  Inez Boston was last seen here in clinic on 3/2/2023, at which time she received 280 units of Botox.     Patient denies new medical diagnoses, illnesses, hospitalizations, emergency room visits, and injuries since the previous injection with botulinum neurotoxin.     She did have one episode about a month ago during which she experienced severe cramping in her abdominal muscles which caused extreme pain and bowel incontinence. She had to lay in fetal position for the symptoms to pass, while she focused on deep breathing and relaxation. These symptoms eventually passed but it was quite distressing. She has had similar episodes in the past.       RESPONSE TO PREVIOUS TREATMENT    Side effects: No problems reported.    Pain Improvement: Yes. Approximately 50% improvement. Duration of Benefit:  9-10 weeks followed by gradual decrease in benefit over the last 3-4 weeks.     Dystonia Improvement: Yes. Approximately 50% improvement. Botox has been extremely beneficial with regards to her dystonia, however this past round has been less effective as compared to previous rounds of Botox.   Duration of Benefit:   9-10 weeks followed by gradual decrease in benefit. She has been having more involuntary movements in her  facial muscles as well as the posterior muscles below the rib cages and the adductors in her lower extremities.       PHYSICAL EXAM  VS: /68 (BP Location: Left arm, Patient Position: Sitting, Cuff Size: Adult Regular)   Pulse 76   SpO2 97%    GEN: Pleasant and cooperative, in no acute distress  HEENT: Right mouth drooping. No asymmetry with eyebrow lift.  HEAD AND NECK: Limited ROM with rotation to the left  HEAD, NECK AND TRUNK PATTERN:   Head & Neck Flexion:  Present  Head & Neck Rotation:   Right  Head & Neck Lateral Bend:   Right  Shoulder Elevation:   Left  JAW AND FACIAL PATTERN:   Jaw Clenching:   Bilateral  VOCAL INVOLVEMENT:   No  SPASMS: Dystonic movements with irritation and facial movements.       ALLERGIES  No Known Allergies    CURRENT MEDICATIONS    Current Outpatient Medications:     aspirin 81 MG EC tablet, Take 81 mg by mouth every morning 2 baby aspirin a day, Disp: , Rfl:     B Complex Vitamins (VITAMIN-B COMPLEX PO), Take 1 tablet by mouth every other day, Disp: , Rfl:     baclofen (LIORESAL) 10 MG tablet, Take 1 tablet (10 mg) by mouth nightly as needed, Disp: 30 tablet, Rfl: 1    cholecalciferol 50 MCG (2000 UT) tablet, 2,000 Units every morning, Disp: , Rfl:     DULoxetine HCl 40 MG CPEP, , Disp: , Rfl:     Omega-3 Fatty Acids (FISH OIL OMEGA-3 PO), , Disp: , Rfl:     Current Facility-Administered Medications:     botulinum toxin type A (BOTOX) 100 units injection 100 Units, 100 Units, Intramuscular, Q90 Days, Ephraim Wu MD    botulinum toxin type A (BOTOX) 100 units injection 400 Units, 400 Units, Intramuscular, Q90 Days, Elizabeth Fischer MD    botulinum toxin type A (BOTOX) 100 units injection 400 Units, 400 Units, Intramuscular, Q90 Days, Alanis Corbin MD, 300 Units at 03/02/23 0855    botulinum toxin type A (BOTOX) 100 units injection 400 Units, 400 Units, Intramuscular, Q90 Days, Elizabeth Fischer MD, 280 Units at 12/06/22 1514       BOTULINUM NEUROTOXIN  INJECTION PROCEDURES    VERIFICATION OF PATIENT IDENTIFICATION AND PROCEDURE     Initials   Patient Name SES   Patient  SES   Procedure Verified by: DIANDRA     Prior to the start of the procedure and with procedural staff participation, I verbally confirmed the patient s identity using two indicators, relevant allergies, that the procedure was appropriate and matched the consent or emergent situation, and that the correct equipment/implants were available. Immediately prior to starting the procedure I conducted the Time Out with the procedural staff and re-confirmed the patient s name, procedure, and site/side. (The Joint Commission universal protocol was followed.)  Yes    Sedation (Moderate or Deep): None    ABOVE ASSESSMENTS PERFORMED BY    Elizabeth Fischer MD      INDICATIONS FOR PROCEDURES  Inez Boston is a 47 year old patient with involuntary movements secondary to the diagnosis of cervical and generalized dystonia. Her baseline symptoms have been recalcitrant to oral medications and conservative therapy.  She is here today for reinjection with Botox.    GOAL OF PROCEDURE  The goal of this procedure is to increase active range of motion, improve volitional motor control, decrease pain  and enhance functional independence.      TOTAL DOSE ADMINISTERED  Dose Administered:  300 units  Botox (Botulinum Toxin Type A)       2:1 Dilution   Unavoidable Drug Waste: No  Diluent Used:  0.25% bupivacaine  Total Volume of Diluent Used: 6 ml  NDC #: Botox 100u ([address]-33)      CONSENT  The risks, benefits, and treatment options were discussed with Inez Boston and she agreed to proceed.    Written consent was obtained by [facility].     EQUIPMENT USED  Needle-37mm stimulating/recording  Needle-30 gauge  EMG/NCS Machine    SKIN PREPARATION  Skin preparation was performed using an alcohol wipe.    GUIDANCE DESCRIPTION  Electro-myographic guidance was necessary throughout the neck portion of the procedure to accurately  identify all areas of dystonic muscles while avoiding injection of non-dystonic muscles, neighboring nerves and nearby vascular structures.     AREA/MUSCLE INJECTED: 300 UNITS BOTOX = TOTAL DOSE, 2:1 DILUTION    1. NECK & UPPER EXTREMITY MUSCLES: 85 UNITS BOTOX = TOTAL DOSE    Right Upper Trapezius (upper cervical) - 15 units of Botox at 3 site/s.   Left Upper Trapezius (upper cervical) - 10 units of Botox at 2 site/s.    Right Splenius Cervicis - 5 units of Botox at 1 site/s.   Left Splenius Cervicis -  5 units of Botox at 1 site/s.    Right Levator Scapulae - 10 units of Botox at 1 site/s.   Left Levator Scapulae - 10 units of Botox at 1 site/s.    Right Pectoralis Minor - 5 units at 1 site/s.   Left Pectoralis Minor - 5 units at 1 site/s.     Right Rhomboids - 10 units at 2 site/s.   Left Rhomboids - 10 units at 2 site/s.     2. TRUNK MUSCLES: 70 UNITS BOTOX = TOTAL DOSE     Right Quadratus Lumborum - 10 units of Botox at 2 site/s.   Left Quadratus Lumborum - 15 units of Botox at 2 site/s.     Right Latissimus Dorsi - 10 units of Botox at 2 site/s.    Left Latissimus Dorsi - 15 units of Botox at 3 site/s.      Right Rectus Abdominis - 10 units of Botox at 3 site/s.    Left Rectus Abdominis - 10 units of Botox at 3 site/s.     3. LOWER EXTREMITY MUSCLES: 10 UNITS BOTOX = TOTAL DOSE   Right Adductor Alex - 5 units of Botox at 2 site/s.   Left Adductor Alex - 5 units of Botox at 2 site/s.     4. FACIAL & SCALP MUSCLES: 135 UNITS BOTOX = TOTAL DOSE    Right Frontalis - 10 units of Botox at 2 site/s.  Left Frontalis - 10 units of Botox at 2 site/s.    Right Temporalis - 30 units of Botox at 4 site/s.  Left Temporalis - 30 units of Botox at 4 site/s.    Left Occipitalis - 20 units of Botox at 4 site/s.    Right  - 5 units of Botox at 1 site/s.   Left  - 5 units of Botox at 1 site/s.    Procerus - 5 units of Botox at 1 site/s.    Right Orbicularis Oculi - 7.5 units of Botox at 3 site/s.  Left  Orbicularis Oculi - 7.5 units of Botox at 3 site/s.     Right Mentalis - 2.5 units of Botox at 1 site/s.   Left Mentalis - 2.5 units of Botox at 1 site/s.       RESPONSE TO PROCEDURE  Inez Boston tolerated the procedure well and there were no immediate complications. She was allowed to recover for an appropriate period of time and was discharged home in stable condition.    ASSESSMENT AND PLAN   Botulinum toxin injections: Dose of Botox was increased from 280 units to 300 units in hopes of increasing effectiveness and prolonging duration of benefit of injections. Patient will continue to monitor response to Botox and report at next appointment.   Referrals: ENT referral placed to address significant congestion in frontal/paranasal sinuses.    Follow up: Inez Boston was rescheduled for the next series of injections in 12 weeks, at which time we will evaluate response to today's injections. she may call the clinic prior with any questions or concerns prior to the next appointment.     I spent a total of 22 minutes, face-to-face and managing the care of Inez Boston, excluding the procedure. Over 50% of my time was spent counseling the patient and coordinating care. Please see note for details.       Sincerely,    Elizabeth Fischer MD

## 2023-06-02 NOTE — NURSING NOTE
Chief Complaint   Patient presents with     RECHECK     Botox injections confirmed with patient     Cherri Perla CMA at 8:26 AM on 6/2/2023.

## 2023-06-19 ENCOUNTER — TELEPHONE (OUTPATIENT)
Dept: OTOLARYNGOLOGY | Facility: CLINIC | Age: 48
End: 2023-06-19
Payer: COMMERCIAL

## 2023-06-29 NOTE — ADDENDUM NOTE
Encounter addended by: [facility] on: 6/29/2023 9:45 AM   Actions taken: Order list changed, Diagnosis association updated

## 2023-06-30 ENCOUNTER — TELEPHONE (OUTPATIENT)
Dept: OTOLARYNGOLOGY | Facility: CLINIC | Age: 48
End: 2023-06-30
Payer: COMMERCIAL

## 2023-06-30 NOTE — TELEPHONE ENCOUNTER
LVM for patient again regarding cancelled appointment due to provider is no longer seeing New Patients. Patient will need to reschedule with either Dr. Drummond or Dr. Senior or with another ENT Provider at another location. Additionally, we have a new Provider Dr. Mcmillan starting in September if patient would like to wait and schedule then. Provided ENT Clinic number for rescheduling needs. Also previously sent patient a cancelled appointment letter and a [employer] Message that remains unread as of today.

## 2023-07-14 ENCOUNTER — PATIENT OUTREACH (OUTPATIENT)
Dept: CARE COORDINATION | Facility: CLINIC | Age: 48
End: 2023-07-14
Payer: COMMERCIAL

## 2023-07-14 NOTE — PROGRESS NOTES
Clinic Care Coordination Contact  [facility]/Voicemail    Referral Source: Other, specify (Assessment Center)  Clinical Data: Care Coordinator Outreach  Outreach attempted x 1.  Left message on patient's voicemail with call back information and requested return call.  Plan: Care Coordinator will try to reach patient again in 1-2 business days.    APARNA Rodríguez  Clinic Care Coordination  [facility]  [email or web address]  [phone]

## 2023-07-16 NOTE — PROGRESS NOTES
History of Present Illness:  Ms. Boston is a 46 yo. Female with PMhx significant for ADHD, anxiety, cervical dystonia and thrombocytosis presenting today for six month follow up. Last visit 1/16/23 in person. Continuing with Botox injections for cervical dystonia which has greatly improved over the last few months. Still having muscle stiffness and spasms but much more manageable. States baclofen helps as well, mostly for sleep. She is requesting refills of her Vyvanse, Cymbalta and Baclofen today. Otherwise no acute concerns.     A detailed Review of Systems was performed, verified and is negative except as documented in the HPI.  All health questionnaires were reviewed, verified and relevant information documented above.    Past Medical History:  Past Medical History:   Diagnosis Date     ADHD (attention deficit hyperactivity disorder)      Anxiety      Cervical dystonia      Thrombocytosis      Past Surgical History:  Past Surgical History:   Procedure Laterality Date     COLONOSCOPY       LAPAROSCOPIC OOPHORECTOMY Left 6/3/2022    Procedure: Laparoscopic oophorectomy;  Surgeon: Elda Mcpherson MD;  Location: [facility]     LAPAROSCOPIC OOPHORECTOMY  6/3/2022    Procedure: ;  Surgeon: Elda Mcpherson MD;  Location: [facility]     LAPAROSCOPIC OOPHORECTOMY  6/3/2022    Procedure: ;  Surgeon: Elda Mcpherson MD;  Location: [facility]     LAPAROSCOPIC SALPINGOTOMY Bilateral 6/3/2022    Procedure: bilateral salpingectomy;  Surgeon: Elda Mcpherson MD;  Location: [facility] OR     LAPAROSCOPY      6/3/2022     MYRINGOTOMY W/ TUBES      as a child     Active Meds:  Current Outpatient Medications   Medication     aspirin 81 MG EC tablet     B Complex Vitamins (VITAMIN-B COMPLEX PO)     baclofen (LIORESAL) 10 MG tablet     DULoxetine HCl 40 MG CPEP     [START ON 8/17/2023] lisdexamfetamine (VYVANSE) 20 MG capsule     Lisdexamfetamine Dimesylate 40 MG CHEW     cholecalciferol 50 MCG (2000 UT) tablet     Omega-3 Fatty Acids (FISH  "OIL OMEGA-3 PO)     Current Facility-Administered Medications   Medication     botulinum toxin type A (BOTOX) 100 units injection 100 Units     botulinum toxin type A (BOTOX) 100 units injection 400 Units     botulinum toxin type A (BOTOX) 100 units injection 400 Units     botulinum toxin type A (BOTOX) 100 units injection 400 Units      Allergies:  Patient has no known allergies.    Family History:  family history includes Attention Deficit Disorder in her brother and mother; Breast Cancer (age of onset: 45) in her mother; Colon Cancer in her maternal grandmother; Congenital heart disease in her paternal grandmother; Depression in her brother and mother; Heart Failure in her paternal grandfather; Hyperlipidemia in her maternal grandfather and maternal grandmother; Osteoporosis in her mother; Substance Abuse in her paternal uncle; Thyroid Disease in her paternal grandmother; Urinary tract infection in her maternal grandmother and mother; Uterine Cancer in an other family member.    Social History:  Social History     Tobacco Use     Smoking status: Former     Types: Cigarettes     Smokeless tobacco: Never     Tobacco comments:     Vaping daily until the past 3 weeks, when she stopped her use.   Vaping Use     Vaping Use: Some days     Substances: Nicotine     Devices: Pre-filled pod   Substance Use Topics     Alcohol use: Yes     Alcohol/week: 2.0 standard drinks of alcohol     Types: 2 Cans of beer per week     Comment: a couple times a week     Drug use: Yes     Types: Marijuana     Physical Exam:  Vitals: /69 (BP Location: Right arm, Patient Position: Sitting, Cuff Size: Adult Regular)   Pulse 82   Ht 1.727 m (5' 7.99\")   Wt 74.3 kg (163 lb 12.8 oz)   SpO2 96%   BMI 24.91 kg/m    Constitutional: Alert, oriented, pleasant, no acute distress  Head: Normocephalic, atraumatic  Cardiovascular: Regular rate and rhythm, no murmurs, rubs or gallops, peripheral pulses full/symmetric  Respiratory: Good air " movement bilaterally, lungs clear, no wheezes/rales/rhonchi  Musculoskeletal: No edema, normal muscle tone, normal gait  Neurologic: Alert and oriented, cranial nerves 2-12 intact, grossly non-focal  Skin: No rashes/lesions  Psychiatric: normal mentation, affect and mood    Diagnostics:  Labs reviewed in [facility]     Assessment and Plan:  1. Immunizations; COVID-19 Pfizer x 2 (bi-valent 9/29/2022). Moderna x 2. Td/Tdap on 3/21/2022.  2. Hematology appt. With Dr. aCceres 10/5/2022 regarding thrombocytosis. Remains on ASA (162 mg). If platelets >700K then can discuss bone marrow bx per heme. Last CBC 10/5/22 with platelet count 499. Not planning to see heme at this point. Repeat CBC today.   3. Cervical dystonia: follow up with Dr. Fischer, PMR 12/6/2022 regarding neck and myofacial pain. On baclofen more for sleep. CMP today. Botox injections, last visit with Dr. Fischer 6/2/23 and next visit 9/25/23.   4. GYN note Dr. Mcpherson, 6/23/2022, 3/11/2022 and 2/28/2022 regarding endometrial bx. plevic mass and pelvic pain. She is s/p bilateral salpingectomy, left oophorectomy on 6/3/2022.   5. On Cymbalta and Vyvanse and she finds this beneficial. Requesting refills today, and placed mental health referral.   6. Mammogram 5/17/23, negative. Had breast MRI 7/6/22. Per Oncology note (10/5/22), recommending MRIs alternating with mammograms.   7. She has seen PT for groin pain 2/24/2022 and appt. 3/21/2022.   8. Genetic counseling given family history significant for breast cancer on 4/28/2022 and 5/24/2022.   9. Colonoscopy 12/7/2021 and repeat in 10 years.   10. Lipid panel 1/16/23 with Cholesterol 223,  . Statin held due to msk complaints. Considering visit with Dr. Bisi Wilkinson, Preventive Cardiology pending lipid panel today.   11. Seen Neurology 6/21/2022Dr. Wylie for cervical dystonia. She is not taking Sinemet (nausea).  12. Neuropsychiatric testing was done 8/3/2022.  13. She was seen 1/11/2023 by Dr. Wu,  Neurophthalmology, for dry eyes possibly related to Botox injections.   14. Vaping; follows with Behavioral Health, Dr. Levi, last visit 5/8/23. Vapes only occasionally now, no concerns.   15. Dermatology: has not followed with anyone in the past, referral placed.     SERGIO Hernández-S2      Staff note. I personally reviewed Ms. Boston's past medical history. I interviewed her and conducted a physical examination. I agree with Ms. Wolf's above assessment and plan.    TOVA Segal MD    30 minutes spent on the date of the encounter doing chart review, history and exam, documentation and further activities as noted above exclusive of procedures and other billable interpretations

## 2023-07-17 ENCOUNTER — LAB (OUTPATIENT)
Dept: LAB | Facility: CLINIC | Age: 48
End: 2023-07-17
Payer: COMMERCIAL

## 2023-07-17 ENCOUNTER — OFFICE VISIT (OUTPATIENT)
Dept: INTERNAL MEDICINE | Facility: CLINIC | Age: 48
End: 2023-07-17
Payer: COMMERCIAL

## 2023-07-17 VITALS
HEIGHT: 68 IN | HEART RATE: 82 BPM | WEIGHT: 163.8 LBS | DIASTOLIC BLOOD PRESSURE: 69 MMHG | SYSTOLIC BLOOD PRESSURE: 101 MMHG | BODY MASS INDEX: 24.83 KG/M2 | OXYGEN SATURATION: 96 %

## 2023-07-17 DIAGNOSIS — F41.9 ANXIETY: ICD-10-CM

## 2023-07-17 DIAGNOSIS — E78.00 HIGH CHOLESTEROL: ICD-10-CM

## 2023-07-17 DIAGNOSIS — D75.839 THROMBOCYTOSIS: ICD-10-CM

## 2023-07-17 DIAGNOSIS — Z12.83 SKIN CANCER SCREENING: ICD-10-CM

## 2023-07-17 DIAGNOSIS — F90.9 ATTENTION DEFICIT HYPERACTIVITY DISORDER (ADHD), UNSPECIFIED ADHD TYPE: ICD-10-CM

## 2023-07-17 DIAGNOSIS — R74.8 ELEVATED LIVER ENZYMES: ICD-10-CM

## 2023-07-17 DIAGNOSIS — G24.9 DYSTONIA: ICD-10-CM

## 2023-07-17 DIAGNOSIS — E78.00 HIGH CHOLESTEROL: Primary | ICD-10-CM

## 2023-07-17 LAB
ALBUMIN SERPL BCG-MCNC: 4.5 G/DL (ref 3.5–5.2)
ALP SERPL-CCNC: 51 U/L (ref 35–104)
ALT SERPL W P-5'-P-CCNC: 13 U/L (ref 0–50)
ANION GAP SERPL CALCULATED.3IONS-SCNC: 10 MMOL/L (ref 7–15)
AST SERPL W P-5'-P-CCNC: 16 U/L (ref 0–45)
BASOPHILS # BLD AUTO: 0.1 10E3/UL (ref 0–0.2)
BASOPHILS NFR BLD AUTO: 1 %
BILIRUB SERPL-MCNC: 0.3 MG/DL
BUN SERPL-MCNC: 9.7 MG/DL (ref 6–20)
CALCIUM SERPL-MCNC: 9.5 MG/DL (ref 8.6–10)
CHLORIDE SERPL-SCNC: 103 MMOL/L (ref 98–107)
CHOLEST SERPL-MCNC: 254 MG/DL
CREAT SERPL-MCNC: 0.63 MG/DL (ref 0.51–0.95)
DEPRECATED HCO3 PLAS-SCNC: 25 MMOL/L (ref 22–29)
EOSINOPHIL # BLD AUTO: 0.2 10E3/UL (ref 0–0.7)
EOSINOPHIL NFR BLD AUTO: 1 %
ERYTHROCYTE [DISTWIDTH] IN BLOOD BY AUTOMATED COUNT: 13.2 % (ref 10–15)
GFR SERPL CREATININE-BSD FRML MDRD: >90 ML/MIN/1.73M2
GLUCOSE SERPL-MCNC: 84 MG/DL (ref 70–99)
HCT VFR BLD AUTO: 37.8 % (ref 35–47)
HDLC SERPL-MCNC: 66 MG/DL
HGB BLD-MCNC: 12.2 G/DL (ref 11.7–15.7)
IMM GRANULOCYTES # BLD: 0.1 10E3/UL
IMM GRANULOCYTES NFR BLD: 0 %
LDLC SERPL CALC-MCNC: 165 MG/DL
LYMPHOCYTES # BLD AUTO: 3.1 10E3/UL (ref 0.8–5.3)
LYMPHOCYTES NFR BLD AUTO: 26 %
MCH RBC QN AUTO: 29.5 PG (ref 26.5–33)
MCHC RBC AUTO-ENTMCNC: 32.3 G/DL (ref 31.5–36.5)
MCV RBC AUTO: 91 FL (ref 78–100)
MONOCYTES # BLD AUTO: 0.8 10E3/UL (ref 0–1.3)
MONOCYTES NFR BLD AUTO: 7 %
NEUTROPHILS # BLD AUTO: 7.8 10E3/UL (ref 1.6–8.3)
NEUTROPHILS NFR BLD AUTO: 65 %
NONHDLC SERPL-MCNC: 188 MG/DL
NRBC # BLD AUTO: 0 10E3/UL
NRBC BLD AUTO-RTO: 0 /100
PLATELET # BLD AUTO: 456 10E3/UL (ref 150–450)
POTASSIUM SERPL-SCNC: 4.6 MMOL/L (ref 3.4–5.3)
PROT SERPL-MCNC: 7.7 G/DL (ref 6.4–8.3)
RBC # BLD AUTO: 4.14 10E6/UL (ref 3.8–5.2)
SODIUM SERPL-SCNC: 138 MMOL/L (ref 136–145)
TRIGL SERPL-MCNC: 117 MG/DL
WBC # BLD AUTO: 12 10E3/UL (ref 4–11)

## 2023-07-17 PROCEDURE — 85025 COMPLETE CBC W/AUTO DIFF WBC: CPT | Performed by: PATHOLOGY

## 2023-07-17 PROCEDURE — 80053 COMPREHEN METABOLIC PANEL: CPT | Performed by: PATHOLOGY

## 2023-07-17 PROCEDURE — 99214 OFFICE O/P EST MOD 30 MIN: CPT | Performed by: INTERNAL MEDICINE

## 2023-07-17 PROCEDURE — 36415 COLL VENOUS BLD VENIPUNCTURE: CPT | Performed by: PATHOLOGY

## 2023-07-17 PROCEDURE — 80061 LIPID PANEL: CPT | Performed by: PATHOLOGY

## 2023-07-17 RX ORDER — LISDEXAMFETAMINE DIMESYLATE 20 MG/1
20 CAPSULE ORAL DAILY
Qty: 30 CAPSULE | Refills: 0 | Status: CANCELLED | OUTPATIENT
Start: 2023-07-17 | End: 2023-08-16

## 2023-07-17 RX ORDER — LISDEXAMFETAMINE DIMESYLATE 40 MG/1
40 TABLET, CHEWABLE ORAL
COMMUNITY
End: 2023-07-24

## 2023-07-17 RX ORDER — DULOXETINE 40 MG/1
40 CAPSULE, DELAYED RELEASE ORAL DAILY
Qty: 90 CAPSULE | Refills: 3 | Status: SHIPPED | OUTPATIENT
Start: 2023-07-17 | End: 2023-09-08

## 2023-07-17 RX ORDER — BACLOFEN 10 MG/1
10 TABLET ORAL
Qty: 30 TABLET | Refills: 1 | Status: SHIPPED | OUTPATIENT
Start: 2023-07-17 | End: 2023-12-14

## 2023-07-17 RX ORDER — LISDEXAMFETAMINE DIMESYLATE 20 MG/1
20 CAPSULE ORAL DAILY
Qty: 30 CAPSULE | Refills: 0 | Status: CANCELLED | OUTPATIENT
Start: 2023-09-17 | End: 2023-10-17

## 2023-07-17 RX ORDER — LISDEXAMFETAMINE DIMESYLATE 20 MG/1
20 CAPSULE ORAL DAILY
Qty: 30 CAPSULE | Refills: 0 | Status: SHIPPED | OUTPATIENT
Start: 2023-08-17 | End: 2023-09-16

## 2023-07-17 NOTE — NURSING NOTE
Inez Boston is a 47 year old female patient that presents today in clinic for the following:    Chief Complaint   Patient presents with     RECHECK     Psych meds refills     The patient's allergies and medications were reviewed as noted. A set of vitals were recorded as noted without incident. The patient does not have any other questions for the provider.    Satish Bagley, EMT at 4:11 PM on 7/17/2023

## 2023-07-18 ENCOUNTER — TELEPHONE (OUTPATIENT)
Dept: PSYCHIATRY | Facility: CLINIC | Age: 48
End: 2023-07-18
Payer: COMMERCIAL

## 2023-07-18 NOTE — TELEPHONE ENCOUNTER
PSYCHIATRY CLINIC PHONE INTAKE     SERVICES REQUESTED / INTERESTED IN          Med Management    Presenting Problem and Brief History                              What would you like to be seen for? (brief description):  ADHD, Anxiety, medical condition called dystonia. Had a psychiatrist in [place] (Dr. Juwan Bar) that was prescribing medications but pt moved--currently taking duloxetine, vyvanse, baclofen and new prescriber and patient are looking for a consult/second opinion on medication combination (scheduled MTM appt). Pt was diagnosed with ADHD 20 years ago, took Adderall in the past and says that it worked for attention but resulted in hearing problems which was difficult for work and negatively impacted relationships. Tried to stop taking Vyvanse and the attention problem comes back, trying to lower the dose but feels that the hearing problem returns. Is not taking it every day currently. But PT is returning to work in the next few weeks and will need to start to take daily again. Feels sad and anxious when not taking duloxetine and feels like it is helping, doesn't think dystonia is a side effect of current medications. Botox treatments every 3 months for dystonia, contractions are cervical around the neck. Trouble sleeping due to jefferson muscles, muscle relaxer helps with sleep, takes oncee at night.  Have you received a mental health diagnosis? No   Which one (s): ADHD, depression, and anxiety.  Is there any history of developmental delay?  No   Are you currently seeing a mental health provider?  Yes            Who / month last seen:  Saw a counselor a month ago, discontinued didn't find it helpful.  Do you have mental health records elsewhere?  Yes, Dr. Juwan Bar, [place]   Will you sign a release so we can obtain them?  Yes    Have you ever been hospitalized for psychiatric reasons?  No  Describe:  No    Do you have current thoughts of self-harm?  No    Do you currently have thoughts of  harming others?  No    Do you have any safety concerns? No   If yes to these, offer to reach out to a  for follow up.      Substance Use History     Do you have any history of alcohol / illicit drug use?  No  Describe:  Social drinker  Have you ever received treatment for this?  No    Describe:  No     Social History     Who is the patient's a guardian?  No    Name / number: Self  Have you had an ACT team in last 12 months?  No  Describe: No   OK to leave a detailed voicemail?  Yes    Would you be interested in learning more about research opportunities for which you or your child may qualify? We can connect you with a team member for more information.  Yes  If yes, send an [employer] message to Yolis Arevalo    Medical/ Surgical History                                   Patient Active Problem List   Diagnosis     Pelvic pain in female     Urge incontinence     Anxiety     Attention deficit hyperactivity disorder (ADHD), predominantly inattentive type     Dysesthesia     Dystonia     History of disease     History of severe acute respiratory syndrome coronavirus 2 (SARS-CoV-2) disease     Cannabis use disorder, mild, abuse     Tobacco use disorder, moderate, dependence          Medications             Have you taken >3 psychiatric medications in your past?  Yes  Do you currently take 5 or more medications, including prescriptions, supplements, and other over the counter products?  Yes    If YES to at least one of these questions:   As part of your evaluation in our clinic, we have specially trained pharmacists as part of your care team. Your provider would like for you to meet with one of our pharmacists to review your current and past medications, ensure your med list is up to date, and queue up any questions or concerns you have about medications. They will review all of your medications, not just for mental health, to help ensure you know what you re taking and that everything is working  together.     Please schedule patient in [facility] (Ro Martin or Mary Jane Tatum) for 60m MTM in any green space as virtual (video), telephone, or in person (designated in person days per Epic templates).  -Appt notes can say  Psych jason on xx/xx   -Route telephone encounter to the pharmacist who will be seeing the patient.  If patient has questions about insurance coverage or billing, please still schedule the visit and refer them to call the [facility] coordinators at [phone].    Current Outpatient Medications   Medication Sig Dispense Refill     aspirin 81 MG EC tablet Take 81 mg by mouth every morning 2 baby aspirin a day       B Complex Vitamins (VITAMIN-B COMPLEX PO) Take 1 tablet by mouth every other day       baclofen (LIORESAL) 10 MG tablet Take 1 tablet (10 mg) by mouth nightly as needed 30 tablet 1     cholecalciferol 50 MCG (2000 UT) tablet 2,000 Units every morning (Patient not taking: Reported on 7/17/2023)       DULoxetine HCl 40 MG CPEP Take 40 mg by mouth daily 90 capsule 3     [START ON 8/17/2023] lisdexamfetamine (VYVANSE) 20 MG capsule Take 1 capsule (20 mg) by mouth daily for 30 days 30 capsule 0     Lisdexamfetamine Dimesylate 40 MG CHEW Take 40 mg by mouth       Omega-3 Fatty Acids (FISH OIL OMEGA-3 PO)  (Patient not taking: Reported on 7/17/2023)           DISPOSITION      Intake Phone Screen completed 7/18/23. MT appt scheduled Mary Jane Tatum on 7/24. Priscaal scheduled with CAT team Dr. Moya for 9/8/23. New patient paperwork emailed to address on file.     Yandy Moeller.

## 2023-07-20 ENCOUNTER — OFFICE VISIT (OUTPATIENT)
Dept: DERMATOLOGY | Facility: CLINIC | Age: 48
End: 2023-07-20
Payer: COMMERCIAL

## 2023-07-20 DIAGNOSIS — L73.9 FOLLICULITIS: Primary | ICD-10-CM
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
## 2023-07-20 DIAGNOSIS — D18.01 CHERRY ANGIOMA: ICD-10-CM

## 2023-07-20 DIAGNOSIS — D23.9 DERMATOFIBROMA: ICD-10-CM

## 2023-07-20 DIAGNOSIS — D22.9 MULTIPLE BENIGN NEVI: ICD-10-CM

## 2023-07-20 DIAGNOSIS — L82.1 SEBORRHEIC KERATOSES: ICD-10-CM

## 2023-07-20 DIAGNOSIS — L81.4 SOLAR LENTIGO: ICD-10-CM

## 2023-07-20 PROCEDURE — 99213 OFFICE O/P EST LOW 20 MIN: CPT | Performed by: DERMATOLOGY

## 2023-07-20 RX ORDER — CLINDAMYCIN PHOSPHATE 10 UG/ML
LOTION TOPICAL
Qty: 60 ML | Refills: 11 | Status: SHIPPED | OUTPATIENT
Start: 2023-07-20 | End: 2023-12-12

## 2023-07-20 RX ORDER — TRIAMCINOLONE ACETONIDE 1 MG/G
OINTMENT TOPICAL
Qty: 80 G | Refills: 3 | Status: SHIPPED | OUTPATIENT
Start: 2023-07-20 | End: 2023-12-12

## 2023-07-20 NOTE — NURSING NOTE
Dermatology Rooming Note    Inez Boston's goals for this visit include:   Chief Complaint   Patient presents with     Skin Check     FBSE  Has dystonia, feels like it pinches her skin       Samantha Booth LPN

## 2023-07-20 NOTE — PROGRESS NOTES
[facility] Dermatology Note  Encounter Date: Jul 20, 2023  Office Visit     Dermatology Problem List:  1. Folliculitis, upper back   - Clindamycin 1% lotion     2. Arthropod bites, papular urticaria   - Triamcinolone 0.1% ointment     3. Hair loss, right scalp, since 2020   Previous treatments: topical minoxidil (not effective)    ____________________________________________    Assessment & Plan:    # Folliculitis, upper back. Acute, uncomplicated.   - Start clindamycin 1% lotion to the upper back. Discussed applying this daily if she has active folliculitis spots, and a few times a week for maintenance if she does not have folliculitis spots.     # Papular dermatitis, lower extremities. Favor arthropod bites, less likely papular eczema, dermal hypersensitivity reaction NOS.   - Start triamcinolone 0.1% ointment to bilateral legs for itch     # Benign lesions: Multiple benign nevi, solar lentigos, seborrheic keratoses, cherry angiomas. Explained to patient benign nature of lesion. No treatment is necessary at this time unless the lesion changes or becomes symptomatic.   - ABCDs of melanoma were discussed and self skin checks were advised.  - Sun precaution was advised including the use of sun screens of SPF 30 or higher, sun protective clothing, and avoidance of tanning beds.    # Hair loss, right scalp   Patient started having hair loss in 2020. This was not addressed at today's visit due to FBSE visit today. Patient was advised to make an appointment for evaluation and treatment with Dr. Arevalo or Dr. Burkett.     Procedures Performed: None     Follow-up: Skin check in 2 years, schedule separate appointment for hair loss with Dr. Burkett or Dr. Arevalo     Staff and Medical Student:     Opal Albright, MS4    Staff Physician:  I was present with the medical student who participated in the service and in the documentation of the note. I have verified the history and personally performed the  "physical exam and medical decision making. I agree with the assessment and plan of care as documented in the note.     Abner Ríos MD  Pronouns: he/him/his    Department of Dermatology  [facility]: Phone: [phone], Fax:[phone]  [facility]: Phone: [phone] Fax: [phone]    ____________________________________________    CC: Skin Check (FBSE/Has dystonia, feels like it pinches her skin/)    HPI:  Ms. Inez Boston is a(n) 47 year old female who presents today as a new patient for FBSE. She denies any spots that are growing, bleeding or changing.     She does mention that she occasionally gets itchy bumps that appears on her legs, neck, and scalp line. She states that she currently has some of these bumps on her legs.     No history of tanning bed use. Has never worked a job outdoors. Mother has had BCC, and her uncle had melanoma. She tans when she gets sun exposure, however does occasionally burn.     She also states that she has been having hair loss since she got COVID-19 in 2020. She was previously diagnosed with telogen effluvium and alopecia areata, however has only treated the hair loss with topical minoxidil (was not effective).    Of note, she is currently getting treated for cervical dystonia with botox; she states that her cervical dystonia causes burning, itching, and a \"pulling sensation\" across her scalp, neck, and upper back, and even legs.     Labs:  NA    Physical Exam:  Vitals: There were no vitals taken for this visit.  SKIN: Full skin, which includes the head/face, both arms, chest, back, abdomen,both legs, genitalia and/or groin buttocks, digits and/or nails, was examined.  - Mild pitting on third fingernail on right hand  - Approximately 1-2 cm regular brown macule on right upper inner leg   - Few pink papules on legs  - Faintly erythematous, round, " soft papule on left lower leg   - Scattered red vascular papules   - Tan linear macule on nail of left hand   - Pink firm nodule on upper leg with dimpling  - Scattered regular brown macules and papules   - Scattered stuck on brown papules   - Pink papule on upper shoulder with central scar under dermatoscope   - Hair pull test negative   - Hair loss of right lateral scalp with regrowth  - No other lesions of concern on areas examined.     Medications:  Current Outpatient Medications   Medication     aspirin 81 MG EC tablet     B Complex Vitamins (VITAMIN-B COMPLEX PO)     baclofen (LIORESAL) 10 MG tablet     DULoxetine HCl 40 MG CPEP     [START ON 8/17/2023] lisdexamfetamine (VYVANSE) 20 MG capsule     Lisdexamfetamine Dimesylate 40 MG CHEW     cholecalciferol 50 MCG (2000 UT) tablet     Omega-3 Fatty Acids (FISH OIL OMEGA-3 PO)     Current Facility-Administered Medications   Medication     botulinum toxin type A (BOTOX) 100 units injection 100 Units     botulinum toxin type A (BOTOX) 100 units injection 400 Units     botulinum toxin type A (BOTOX) 100 units injection 400 Units     botulinum toxin type A (BOTOX) 100 units injection 400 Units      Past Medical History:   Patient Active Problem List   Diagnosis     Pelvic pain in female     Urge incontinence     Anxiety     Attention deficit hyperactivity disorder (ADHD), predominantly inattentive type     Dysesthesia     Dystonia     History of disease     History of severe acute respiratory syndrome coronavirus 2 (SARS-CoV-2) disease     Cannabis use disorder, mild, abuse     Tobacco use disorder, moderate, dependence     Past Medical History:   Diagnosis Date     ADHD (attention deficit hyperactivity disorder)      Anxiety      Cervical dystonia      Thrombocytosis         CC Tony Segal MD  [address] [address] on close of this encounter.

## 2023-07-20 NOTE — LETTER
7/20/2023       RE: Inez Boston  [address] [facility] [address]     Dear Colleague,    Thank you for referring your patient, Inez Boston, to the [facility]ERMATOLOG[facility] [place] [facility]. Please see a copy of my visit note below.    [facility] Dermatology Note  Encounter Date: Jul 20, 2023  Office Visit     Dermatology Problem List:  1. Folliculitis, upper back   - Clindamycin 1% lotion     2. Arthropod bites, papular urticaria   - Triamcinolone 0.1% ointment     3. Hair loss, right scalp, since 2020   Previous treatments: topical minoxidil (not effective)    ____________________________________________    Assessment & Plan:    # Folliculitis, upper back. Acute, uncomplicated.   - Start clindamycin 1% lotion to the upper back. Discussed applying this daily if she has active folliculitis spots, and a few times a week for maintenance if she does not have folliculitis spots.     # Papular dermatitis, lower extremities. Favor arthropod bites, less likely papular eczema, dermal hypersensitivity reaction NOS.   - Start triamcinolone 0.1% ointment to bilateral legs for itch     # Benign lesions: Multiple benign nevi, solar lentigos, seborrheic keratoses, cherry angiomas. Explained to patient benign nature of lesion. No treatment is necessary at this time unless the lesion changes or becomes symptomatic.   - ABCDs of melanoma were discussed and self skin checks were advised.  - Sun precaution was advised including the use of sun screens of SPF 30 or higher, sun protective clothing, and avoidance of tanning beds.    # Hair loss, right scalp   Patient started having hair loss in 2020. This was not addressed at today's visit due to FBSE visit today. Patient was advised to make an appointment for evaluation and treatment with Dr. Arevalo or Dr. Burkett.     Procedures Performed: None     Follow-up: Skin check in 2 years,  "schedule separate appointment for hair loss with Dr. Burkett or Dr. Arevalo     Staff and Medical Student:     Opal Albright, MS4    Staff Physician:  I was present with the medical student who participated in the service and in the documentation of the note. I have verified the history and personally performed the physical exam and medical decision making. I agree with the assessment and plan of care as documented in the note.     Abner Ríos MD  Pronouns: he/him/his    Department of Dermatology  [facility]: Phone: [phone], Fax:[phone]  [facility]: Phone: [phone] Fax: [phone]    ____________________________________________    CC: Skin Check (FBSE/Has dystonia, feels like it pinches her skin/)    HPI:  Ms. Inez Boston is a(n) 47 year old female who presents today as a new patient for FBSE. She denies any spots that are growing, bleeding or changing.     She does mention that she occasionally gets itchy bumps that appears on her legs, neck, and scalp line. She states that she currently has some of these bumps on her legs.     No history of tanning bed use. Has never worked a job outdoors. Mother has had BCC, and her uncle had melanoma. She tans when she gets sun exposure, however does occasionally burn.     She also states that she has been having hair loss since she got COVID-19 in 2020. She was previously diagnosed with telogen effluvium and alopecia areata, however has only treated the hair loss with topical minoxidil (was not effective).    Of note, she is currently getting treated for cervical dystonia with botox; she states that her cervical dystonia causes burning, itching, and a \"pulling sensation\" across her scalp, neck, and upper back, and even legs.     Labs:  NA    Physical Exam:  Vitals: There were no vitals taken for this visit.  SKIN: Full skin, which " includes the head/face, both arms, chest, back, abdomen,both legs, genitalia and/or groin buttocks, digits and/or nails, was examined.  - Mild pitting on third fingernail on right hand  - Approximately 1-2 cm regular brown macule on right upper inner leg   - Few pink papules on legs  - Faintly erythematous, round, soft papule on left lower leg   - Scattered red vascular papules   - Tan linear macule on nail of left hand   - Pink firm nodule on upper leg with dimpling  - Scattered regular brown macules and papules   - Scattered stuck on brown papules   - Pink papule on upper shoulder with central scar under dermatoscope   - Hair pull test negative   - Hair loss of right lateral scalp with regrowth  - No other lesions of concern on areas examined.     Medications:  Current Outpatient Medications   Medication    aspirin 81 MG EC tablet    B Complex Vitamins (VITAMIN-B COMPLEX PO)    baclofen (LIORESAL) 10 MG tablet    DULoxetine HCl 40 MG CPEP    [START ON 8/17/2023] lisdexamfetamine (VYVANSE) 20 MG capsule    Lisdexamfetamine Dimesylate 40 MG CHEW    cholecalciferol 50 MCG (2000 UT) tablet    Omega-3 Fatty Acids (FISH OIL OMEGA-3 PO)     Current Facility-Administered Medications   Medication    botulinum toxin type A (BOTOX) 100 units injection 100 Units    botulinum toxin type A (BOTOX) 100 units injection 400 Units    botulinum toxin type A (BOTOX) 100 units injection 400 Units    botulinum toxin type A (BOTOX) 100 units injection 400 Units      Past Medical History:   Patient Active Problem List   Diagnosis    Pelvic pain in female    Urge incontinence    Anxiety    Attention deficit hyperactivity disorder (ADHD), predominantly inattentive type    Dysesthesia    Dystonia    History of disease    History of severe acute respiratory syndrome coronavirus 2 (SARS-CoV-2) disease    Cannabis use disorder, mild, abuse    Tobacco use disorder, moderate, dependence     Past Medical History:   Diagnosis Date    ADHD  (attention deficit hyperactivity disorder)     Anxiety     Cervical dystonia     Thrombocytosis         CC Tony Segal MD  [address] [address] on close of this encounter.

## 2023-07-24 ENCOUNTER — VIRTUAL VISIT (OUTPATIENT)
Dept: PHARMACY | Facility: CLINIC | Age: 48
End: 2023-07-24
Payer: COMMERCIAL

## 2023-07-24 DIAGNOSIS — F41.1 GAD (GENERALIZED ANXIETY DISORDER): ICD-10-CM

## 2023-07-24 DIAGNOSIS — Z78.9 TAKES DIETARY SUPPLEMENTS: ICD-10-CM

## 2023-07-24 DIAGNOSIS — L73.9 FOLLICULITIS: ICD-10-CM

## 2023-07-24 DIAGNOSIS — F90.0 ATTENTION DEFICIT HYPERACTIVITY DISORDER (ADHD), PREDOMINANTLY INATTENTIVE TYPE: ICD-10-CM

## 2023-07-24 DIAGNOSIS — Z86.2 HISTORY OF HIGH PLATELET COUNT: ICD-10-CM

## 2023-07-24 DIAGNOSIS — G24.9 DYSTONIA: ICD-10-CM

## 2023-07-24 DIAGNOSIS — F33.42 RECURRENT MAJOR DEPRESSIVE DISORDER, IN FULL REMISSION (H): Primary | ICD-10-CM

## 2023-07-24 PROCEDURE — 99605 MTMS BY PHARM NP 15 MIN: CPT | Performed by: PHARMACIST

## 2023-07-24 RX ORDER — LISDEXAMFETAMINE DIMESYLATE 40 MG/1
20 CAPSULE ORAL EVERY MORNING
COMMUNITY
End: 2023-11-17

## 2023-07-24 RX ORDER — FERROUS SULFATE 324(65)MG
1 TABLET, DELAYED RELEASE (ENTERIC COATED) ORAL EVERY OTHER DAY
COMMUNITY
End: 2023-12-12

## 2023-07-24 RX ORDER — FAMOTIDINE 20 MG
1000 TABLET ORAL DAILY
COMMUNITY

## 2023-07-24 NOTE — Clinical Note
Hello- Met with this patient to get meds updated and queue up concerns.  See note for details and let me know if you have questions. Thanks, Mary Jane

## 2023-07-24 NOTE — PROGRESS NOTES
Medication Therapy Management (MTM) Encounter    ASSESSMENT:                            Medication Adherence/Access: No issues identified  Anxiety/Depression: Stable. Continue current medication.    ADHD: Discussed that taking the lower 20mg dose on a more regular basis is likely ok and will monitor for any change in dystonia. Would prefer that she use 20mg capsules, though opening them seems to be working ok for now.  She will fill 20mg Rx in a few weeks.     Dystonia: Seems to be improving over time with additive Botox injections. Baclofen seems helpful overnight. Continue current medication.    Folliculitis: Improving. Continue current medication.    Supplements: Stable. Continue current medication. Would be reasonable to recheck ferritin. Pt will discuss with PCP.    Stroke prevention: Stable. Continue current medication.    PLAN:                            Continue current medication. Ok to take Vyvanse 20mg every day.    Follow-up: as needed  Psychiatry intake 9/8    SUBJECTIVE/OBJECTIVE:                          Inez Boston is a 47 year old female called for an initial visit. She was referred to me from psychiatry intake.      Reason for visit: med review.    Allergies/ADRs: Reviewed in chart  Past Medical History: Reviewed in chart  Tobacco: She reports that she has quit smoking. Her smoking use included cigarettes. She has never used smokeless tobacco.  Alcohol: not currently using    Medication Adherence/Access:   -has been opening Vyvanse 40mg caps and visually estimating about half, then consumes from a small cup and drinks water afterward.    Anxiety/Depression:   -duloxetine 40mg daily    Pt reported that she feels medication is helpful and overall mood and anxiety are well managed while taking this med. No current concerns. She has tried without it in the past and noticed her mood get worse. Doesn't seem to have any side effects. States that there seems to be an insurance issue, as she couldn't get  "most recent refill, but will follow up with [facility].    ADHD:  -Vyvanse 40mg capsule (pours out some powder) has #15 remaining     Pt stated that she has trouble hearing what people say when she doesn't take Vyvanse, thinks related to difficulty with focus, as this is improved when she takes the med. She is trying to limit use in case it is worsening dystonia, though doesn't notice a specific effect after taking the med, so has been pouring out \"about a third or half\" of the powder inside the capsule.  She is starting new job on 8/7 and wonders about taking it more regularly in order to be successful. When she does take the dose (~20mg?) she can focus on conversations fully and can better process information.   New Rx for 20mg caps can be filled on 8/14/23.    Dystonia:  -Botox every 3 months  -baclofen 10mg nightly as needed    Thinks she has had some variation of dystonia \"for awhile,\" but worsened after she had COVID in 2020. She had felt that she was being choked sometimes due to neck muscle tension being so tight, though this has been much improved with Botox.    She notices that coffee makes dystonia worse. She does always feel a \"discomfort\" with pulling and pressure in her neck and feels that Botox effect only lasts about 2 months, so the month prior to her next injection is a bit worse.  Does feel that Botox has been more helpful with more time getting injections.  She takes the baclofen every night and feels that it's a bit easier to fall asleep, though doesn't notice specific change after taking the medication. She does stay asleep through the night whether she takes the baclofen during the day.     Folliculitis:  -triamcinolone 0.1% daily  -clindamycin 1% daily  She just started using them and feels that leg is a little less itchy already. Will continue treatment and monitor symptoms.     Supplements:  -Vitamin D 2000 units daily  -ferrous sulfate 325mg daily     Stroke prevention:  -aspirin 162mg " daily.  No issues. Pt states that she has had high platelets and was recommended to take this for stroke prevention.      Today's Vitals: There were no vitals taken for this visit.  ----------------    I spent 25 minutes with this patient today. A copy of the visit note was provided to the patient's provider(s).    A summary of these recommendations was declined by the patient.    Mary Jane Tatum, PharmD  Medication Therapy Management Pharmacist  [facility]    Telemedicine Visit Details  Type of service:  Telephone visit  Start Time:  10:10am  End Time:  10:35am     Medication Therapy Recommendations  No medication therapy recommendations to display

## 2023-07-24 NOTE — PATIENT INSTRUCTIONS
"Recommendations from today's MTM visit:                                                    MTM (medication therapy management) is a service provided by a clinical pharmacist designed to help you get the most of out of your medicines.   Today we reviewed what your medicines are for, how to know if they are working, that your medicines are safe and how to make your medicine regimen as easy as possible.      Continue current medication.    Follow-up: as needed  Psychiatry intake on 9/8    It was great speaking with you today.  I value your experience and would be very thankful for your time in providing feedback in our clinic survey. In the next few days, you may receive an email or text message from [employer] with a link to a survey related to your  clinical pharmacist.\"     To schedule another MTM appointment, please call the clinic directly or you may call the MTM scheduling line at [phone] or toll-free at [phone].     My Clinical Pharmacist's contact information:                                                      Please feel free to contact me with any questions or concerns you have.      Mary Jane Tatum, PharmD  Medication Therapy Management Pharmacist  [facility]    "

## 2023-07-26 ENCOUNTER — APPOINTMENT (OUTPATIENT)
Dept: CT IMAGING | Facility: CLINIC | Age: 48
End: 2023-07-26
Attending: EMERGENCY MEDICINE
Payer: COMMERCIAL

## 2023-07-26 ENCOUNTER — HOSPITAL ENCOUNTER (EMERGENCY)
Facility: CLINIC | Age: 48
Discharge: HOME | End: 2023-07-26
Attending: EMERGENCY MEDICINE | Admitting: EMERGENCY MEDICINE
Payer: COMMERCIAL

## 2023-07-26 ENCOUNTER — OFFICE VISIT (OUTPATIENT)
Dept: URGENT CARE | Facility: URGENT CARE | Age: 48
End: 2023-07-26
Payer: COMMERCIAL

## 2023-07-26 VITALS
HEIGHT: 68 IN | SYSTOLIC BLOOD PRESSURE: 125 MMHG | OXYGEN SATURATION: 98 % | DIASTOLIC BLOOD PRESSURE: 65 MMHG | TEMPERATURE: 98.6 F | HEART RATE: 82 BPM | RESPIRATION RATE: 16 BRPM | BODY MASS INDEX: 24.25 KG/M2 | WEIGHT: 160 LBS

## 2023-07-26 VITALS
SYSTOLIC BLOOD PRESSURE: 102 MMHG | DIASTOLIC BLOOD PRESSURE: 66 MMHG | OXYGEN SATURATION: 100 % | RESPIRATION RATE: 18 BRPM | BODY MASS INDEX: 24.79 KG/M2 | HEART RATE: 89 BPM | WEIGHT: 163 LBS | TEMPERATURE: 97.9 F

## 2023-07-26 DIAGNOSIS — R19.7 VOMITING AND DIARRHEA: ICD-10-CM

## 2023-07-26 DIAGNOSIS — R10.30 LOWER ABDOMINAL PAIN: Primary | ICD-10-CM

## 2023-07-26 DIAGNOSIS — R11.10 VOMITING AND DIARRHEA: ICD-10-CM

## 2023-07-26 DIAGNOSIS — K62.5 RECTAL BLEEDING: ICD-10-CM

## 2023-07-26 DIAGNOSIS — Z20.822 SUSPECTED 2019 NOVEL CORONAVIRUS INFECTION: ICD-10-CM

## 2023-07-26 DIAGNOSIS — K52.9 GASTROENTERITIS: ICD-10-CM

## 2023-07-26 DIAGNOSIS — R19.7 BLOODY DIARRHEA: ICD-10-CM

## 2023-07-26 LAB
ABO/RH(D): NORMAL
ALBUMIN SERPL BCG-MCNC: 4.1 G/DL (ref 3.5–5.2)
ALP SERPL-CCNC: 56 U/L (ref 35–104)
ALT SERPL W P-5'-P-CCNC: 12 U/L (ref 0–50)
ANION GAP SERPL CALCULATED.3IONS-SCNC: 12 MMOL/L (ref 7–15)
ANTIBODY SCREEN: NEGATIVE
AST SERPL W P-5'-P-CCNC: 19 U/L (ref 0–45)
BASOPHILS # BLD AUTO: 0.1 10E3/UL (ref 0–0.2)
BASOPHILS NFR BLD AUTO: 0 %
BILIRUB SERPL-MCNC: 0.3 MG/DL
BUN SERPL-MCNC: 7.3 MG/DL (ref 6–20)
CALCIUM SERPL-MCNC: 8.9 MG/DL (ref 8.6–10)
CHLORIDE SERPL-SCNC: 98 MMOL/L (ref 98–107)
CREAT SERPL-MCNC: 0.63 MG/DL (ref 0.51–0.95)
DEPRECATED HCO3 PLAS-SCNC: 25 MMOL/L (ref 22–29)
EOSINOPHIL # BLD AUTO: 0.1 10E3/UL (ref 0–0.7)
EOSINOPHIL NFR BLD AUTO: 1 %
ERYTHROCYTE [DISTWIDTH] IN BLOOD BY AUTOMATED COUNT: 13.3 % (ref 10–15)
GFR SERPL CREATININE-BSD FRML MDRD: >90 ML/MIN/1.73M2
GLUCOSE SERPL-MCNC: 91 MG/DL (ref 70–99)
HCG SERPL QL: NEGATIVE
HCT VFR BLD AUTO: 35.9 % (ref 35–47)
HGB BLD-MCNC: 11.8 G/DL (ref 11.7–15.7)
HOLD SPECIMEN: NORMAL
IMM GRANULOCYTES # BLD: 0.1 10E3/UL
IMM GRANULOCYTES NFR BLD: 1 %
LYMPHOCYTES # BLD AUTO: 4 10E3/UL (ref 0.8–5.3)
LYMPHOCYTES NFR BLD AUTO: 17 %
MCH RBC QN AUTO: 30.4 PG (ref 26.5–33)
MCHC RBC AUTO-ENTMCNC: 32.9 G/DL (ref 31.5–36.5)
MCV RBC AUTO: 93 FL (ref 78–100)
MONOCYTES # BLD AUTO: 1.9 10E3/UL (ref 0–1.3)
MONOCYTES NFR BLD AUTO: 8 %
NEUTROPHILS # BLD AUTO: 17.9 10E3/UL (ref 1.6–8.3)
NEUTROPHILS NFR BLD AUTO: 73 %
NRBC # BLD AUTO: 0 10E3/UL
NRBC BLD AUTO-RTO: 0 /100
PLATELET # BLD AUTO: 486 10E3/UL (ref 150–450)
POTASSIUM SERPL-SCNC: 3.9 MMOL/L (ref 3.4–5.3)
PROT SERPL-MCNC: 7.3 G/DL (ref 6.4–8.3)
RBC # BLD AUTO: 3.88 10E6/UL (ref 3.8–5.2)
SODIUM SERPL-SCNC: 135 MMOL/L (ref 136–145)
SPECIMEN EXPIRATION DATE: NORMAL
WBC # BLD AUTO: 24 10E3/UL (ref 4–11)

## 2023-07-26 PROCEDURE — 80053 COMPREHEN METABOLIC PANEL: CPT | Performed by: EMERGENCY MEDICINE

## 2023-07-26 PROCEDURE — 85025 COMPLETE CBC W/AUTO DIFF WBC: CPT | Performed by: EMERGENCY MEDICINE

## 2023-07-26 PROCEDURE — 86850 RBC ANTIBODY SCREEN: CPT | Performed by: EMERGENCY MEDICINE

## 2023-07-26 PROCEDURE — 250N000009 HC RX 250: Performed by: EMERGENCY MEDICINE

## 2023-07-26 PROCEDURE — 99285 EMERGENCY DEPT VISIT HI MDM: CPT | Mod: 25

## 2023-07-26 PROCEDURE — 84703 CHORIONIC GONADOTROPIN ASSAY: CPT | Performed by: EMERGENCY MEDICINE

## 2023-07-26 PROCEDURE — 36415 COLL VENOUS BLD VENIPUNCTURE: CPT | Performed by: EMERGENCY MEDICINE

## 2023-07-26 PROCEDURE — 250N000011 HC RX IP 250 OP 636: Performed by: EMERGENCY MEDICINE

## 2023-07-26 PROCEDURE — 74177 CT ABD & PELVIS W/CONTRAST: CPT

## 2023-07-26 PROCEDURE — 87635 SARS-COV-2 COVID-19 AMP PRB: CPT | Performed by: PHYSICIAN ASSISTANT

## 2023-07-26 PROCEDURE — 99215 OFFICE O/P EST HI 40 MIN: CPT | Performed by: PHYSICIAN ASSISTANT

## 2023-07-26 PROCEDURE — 86901 BLOOD TYPING SEROLOGIC RH(D): CPT | Performed by: EMERGENCY MEDICINE

## 2023-07-26 RX ORDER — IOPAMIDOL 755 MG/ML
81 INJECTION, SOLUTION INTRAVASCULAR ONCE
Status: COMPLETED | OUTPATIENT
Start: 2023-07-26 | End: 2023-07-26

## 2023-07-26 RX ADMIN — IOPAMIDOL 81 ML: 755 INJECTION, SOLUTION INTRAVENOUS at 21:33

## 2023-07-26 RX ADMIN — SODIUM CHLORIDE 63 ML: 9 INJECTION, SOLUTION INTRAVENOUS at 21:33

## 2023-07-26 ASSESSMENT — ACTIVITIES OF DAILY LIVING (ADL)
ADLS_ACUITY_SCORE: 35
ADLS_ACUITY_SCORE: 35

## 2023-07-27 LAB — SARS-COV-2 RNA RESP QL NAA+PROBE: NEGATIVE

## 2023-07-27 NOTE — ED PROVIDER NOTES
History     Chief Complaint:  Rectal Bleeding     The history is provided by the patient.      Inez Boston is a 47 year old female on aspirin 81 mg with a history of cervical dystonia, cannabis use disorder, and genetic torsion dystonia who presents with sudden onset of abdominal cramping, diaphoresis, vomiting, and diarrhea two days ago. She notes she noticed maroon blood in her stool and some light blood yesterday. She notes the blood was dripping out of her rectum. She notes the vomiting and diarrhea subsided yesterday and she notes she was able to eat and then an episode of abdominal cramping, diaphoresis, vomiting, and diarrhea started again last night. She notes her symptoms are worse in the evenings. She notes today her symptoms went away today. She ate edamame and toast yesterday. Today she notes she ate toast, lemonade, and sprite. She notes history of abdominal cramping on 1st or 2nd day of period and noted when her symptoms onset she was on her 3rd to 4th day of her period. Her abdominal cramping feels worse than her usual period cramping. No recent travel or being on the lake or drinking lake or well water. No recent antibiotics.     Independent Historian:   None - Patient Only    Review of External Notes:   None    Medications:    Aspirin 81 MG  Baclofen   Duloxetine HCl  Ferrous Sulfate   Lisdexamfetamine    Past Medical History:    ADHD  Anxiety  Cervical dystonia  Thrombocytosis   Dysesthesia  ADHD  TIA  Lactose intolerance  PFO  Cannabis use disorder  Tobacco use disorder  Genetic torsion dystonia  Spasmodic torticollis  Functional movement disorder  Adnexal mass  Pelvic floor dysfunction in female    Past Surgical History:    Colonoscopy  Laparoscopic oophorectomy  Laparoscopic salpingotomy  Laparoscopy  Myringotomy with tubes     Physical Exam   Patient Vitals for the past 24 hrs:   BP Temp Temp src Pulse Resp SpO2 Height Weight   07/26/23 1943 128/59 98.6  F (37  C) Oral 81 18 98 % 1.727 m  "(5' 8\") 72.6 kg (160 lb)      Physical Exam  Gen: well appearing, in no acute distress  Oral : Mucous membranes moist,   Nose: No rhinorrhea  Ears: External near normal, without drainage  Eyes: periorbital tissues and sclera normal   Neck: supple, no abnormal swelling  Lungs: Clear bilaterally, no tachypnea or distress, speaks full sentences  CV: Regular rate, regular rhythm  Abd: soft, mild diffuse tenderness without rebound tenderness nondistended, no guarding  Ext: no lower extremity edema  Skin: warm, dry, well perfused, no rashes/bruising/lesions on exposed skin  Neuro: alert, no gross motor or sensory deficits,   Psych: pleasant mood, normal affect     Emergency Department Course     Imaging:  CT Abdomen Pelvis w Contrast   Final Result   IMPRESSION:    1.  Findings consistent with a nonspecific colitis affecting the distal transverse colon and splenic flexure. This is likely infectious or inflammatory. Ischemia would be possible as this encompasses an arterial watershed region, though the mesenteric    vasculature is patent. Consider correlation with serum lactate.         Report per radiology    Laboratory:  Labs Ordered and Resulted from Time of ED Arrival to Time of ED Departure   COMPREHENSIVE METABOLIC PANEL - Abnormal       Result Value    Sodium 135 (*)     Potassium 3.9      Chloride 98      Carbon Dioxide (CO2) 25      Anion Gap 12      Urea Nitrogen 7.3      Creatinine 0.63      Calcium 8.9      Glucose 91      Alkaline Phosphatase 56      AST 19      ALT 12      Protein Total 7.3      Albumin 4.1      Bilirubin Total 0.3      GFR Estimate >90     CBC WITH PLATELETS AND DIFFERENTIAL - Abnormal    WBC Count 24.0 (*)     RBC Count 3.88      Hemoglobin 11.8      Hematocrit 35.9      MCV 93      MCH 30.4      MCHC 32.9      RDW 13.3      Platelet Count 486 (*)     % Neutrophils 73      % Lymphocytes 17      % Monocytes 8      % Eosinophils 1      % Basophils 0      % Immature Granulocytes 1      NRBCs " per 100 WBC 0      Absolute Neutrophils 17.9 (*)     Absolute Lymphocytes 4.0      Absolute Monocytes 1.9 (*)     Absolute Eosinophils 0.1      Absolute Basophils 0.1      Absolute Immature Granulocytes 0.1      Absolute NRBCs 0.0     HCG QUALITATIVE PREGNANCY - Normal    hCG Serum Qualitative Negative     TYPE AND SCREEN, ADULT    ABO/RH(D) A POS      Antibody Screen Negative      SPECIMEN EXPIRATION DATE [id number]     ABO/RH TYPE AND SCREEN      Emergency Department Course & Assessments:    Interventions:  Medications   iopamidol (ISOVUE-370) solution 81 mL (81 mLs Intravenous $Given 7/26/23 2133)   Saline Flush (63 mLs Intravenous $Given 7/26/23 2133)      Independent Interpretation (X-rays, CTs, rhythm strip):  I reviewed the patient's abdominal CT and agree with the radiologist's interpretation of nonspecific colitis affecting the distal transverse colon and splenic flexure    Assessments/Consultations/Discussion of Management or Tests:  ED Course as of 07/26/23 2222 Wed Jul 26, 2023 2043 I obtained the patient's history and examined as noted above.      Social Determinants of Health affecting care:   None    Disposition:  The patient was discharged to home.     Impression & Plan    CMS Diagnoses: None    Medical Decision Making:  Patient presents emergency department with nausea and vomiting and diarrhea that started on Monday.  She started having bloody stools red in color and a bit maroon on Tuesday.  Felt a bit better Tuesday evening ate some food and then felt worse again last night.  Today so far she has been feeling improved actually has been tolerating oral intake pain has been better diarrhea and bleeding less as well.  She gives a good history for gastroenteritis.  CT shows a nonspecific colitis.  Given her improvement in symptoms, patent abdominal vasculature I think ischemic bowel very unlikely at this point.  She does have a significant leukocytosis but I am not seeing any evidence of  bacterial infection.  I think this is likely a generalized stress response she gives a good history for gastroenteritis with colitis causing some bleeding.  Her vital signs are within normal limits, her hemoglobin is not low, given improvement in symptoms I think think this can safely be observed as an outpatient.  Would expect continued improvement over the next several days as long as patient continues with bland diet.  She feels comfortable with that management plan.    Diagnosis:    ICD-10-CM    1. Gastroenteritis  K52.9       2. Rectal bleeding  K62.5          Scribe Disclosure:  I, Anupama Mendiola, am serving as a scribe at 8:40 PM on 7/26/2023 to document services personally performed by Alec Rausch MD based on my observations and the provider's statements to me.      7/26/2023   Alec Rausch MD Tschetter, Paul Anthony, MD  07/26/23 2236       Alec Rausch MD  07/26/23 2237

## 2023-07-27 NOTE — PROGRESS NOTES
SUBJECTIVE  HPI:  Inez Boston is a 47 year old female who presents with the CC of abdominal/pelvic pain.    Pain is located in the lower abdominal area.  The pain is characterized as cramping, and at worst is a level 10 on a scale of 1-10.  Pain has been present for 2 day(s) and is fluctuating.  EXACERBATING FACTORS: nothing  RELIEVING FACTORS: nothing.  ASSOCIATED SX: bloody diarrhea with clots, vomiting.    Past Medical History:   Diagnosis Date    ADHD (attention deficit hyperactivity disorder)     Anxiety     Cervical dystonia     Thrombocytosis      Current Outpatient Medications   Medication Sig Dispense Refill    aspirin 81 MG EC tablet Take 162 mg by mouth every morning      baclofen (LIORESAL) 10 MG tablet Take 1 tablet (10 mg) by mouth nightly as needed 30 tablet 1    clindamycin (CLEOCIN T) 1 % external lotion Apply once daily as needed for acne/folliculitis on the back/shoulders. 60 mL 11    DULoxetine HCl 40 MG CPEP Take 40 mg by mouth daily 90 capsule 3    Ferrous Sulfate 324 (65 Fe) MG TBEC Take 1 tablet by mouth every other day      [START ON 8/17/2023] lisdexamfetamine (VYVANSE) 20 MG capsule Take 1 capsule (20 mg) by mouth daily for 30 days 30 capsule 0    lisdexamfetamine (VYVANSE) 40 MG capsule Take 20 mg by mouth every morning      triamcinolone (KENALOG) 0.1 % external ointment Apply twice daily as needed for itchy spots on the legs 80 g 3    Vitamin D, Cholecalciferol, 25 MCG (1000 UT) CAPS Take 1,000 Units by mouth daily       Social History     Tobacco Use    Smoking status: Former     Types: Cigarettes    Smokeless tobacco: Never    Tobacco comments:     Vaping daily until the past 3 weeks, when she stopped her use.   Substance Use Topics    Alcohol use: Yes     Alcohol/week: 2.0 standard drinks of alcohol     Types: 2 Cans of beer per week     Comment: a couple times a week       ROS:  Review of systems negative except as stated above.    OBJECTIVE:  /66   Pulse 89   Temp 97.9   F (36.6  C)   Resp 18   Wt 73.9 kg (163 lb)   SpO2 100%   BMI 24.79 kg/m    GENERAL APPEARANCE: healthy, alert and no distress  RESP: lungs clear to auscultation - no rales, rhonchi or wheezes  CV: regular rates and rhythm, normal S1 S2, no murmur noted  ABDOMEN:  soft, nontender, no HSM or masses and bowel sounds normal  NEURO: Normal strength and tone, sensory exam grossly normal,  normal speech and mentation  SKIN: no suspicious lesions or rashes    ASSESSMENT:  (R10.30) Lower abdominal pain  (primary encounter diagnosis)  (R11.10,  R19.7) Vomiting and diarrhea  (R19.7) Bloody diarrhea  Comment: greater than 48 hours  Plan: to ED    (Z20.822) Suspected 2019 novel coronavirus infection  Plan: Symptomatic COVID-19 Virus (Coronavirus) by PCR        Nasopharyngeal

## 2023-07-27 NOTE — ED TRIAGE NOTES
Pt reports abd cramping since Monday with N/V/D. Pt reports since Tuesday having dark rectal bleeding with clots. Not on thinners. Pt feels weak.     Triage Assessment       Row Name 07/26/23 1945       Triage Assessment (Adult)    Airway WDL WDL       Respiratory WDL    Respiratory WDL WDL       Skin Circulation/Temperature WDL    Skin Circulation/Temperature WDL WDL       Cardiac WDL    Cardiac WDL WDL       Peripheral/Neurovascular WDL    Peripheral Neurovascular WDL WDL       Cognitive/Neuro/Behavioral WDL    Cognitive/Neuro/Behavioral WDL WDL

## 2023-07-27 NOTE — ED NOTES
First encounter with pt. Pt stated Monday night she had diarrhea and vomiting with ABD cramping. Pt stated she woke up Tuesday with some cramping. Pt stated she noticed dark red blood in toilet with diarrhea. Pt stated ABD pain/pressure lightened up after the morning but then returned worse at night. Pt stated blood dripped out of her shorts when walking in her kitchen. Pt stated she noticed more blood in toilet last night. Pt stated blood continued today with last bowel movement being at roughly 1400 with dark red in toilet. Pt A&O x 4. Pt mother at bedside.

## 2023-08-01 ENCOUNTER — TELEPHONE (OUTPATIENT)
Dept: OTOLARYNGOLOGY | Facility: CLINIC | Age: 48
End: 2023-08-01
Payer: COMMERCIAL

## 2023-08-01 NOTE — TELEPHONE ENCOUNTER
Left generic  to offer a sooner appointment.    Originally schedule for 10/31/23.    Gabriela Monroe LPN on 8/1/2023 at 3:08 PM

## 2023-08-02 ENCOUNTER — PATIENT OUTREACH (OUTPATIENT)
Dept: CARE COORDINATION | Facility: CLINIC | Age: 48
End: 2023-08-02
Payer: COMMERCIAL

## 2023-08-02 NOTE — PROGRESS NOTES
Clinic Care Coordination Contact  [facility]/Voicemail     Referral Source: Other, specify (Assessment Center)  Clinical Data: Care Coordinator Outreach  Outreach attempted x 2.  Left message on patient's voicemail with call back information and requested return call.  Plan: Care Coordinator will try to reach patient again in 3-5 business days.     APARNA Rodríguez  Clinic Care Coordination  [facility]  [email or web address]  [phone]

## 2023-08-11 ENCOUNTER — TELEPHONE (OUTPATIENT)
Dept: DERMATOLOGY | Facility: CLINIC | Age: 48
End: 2023-08-11
Payer: COMMERCIAL

## 2023-08-11 NOTE — TELEPHONE ENCOUNTER
ALEIDA to schedule next available appointment for hair loss.  Patient referred by Dr. Ríos.  Provided call back number.

## 2023-08-17 ENCOUNTER — TELEPHONE (OUTPATIENT)
Dept: PHYSICAL MEDICINE AND REHAB | Facility: CLINIC | Age: 48
End: 2023-08-17
Payer: COMMERCIAL

## 2023-08-17 NOTE — TELEPHONE ENCOUNTER
LVM for pt to call back and confirm appt for 1/10/24 at 9:00am with Dr Fischer. Her 12/22 appt is canceled due to MD out of the office

## 2023-08-24 ENCOUNTER — PATIENT OUTREACH (OUTPATIENT)
Dept: CARE COORDINATION | Facility: CLINIC | Age: 48
End: 2023-08-24
Payer: COMMERCIAL

## 2023-08-24 NOTE — PROGRESS NOTES
Clinic Care Coordination Contact  [facility]/Voicemail       Clinical Data: Care Coordinator Outreach  Outreach attempted x 3.  Left message on patient's voicemail with call back information and requested return call.  Plan: Care Coordinator will send care coordination introduction letter with care coordinator contact information and explanation of care coordination services via [facility]. Care Coordinator will do no further outreach attempts.     APARNA Rodríguez  Clinic Care Coordination  [facility]  [email or web address]  [phone]

## 2023-08-24 NOTE — LETTER
[facility] CARE COORDINATION  [facility]  August 24, 2023    Inez Boston  [address] [place] [facility] [address]      Dear Ienz,        I am a  clinic care coordinator who works with BETH Whitt with the [facility]. I have been trying to reach you recently to introduce Clinic Care Coordination. Below is a description of clinic care coordination and how I can further assist you.       The clinic care coordination team is made up of a , financial resource worker and community health worker who understand the health care system. The goal of clinic care coordination is to help you manage your health and improve access to the health care system. Our team works alongside your provider to assist you in determining your health and social needs. We can help you obtain health care and community resources, providing you with necessary information and education. We can work with you through any barriers and develop a care plan that helps coordinate and strengthen the communication between you and your care team.  Our services are voluntary and are offered without charge to you personally.    Please feel free to contact me with any questions or concerns regarding care coordination and what we can offer.      We are focused on providing you with the highest-quality healthcare experience possible.    Sincerely,     APARNA Rodríguez  Clinic Care Coordination  [facility]  [email or web address]  [phone]

## 2023-09-07 PROBLEM — F41.9 ANXIETY: Status: ACTIVE | Noted: 2019-04-03

## 2023-09-07 PROBLEM — F90.2 ATTENTION DEFICIT HYPERACTIVITY DISORDER (ADHD), COMBINED TYPE: Status: ACTIVE | Noted: 2019-04-03

## 2023-09-07 NOTE — PROGRESS NOTES
[facility]  NEW PATIENT EVALUATION     CARE TEAM:    PCP- Tony Segal  Therapist- Referral sent  Physiatry: Dr Aaliyah Wiley is a 48 year old who uses the pronouns she, her, hers.      Chief Concern     Psychiatric medication evaluation     Diagnoses     ADHD  Anxiety  Depression  Hx of cannabis use disorder     Assessment     Inez Boston is a 48 year old adult previously diagnosed with ADHD, anxiety, depression, and cannabis use disorder. Her primary symptoms of ADHD is difficulty concentrating/focusing and are improved with Vyvanse. She was previously seen for many years by a psychiatrist in [place] (until the COVID telehealth laws ended), so Inez has not had access to Vyvanse since April. She has continued taking duloxetine - is currently taking 30mg daily from her brother's supply.    The main question being asked today is if her current psychiatric medications are worsening muscle dystonia from long COVID. She has been off of Vyvanse for 4 months - and has noticed no clear changes in dystonia and more difficulty concentrating. She also previously tried going off of duloxetine for 4 months, and likewise didn't notice any dystonia changes but did notice more irritability and worsened mood.     Given these data points, it seems unlikely that either the Vyvanse and duloxetine are contributing to dystonia in any significant way.     Inez is open to a referral for individual therapy.    Future Considerations:  -If muscle dystonia persists/worsens, consider follow up with neurology  -If Vyvanse becomes less effective for ADHD, consider Concerta or other methylphenidate  -Consider duloxetine increase for worsening anxiety or depression    Psychotropic Drug Interactions:  [PSYCHCLINICDDI]  via 2D6 INHIBITION: duloxetine can raise the serum level of Vyvanse  ADDITIVE SEROTONERGIC: duloxetine and Vyvanse  Management: routine  "monitoring    [place] was [place] today: indicates that controlled prescriptions have been filled as prescribed    Risk Statements:   Treatment Risk- Risks, benefits, alternatives and potential adverse effects have been discussed and are understood.   Safety Risk-Inez did not appear to be an imminent safety risk to self or others.     Plan     1) Medications:   - Continue duloxetine 30mg daily  - Resume Vyvanse   - Start at 20mg daily in the morning  - Titrate up if needed    2) Psychotherapy: Referral placed    3) Next due:  Labs- Routine monitoring is not indicated for current psychotropic medication regimen   EKG- Routine monitoring is not indicated for current psychotropic medication regimen     4) Referrals: Therapy- individual    5) Other:     6) Follow-up: Return to PCP for ongoing management. Inez or primary care provider may reach out to us for additional chart review / recommendations if needed within the next year.      Pertinent Background                                                   [most recent eval 09/07/23]     Inez was first diagnosed with ADHD 20 years ago.  She has long COVID (2020), which primarily manifests as cervical dystonia and tremors.    Pertinent items include: eating disorder  and major medical problems     Subjective       -Had a psychiatrist in [place] (Dr. Juwan Bar) that was prescribing medications but pt moved--currently taking duloxetine, vyvanse, and baclofen     ADHD:  -Stopped Vyvanse when [place] psychiatrist couldn't prescribe in [place] anymore (April 2023) - felt more lethargic, no clear improvement in muscle spasms, marked worsening in focus.     Mood/anxiety:  -Feeling a little \"directionless\" lately  -Tried stopping duloxetine and vyvanse (both for 4 months, both separately). Without duloxetine, felt more irritable, negative, down.   -Caffeine makes muscle spasms worse.     Long COVID, muscle dystonia, psychosocial stressors:  -Dystonia - neck muscle tightness, brain fog, " "fatigue, tremor ever since COVID . Interfered with work. Brain fog, faitgue, improved but tremors and dystonia have not. Botox has been helpful for dystonia, follows with nuerology. When relaxing, tremors/spams are worse.  -went to [place] for workup. They didn't have a distinct diagnosis, prescribed electronic massager. Felt like partner thought she was \"going insane\" and was ready to call .  -Left [facility], after relationship and  job ended .  -Brother's girlfriend, who they were living with,  by suicide. Inez was with girlfriend the night she  - talked about her brother. Blamed brother, hanged herslef in garage. Care taker for her borther for a while after that (he wasn't eating, etc). Had family therapy, which helped.  -Longstanding difficulty sleeping, worsened with long COVID. Still has trouble with it when not taking it.    -Safety concerns: No SI , HI, SIB  -Therapy: Saw someone in , didn't find it helpful      Recent Psych Symptoms:   Depression:   none  Elevated:  none  Psychosis:  none  Anxiety:   see HPI  Trauma Related:  none  Insomnia:  Yes: difficulty falling asleep  Other:  No    Current Social History:  Financial/occupational: Short term contracts with , started job at [employer].   Living situation (partner, children, pets, etc): With brother in [facility].   Social/spiritual support: Friends, roommates,   Feels safe at home: Yes    Pertinent Substance Use:   Alcohol: No   Cannabis: Yes: 1-2 times per week for muscle relaxation  Tobacco: Not for 3 weeks. Occasionally vapes when drinking alcohol.   Caffeine:  Yes: 0-1 cups per day (worried about worsening muscle spasms)   Opioids: No   Narcan Kit current: N/A  Other substances: none    Medical Review of Systems:   Lightheadedness/orthostasis: Endorses lightheadedness with standing - unchanged.   Headaches: Yes - related to dystonia   GI: Diarrhea w/ abdominal cramping about once per month  Sexual health concerns: None  Other: Botox " treatments every 3 months for dystonia, contractions are cervical around the neck. Trouble sleeping due to jefferson muscles, muscle relaxer helps with sleep, takes oncee at night      Mental Status Exam     Alertness: alert  and oriented  Appearance: well groomed  Behavior/Demeanor: cooperative, pleasant, and calm, with good  eye contact   Speech: normal  Language: intact  Psychomotor: normal or unremarkable  Mood: description consistent with euthymia  Affect: full range; congruent to: mood- yes, content- yes  Thought Process/Associations: circumstantial  Thought Content:  Reports none;  Denies suicidal & violent ideation and delusions  Perception:  Reports none;  Denies hallucinations  Insight: good  Judgment: good  Cognition: does  appear grossly intact; formal cognitive testing was not done  Gait and Station: unremarkable     Family Mental Health History                                 pt reported     Mom - ADHD, anxiety (on duloxetine)   Brother - ADHD  Grandma - committed, paranoid schizophrenia?     Past Psychiatric History     Self injurious behavior [method, most recent]: No  Suicide attempt [#, most recent, method]: No  Suicidal ideation hx [passive, active]: No    Violence/Aggression Hx:No   Psychosis Hx: No   Eating Disorder Hx: Yes: Bulimia briefly as a teenager  Trauma hx: In [facility] during COVID, [place], a lot of fear about not making it    Psych Hosp [#, most recent]: No   Commitment: No   TMS/ECT: No   Outpatient Programs [Day treatment, DBT, eating disorder tx, etc]: No       Past Psych Med Trials        Medication Max Dose (mg) Dates / Duration Helpful? DC Reason / Adverse Effects?   Adderall       duloxetine   Y    Vyvanse   Y                                                 Vitals   LMP 06/24/2023 (Approximate)   Pulse Readings from Last 3 Encounters:   07/26/23 82   07/26/23 89   07/17/23 82     Wt Readings from Last 3 Encounters:   07/26/23 72.6 kg (160 lb)    07/26/23 73.9 kg (163 lb)   07/17/23 74.3 kg (163 lb 12.8 oz)     BP Readings from Last 3 Encounters:   07/26/23 125/65   07/26/23 102/66   07/17/23 101/69        Medical History     ALLERGIES: Patient has no known allergies.    Patient Active Problem List   Diagnosis    Pelvic pain in female    Urge incontinence    Anxiety    Attention deficit hyperactivity disorder (ADHD), predominantly inattentive type    Dysesthesia    Dystonia    History of disease    History of severe acute respiratory syndrome coronavirus 2 (SARS-CoV-2) disease    Cannabis use disorder, mild, abuse    Tobacco use disorder, moderate, dependence        Medications     Current Outpatient Medications   Medication Sig Dispense Refill    aspirin 81 MG EC tablet Take 162 mg by mouth every morning      baclofen (LIORESAL) 10 MG tablet Take 1 tablet (10 mg) by mouth nightly as needed 30 tablet 1    clindamycin (CLEOCIN T) 1 % external lotion Apply once daily as needed for acne/folliculitis on the back/shoulders. 60 mL 11    DULoxetine HCl 40 MG CPEP Take 40 mg by mouth daily 90 capsule 3    Ferrous Sulfate 324 (65 Fe) MG TBEC Take 1 tablet by mouth every other day      lisdexamfetamine (VYVANSE) 20 MG capsule Take 1 capsule (20 mg) by mouth daily for 30 days 30 capsule 0    lisdexamfetamine (VYVANSE) 40 MG capsule Take 20 mg by mouth every morning      triamcinolone (KENALOG) 0.1 % external ointment Apply twice daily as needed for itchy spots on the legs 80 g 3    Vitamin D, Cholecalciferol, 25 MCG (1000 UT) CAPS Take 1,000 Units by mouth daily          Labs and Data         5/8/2023     2:46 PM   PROMIS-10 Total Score w/o Sub Scores   PROMIS TOTAL - SUBSCORES 24         2/3/2022     2:27 PM 5/8/2023     2:43 PM   PHQ-9 SCORE   PHQ-9 Total Score MyChart  6 (Mild depression)   PHQ-9 Total Score 2 6         2/3/2022     2:27 PM 5/8/2023     2:44 PM   DUNCAN-7 SCORE   Total Score  2 (minimal anxiety)   Total Score 2 2       Liver/Kidney Function, TSH  Metabolic Blood counts   Recent Labs   Lab Test 07/26/23 1953 07/17/23  1726   AST 19 16   ALT 12 13   ALKPHOS 56 51   CR 0.63 0.63     Recent Labs   Lab Test 12/22/21  1100   TSH 1.39    Recent Labs   Lab Test 07/17/23  1726   CHOL 254*   TRIG 117   *   HDL 66     No lab results found.  Recent Labs   Lab Test 07/26/23 1953   GLC 91    Recent Labs   Lab Test 07/26/23 1953   WBC 24.0*   HGB 11.8   HCT 35.9   MCV 93   *          PROVIDER: Maritza Moya MD    Level of Medical Decision Making:   - At least 1 chronic problem that is not stable  - Engaged in prescription drug management during visit (discussed any medication benefits, side effects, alternatives, etc.)         Patient staffed in clinic with Dr. Maddox who will sign the note.  Supervisor is Dr. Rod.

## 2023-09-07 NOTE — PATIENT INSTRUCTIONS
Thank you for your visit today.     Treatment Plan Today:     1) Medications - Prescription sent for duloxetine 30mg daily. We will also call your pharmacy to get the Vyvanse approved.    2) Follow-up with Dr Segal.     3) In the case of an emergency, crisis numbers are below and clinic after hours number is [phone].    Maritza Moya MD  Psychiatry Resident Physician     **For crisis resources, please see the information at the end of this document**   Patient Education    Thank you for coming to the [facility] [place] [facility].     Lab Testing:  If you had lab testing today and your results are reassuring or normal they will be mailed to you or sent through [employer] within 7 days. If the lab tests need quick action we will call you with the results. The phone number we will call with results is # [phone]. If this is not the best number please call our clinic and change the number.     Medication Refills:  If you need any refills please call your pharmacy and they will contact us. Our fax number for refills is [phone].   Three business days of notice are needed for general medication refill requests.   Five business days of notice are needed for controlled substance refill requests.   If you need to change to a different pharmacy, please contact the new pharmacy directly. The new pharmacy will help you get your medications transferred.     Contact Us:  Please call [phone] during business hours (8-5:00 M-F).   If you have medication related questions after clinic hours, or on the weekend, please call [phone].     Financial Assistance [phone]   Medical Records [phone]       MENTAL HEALTH CRISIS RESOURCES:  For a emergency help, please call 911 or go to the nearest Emergency Department.     Emergency Walk-In Options:   EmPATH Unit @ [facility] ([place]): [phone] - Specialized mental health emergency area designed to be calming  [facility] ([place]): [phone]  [facility] ([place]): [phone]  [facility] ([place]): [phone]    [facility] Crisis Information:   [place]: [phone]  [place]: [phone]  [place] ([place]) - Adult: [phone]     Child: [phone]  [place] - Adult: [phone]     Child: [phone]  [place]: [phone]  List of all [facility] resources:   https://[place].[facility]/dhs/people-we-serve/adults/health-care/mental-health/resources/crisis-contacts.jsp    National Crisis Information:   Crisis Text Line: Text  [place]  to 312569  Suicide & Crisis Lifeline: 988  National Suicide Prevention Lifeline: 7-402-187-TALK (1-966.235.8796)       For online chat options, visit https://suicidepreventionlifeline.org/chat/  Poison Control Center: 1-940.990.9937  Trans Lifeline: 9-680-826-5289 - Hotline for transgender people of all ages  The [employer]: 8-343-900-3603 - Hotline for LGBT youth     For Non-Emergency Support:   Fast Tracker: Mental Health & Substance Use Disorder Resources -   https://www.[employer].org/

## 2023-09-08 ENCOUNTER — OFFICE VISIT (OUTPATIENT)
Dept: PSYCHIATRY | Facility: CLINIC | Age: 48
End: 2023-09-08
Attending: PSYCHIATRY & NEUROLOGY
Payer: COMMERCIAL

## 2023-09-08 VITALS
BODY MASS INDEX: 25.15 KG/M2 | DIASTOLIC BLOOD PRESSURE: 81 MMHG | HEART RATE: 88 BPM | WEIGHT: 165.4 LBS | SYSTOLIC BLOOD PRESSURE: 128 MMHG

## 2023-09-08 DIAGNOSIS — F33.42 RECURRENT MAJOR DEPRESSIVE DISORDER, IN FULL REMISSION (H): ICD-10-CM

## 2023-09-08 DIAGNOSIS — F41.9 ANXIETY: ICD-10-CM

## 2023-09-08 DIAGNOSIS — F90.2 ATTENTION DEFICIT HYPERACTIVITY DISORDER (ADHD), COMBINED TYPE: Primary | ICD-10-CM

## 2023-09-08 PROCEDURE — 90792 PSYCH DIAG EVAL W/MED SRVCS: CPT | Mod: GC | Performed by: STUDENT IN AN ORGANIZED HEALTH CARE EDUCATION/TRAINING PROGRAM

## 2023-09-08 RX ORDER — DULOXETIN HYDROCHLORIDE 30 MG/1
30 CAPSULE, DELAYED RELEASE ORAL DAILY
Qty: 90 CAPSULE | Refills: 0 | Status: SHIPPED | OUTPATIENT
Start: 2023-09-08 | End: 2023-11-17

## 2023-09-08 ASSESSMENT — PAIN SCALES - GENERAL: PAINLEVEL: MILD PAIN (2)

## 2023-09-08 ASSESSMENT — PATIENT HEALTH QUESTIONNAIRE - PHQ9
SUM OF ALL RESPONSES TO PHQ QUESTIONS 1-9: 3
10. IF YOU CHECKED OFF ANY PROBLEMS, HOW DIFFICULT HAVE THESE PROBLEMS MADE IT FOR YOU TO DO YOUR WORK, TAKE CARE OF THINGS AT HOME, OR GET ALONG WITH OTHER PEOPLE: NOT DIFFICULT AT ALL
SUM OF ALL RESPONSES TO PHQ QUESTIONS 1-9: 3

## 2023-09-08 NOTE — Clinical Note
Dr Segal,  Thank you for your referral. We met Inez today and it seems like her . Please see the recommendations in my note along with possible future recommendations.   Best, Maritza Moya (Psychiatry Resident)

## 2023-09-08 NOTE — Clinical Note
Hi Dr Segal,  I'm so sorry if this is the third message you receive from me. I just wanted to send you an FYI that I met with Inez in the psychiatry clinic and have written a note with current recommendations and possible future recs. Thank you!  Maritza Moya

## 2023-09-08 NOTE — NURSING NOTE
Chief Complaint   Patient presents with    Eval/Assessment       Attention deficit hyperactivity disorder (ADHD)         - Cy Dillon, Visit Facilitator

## 2023-09-13 ENCOUNTER — TELEPHONE (OUTPATIENT)
Dept: OTOLARYNGOLOGY | Facility: CLINIC | Age: 48
End: 2023-09-13
Payer: COMMERCIAL

## 2023-09-24 NOTE — PROGRESS NOTES
"HPI:    Seen in the ED for rectal bleeding. CT showed non-specific colitis. She states she is back to normal. She states increased urinary frequency and feels this may be related to her \"dystonia\" She does not feel she has a urinary tract infection. No previous evidence of diabetes. No dysuria, no hematuria. No current abdominal complaints. No GYN complaints. Otherwise, no additional HEENT, cardiopulmonary, abdominal, neurological, systemic, psychiatric, lymphatic, vascular complaints.     Past Medical History:   Diagnosis Date    ADHD (attention deficit hyperactivity disorder)     Anxiety     Cervical dystonia     Thrombocytosis      Past Surgical History:   Procedure Laterality Date    COLONOSCOPY      LAPAROSCOPIC OOPHORECTOMY Left 6/3/2022    Procedure: Laparoscopic oophorectomy;  Surgeon: Elda Mcpherson MD;  Location: [facility] OR    LAPAROSCOPIC OOPHORECTOMY  6/3/2022    Procedure: ;  Surgeon: Elda Mcpherson MD;  Location: [facility] OR    LAPAROSCOPIC OOPHORECTOMY  6/3/2022    Procedure: ;  Surgeon: Elda Mcpherson MD;  Location: [facility] OR    LAPAROSCOPIC SALPINGOTOMY Bilateral 6/3/2022    Procedure: bilateral salpingectomy;  Surgeon: Elda Mcpherson MD;  Location: [facility] OR    LAPAROSCOPY      6/3/2022    MYRINGOTOMY W/ TUBES      as a child     PE:    Vitals noted, gen, nad, cooperative, alert, lungs with good air movement, RRR, S1, S2, no MRG, abdomen, no acute findings. Grossly normal neurological exam.     EXAM: CT ABDOMEN PELVIS W CONTRAST  LOCATION: [facility]  DATE: 7/26/2023     INDICATION: eval for abd pain, bloody stools  COMPARISON: None.  TECHNIQUE: CT scan of the abdomen and pelvis was performed following injection of IV contrast. Multiplanar reformats were obtained. Dose reduction techniques were used.  CONTRAST: 81mL Isovue 370     FINDINGS:   LOWER CHEST: Normal.     HEPATOBILIARY: Normal.     PANCREAS: Normal.     SPLEEN: Normal.     ADRENAL GLANDS: Normal.   "   KIDNEYS/BLADDER: Normal.     BOWEL: No bowel obstruction. Normal appendix. Wall thickening and adjacent edema present at the distal transverse colon and splenic flexure. Remainder of the colon is normal.     LYMPH NODES: Normal.     VASCULATURE: Normal caliber abdominal aorta. The superior mesenteric artery and vein are patent. The inferior mesenteric artery is patent.     PELVIC ORGANS: Small amount of free fluid in the posterior cul-de-sac.     MUSCULOSKELETAL: Normal.                                                                      IMPRESSION:   1.  Findings consistent with a nonspecific colitis affecting the distal transverse colon and splenic flexure. This is likely infectious or inflammatory. Ischemia would be possible as this encompasses an arterial watershed region, though the mesenteric   vasculature is patent. Consider correlation with serum lactate.    A/P:      1. Immunizations; COVID-19 Pfizer x 2 (bi-valent 9/29/2022). Moderna x 2. Td/Tdap on 3/21/2022.  2. Hematology appt. With Dr. Caceres 10/5/2022 regarding thrombocytosis. Remains on ASA (162 mg). If platelets >700K then can discuss bone marrow bx per heme. Ordered CBC 9/24/2023.   3. Cervical dystonia: follow up with Dr. Fischer, PMR 12/6/2022 regarding neck and myofacial pain. On baclofen more for sleep. Botox injections, last visit with Dr. Fischer 6/2/2033 and next visit 9/25/2023.   4. GYN note Dr. Mcpherson, 6/23/2022, 3/11/2022 and 2/28/2022 regarding endometrial bx. plevic mass and pelvic pain. She is s/p bilateral salpingectomy, left oophorectomy on 6/3/2022.   5. On Cymbalta and Vyvanse and she finds this beneficial.  Seen Dr. Moya, Psychiatry 9/8/2023  6. Mammogram 5/17/2023, negative. Had breast MRI 7/6/2022. Per Oncology note (10/5/2022), recommending MRIs alternating with mammograms.   7. She has seen PT for groin pain 2/24/2022 and appt. 3/21/2022.   8. Genetic counseling given family history significant for breast cancer on  "4/28/2022 and 5/24/2022.   9. Colonoscopy 12/7/2021 and repeat in 10 years.   10. Lipid panel 1/16/23 with Cholesterol 223,  . Statin held due to msk complaints. Last lipid panel 7/17/2023; , TG' s 117, and HDL 66. Considering visit with Dr. Bisi Wilkinson, Preventive Cardiology. Ordered lipids today 9/25/2023.   11. Seen Neurology 6/21/2022, Dr. Wylie for cervical dystonia. She is not taking Sinemet (nausea).  12. Neuropsychiatric testing was done 8/3/2022.  13. She was seen 1/11/2023 by Dr. Wu, Neurophthalmology, for dry eyes possibly related to Botox injections.   14. Vaping; follows with Behavioral Health, Dr. Levi, last visit 5/8/2023. Vapes only occasionally now, no concerns.   15. Dermatology: has not followed with anyone in the past, referral placed on 7/20/2023.   16. ENT appt. With Dr. Hurtado on 10/31/2023.   17. Urinary frequency; she feels this may be related to her \"dystonia\" Ordered UA and could consider having her see Dr. Lopez, Urology, cystoscopy and bladder botox?         30 minutes spent on the date of the encounter doing chart review, history and exam, documentation and further activities as noted above exclusive of procedures and other billable interpretations  "

## 2023-09-25 ENCOUNTER — OFFICE VISIT (OUTPATIENT)
Dept: PHYSICAL MEDICINE AND REHAB | Facility: CLINIC | Age: 48
End: 2023-09-25
Payer: COMMERCIAL

## 2023-09-25 ENCOUNTER — OFFICE VISIT (OUTPATIENT)
Dept: INTERNAL MEDICINE | Facility: CLINIC | Age: 48
End: 2023-09-25
Payer: COMMERCIAL

## 2023-09-25 VITALS
DIASTOLIC BLOOD PRESSURE: 67 MMHG | WEIGHT: 163.3 LBS | BODY MASS INDEX: 24.75 KG/M2 | OXYGEN SATURATION: 99 % | SYSTOLIC BLOOD PRESSURE: 110 MMHG | HEART RATE: 78 BPM | HEIGHT: 68 IN

## 2023-09-25 DIAGNOSIS — E78.00 HIGH BLOOD CHOLESTEROL: ICD-10-CM

## 2023-09-25 DIAGNOSIS — G24.1 GENETIC TORSION DYSTONIA: ICD-10-CM

## 2023-09-25 DIAGNOSIS — G24.3 CERVICAL DYSTONIA: Primary | ICD-10-CM

## 2023-09-25 DIAGNOSIS — G24.4 IDIOPATHIC OROFACIAL DYSTONIA: ICD-10-CM

## 2023-09-25 DIAGNOSIS — R35.0 URINARY FREQUENCY: ICD-10-CM

## 2023-09-25 DIAGNOSIS — R79.89 ELEVATED PLATELET COUNT: Primary | ICD-10-CM

## 2023-09-25 PROCEDURE — 99214 OFFICE O/P EST MOD 30 MIN: CPT | Performed by: INTERNAL MEDICINE

## 2023-09-25 PROCEDURE — 64643 CHEMODENERV 1 EXTREM 1-4 EA: CPT | Performed by: PHYSICAL MEDICINE & REHABILITATION

## 2023-09-25 PROCEDURE — 64616 CHEMODENERV MUSC NECK DYSTON: CPT | Mod: RT | Performed by: PHYSICAL MEDICINE & REHABILITATION

## 2023-09-25 PROCEDURE — 64616 CHEMODENERV MUSC NECK DYSTON: CPT | Mod: LT | Performed by: PHYSICAL MEDICINE & REHABILITATION

## 2023-09-25 PROCEDURE — 95874 GUIDE NERV DESTR NEEDLE EMG: CPT | Performed by: PHYSICAL MEDICINE & REHABILITATION

## 2023-09-25 PROCEDURE — 64612 DESTROY NERVE FACE MUSCLE: CPT | Mod: RT | Performed by: PHYSICAL MEDICINE & REHABILITATION

## 2023-09-25 PROCEDURE — 64612 DESTROY NERVE FACE MUSCLE: CPT | Mod: LT | Performed by: PHYSICAL MEDICINE & REHABILITATION

## 2023-09-25 PROCEDURE — 64642 CHEMODENERV 1 EXTREMITY 1-4: CPT | Performed by: PHYSICAL MEDICINE & REHABILITATION

## 2023-09-25 NOTE — NURSING NOTE
Inez Boston is a 48 year old female patient that presents today in clinic for the following:    Chief Complaint   Patient presents with    Follow Up     Concerned about high cholesterol      The patient's allergies and medications were reviewed as noted. A set of vitals were recorded as noted without incident. The patient does not have any other questions for the provider.    Ethel Dsouza, EMT at 5:54 PM on 9/25/2023

## 2023-09-25 NOTE — PROGRESS NOTES
"  [facility]     PM&R CLINIC NOTE  BOTULINUM TOXIN PROCEDURE      HPI  No chief complaint on file.    Inez Boston is a 48 year old female with a history of  who presents to clinic for botulinum toxin injections for management of cervical dystonia.      SINCE LAST VISIT  Inez Bosotn was last seen here in clinic on 6/2/2023, at which time she received 300 units of Botox.     Patient denies new medical diagnoses, illnesses, hospitalizations, emergency room visits, and injuries since the previous injection with botulinum neurotoxin.       RESPONSE TO PREVIOUS TREATMENT    Side effects: No problems reported.    Pain Improvement: Yes. Approximately 50% improvement. Duration of Benefit:   10-11 weeks followed by gradual decrease in benefit over the last 3-4 weeks, although she is 4 weeks overdue on her Botox injections.     Dystonia Improvement: Yes. Approximately 50% improvement. Botox has been extremely beneficial with regards to her dystonia.   Duration of Benefit:   10-11 weeks followed by gradual decrease in benefit. More notably, her family members and friends have commented on the fact that she is moving much less, and exhibiting less \"dystonia extinguishing\" behaviors.     Functional Improvement: Yes, her dystonia has improved so much that she was able to go back to working.       PHYSICAL EXAM  VS: There were no vitals taken for this visit.   GEN: Pleasant and cooperative, in no acute distress  HEENT: Right mouth drooping. No asymmetry with eyebrow lift.  HEAD AND NECK: Limited ROM with rotation to the left  HEAD, NECK AND TRUNK PATTERN:   Head & Neck Flexion:  Present  Head & Neck Rotation:   Right  Head & Neck Lateral Bend:   Right  Shoulder Elevation:   Left  JAW AND FACIAL PATTERN:   Jaw Clenching:   Bilateral  VOCAL INVOLVEMENT:   No  SPASMS: Dystonic movements with irritation and facial movements.       ALLERGIES  No Known Allergies    CURRENT MEDICATIONS    Current " Outpatient Medications:     aspirin 81 MG EC tablet, Take 162 mg by mouth every morning, Disp: , Rfl:     baclofen (LIORESAL) 10 MG tablet, Take 1 tablet (10 mg) by mouth nightly as needed, Disp: 30 tablet, Rfl: 1    clindamycin (CLEOCIN T) 1 % external lotion, Apply once daily as needed for acne/folliculitis on the back/shoulders., Disp: 60 mL, Rfl: 11    DULoxetine (CYMBALTA) 30 MG capsule, Take 1 capsule (30 mg) by mouth daily, Disp: 90 capsule, Rfl: 0    Ferrous Sulfate 324 (65 Fe) MG TBEC, Take 1 tablet by mouth every other day, Disp: , Rfl:     lisdexamfetamine (VYVANSE) 40 MG capsule, Take 20 mg by mouth every morning, Disp: , Rfl:     triamcinolone (KENALOG) 0.1 % external ointment, Apply twice daily as needed for itchy spots on the legs, Disp: 80 g, Rfl: 3    Vitamin D, Cholecalciferol, 25 MCG (1000 UT) CAPS, Take 1,000 Units by mouth daily, Disp: , Rfl:     Current Facility-Administered Medications:     botulinum toxin type A (BOTOX) 100 units injection 400 Units, 400 Units, Intramuscular, Q90 Days, Elizabeth Fiscehr MD       BOTULINUM NEUROTOXIN INJECTION PROCEDURES    VERIFICATION OF PATIENT IDENTIFICATION AND PROCEDURE     Initials   Patient Name SES   Patient  SES   Procedure Verified by: SES     Prior to the start of the procedure and with procedural staff participation, I verbally confirmed the patient s identity using two indicators, relevant allergies, that the procedure was appropriate and matched the consent or emergent situation, and that the correct equipment/implants were available. Immediately prior to starting the procedure I conducted the Time Out with the procedural staff and re-confirmed the patient s name, procedure, and site/side. (The Joint Commission universal protocol was followed.)  Yes    Sedation (Moderate or Deep): None    ABOVE ASSESSMENTS PERFORMED BY    Elizabeth Fischer MD      INDICATIONS FOR PROCEDURES  Inez Boston is a 48 year old patient with involuntary  movements secondary to the diagnosis of cervical and generalized dystonia. Her baseline symptoms have been recalcitrant to oral medications and conservative therapy.  She is here today for reinjection with Botox.    GOAL OF PROCEDURE  The goal of this procedure is to increase active range of motion, improve volitional motor control, decrease pain  and enhance functional independence.      TOTAL DOSE ADMINISTERED  Dose Administered:  350 units  Botox (Botulinum Toxin Type A)       2:1 Dilution   Unavoidable Drug Waste: Yes  Amount of drug waste (mL): 50 units Botox.  Reason for waste:  Single use vial.  Diluent Used:  0.25% bupivacaine  Total Volume of Diluent Used: 6 ml  NDC #: Botox 100u ([address]-33)      CONSENT  The risks, benefits, and treatment options were discussed with Inez Boston and she agreed to proceed.    Written consent was obtained by [facility].     EQUIPMENT USED  Needle-37mm stimulating/recording  Needle-30 gauge  EMG/NCS Machine    SKIN PREPARATION  Skin preparation was performed using an alcohol wipe.    GUIDANCE DESCRIPTION  Electro-myographic guidance was necessary throughout the neck portion of the procedure to accurately identify all areas of dystonic muscles while avoiding injection of non-dystonic muscles, neighboring nerves and nearby vascular structures.       AREA/MUSCLE INJECTED: [id number] UNITS BOTOX = TOTAL DOSE, 2:1 DILUTION    1. NECK MUSCLES: 40 UNITS BOTOX = TOTAL DOSE    Right Upper Trapezius (upper cervical) - 15 units of Botox at 3 site/s.   Left Upper Trapezius (upper cervical) - 15 units of Botox at 3 site/s.    Right Splenius Cervicis - 5 units of Botox at 1 site/s.   Left Splenius Cervicis -  5 units of Botox at 1 site/s.    2. UPPER EXTREMITY MUSCLES: 100 UNITS BOTOX = TOTAL DOSE    Right Levator Scapulae - 10 units of Botox at 1 site/s.   Left Levator Scapulae - 10 units of Botox at 1 site/s.    Right Pectoralis Minor - 5 units at 1 site/s.   Left Pectoralis Minor - 5 units  at 1 site/s.     Right Rhomboids - 20 units at 4 site/s.   Left Rhomboids - 20 units at 4 site/s.     Right Latissimus Dorsi - 15 units of Botox at 2 site/s.   Left Latissimus Dorsi - 15 units of Botox at 3 site/s.     4. FACIAL & SCALP MUSCLES: 210 UNITS BOTOX = TOTAL DOSE    Right Frontalis - 10 units of Botox at 2 site/s.  Left Frontalis - 10 units of Botox at 2 site/s.    Right Temporalis - 50 units of Botox at 5 site/s.  Left Temporalis - 50 units of Botox at 5 site/s.    Right Occipitalis - 25 units of Botox at 4 site/s.   Left Occipitalis - 25 units of Botox at 4 site/s.    Right  - 5 units of Botox at 1 site/s.   Left  - 5 units of Botox at 1 site/s.    Procerus - 5 units of Botox at 1 site/s.    Right Orbicularis Oculi - 7.5 units of Botox at 3 site/s.  Left Orbicularis Oculi - 7.5 units of Botox at 3 site/s.     Right Orbicularis Oris - 2.5 units of Botox at 1 site/s.   Left Orbicularis Oris - 2.5 units of Botox at 1 site/s.     Right Mentalis - 2.5 units of Botox at 1 site/s.   Left Mentalis - 2.5 units of Botox at 1 site/s.       RESPONSE TO PROCEDURE  Inez Boston tolerated the procedure well and there were no immediate complications. She was allowed to recover for an appropriate period of time and was discharged home in stable condition.    ASSESSMENT AND PLAN   Botulinum toxin injections: Dose of Botox was increased from 300 units to 350 units in hopes of increasing effectiveness and prolonging duration of benefit of injections. Patient will continue to monitor response to Botox and report at next appointment.   Referrals: None.   Follow up: Inez Boston was rescheduled for the next series of injections in 12 weeks, at which time we will evaluate response to today's injections. she may call the clinic prior with any questions or concerns prior to the next appointment.

## 2023-09-25 NOTE — LETTER
"9/25/2023       RE: Inez Boston  [address]  [facility] [address]       Dear Colleague,    Thank you for referring your patient, Inez Boston, to the [facility] [place] at [facility]. Please see a copy of my visit note below.      [facility]     PM&R CLINIC NOTE  BOTULINUM TOXIN PROCEDURE      HPI  No chief complaint on file.    Inez Boston is a 48 year old female with a history of  who presents to clinic for botulinum toxin injections for management of cervical dystonia.      SINCE LAST VISIT  Inez Boston was last seen here in clinic on 6/2/2023, at which time she received 300 units of Botox.     Patient denies new medical diagnoses, illnesses, hospitalizations, emergency room visits, and injuries since the previous injection with botulinum neurotoxin.       RESPONSE TO PREVIOUS TREATMENT    Side effects: No problems reported.    Pain Improvement: Yes. Approximately 50% improvement. Duration of Benefit:   10-11 weeks followed by gradual decrease in benefit over the last 3-4 weeks, although she is 4 weeks overdue on her Botox injections.     Dystonia Improvement: Yes. Approximately 50% improvement. Botox has been extremely beneficial with regards to her dystonia.   Duration of Benefit:   10-11 weeks followed by gradual decrease in benefit. More notably, her family members and friends have commented on the fact that she is moving much less, and exhibiting less \"dystonia extinguishing\" behaviors.     Functional Improvement: Yes, her dystonia has improved so much that she was able to go back to working.       PHYSICAL EXAM  VS: There were no vitals taken for this visit.   GEN: Pleasant and cooperative, in no acute distress  HEENT: Right mouth drooping. No asymmetry with eyebrow lift.  HEAD AND NECK: Limited ROM with rotation to the left  HEAD, NECK AND TRUNK " PATTERN:   Head & Neck Flexion:  Present  Head & Neck Rotation:   Right  Head & Neck Lateral Bend:   Right  Shoulder Elevation:   Left  JAW AND FACIAL PATTERN:   Jaw Clenching:   Bilateral  VOCAL INVOLVEMENT:   No  SPASMS: Dystonic movements with irritation and facial movements.       ALLERGIES  No Known Allergies    CURRENT MEDICATIONS    Current Outpatient Medications:     aspirin 81 MG EC tablet, Take 162 mg by mouth every morning, Disp: , Rfl:     baclofen (LIORESAL) 10 MG tablet, Take 1 tablet (10 mg) by mouth nightly as needed, Disp: 30 tablet, Rfl: 1    clindamycin (CLEOCIN T) 1 % external lotion, Apply once daily as needed for acne/folliculitis on the back/shoulders., Disp: 60 mL, Rfl: 11    DULoxetine (CYMBALTA) 30 MG capsule, Take 1 capsule (30 mg) by mouth daily, Disp: 90 capsule, Rfl: 0    Ferrous Sulfate 324 (65 Fe) MG TBEC, Take 1 tablet by mouth every other day, Disp: , Rfl:     lisdexamfetamine (VYVANSE) 40 MG capsule, Take 20 mg by mouth every morning, Disp: , Rfl:     triamcinolone (KENALOG) 0.1 % external ointment, Apply twice daily as needed for itchy spots on the legs, Disp: 80 g, Rfl: 3    Vitamin D, Cholecalciferol, 25 MCG (1000 UT) CAPS, Take 1,000 Units by mouth daily, Disp: , Rfl:     Current Facility-Administered Medications:     botulinum toxin type A (BOTOX) 100 units injection 400 Units, 400 Units, Intramuscular, Q90 Days, Elizabeth Fischer MD       BOTULINUM NEUROTOXIN INJECTION PROCEDURES    VERIFICATION OF PATIENT IDENTIFICATION AND PROCEDURE     Initials   Patient Name SES   Patient  SES   Procedure Verified by: SES     Prior to the start of the procedure and with procedural staff participation, I verbally confirmed the patient s identity using two indicators, relevant allergies, that the procedure was appropriate and matched the consent or emergent situation, and that the correct equipment/implants were available. Immediately prior to starting the procedure I conducted  the Time Out with the procedural staff and re-confirmed the patient s name, procedure, and site/side. (The Joint Commission universal protocol was followed.)  Yes    Sedation (Moderate or Deep): None    ABOVE ASSESSMENTS PERFORMED BY    Elizabeth Fischer MD      INDICATIONS FOR PROCEDURES  Inez Boston is a 48 year old patient with involuntary movements secondary to the diagnosis of cervical and generalized dystonia. Her baseline symptoms have been recalcitrant to oral medications and conservative therapy.  She is here today for reinjection with Botox.    GOAL OF PROCEDURE  The goal of this procedure is to increase active range of motion, improve volitional motor control, decrease pain  and enhance functional independence.      TOTAL DOSE ADMINISTERED  Dose Administered:  350 units  Botox (Botulinum Toxin Type A)       2:1 Dilution   Unavoidable Drug Waste: Yes  Amount of drug waste (mL): 50 units Botox.  Reason for waste:  Single use vial.  Diluent Used:  0.25% bupivacaine  Total Volume of Diluent Used: 6 ml  NDC #: Botox 100u ([address]-51)      CONSENT  The risks, benefits, and treatment options were discussed with Inez Boston and she agreed to proceed.    Written consent was obtained by [facility].     EQUIPMENT USED  Needle-37mm stimulating/recording  Needle-30 gauge  EMG/NCS Machine    SKIN PREPARATION  Skin preparation was performed using an alcohol wipe.    GUIDANCE DESCRIPTION  Electro-myographic guidance was necessary throughout the neck portion of the procedure to accurately identify all areas of dystonic muscles while avoiding injection of non-dystonic muscles, neighboring nerves and nearby vascular structures.       AREA/MUSCLE INJECTED: [id number] UNITS BOTOX = TOTAL DOSE, 2:1 DILUTION    1. NECK MUSCLES: 40 UNITS BOTOX = TOTAL DOSE    Right Upper Trapezius (upper cervical) - 15 units of Botox at 3 site/s.   Left Upper Trapezius (upper cervical) - 15 units of Botox at 3 site/s.    Right Splenius Cervicis  - 5 units of Botox at 1 site/s.   Left Splenius Cervicis -  5 units of Botox at 1 site/s.    2. UPPER EXTREMITY MUSCLES: 100 UNITS BOTOX = TOTAL DOSE    Right Levator Scapulae - 10 units of Botox at 1 site/s.   Left Levator Scapulae - 10 units of Botox at 1 site/s.    Right Pectoralis Minor - 5 units at 1 site/s.   Left Pectoralis Minor - 5 units at 1 site/s.     Right Rhomboids - 20 units at 4 site/s.   Left Rhomboids - 20 units at 4 site/s.     Right Latissimus Dorsi - 15 units of Botox at 2 site/s.   Left Latissimus Dorsi - 15 units of Botox at 3 site/s.     4. FACIAL & SCALP MUSCLES: 210 UNITS BOTOX = TOTAL DOSE    Right Frontalis - 10 units of Botox at 2 site/s.  Left Frontalis - 10 units of Botox at 2 site/s.    Right Temporalis - 50 units of Botox at 5 site/s.  Left Temporalis - 50 units of Botox at 5 site/s.    Right Occipitalis - 25 units of Botox at 4 site/s.   Left Occipitalis - 25 units of Botox at 4 site/s.    Right  - 5 units of Botox at 1 site/s.   Left  - 5 units of Botox at 1 site/s.    Procerus - 5 units of Botox at 1 site/s.    Right Orbicularis Oculi - 7.5 units of Botox at 3 site/s.  Left Orbicularis Oculi - 7.5 units of Botox at 3 site/s.     Right Orbicularis Oris - 2.5 units of Botox at 1 site/s.   Left Orbicularis Oris - 2.5 units of Botox at 1 site/s.     Right Mentalis - 2.5 units of Botox at 1 site/s.   Left Mentalis - 2.5 units of Botox at 1 site/s.       RESPONSE TO PROCEDURE  Inez Boston tolerated the procedure well and there were no immediate complications. She was allowed to recover for an appropriate period of time and was discharged home in stable condition.    ASSESSMENT AND PLAN   Botulinum toxin injections: Dose of Botox was increased from 300 units to 350 units in hopes of increasing effectiveness and prolonging duration of benefit of injections. Patient will continue to monitor response to Botox and report at next appointment.   Referrals: None.   Follow  up: Inez Boston was rescheduled for the next series of injections in 12 weeks, at which time we will evaluate response to today's injections. she may call the clinic prior with any questions or concerns prior to the next appointment.         Again, thank you for allowing me to participate in the care of your patient.      Sincerely,    Elizabeth Fischer MD

## 2023-10-02 RX ORDER — BUPIVACAINE HYDROCHLORIDE 2.5 MG/ML
5 INJECTION, SOLUTION EPIDURAL; INFILTRATION; INTRACAUDAL ONCE
Status: COMPLETED | OUTPATIENT
Start: 2023-10-02 | End: 2023-10-02

## 2023-10-02 RX ADMIN — BUPIVACAINE HYDROCHLORIDE 12.5 MG: 2.5 INJECTION, SOLUTION EPIDURAL; INFILTRATION; INTRACAUDAL at 09:01

## 2023-10-18 ENCOUNTER — LAB (OUTPATIENT)
Dept: LAB | Facility: CLINIC | Age: 48
End: 2023-10-18
Payer: COMMERCIAL

## 2023-10-18 ENCOUNTER — ALLIED HEALTH/NURSE VISIT (OUTPATIENT)
Dept: INTERNAL MEDICINE | Facility: CLINIC | Age: 48
End: 2023-10-18
Payer: COMMERCIAL

## 2023-10-18 DIAGNOSIS — E78.00 HIGH BLOOD CHOLESTEROL: ICD-10-CM

## 2023-10-18 DIAGNOSIS — Z23 NEED FOR VACCINATION: Primary | ICD-10-CM

## 2023-10-18 DIAGNOSIS — R35.0 URINARY FREQUENCY: ICD-10-CM

## 2023-10-18 DIAGNOSIS — R79.89 ELEVATED PLATELET COUNT: ICD-10-CM

## 2023-10-18 LAB
ALBUMIN UR-MCNC: NEGATIVE MG/DL
APPEARANCE UR: CLEAR
BASO+EOS+MONOS # BLD AUTO: ABNORMAL 10*3/UL
BASO+EOS+MONOS NFR BLD AUTO: ABNORMAL %
BASOPHILS # BLD AUTO: 0.1 10E3/UL (ref 0–0.2)
BASOPHILS NFR BLD AUTO: 1 %
BILIRUB UR QL STRIP: NEGATIVE
CHOLEST SERPL-MCNC: 257 MG/DL
COLOR UR AUTO: ABNORMAL
EOSINOPHIL # BLD AUTO: 0.1 10E3/UL (ref 0–0.7)
EOSINOPHIL NFR BLD AUTO: 1 %
ERYTHROCYTE [DISTWIDTH] IN BLOOD BY AUTOMATED COUNT: 13.1 % (ref 10–15)
GLUCOSE UR STRIP-MCNC: NEGATIVE MG/DL
HCT VFR BLD AUTO: 36.4 % (ref 35–47)
HDLC SERPL-MCNC: 73 MG/DL
HGB BLD-MCNC: 11.7 G/DL (ref 11.7–15.7)
HGB UR QL STRIP: NEGATIVE
IMM GRANULOCYTES # BLD: 0 10E3/UL
IMM GRANULOCYTES NFR BLD: 0 %
KETONES UR STRIP-MCNC: ABNORMAL MG/DL
LDLC SERPL CALC-MCNC: 167 MG/DL
LEUKOCYTE ESTERASE UR QL STRIP: NEGATIVE
LYMPHOCYTES # BLD AUTO: 3 10E3/UL (ref 0.8–5.3)
LYMPHOCYTES NFR BLD AUTO: 26 %
MCH RBC QN AUTO: 29.3 PG (ref 26.5–33)
MCHC RBC AUTO-ENTMCNC: 32.1 G/DL (ref 31.5–36.5)
MCV RBC AUTO: 91 FL (ref 78–100)
MONOCYTES # BLD AUTO: 0.7 10E3/UL (ref 0–1.3)
MONOCYTES NFR BLD AUTO: 6 %
MUCOUS THREADS #/AREA URNS LPF: PRESENT /LPF
NEUTROPHILS # BLD AUTO: 7.7 10E3/UL (ref 1.6–8.3)
NEUTROPHILS NFR BLD AUTO: 66 %
NITRATE UR QL: NEGATIVE
NONHDLC SERPL-MCNC: 184 MG/DL
NRBC # BLD AUTO: 0 10E3/UL
NRBC BLD AUTO-RTO: 0 /100
PH UR STRIP: 5.5 [PH] (ref 5–7)
PLATELET # BLD AUTO: 535 10E3/UL (ref 150–450)
RBC # BLD AUTO: 4 10E6/UL (ref 3.8–5.2)
RBC URINE: 2 /HPF
SP GR UR STRIP: 1.02 (ref 1–1.03)
SQUAMOUS EPITHELIAL: 1 /HPF
TRIGL SERPL-MCNC: 83 MG/DL
UROBILINOGEN UR STRIP-MCNC: NORMAL MG/DL
WBC # BLD AUTO: 11.6 10E3/UL (ref 4–11)
WBC URINE: 1 /HPF

## 2023-10-18 PROCEDURE — 90480 ADMN SARSCOV2 VAC 1/ONLY CMP: CPT

## 2023-10-18 PROCEDURE — 36415 COLL VENOUS BLD VENIPUNCTURE: CPT | Performed by: PATHOLOGY

## 2023-10-18 PROCEDURE — 80061 LIPID PANEL: CPT | Performed by: PATHOLOGY

## 2023-10-18 PROCEDURE — 90686 IIV4 VACC NO PRSV 0.5 ML IM: CPT

## 2023-10-18 PROCEDURE — 91320 SARSCV2 VAC 30MCG TRS-SUC IM: CPT

## 2023-10-18 PROCEDURE — 81001 URINALYSIS AUTO W/SCOPE: CPT | Performed by: PATHOLOGY

## 2023-10-18 PROCEDURE — 85025 COMPLETE CBC W/AUTO DIFF WBC: CPT | Performed by: PATHOLOGY

## 2023-10-18 PROCEDURE — 99207 PR NO CHARGE NURSE ONLY: CPT

## 2023-10-18 PROCEDURE — 90471 IMMUNIZATION ADMIN: CPT

## 2023-10-18 NOTE — PROGRESS NOTES
Inez Boston received the COVID and Flu Vaccinations today in clinic at the request of the patient. The immunization was given under the supervision of Dr. Segal if assistance was needed. The patient does not report a history of adverse reactions associated with vaccine administration. The immunization site was cleaned with an alcohol prep wipe. The immunization was given without incident--see immunization list for administration details. No swelling or redness was observed at the site of injection after the immunization was given. The patient was advised to remain in fourth floor lobby of the [facility] for fifteen minutes after the injection in case of an adverse reaction.       ADRIENNE Curry at 2:35 PM on 10/18/2023.  
Amanda Rivera(Resident)

## 2023-10-31 ENCOUNTER — OFFICE VISIT (OUTPATIENT)
Dept: OTOLARYNGOLOGY | Facility: CLINIC | Age: 48
End: 2023-10-31
Payer: COMMERCIAL

## 2023-10-31 ENCOUNTER — OFFICE VISIT (OUTPATIENT)
Dept: AUDIOLOGY | Facility: CLINIC | Age: 48
End: 2023-10-31
Payer: COMMERCIAL

## 2023-10-31 VITALS — SYSTOLIC BLOOD PRESSURE: 130 MMHG | DIASTOLIC BLOOD PRESSURE: 70 MMHG | HEART RATE: 94 BPM

## 2023-10-31 DIAGNOSIS — H93.8X3 EAR FULLNESS, BILATERAL: Primary | ICD-10-CM

## 2023-10-31 DIAGNOSIS — J30.0 CHRONIC VASOMOTOR RHINITIS: Primary | ICD-10-CM

## 2023-10-31 DIAGNOSIS — R09.81 CONGESTION OF PARANASAL SINUS: ICD-10-CM

## 2023-10-31 PROCEDURE — 92557 COMPREHENSIVE HEARING TEST: CPT | Performed by: AUDIOLOGIST

## 2023-10-31 PROCEDURE — 92567 TYMPANOMETRY: CPT | Mod: 52 | Performed by: AUDIOLOGIST

## 2023-10-31 PROCEDURE — 99204 OFFICE O/P NEW MOD 45 MIN: CPT | Performed by: OTOLARYNGOLOGY

## 2023-10-31 RX ORDER — IPRATROPIUM BROMIDE 21 UG/1
2 SPRAY, METERED NASAL 3 TIMES DAILY
Qty: 30 ML | Refills: 1 | Status: SHIPPED | OUTPATIENT
Start: 2023-10-31

## 2023-10-31 ASSESSMENT — ENCOUNTER SYMPTOMS
CONSTITUTIONAL NEGATIVE: 1
VOMITING: 0
BLURRED VISION: 0
DIZZINESS: 0
HEARTBURN: 0
STRIDOR: 0
SINUS PAIN: 0
HEMOPTYSIS: 0
NAUSEA: 0
HEADACHES: 0
SORE THROAT: 0
SPUTUM PRODUCTION: 0
TINGLING: 0
COUGH: 0
DOUBLE VISION: 0
TREMORS: 0
BRUISES/BLEEDS EASILY: 0
PHOTOPHOBIA: 0

## 2023-10-31 NOTE — LETTER
10/31/2023         RE: Inez Boston  [address] [facility] [address]        Dear Colleague,    Thank you for referring your patient, Inez Boston, to the [facility]. Please see a copy of my visit note below.    Inez Boston's chief complaint for this visit includes:  Chief Complaint   Patient presents with     Consult     Nasal congestion with exercise. Gagging reflux daily. Has cervical dystonia whish twists muscles, gets Botox treatments for this.   Ears feel tight, at times turns red. Gets airplane ears, plugged feeling.      PCP: Tony Segal    Referring Provider:  Elizabeth Fischer MD  [address] [place] [id number]    /70   Pulse 94   LMP 09/18/2023 (Approximate)             HPI    This pleasant patient is having watery discharge and nasal congestion with exercise. She states gagging and choking from time to time.She feels tightness in her ears, and neck. She has a hx of cervical dystonia. Denies any odynophagia or hoarseness.    Inez Boston's chief complaint for this visit includes:  Chief Complaint   Patient presents with     Consult     Nasal congestion with exercise. Gagging reflux daily. Has cervical dystonia whish twists muscles, gets Botox treatments for this.   Ears feel tight, at times turns red. Gets airplane ears, plugged feeling.      PCP: Tony Segal    Referring Provider:  Elizabeth Fischer MD  [address] [place] [address]    /70   Pulse 94   LMP 09/18/2023 (Approximate)     She has a hx of cervical dystonia. She is on Botox treatment for that.      Outpatient Medications  aspirin 81 MG EC tablet    DULoxetine (CYMBALTA) 30 MG capsule  lisdexamfetamine (VYVANSE) 40 MG capsule  baclofen (LIORESAL) 10 MG tablet  clindamycin (CLEOCIN T) 1 % external lotion  Ferrous Sulfate 324 (65 Fe) MG TBEC  triamcinolone (KENALOG) 0.1 % external ointment  Vitamin D, Cholecalciferol, 25 MCG (1000 UT)  CAPS     ENT Problem List  Dystonia  Pelvic pain in female  Urge incontinence  Anxiety  Attention deficit hyperactivity disorder (ADHD), combined type  Dysesthesia  History of severe acute respiratory syndrome coronavirus 2 (SARS-CoV-2) disease  Cannabis use disorder, mild, abuse  Tobacco use disorder, moderate, dependence  Recurrent major depressive disorder, in full remission (H24)       Review of Systems   Constitutional: Negative.    HENT:  Positive for congestion. Negative for ear discharge, ear pain, hearing loss, nosebleeds, sinus pain, sore throat and tinnitus.    Eyes:  Negative for blurred vision, double vision and photophobia.   Respiratory:  Negative for cough, hemoptysis, sputum production and stridor.    Gastrointestinal:  Negative for heartburn, nausea and vomiting.   Skin: Negative.    Neurological:  Negative for dizziness, tingling, tremors and headaches.   Endo/Heme/Allergies:  Negative for environmental allergies. Does not bruise/bleed easily.         Physical Exam  Vitals and nursing note reviewed.   Constitutional:       Appearance: Normal appearance.   HENT:      Head: Normocephalic and atraumatic.      Right Ear: Tympanic membrane, ear canal and external ear normal. There is no impacted cerumen.      Left Ear: Tympanic membrane, ear canal and external ear normal. There is no impacted cerumen.      Nose: Congestion and rhinorrhea present.      Mouth/Throat:      Mouth: Mucous membranes are moist.   Eyes:      Extraocular Movements: Extraocular movements intact.      Pupils: Pupils are equal, round, and reactive to light.   Neurological:      Mental Status: She is alert.       Septum is deviated to the right.  Nasal turbinates are enlarged.    A/P  This pleasant patient is having chronic vasomotor rhinitis, septal deviation and cervical dystonia. Options were discussed. I will give her a topical Ipratropium bromide before exercise and meals and see her in the f/up for a consideration of a scopic  evaluation.      Again, thank you for allowing me to participate in the care of your patient.        Sincerely,        Shaheed Hurtado MD

## 2023-10-31 NOTE — PROGRESS NOTES
Inez Boston's chief complaint for this visit includes:  Chief Complaint   Patient presents with    Consult     Nasal congestion with exercise. Gagging reflux daily. Has cervical dystonia whish twists muscles, gets Botox treatments for this.   Ears feel tight, at times turns red. Gets airplane ears, plugged feeling.      PCP: Tony Segal    Referring Provider:  Elizabeth Fischer MD  [address] [place] [address]    /70   Pulse 94   LMP 09/18/2023 (Approximate)

## 2023-10-31 NOTE — PROGRESS NOTES
HPI    This pleasant patient is having watery discharge and nasal congestion with exercise. She states gagging and choking from time to time.She feels tightness in her ears, and neck. She has a hx of cervical dystonia. Denies any odynophagia or hoarseness.    Inez Boston's chief complaint for this visit includes:  Chief Complaint   Patient presents with    Consult     Nasal congestion with exercise. Gagging reflux daily. Has cervical dystonia whish twists muscles, gets Botox treatments for this.   Ears feel tight, at times turns red. Gets airplane ears, plugged feeling.      PCP: Tony Segal    Referring Provider:  Elizabeth Fischer MD  [address] [place] [address]    /70   Pulse 94   LMP 09/18/2023 (Approximate)     She has a hx of cervical dystonia. She is on Botox treatment for that.      Outpatient Medications  aspirin 81 MG EC tablet    DULoxetine (CYMBALTA) 30 MG capsule  lisdexamfetamine (VYVANSE) 40 MG capsule  baclofen (LIORESAL) 10 MG tablet  clindamycin (CLEOCIN T) 1 % external lotion  Ferrous Sulfate 324 (65 Fe) MG TBEC  triamcinolone (KENALOG) 0.1 % external ointment  Vitamin D, Cholecalciferol, 25 MCG (1000 UT) CAPS     ENT Problem List  Dystonia  Pelvic pain in female  Urge incontinence  Anxiety  Attention deficit hyperactivity disorder (ADHD), combined type  Dysesthesia  History of severe acute respiratory syndrome coronavirus 2 (SARS-CoV-2) disease  Cannabis use disorder, mild, abuse  Tobacco use disorder, moderate, dependence  Recurrent major depressive disorder, in full remission (H24)       Review of Systems   Constitutional: Negative.    HENT:  Positive for congestion. Negative for ear discharge, ear pain, hearing loss, nosebleeds, sinus pain, sore throat and tinnitus.    Eyes:  Negative for blurred vision, double vision and photophobia.   Respiratory:  Negative for cough, hemoptysis, sputum production and stridor.    Gastrointestinal:  Negative for heartburn,  nausea and vomiting.   Skin: Negative.    Neurological:  Negative for dizziness, tingling, tremors and headaches.   Endo/Heme/Allergies:  Negative for environmental allergies. Does not bruise/bleed easily.         Physical Exam  Vitals and nursing note reviewed.   Constitutional:       Appearance: Normal appearance.   HENT:      Head: Normocephalic and atraumatic.      Right Ear: Tympanic membrane, ear canal and external ear normal. There is no impacted cerumen.      Left Ear: Tympanic membrane, ear canal and external ear normal. There is no impacted cerumen.      Nose: Congestion and rhinorrhea present.      Mouth/Throat:      Mouth: Mucous membranes are moist.   Eyes:      Extraocular Movements: Extraocular movements intact.      Pupils: Pupils are equal, round, and reactive to light.   Neurological:      Mental Status: She is alert.       Septum is deviated to the right.  Nasal turbinates are enlarged.    A/P  This pleasant patient is having chronic vasomotor rhinitis, septal deviation and cervical dystonia. Options were discussed. I will give her a topical Ipratropium bromide before exercise and meals and see her in the f/up for a consideration of a scopic evaluation.

## 2023-10-31 NOTE — NURSING NOTE
Inez Boston's chief complaint for this visit includes:  Chief Complaint   Patient presents with    Consult     Nasal congestion with exercise. Gagging reflux daily. Has cervical dystonia whish twists muscles, gets Botox treatments for this.   Ears feel tight, at times turns red. Gets airplane ears, plugged feeling.      PCP: Tony Segal    Referring Provider:  Elizabeth Fischer MD  [address] [place] [address]    /70   Pulse 94   LMP 09/18/2023 (Approximate)

## 2023-10-31 NOTE — PROGRESS NOTES
AUDIOLOGY REPORT    SUMMARY: Audiology visit completed. See audiogram for results.    RECOMMENDATIONS: Follow-up with ENT.    Kg Huffman  Doctor of Audiology  [place] License # [id number]

## 2023-11-16 DIAGNOSIS — F41.9 ANXIETY: ICD-10-CM

## 2023-11-16 NOTE — TELEPHONE ENCOUNTER
[facility] Call [facility]    Phone Message    May a detailed message be left on voicemail: yes     Reason for Call: Other: Patient requesting refills on these medications,  baclofen (LIORESAL) 10 MG tablet, DULoxetine (CYMBALTA) 30 MG capsule, lisdexamfetamine (VYVANSE) 40 MG capsule, only one that was previously ordered from Dr. Segla is the baclofen. Please call patient with any questions or  concerns. Thank you    Action Taken: Message routed to:  [facility] ([facility]):      Travel Screening: Not Applicable

## 2023-11-17 RX ORDER — DULOXETIN HYDROCHLORIDE 30 MG/1
30 CAPSULE, DELAYED RELEASE ORAL DAILY
Qty: 90 CAPSULE | Refills: 0 | Status: SHIPPED | OUTPATIENT
Start: 2023-11-17 | End: 2024-03-15

## 2023-11-17 RX ORDER — LISDEXAMFETAMINE DIMESYLATE 40 MG/1
40 CAPSULE ORAL EVERY MORNING
Qty: 30 CAPSULE | Refills: 0 | Status: SHIPPED | OUTPATIENT
Start: 2023-11-17

## 2023-11-28 NOTE — TELEPHONE ENCOUNTER
RECORDS RECEIVED FROM:    DATE RECEIVED:    NOTES STATUS DETAILS   OFFICE NOTE from referring provider  Internal Dr. Segal 9-24-23   OFFICE NOTE from other cardiologists  N/A    RECORDS from hospital/ED N/A    MEDICATION LIST Internal    GENERAL CARDIO RECORDS   (ALL APPOINTMENT TYPES)     EKG (STRIPS & REPORTS) N/A    MONITORS (STRIPS & REPORTS) N/A    ECHOS (IMAGES AND REPORTS) N/A    cMRI (IMAGES AND REPORTS) N/A    CT/CTA (IMAGES AND REPORTS) N/A    NEW LIPID MANAGMENT     LIPID LABS (LAST 5 YEARS) Internal 10-18-23, 7-17-23, 1-16-23, 1-11-22, 7-9-20, 4-3-19, 6-26-18   STRESS TESTS (IMAGES AND REPORTS) N/A

## 2023-12-12 ENCOUNTER — OFFICE VISIT (OUTPATIENT)
Dept: CARDIOLOGY | Facility: CLINIC | Age: 48
End: 2023-12-12
Attending: INTERNAL MEDICINE
Payer: COMMERCIAL

## 2023-12-12 ENCOUNTER — PRE VISIT (OUTPATIENT)
Dept: CARDIOLOGY | Facility: CLINIC | Age: 48
End: 2023-12-12
Payer: COMMERCIAL

## 2023-12-12 VITALS
OXYGEN SATURATION: 99 % | BODY MASS INDEX: 24.66 KG/M2 | DIASTOLIC BLOOD PRESSURE: 79 MMHG | HEIGHT: 69 IN | SYSTOLIC BLOOD PRESSURE: 120 MMHG | WEIGHT: 166.5 LBS | HEART RATE: 71 BPM

## 2023-12-12 DIAGNOSIS — D75.839 THROMBOCYTOSIS: ICD-10-CM

## 2023-12-12 DIAGNOSIS — Z13.220 LIPID SCREENING: ICD-10-CM

## 2023-12-12 DIAGNOSIS — E78.2 MIXED HYPERLIPIDEMIA: Primary | ICD-10-CM

## 2023-12-12 PROCEDURE — 99204 OFFICE O/P NEW MOD 45 MIN: CPT | Performed by: INTERNAL MEDICINE

## 2023-12-12 PROCEDURE — 99213 OFFICE O/P EST LOW 20 MIN: CPT | Performed by: INTERNAL MEDICINE

## 2023-12-12 ASSESSMENT — PAIN SCALES - GENERAL: PAINLEVEL: NO PAIN (0)

## 2023-12-12 NOTE — PROGRESS NOTES
I am delighted to see Inez EVANGELISTA Ludy in consultation.The primary encounter diagnosis was Mixed hyperlipidemia. Diagnoses of Lipid screening and Thrombocytosis were also pertinent to this visit.   As you know, the patient is a 48 year old  female. She   has a past medical history of ADHD (attention deficit hyperactivity disorder), Anxiety, Cervical dystonia, Hyperlipidemia, and Thrombocytosis..    On this visit, the patient states that she has good exercise tolerance but suffered cholinergic effects of exercise.  The patient denies chest pressure/discomfort, dyspnea, palpitations, near-syncope, syncope, orthopnea, paroxysmal nocturnal dyspnea, and lower extermity edema.    The patient's cardiovascular risk factors include high cholesterol.    The following portions of the patient's history were reviewed and updated as appropriate: allergies, current medications, past family history, past medical history, past social history, past surgical history, and the problem list.    PMH: The patient's past medical history includes:    Past Medical History:   Diagnosis Date    ADHD (attention deficit hyperactivity disorder)     Anxiety     Cervical dystonia     Hyperlipidemia     Thrombocytosis       Past Surgical History:   Procedure Laterality Date    COLONOSCOPY      LAPAROSCOPIC OOPHORECTOMY Left 6/3/2022    Procedure: Laparoscopic oophorectomy;  Surgeon: Elda Mcpherson MD;  [facility]    LAPAROSCOPIC OOPHORECTOMY  6/3/2022    Procedure: ;  Surgeon: Elda Mcpherson MD;  [facility]    LAPAROSCOPIC OOPHORECTOMY  6/3/2022    Procedure: ;  Surgeon: Elda Mcpherson MD;  [facility]    LAPAROSCOPIC SALPINGOTOMY Bilateral 6/3/2022    Procedure: bilateral salpingectomy;  Surgeon: Elda Mcpherson MD;  Location: [facility]    LAPAROSCOPY      6/3/2022    MYRINGOTOMY W/ TUBES      as a child       The patient's medications as of the current encounter are:     Current Outpatient Medications    Medication Sig Dispense Refill    aspirin 81 MG EC tablet Take 162 mg by mouth every morning      baclofen (LIORESAL) 10 MG tablet Take 1 tablet (10 mg) by mouth nightly as needed 30 tablet 1    DULoxetine (CYMBALTA) 30 MG capsule Take 1 capsule (30 mg) by mouth daily 90 capsule 0    ipratropium (ATROVENT) 0.03 % nasal spray Spray 2 sprays into both nostrils 3 times daily 30 mL 1    lisdexamfetamine (VYVANSE) 40 MG capsule Take 1 capsule (40 mg) by mouth every morning 30 capsule 0    Vitamin D, Cholecalciferol, 25 MCG (1000 UT) CAPS Take 1,000 Units by mouth daily         Labs:     Lab on 10/18/2023   Component Date Value Ref Range Status    Color Urine 10/18/2023 Light Yellow  Colorless, Straw, Light Yellow, Yellow Final    Appearance Urine 10/18/2023 Clear  Clear Final    Glucose Urine 10/18/2023 Negative  Negative mg/dL Final    Bilirubin Urine 10/18/2023 Negative  Negative Final    Ketones Urine 10/18/2023 Trace (A)  Negative mg/dL Final    Specific Gravity Urine 10/18/2023 1.016  1.003 - 1.035 Final    Blood Urine 10/18/2023 Negative  Negative Final    pH Urine 10/18/2023 5.5  5.0 - 7.0 Final    Protein Albumin Urine 10/18/2023 Negative  Negative mg/dL Final    Urobilinogen Urine 10/18/2023 Normal  Normal, 2.0 mg/dL Final    Nitrite Urine 10/18/2023 Negative  Negative Final    Leukocyte Esterase Urine 10/18/2023 Negative  Negative Final    Mucus Urine 10/18/2023 Present (A)  None Seen /LPF Final    RBC Urine 10/18/2023 2  <=2 /HPF Final    WBC Urine 10/18/2023 1  <=5 /HPF Final    Squamous Epithelials Urine 10/18/2023 1  <=1 /HPF Final    Cholesterol 10/18/2023 257 (H)  <200 mg/dL Final    Triglycerides 10/18/2023 83  <150 mg/dL Final    Direct Measure HDL 10/18/2023 73  >=50 mg/dL Final    LDL Cholesterol Calculated 10/18/2023 167 (H)  <=100 mg/dL Final    Non HDL Cholesterol 10/18/2023 184 (H)  <130 mg/dL Final    WBC Count 10/18/2023 11.6 (H)  4.0 - 11.0 10e3/uL Final    RBC Count 10/18/2023 4.00  3.80 -  5.20 10e6/uL Final    Hemoglobin 10/18/2023 11.7  11.7 - 15.7 g/dL Final    Hematocrit 10/18/2023 36.4  35.0 - 47.0 % Final    MCV 10/18/2023 91  78 - 100 fL Final    MCH 10/18/2023 29.3  26.5 - 33.0 pg Final    MCHC 10/18/2023 32.1  31.5 - 36.5 g/dL Final    RDW 10/18/2023 13.1  10.0 - 15.0 % Final    Platelet Count 10/18/2023 535 (H)  150 - 450 10e3/uL Final    % Neutrophils 10/18/2023 66  % Final    % Lymphocytes 10/18/2023 26  % Final    % Monocytes 10/18/2023 6  % Final    % Eosinophils 10/18/2023 1  % Final    % Basophils 10/18/2023 1  % Final    % Immature Granulocytes 10/18/2023 0  % Final    NRBCs per 100 WBC 10/18/2023 0  <1 /100 Final    Absolute Neutrophils 10/18/2023 7.7  1.6 - 8.3 10e3/uL Final    Absolute Lymphocytes 10/18/2023 3.0  0.8 - 5.3 10e3/uL Final    Absolute Monocytes 10/18/2023 0.7  0.0 - 1.3 10e3/uL Final    Absolute Eosinophils 10/18/2023 0.1  0.0 - 0.7 10e3/uL Final    Absolute Basophils 10/18/2023 0.1  0.0 - 0.2 10e3/uL Final    Absolute Immature Granulocytes 10/18/2023 0.0  <=0.4 10e3/uL Final    Absolute NRBCs 10/18/2023 0.0  10e3/uL Final       Allergies:  No Known Allergies    Family History:   Family History   Problem Relation Age of Onset    Skin Cancer Mother     Depression Mother     Breast Cancer Mother 45    Urinary tract infection Mother     Osteoporosis Mother     Attention Deficit Disorder Mother     Coronary Artery Disease Father     Hyperlipidemia Maternal Grandmother     Urinary tract infection Maternal Grandmother     Colon Cancer Maternal Grandmother         early age onset    Hyperlipidemia Maternal Grandfather     Congenital heart disease Paternal Grandmother     Thyroid Disease Paternal Grandmother     Heart Failure Paternal Grandfather     Depression Brother     Attention Deficit Disorder Brother     Substance Abuse Paternal Uncle     Uterine Cancer Other         maternal grandmother's sister    Glaucoma No family hx of     Macular Degeneration No family hx of   "      Psychosocial history:  reports that she has quit smoking. Her smoking use included cigarettes and vaping device. She has never used smokeless tobacco. She reports current alcohol use of about 2.0 standard drinks of alcohol per week. She reports current drug use. Drug: Marijuana.    Review of systems: negative for, palpitations, exertional chest pain or pressure, paroxysmal nocturnal dyspnea, dyspnea on exertion, orthopnea, lower extremity edema, syncope or near-syncope, claudication, and exercise intolerance    In addition,   General: No change in weight, sleep or appetite.  Normal energy.  No fever or chills  Eyes: Negative for vision changes or eye problems  ENT: Exercise cholinergic symptoms  Resp: No coughing, wheezing or shortness of breath  GI: No nausea, vomiting,  heartburn, abdominal pain, diarrhea, constipation or change in bowel habits  : No urinary frequency or dysuria, bladder or kidney problems  Musculoskeletal: Dystonia  Neurologic: No headaches, numbness, tingling, weakness, problems with balance or coordination  Psychiatric: ADHD  Heme/immune/allergy: No history of bleeding or clotting problems or anemia.    Endocrine: No history of thyroid disease, diabetes or other endocrine disorders  Skin: No rashes,worrisome lesions or skin problems  Vascular:  No claudication, lifestyle limiting or otherwise; no ischemic rest pain; no non-healing ulcers. No weakness, No loss of sensation        Physical examination  Vitals: /79 (BP Location: Right arm, Patient Position: Sitting, Cuff Size: Adult Small)   Pulse 71   Ht 1.756 m (5' 9.13\")   Wt 75.5 kg (166 lb 8 oz)   SpO2 99%   BMI 24.49 kg/m    BMI= Body mass index is 24.49 kg/m .    In general, the patient is a pleasant female in no apparent distress.    HEENT: Normiocephalic and atraumatic.  PERRLA.  EOMI.  Sclerae white, not injected.  Nares clear.  Pharynx without erythema or exudate.  Dentition intact.    Neck: No adenopathy.  No " thyromegaly. Carotids +2/2 bilaterally without bruits.  No jugular venous distension.   Heart:  The PMI is in the 5th ICS in the midclavicular line. There is no heave. Regular rate and rhythm. Normal S1, S2 splits physiologically. No murmur, rub, click, or gallop.    Lungs: Clear to asculation.  No ronchi, wheezes, rales.  No dullness to percussion.   Abdomen: Soft, nontender, nondistended. No organomegaly. No AAA.  No bruits.   Extremities: No clubbing, cyanosis, or edema. The pulses were intact bilaterally.   Neurological: The neurological examination reveal a patient who was oriented to person, place, and time.  The remainder of the examination was nonfocal.    Cardiac tests include:        Assessment and Plan    HL - Current guidelines suggest life style changes without statin. Could consider zetia, PCSK9 inhibitor, or bempedoic acid to avoid any statin muscle effects.  Thrombocytosis - probably reactionary  Exercise induced cholinergic symptoms  Dystonia  PFO, atrial septal aneurysm - expectant management    The patient is to return  prn. The patient understood the treatment plan as outlined above.  There were no barriers to learning.      Hang Landon MD

## 2023-12-12 NOTE — NURSING NOTE
Chief Complaint   Patient presents with    New Patient     new - referral from PCP - Philipp        Vitals were taken. Medications were reconciled.    Natalie Her, EMT  8:33 AM

## 2023-12-12 NOTE — LETTER
12/12/2023      RE: Inez Boston  [address] [facility] [address]       Dear Colleague,    Thank you for the opportunity to participate in the care of your patient, Inez Boston, at the [facility] [place] [facility]. Please see a copy of my visit note below.    I am delighted to see Inez Boston in consultation.The primary encounter diagnosis was Mixed hyperlipidemia. Diagnoses of Lipid screening and Thrombocytosis were also pertinent to this visit.   As you know, the patient is a 48 year old  female. She   has a past medical history of ADHD (attention deficit hyperactivity disorder), Anxiety, Cervical dystonia, Hyperlipidemia, and Thrombocytosis..    On this visit, the patient states that she has good exercise tolerance but suffered cholinergic effects of exercise.  The patient denies chest pressure/discomfort, dyspnea, palpitations, near-syncope, syncope, orthopnea, paroxysmal nocturnal dyspnea, and lower extermity edema.    The patient's cardiovascular risk factors include high cholesterol.    The following portions of the patient's history were reviewed and updated as appropriate: allergies, current medications, past family history, past medical history, past social history, past surgical history, and the problem list.    PMH: The patient's past medical history includes:    Past Medical History:   Diagnosis Date     ADHD (attention deficit hyperactivity disorder)      Anxiety      Cervical dystonia      Hyperlipidemia      Thrombocytosis       Past Surgical History:   Procedure Laterality Date     COLONOSCOPY       LAPAROSCOPIC OOPHORECTOMY Left 6/3/2022    Procedure: Laparoscopic oophorectomy;  Surgeon: Elda Mcpherson MD;  [facility]     LAPAROSCOPIC OOPHORECTOMY  6/3/2022    Procedure: ;  Surgeon: Elda Mcpherson MD;  [facility]     LAPAROSCOPIC OOPHORECTOMY  6/3/2022    Procedure: ;  Surgeon:  Elda Mcpherson MD;  [facility]     LAPAROSCOPIC SALPINGOTOMY Bilateral 6/3/2022    Procedure: bilateral salpingectomy;  Surgeon: Elda Mcpherson MD;  [facility]     LAPAROSCOPY      6/3/2022     MYRINGOTOMY W/ TUBES      as a child       The patient's medications as of the current encounter are:     Current Outpatient Medications   Medication Sig Dispense Refill     aspirin 81 MG EC tablet Take 162 mg by mouth every morning       baclofen (LIORESAL) 10 MG tablet Take 1 tablet (10 mg) by mouth nightly as needed 30 tablet 1     DULoxetine (CYMBALTA) 30 MG capsule Take 1 capsule (30 mg) by mouth daily 90 capsule 0     ipratropium (ATROVENT) 0.03 % nasal spray Spray 2 sprays into both nostrils 3 times daily 30 mL 1     lisdexamfetamine (VYVANSE) 40 MG capsule Take 1 capsule (40 mg) by mouth every morning 30 capsule 0     Vitamin D, Cholecalciferol, 25 MCG (1000 UT) CAPS Take 1,000 Units by mouth daily         Labs:     Lab on 10/18/2023   Component Date Value Ref Range Status     Color Urine 10/18/2023 Light Yellow  Colorless, Straw, Light Yellow, Yellow Final     Appearance Urine 10/18/2023 Clear  Clear Final     Glucose Urine 10/18/2023 Negative  Negative mg/dL Final     Bilirubin Urine 10/18/2023 Negative  Negative Final     Ketones Urine 10/18/2023 Trace (A)  Negative mg/dL Final     Specific Gravity Urine 10/18/2023 1.016  1.003 - 1.035 Final     Blood Urine 10/18/2023 Negative  Negative Final     pH Urine 10/18/2023 5.5  5.0 - 7.0 Final     Protein Albumin Urine 10/18/2023 Negative  Negative mg/dL Final     Urobilinogen Urine 10/18/2023 Normal  Normal, 2.0 mg/dL Final     Nitrite Urine 10/18/2023 Negative  Negative Final     Leukocyte Esterase Urine 10/18/2023 Negative  Negative Final     Mucus Urine 10/18/2023 Present (A)  None Seen /LPF Final     RBC Urine 10/18/2023 2  <=2 /HPF Final     WBC Urine 10/18/2023 1  <=5 /HPF Final     Squamous Epithelials Urine 10/18/2023 1  <=1 /HPF Final      Cholesterol 10/18/2023 257 (H)  <200 mg/dL Final     Triglycerides 10/18/2023 83  <150 mg/dL Final     Direct Measure HDL 10/18/2023 73  >=50 mg/dL Final     LDL Cholesterol Calculated 10/18/2023 167 (H)  <=100 mg/dL Final     Non HDL Cholesterol 10/18/2023 184 (H)  <130 mg/dL Final     WBC Count 10/18/2023 11.6 (H)  4.0 - 11.0 10e3/uL Final     RBC Count 10/18/2023 4.00  3.80 - 5.20 10e6/uL Final     Hemoglobin 10/18/2023 11.7  11.7 - 15.7 g/dL Final     Hematocrit 10/18/2023 36.4  35.0 - 47.0 % Final     MCV 10/18/2023 91  78 - 100 fL Final     MCH 10/18/2023 29.3  26.5 - 33.0 pg Final     MCHC 10/18/2023 32.1  31.5 - 36.5 g/dL Final     RDW 10/18/2023 13.1  10.0 - 15.0 % Final     Platelet Count 10/18/2023 535 (H)  150 - 450 10e3/uL Final     % Neutrophils 10/18/2023 66  % Final     % Lymphocytes 10/18/2023 26  % Final     % Monocytes 10/18/2023 6  % Final     % Eosinophils 10/18/2023 1  % Final     % Basophils 10/18/2023 1  % Final     % Immature Granulocytes 10/18/2023 0  % Final     NRBCs per 100 WBC 10/18/2023 0  <1 /100 Final     Absolute Neutrophils 10/18/2023 7.7  1.6 - 8.3 10e3/uL Final     Absolute Lymphocytes 10/18/2023 3.0  0.8 - 5.3 10e3/uL Final     Absolute Monocytes 10/18/2023 0.7  0.0 - 1.3 10e3/uL Final     Absolute Eosinophils 10/18/2023 0.1  0.0 - 0.7 10e3/uL Final     Absolute Basophils 10/18/2023 0.1  0.0 - 0.2 10e3/uL Final     Absolute Immature Granulocytes 10/18/2023 0.0  <=0.4 10e3/uL Final     Absolute NRBCs 10/18/2023 0.0  10e3/uL Final       Allergies:  No Known Allergies    Family History:   Family History   Problem Relation Age of Onset     Skin Cancer Mother      Depression Mother      Breast Cancer Mother 45     Urinary tract infection Mother      Osteoporosis Mother      Attention Deficit Disorder Mother      Coronary Artery Disease Father      Hyperlipidemia Maternal Grandmother      Urinary tract infection Maternal Grandmother      Colon Cancer Maternal Grandmother         early  age onset     Hyperlipidemia Maternal Grandfather      Congenital heart disease Paternal Grandmother      Thyroid Disease Paternal Grandmother      Heart Failure Paternal Grandfather      Depression Brother      Attention Deficit Disorder Brother      Substance Abuse Paternal Uncle      Uterine Cancer Other         maternal grandmother's sister     Glaucoma No family hx of      Macular Degeneration No family hx of        Psychosocial history:  reports that she has quit smoking. Her smoking use included cigarettes and vaping device. She has never used smokeless tobacco. She reports current alcohol use of about 2.0 standard drinks of alcohol per week. She reports current drug use. Drug: Marijuana.    Review of systems: negative for, palpitations, exertional chest pain or pressure, paroxysmal nocturnal dyspnea, dyspnea on exertion, orthopnea, lower extremity edema, syncope or near-syncope, claudication, and exercise intolerance    In addition,   General: No change in weight, sleep or appetite.  Normal energy.  No fever or chills  Eyes: Negative for vision changes or eye problems  ENT: Exercise cholinergic symptoms  Resp: No coughing, wheezing or shortness of breath  GI: No nausea, vomiting,  heartburn, abdominal pain, diarrhea, constipation or change in bowel habits  : No urinary frequency or dysuria, bladder or kidney problems  Musculoskeletal: Dystonia  Neurologic: No headaches, numbness, tingling, weakness, problems with balance or coordination  Psychiatric: ADHD  Heme/immune/allergy: No history of bleeding or clotting problems or anemia.    Endocrine: No history of thyroid disease, diabetes or other endocrine disorders  Skin: No rashes,worrisome lesions or skin problems  Vascular:  No claudication, lifestyle limiting or otherwise; no ischemic rest pain; no non-healing ulcers. No weakness, No loss of sensation        Physical examination  Vitals: /79 (BP Location: Right arm, Patient Position: Sitting, Cuff  "Size: Adult Small)   Pulse 71   Ht 1.756 m (5' 9.13\")   Wt 75.5 kg (166 lb 8 oz)   SpO2 99%   BMI 24.49 kg/m    BMI= Body mass index is 24.49 kg/m .    In general, the patient is a pleasant female in no apparent distress.    HEENT: Normiocephalic and atraumatic.  PERRLA.  EOMI.  Sclerae white, not injected.  Nares clear.  Pharynx without erythema or exudate.  Dentition intact.    Neck: No adenopathy.  No thyromegaly. Carotids +2/2 bilaterally without bruits.  No jugular venous distension.   Heart:  The PMI is in the 5th ICS in the midclavicular line. There is no heave. Regular rate and rhythm. Normal S1, S2 splits physiologically. No murmur, rub, click, or gallop.    Lungs: Clear to asculation.  No ronchi, wheezes, rales.  No dullness to percussion.   Abdomen: Soft, nontender, nondistended. No organomegaly. No AAA.  No bruits.   Extremities: No clubbing, cyanosis, or edema. The pulses were intact bilaterally.   Neurological: The neurological examination reveal a patient who was oriented to person, place, and time.  The remainder of the examination was nonfocal.    Cardiac tests include:        Assessment and Plan    HL - Current guidelines suggest life style changes without statin. Could consider zetia, PCSK9 inhibitor, or bempedoic acid to avoid any statin muscle effects.  Thrombocytosis - probably reactionary  Exercise induced cholinergic symptoms  Dystonia  PFO, atrial septal aneurysm - expectant management    The patient is to return  prn. The patient understood the treatment plan as outlined above.  There were no barriers to learning.      Hang Landon MD     Please do not hesitate to contact me if you have any questions/concerns.     Sincerely,     Hang Landon MD  "

## 2023-12-14 DIAGNOSIS — G24.9 DYSTONIA: ICD-10-CM

## 2023-12-15 ENCOUNTER — TELEPHONE (OUTPATIENT)
Dept: INTERNAL MEDICINE | Facility: CLINIC | Age: 48
End: 2023-12-15
Payer: COMMERCIAL

## 2023-12-15 NOTE — TELEPHONE ENCOUNTER
[facility] Call Center    Phone Message    May a detailed message be left on voicemail: yes     Reason for Call: Medication Question or concern regarding medication   Prescription Clarification  Name of Medication:   lisdexamfetamine (VYVANSE) 40 MG capsule    Prescribing Provider: Dr. Segal   Pharmacy:   [id number] [place], [place] - [address]      What on the order needs clarification? Pt called and stated the above medication's generic version is being discontinued and pt's insurance does not cover the name brand, so pt is needing a waver or to figure out a way to still get the medication

## 2023-12-15 NOTE — TELEPHONE ENCOUNTER
Disp Refills Start End GOPAL   baclofen (LIORESAL) 10 MG tablet 30 tablet 1 7/17/2023       Last Office Visit : 9/25/2023  [facility] [place]     Tony Segal MD     Future Office visit:  0    Routing refill request to provider for review/approval because:  Drug not on the [facility], [facility] or [facility] refill protocol or controlled substance

## 2023-12-16 RX ORDER — BACLOFEN 10 MG/1
10 TABLET ORAL
Qty: 30 TABLET | Refills: 1 | Status: SHIPPED | OUTPATIENT
Start: 2023-12-16 | End: 2024-03-14

## 2023-12-19 ENCOUNTER — TELEPHONE (OUTPATIENT)
Dept: INTERNAL MEDICINE | Facility: CLINIC | Age: 48
End: 2023-12-19

## 2023-12-19 ENCOUNTER — OFFICE VISIT (OUTPATIENT)
Dept: OTOLARYNGOLOGY | Facility: CLINIC | Age: 48
End: 2023-12-19
Payer: COMMERCIAL

## 2023-12-19 VITALS — HEART RATE: 77 BPM | SYSTOLIC BLOOD PRESSURE: 127 MMHG | DIASTOLIC BLOOD PRESSURE: 77 MMHG

## 2023-12-19 DIAGNOSIS — R49.0 MUSCLE TENSION DYSPHONIA: ICD-10-CM

## 2023-12-19 DIAGNOSIS — J30.0 CHRONIC VASOMOTOR RHINITIS: Primary | ICD-10-CM

## 2023-12-19 DIAGNOSIS — K21.9 LPRD (LARYNGOPHARYNGEAL REFLUX DISEASE): ICD-10-CM

## 2023-12-19 PROCEDURE — 99214 OFFICE O/P EST MOD 30 MIN: CPT | Mod: 25 | Performed by: OTOLARYNGOLOGY

## 2023-12-19 PROCEDURE — 31575 DIAGNOSTIC LARYNGOSCOPY: CPT | Performed by: OTOLARYNGOLOGY

## 2023-12-19 NOTE — TELEPHONE ENCOUNTER
Prior Authorization Retail Medication Request    Medication/Dose: lisdexamfetamine (VYVANSE) 40 MG capsule  Rationale:  Needs coverage for name brand vyvanse    Insurance   Primary:

## 2023-12-19 NOTE — PROGRESS NOTES
Inez Boston's chief complaint for this visit includes:  Chief Complaint   Patient presents with    Dystonia     Patient has returned for an exercise scope. Pt has not noticed any changes and states that the Atrovent has not helped.      PCP: Tony Segal    Referring Provider:  No referring provider defined for this encounter.    /77 (BP Location: Left arm, Patient Position: Sitting)   Pulse 77       Jeanine Berry    HPI    This pleasant patient is here for the f/up. She continues to have dry cough, muscle straining, stretching, tightness, and hoarseness. She continues having watery discharge and nasal congestion with exercise. She states gagging and choking from time to time.She feels tightness in her ears, and neck. She has a hx of cervical dystonia. Denies any odynophagia or hoarseness.    Inez Boston's chief complaint for this visit includes:  Chief Complaint   Patient presents with    Consult     Nasal congestion with exercise. Gagging reflux daily. Has cervical dystonia whish twists muscles, gets Botox treatments for this.   Ears feel tight, at times turns red. Gets airplane ears, plugged feeling.      PCP: Tony Segal    Referring Provider:  Elizabeth Fischer MD  [address] [place] [address]    /70   Pulse 94   LMP 09/18/2023 (Approximate)     She has a hx of cervical dystonia. She is on Botox treatment for that.      Outpatient Medications  aspirin 81 MG EC tablet    DULoxetine (CYMBALTA) 30 MG capsule  lisdexamfetamine (VYVANSE) 40 MG capsule  baclofen (LIORESAL) 10 MG tablet  clindamycin (CLEOCIN T) 1 % external lotion  Ferrous Sulfate 324 (65 Fe) MG TBEC  triamcinolone (KENALOG) 0.1 % external ointment  Vitamin D, Cholecalciferol, 25 MCG (1000 UT) CAPS     ENT Problem List  Dystonia  Pelvic pain in female  Urge incontinence  Anxiety  Attention deficit hyperactivity disorder (ADHD), combined type  Dysesthesia  History of severe acute respiratory syndrome  coronavirus 2 (SARS-CoV-2) disease  Cannabis use disorder, mild, abuse  Tobacco use disorder, moderate, dependence  Recurrent major depressive disorder, in full remission (H24)       Review of Systems   Constitutional: Negative.    HENT:  Positive for congestion. Negative for ear discharge, ear pain, hearing loss, nosebleeds, sinus pain, sore throat and tinnitus.    Eyes:  Negative for blurred vision, double vision and photophobia.   Respiratory:  Negative for cough, hemoptysis, sputum production and stridor.    Gastrointestinal:  Negative for heartburn, nausea and vomiting.   Skin: Negative.    Neurological:  Negative for dizziness, tingling, tremors and headaches.   Endo/Heme/Allergies:  Negative for environmental allergies. Does not bruise/bleed easily.         Physical Exam  Vitals and nursing note reviewed.   Constitutional:       Appearance: Normal appearance.   HENT:      Head: Normocephalic and atraumatic.      Right Ear: Tympanic membrane, ear canal and external ear normal. There is no impacted cerumen.      Left Ear: Tympanic membrane, ear canal and external ear normal. There is no impacted cerumen.      Nose: Congestion and rhinorrhea present.      Mouth/Throat:      Mouth: Mucous membranes are moist.   Eyes:      Extraocular Movements: Extraocular movements intact.      Pupils: Pupils are equal, round, and reactive to light.   Neurological:      Mental Status: She is alert.       Septum is deviated to the right.  Nasal turbinates are enlarged.  Diagnostic nasal endoscopy:    The patient was seen in the room and identified. Pros and cons of the procedure were explained to the patient. The procedure and its alternatives were explained to the patient in lay terms. Patient's questions were answered. Symptoms required a diagnostic endoscopic evaluation under local anesthesia. After obtaining an informed consent from the patient, 2% Lidocaine spray was applied to each nostril. Then a flexible scopic exam was  performed. Septum is deviated to the left. Ostiomeatal complexes are free of disease. No pus or polyp seen. Inferior turbinates are enlarged. Nasopharynx is normal. Rosenmüller fossa and torus tubarius are normal. Epiglottis, hypopharynx, false vocal folds are normal. No pooling in pyriform fossae. Vocal cords are mobile and normal other than slight edematous appearance of posterior commissure. Patient tolerated the procedure well and left the room with no complications.    Functional improper cord use and deconditioning may also contribute.  Over 80% of patients with LPRD do not have heartburn or clear symptoms of GERD. The trhoat symptoms of LPRD can be caused by irritation from direct contact with stomach secretions, or by reflexive cricopharyngeal spasm from reflux into lower portions of the esophagus. I counseled the patient today on the importance of diet and the timing and volume of meals for nonpharmaceutical control of reflux diseases. I also advised frequent sips of water instead of throat clearing to manage the globus sensation and reduce cricopharyngeal spasm. I would like her to follow up with voice  therapist.      Adult lifestyle changes to prevent LPR reviewed     Avoid eating and drinking within two to three hours prior to bedtime   Do not drink alcohol   Eat small meals and slowly   Limit problem foods:    o Caffeine  o Carbonated drinks  o Chocolate  o Peppermint  o Tomato  o Citrus fruits  o Fatty and fried foods     Lose weight   Quit smoking   Wear loose clothing         A/P  This pleasant patient is having chronic vasomotor rhinitis, septal deviation, muscle tension dysphonia and cervical dystonia. Options were discussed. She will be scheduled to see speech pathology for further evaluation and treatment. I will give her a topical Ipratropium bromide before exercise and meals and see her in the f/up in 3 months.

## 2023-12-19 NOTE — LETTER
12/19/2023         RE: Inez Boston  [address] [facility] [address]        Dear Colleague,    Thank you for referring your patient, Inez Boston, to the [facility]. Please see a copy of my visit note below.    Inez Boston's chief complaint for this visit includes:  Chief Complaint   Patient presents with     Dystonia     Patient has returned for an exercise scope. Pt has not noticed any changes and states that the Atrovent has not helped.      PCP: Tony Segal    Referring Provider:  No referring provider defined for this encounter.    /77 (BP Location: Left arm, Patient Position: Sitting)   Pulse 77       Jeanine Berry    HPI    This pleasant patient is here for the f/up. She continues to have dry cough, muscle straining, stretching, tightness, and hoarseness. She continues having watery discharge and nasal congestion with exercise. She states gagging and choking from time to time.She feels tightness in her ears, and neck. She has a hx of cervical dystonia. Denies any odynophagia or hoarseness.    Inez Boston's chief complaint for this visit includes:  Chief Complaint   Patient presents with     Consult     Nasal congestion with exercise. Gagging reflux daily. Has cervical dystonia whish twists muscles, gets Botox treatments for this.   Ears feel tight, at times turns red. Gets airplane ears, plugged feeling.      PCP: Tony Segal    Referring Provider:  Elizabeth Fischer MD  [address] [place] [address]    /70   Pulse 94   LMP 09/18/2023 (Approximate)     She has a hx of cervical dystonia. She is on Botox treatment for that.      Outpatient Medications  aspirin 81 MG EC tablet    DULoxetine (CYMBALTA) 30 MG capsule  lisdexamfetamine (VYVANSE) 40 MG capsule  baclofen (LIORESAL) 10 MG tablet  clindamycin (CLEOCIN T) 1 % external lotion  Ferrous Sulfate 324 (65 Fe) MG TBEC  triamcinolone (KENALOG) 0.1 % external  ointment  Vitamin D, Cholecalciferol, 25 MCG (1000 UT) [facility]     ENT Problem List  Dystonia  Pelvic pain in female  Urge incontinence  Anxiety  Attention deficit hyperactivity disorder (ADHD), combined type  Dysesthesia  History of severe acute respiratory syndrome coronavirus 2 (SARS-CoV-2) disease  Cannabis use disorder, mild, abuse  Tobacco use disorder, moderate, dependence  Recurrent major depressive disorder, in full remission (H24)       Review of Systems   Constitutional: Negative.    HENT:  Positive for congestion. Negative for ear discharge, ear pain, hearing loss, nosebleeds, sinus pain, sore throat and tinnitus.    Eyes:  Negative for blurred vision, double vision and photophobia.   Respiratory:  Negative for cough, hemoptysis, sputum production and stridor.    Gastrointestinal:  Negative for heartburn, nausea and vomiting.   Skin: Negative.    Neurological:  Negative for dizziness, tingling, tremors and headaches.   Endo/Heme/Allergies:  Negative for environmental allergies. Does not bruise/bleed easily.         Physical Exam  Vitals and nursing note reviewed.   Constitutional:       Appearance: Normal appearance.   HENT:      Head: Normocephalic and atraumatic.      Right Ear: Tympanic membrane, ear canal and external ear normal. There is no impacted cerumen.      Left Ear: Tympanic membrane, ear canal and external ear normal. There is no impacted cerumen.      Nose: Congestion and rhinorrhea present.      Mouth/Throat:      Mouth: Mucous membranes are moist.   Eyes:      Extraocular Movements: Extraocular movements intact.      Pupils: Pupils are equal, round, and reactive to light.   Neurological:      Mental Status: She is alert.       Septum is deviated to the right.  Nasal turbinates are enlarged.  Diagnostic nasal endoscopy:    The patient was seen in the room and identified. Pros and cons of the procedure were explained to the patient. The procedure and its alternatives were explained to the  patient in lay terms. Patient's questions were answered. Symptoms required a diagnostic endoscopic evaluation under local anesthesia. After obtaining an informed consent from the patient, 2% Lidocaine spray was applied to each nostril. Then a flexible scopic exam was performed. Septum is deviated to the left. Ostiomeatal complexes are free of disease. No pus or polyp seen. Inferior turbinates are enlarged. Nasopharynx is normal. Rosenmüller fossa and torus tubarius are normal. Epiglottis, hypopharynx, false vocal folds are normal. No pooling in pyriform fossae. Vocal cords are mobile and normal other than slight edematous appearance of posterior commissure. Patient tolerated the procedure well and left the room with no complications.    Functional improper cord use and deconditioning may also contribute.  Over 80% of patients with LPRD do not have heartburn or clear symptoms of GERD. The trhoat symptoms of LPRD can be caused by irritation from direct contact with stomach secretions, or by reflexive cricopharyngeal spasm from reflux into lower portions of the esophagus. I counseled the patient today on the importance of diet and the timing and volume of meals for nonpharmaceutical control of reflux diseases. I also advised frequent sips of water instead of throat clearing to manage the globus sensation and reduce cricopharyngeal spasm. I would like her to follow up with voice  therapist.      Adult lifestyle changes to prevent LPR reviewed      Avoid eating and drinking within two to three hours prior to bedtime    Do not drink alcohol    Eat small meals and slowly    Limit problem foods:    o Caffeine  o Carbonated drinks  o Chocolate  o Peppermint  o Tomato  o Citrus fruits  o Fatty and fried foods      Lose weight    Quit smoking    Wear loose clothing         A/P  This pleasant patient is having chronic vasomotor rhinitis, septal deviation, muscle tension dysphonia and cervical dystonia. Options were discussed.  She will be scheduled to see speech pathology for further evaluation and treatment. I will give her a topical Ipratropium bromide before exercise and meals and see her in the f/up in 3 months.      Again, thank you for allowing me to participate in the care of your patient.        Sincerely,        Shaheed Hurtado MD

## 2023-12-22 NOTE — TELEPHONE ENCOUNTER
Prior Authorization Not Needed per Insurance    Medication: VYVANSE 40 MG PO CAPS  Insurance Company: [employer] - Phone [phone] Fax [phone]  Expected CoPay: $    Pharmacy Filling the Rx: [id number] [place], [place] - [address]  Pharmacy Notified: YES  Patient Notified: **Instructed pharmacy to notify patient when script is ready to /ship.**    Generic is not discontinued, its on short supply due to a shortage.  Per pharmacy they are still receiving the generic and received a shipment every couple of days and they are still filling generic for patients

## 2023-12-27 ENCOUNTER — THERAPY VISIT (OUTPATIENT)
Dept: SPEECH THERAPY | Facility: CLINIC | Age: 48
End: 2023-12-27
Attending: OTOLARYNGOLOGY
Payer: COMMERCIAL

## 2023-12-27 DIAGNOSIS — R13.12 OROPHARYNGEAL DYSPHAGIA: ICD-10-CM

## 2023-12-27 DIAGNOSIS — R49.0 MUSCLE TENSION DYSPHONIA: ICD-10-CM

## 2023-12-27 DIAGNOSIS — G24.9 DYSTONIA: Primary | ICD-10-CM

## 2023-12-27 PROCEDURE — 92524 BEHAVRAL QUALIT ANALYS VOICE: CPT | Mod: GN | Performed by: SPEECH-LANGUAGE PATHOLOGIST

## 2023-12-27 PROCEDURE — 92610 EVALUATE SWALLOWING FUNCTION: CPT | Mod: GN | Performed by: SPEECH-LANGUAGE PATHOLOGIST

## 2023-12-27 PROCEDURE — 92526 ORAL FUNCTION THERAPY: CPT | Mod: GN | Performed by: SPEECH-LANGUAGE PATHOLOGIST

## 2023-12-27 NOTE — PROGRESS NOTES
"SPEECH LANGUAGE PATHOLOGY EVALUATION    See electronic medical record for Abuse and Falls Screening details.    Subjective      Presenting condition or subjective complaint: Pt is a 48 y.o. female who was referred for an OP SLP evaluation by ENT following nasal endoscopy completed 12/19/23. Pt presented with symptoms of; dry cough, muscle straining, tightness and hoarsness. She experinces gagging and choking on a weekly basis, she experinces vocal hoarsness and tightness in her ears, neck, face and tongue. She has a PMHx significant for cervical dystonia with an onset in 2020. Pt intially reports difficulty with dysphagia prior to dx. this is improved now with occasional difficulty swallowing breads and pills. Results of endoscopy revealed normal and mobil vocal cords with slight edematous appearance of posterior commissure. ENT report states \"functional improper cord use and deconditioning may contribute\" as well as LPRD. Pt endorses difficulty with articulation of specific words at times as well.   Date of onset: 12/19/23 (Dystonia dx 2020.)    Relevant medical history:   Please see EMR.     Prior diagnostic imaging/testing results: Other Nasal endoscopy 12/19.   Prior therapy history for the same diagnosis, illness or injury:   PT-cranial sacral 2x/mo.    Patient goals for therapy: Talk comfortably, swallow easily and safely.    Pain assessment: Pain present  Muscle tension in head, neck, shoulders.      Objective     SWALLOW EVALUTION  Dysphagia history: Regular diet and liquids, difficulty with oral motor control of foods and liquids since dystonia onset.   Current Diet/Method of Nutritional Intake: oral diet, thin liquids (level 0), regular diet        CLINICAL SWALLOW EVALUATION  Oral Motor Function: anomalies present  Dentition: natural dentition  Secretion management: WFL  Mandibular function: poor jaw stability  Oral labial function: impaired coordination on left, impaired coordination on right  Lingual " "function: impaired protrusion, impaired anterior elevation, impaired coordination  Buccal function: impaired     Level of assist required for feeding: no assistance needed   No texture trials completed d/t time constraints. Pt denies avoidance of specific foods d/t safety concerns. Reports needing to reduce speed of intake and increase mastication time for safety. Difficulty reported with oral control consistent with impaired lingual and labial movement patterns noted during oral motor exam. Pt reports anterior oral pocketing with most foods, generally able to clear with effortful lingual sweep, occasionally utilizes finger for oral clearance.     ESOPHAGEAL PHASE OF SWALLOW  no observed or reported concerns related to esophageal function     SWALLOW ASSESSMENT CLINICAL IMPRESSIONS AND RATIONALE  Diet Consistency Recommendations: oral diet, thin liquids (level 0), regular diet    Recommended Feeding/Eating Techniques: slow rate of intake, chin tuck, effortful (hard) swallow, check mouth for oral residue/pocketing, discontinue eating activities if fatigue is present, monitor for cough or change in vocal quality with intake, maintain upright sitting position for eating, maintain upright posture during/after eating for 30 minutes, minimize distractions during oral intake   Medication Administration Recommendations: per Pt digression.   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   Patient description of current voice quality: \"Tight, normal, agitated\"  Describe the amount of typical non-work voice use: moderate    Describe the intensity of typical non-work voice use: moderate     COUGH/THROAT CLEAR  Cough/throat clear characteristics: not observed during evaluation   Cough/throat clear triggers in evaluation:  Reflux/globus sensation mentioned in ENT report.   "     HYPERFUNCTION  Visible tension: neck, jaw, tongue, upper body      VOICE PROFILE DURING CONVERSATION (1 min monologue & paragraph reading)  Voice quality: hoarse, airy   Voice quality severity rating continuum: 6 - mild   Breath control: shallow   Breath control severity rating continuum: 5 - mild-moderate  Voice Use/Effort: contraction of neck muscles   Voice Use/Effort severity rating continuum: 6 - mild  Pitch/Frequency Description: WNL   Pitch/Frequency severity rating continuum: 7 - WNL  Average dB: Judged to be 70 dB SPL, appropriate for 1:1 conversation   Volume: WNL   Volume severity rating continuum: 7 - WNL  Neuromuscular Control: WNL   Neuromuscular Control severity rating continuum: 7 - WNL  Resonance: WNL   Resonance severity rating continuum: 7 - WNL    Speech Intelligibility judged to be 100% during this session at the conversational level of speech. Pt endorses difficulty with imprecise articulation at times, frequently related to fatigue and increased muscle tension.       Assessment & Plan   CLINICAL IMPRESSIONS   Medical Diagnosis: Muscle tension dysphonia (R49.0) (Dysphonia and dysphagia)    Treatment Diagnosis: Muscle Tension Dysphonia, oropharyngeal dysphagia   Impression/Assessment: Pt is a 48 year old female with voice, speech and swallowing complaints. The following significant findings have been identified: impaired speech intelligibility, impaired swallowing, and impaired voicing, characterized by hoarse, breathy voice, impaired oral motor ROM, strength and coordination of movement impacting oropharyngeal swallow function and speech intelligibility (per Pt). Identified deficits interfere with their ability to communicate within the home or community, consume an oral diet, and maintain nutrition as compared to previous level of function.    PLAN OF CARE  Treatment Interventions: Swallowing dysfunction and/or oral function for feeding, Voice    Prognosis to achieve stated therapy goals  is good   Rehab potential is impacted by: comorbidities    Long Term Goals   SLP Goal 1  Goal Identifier: LTG 1-Dysphagia  Goal Description: Pt will verbalize and impliment 2-3 strategies for LPRD managment given 0-min cues in order to reduce impact of reflux on upper esophageal and laryngeal tissues.  Rationale: To maximize safety, ease and/or independence of oral intake  Target Date: 02/25/24  SLP Goal 2  Goal Identifier: LTG 2-Dysphagia/Oral Motor Exercises  Goal Description: Pt will complete 8-10 repetitions of recommended oral motor stretches or exercises to improve ROM and coordination of movement need to support safe swallow function.  Rationale: To maximize safety, ease and/or independence of oral intake  Target Date: 02/25/24  SLP Goal 3  Goal Identifier: LTG 3-Muscle Tension  Goal Description: Pt will complete oral-facial-laryngeal massage given 0-min cues in order to reduce muscle tension for improved voicing when communicating with family and friends.  Rationale: To maximize functional communication within the home or community  Target Date: 02/25/24      Frequency of Treatment: 1x/week  Duration of Treatment: 4 weeks, + 1 month follow up.     Recommended Referrals to Other Professionals:  none at this time.   Education Assessment:   Learner/Method: Patient;Listening;Demonstration;No Barriers to Learning    Risks and benefits of evaluation/treatment have been explained.   Patient/Family/caregiver agrees with Plan of Care.     Evaluation Time:    SLP Eval: oral/pharyngeal swallow function, clinical minutes (46179): 15  Voice Minutes (09639): 15     Present: Not applicable     Signing Clinician: Mary Zuniga SLP

## 2024-01-03 ENCOUNTER — THERAPY VISIT (OUTPATIENT)
Dept: SPEECH THERAPY | Facility: CLINIC | Age: 49
End: 2024-01-03
Payer: COMMERCIAL

## 2024-01-03 DIAGNOSIS — R13.12 OROPHARYNGEAL DYSPHAGIA: ICD-10-CM

## 2024-01-03 DIAGNOSIS — R49.0 MUSCLE TENSION DYSPHONIA: Primary | ICD-10-CM

## 2024-01-03 DIAGNOSIS — G24.9 DYSTONIA: ICD-10-CM

## 2024-01-03 PROCEDURE — 92526 ORAL FUNCTION THERAPY: CPT | Mod: GN | Performed by: SPEECH-LANGUAGE PATHOLOGIST

## 2024-01-03 PROCEDURE — 92507 TX SP LANG VOICE COMM INDIV: CPT | Mod: GN | Performed by: SPEECH-LANGUAGE PATHOLOGIST

## 2024-01-08 ENCOUNTER — TELEPHONE (OUTPATIENT)
Dept: PHYSICAL MEDICINE AND REHAB | Facility: CLINIC | Age: 49
End: 2024-01-08
Payer: COMMERCIAL

## 2024-01-09 ENCOUNTER — THERAPY VISIT (OUTPATIENT)
Dept: SPEECH THERAPY | Facility: CLINIC | Age: 49
End: 2024-01-09
Payer: COMMERCIAL

## 2024-01-09 DIAGNOSIS — R13.12 OROPHARYNGEAL DYSPHAGIA: ICD-10-CM

## 2024-01-09 DIAGNOSIS — R49.0 MUSCLE TENSION DYSPHONIA: Primary | ICD-10-CM

## 2024-01-09 DIAGNOSIS — G24.9 DYSTONIA: ICD-10-CM

## 2024-01-09 PROCEDURE — 92526 ORAL FUNCTION THERAPY: CPT | Mod: GN | Performed by: SPEECH-LANGUAGE PATHOLOGIST

## 2024-01-10 ENCOUNTER — OFFICE VISIT (OUTPATIENT)
Dept: PHYSICAL MEDICINE AND REHAB | Facility: CLINIC | Age: 49
End: 2024-01-10
Payer: COMMERCIAL

## 2024-01-10 VITALS — DIASTOLIC BLOOD PRESSURE: 75 MMHG | SYSTOLIC BLOOD PRESSURE: 114 MMHG | HEART RATE: 81 BPM | OXYGEN SATURATION: 97 %

## 2024-01-10 DIAGNOSIS — G24.4 IDIOPATHIC OROFACIAL DYSTONIA: ICD-10-CM

## 2024-01-10 DIAGNOSIS — G24.3 CERVICAL DYSTONIA: Primary | ICD-10-CM

## 2024-01-10 PROCEDURE — 64643 CHEMODENERV 1 EXTREM 1-4 EA: CPT | Performed by: PHYSICAL MEDICINE & REHABILITATION

## 2024-01-10 PROCEDURE — 64642 CHEMODENERV 1 EXTREMITY 1-4: CPT | Performed by: PHYSICAL MEDICINE & REHABILITATION

## 2024-01-10 PROCEDURE — 64616 CHEMODENERV MUSC NECK DYSTON: CPT | Mod: RT | Performed by: PHYSICAL MEDICINE & REHABILITATION

## 2024-01-10 PROCEDURE — 64612 DESTROY NERVE FACE MUSCLE: CPT | Mod: LT | Performed by: PHYSICAL MEDICINE & REHABILITATION

## 2024-01-10 PROCEDURE — 95874 GUIDE NERV DESTR NEEDLE EMG: CPT | Performed by: PHYSICAL MEDICINE & REHABILITATION

## 2024-01-10 PROCEDURE — 64612 DESTROY NERVE FACE MUSCLE: CPT | Mod: RT | Performed by: PHYSICAL MEDICINE & REHABILITATION

## 2024-01-10 PROCEDURE — 64616 CHEMODENERV MUSC NECK DYSTON: CPT | Mod: LT | Performed by: PHYSICAL MEDICINE & REHABILITATION

## 2024-01-10 RX ORDER — BUPIVACAINE HYDROCHLORIDE 2.5 MG/ML
5 INJECTION, SOLUTION EPIDURAL; INFILTRATION; INTRACAUDAL ONCE
Status: COMPLETED | OUTPATIENT
Start: 2024-01-10 | End: 2024-01-10

## 2024-01-10 RX ADMIN — BUPIVACAINE HYDROCHLORIDE 12.5 MG: 2.5 INJECTION, SOLUTION EPIDURAL; INFILTRATION; INTRACAUDAL at 12:54

## 2024-01-10 NOTE — LETTER
"1/10/2024       RE: Inez Boston  [address] [facility] [address]       Dear Colleague,    Thank you for referring your patient, Inez Boston, to the [facility] PHYSICAL MEDICINE AND REHABILITATION CLINIC [place] at [facility]. Please see a copy of my visit note below.      [facility]     PM&R CLINIC NOTE  BOTULINUM TOXIN PROCEDURE      HPI  Chief Complaint   Patient presents with    Botox     Return       Inez Boston is a 48 year old female with a history of  who presents to clinic for botulinum toxin injections for management of cervical dystonia.      SINCE LAST VISIT  Inez Boston was last seen here in clinic on 9/25/2023, at which time she received 300 units of Botox.     Patient reports the following new medical problems since last visit: She had a \"fainting spell\" while watching a band at a bar in 12/2023. She reports that when she woke up, her muscles were all in spasm and she was \"curled up in a ball\". These symptoms eventually subsided and she did not seek further medical care as she has had this happen in the past.         RESPONSE TO PREVIOUS TREATMENT    Side effects: No problems reported.    Pain Improvement: Yes. Approximately 50% improvement. Duration of Benefit:   12 weeks followed by gradual decrease in benefit.     Dystonia Improvement: Yes. Approximately 50% improvement. Botox has been extremely beneficial with regards to her dystonia.   Duration of Benefit:   12 weeks followed by gradual decrease in benefit.     Functional Improvement: Yes, her dystonia has improved so much that she was able to go back to working.       PHYSICAL EXAM  VS: /75 (BP Location: Right arm, Patient Position: Chair, Cuff Size: Adult Regular)   Pulse 81   SpO2 97%    GEN: Pleasant and cooperative, in no acute distress  HEENT: Right mouth drooping. No asymmetry with eyebrow lift.  HEAD AND NECK: Limited " ROM with rotation to the left  HEAD, NECK AND TRUNK PATTERN:   Head & Neck Flexion:  Present  Head & Neck Rotation:   Right  Head & Neck Lateral Bend:   Right  Shoulder Elevation:   Left  JAW AND FACIAL PATTERN:   Jaw Clenching:   Bilateral  VOCAL INVOLVEMENT:   No  SPASMS: Dystonic movements with irritation and facial movements.       ALLERGIES  No Known Allergies    CURRENT MEDICATIONS    Current Outpatient Medications:     aspirin 81 MG EC tablet, Take 162 mg by mouth every morning, Disp: , Rfl:     baclofen (LIORESAL) 10 MG tablet, Take 1 tablet (10 mg) by mouth nightly as needed, Disp: 30 tablet, Rfl: 1    DULoxetine (CYMBALTA) 30 MG capsule, Take 1 capsule (30 mg) by mouth daily, Disp: 90 capsule, Rfl: 0    ipratropium (ATROVENT) 0.03 % nasal spray, Spray 2 sprays into both nostrils 3 times daily, Disp: 30 mL, Rfl: 1    lisdexamfetamine (VYVANSE) 40 MG capsule, Take 1 capsule (40 mg) by mouth every morning (Patient not taking: Reported on 1/10/2024), Disp: 30 capsule, Rfl: 0    Vitamin D, Cholecalciferol, 25 MCG (1000 UT) CAPS, Take 1,000 Units by mouth daily, Disp: , Rfl:     Current Facility-Administered Medications:     botulinum toxin type A (BOTOX) 100 units injection 400 Units, 400 Units, Intramuscular, Q90 Days, StandElizabeth rojo MD, 400 Units at 23 1214       BOTULINUM NEUROTOXIN INJECTION PROCEDURES    VERIFICATION OF PATIENT IDENTIFICATION AND PROCEDURE     Initials   Patient Name SES   Patient  SES   Procedure Verified by: SES     Prior to the start of the procedure and with procedural staff participation, I verbally confirmed the patient s identity using two indicators, relevant allergies, that the procedure was appropriate and matched the consent or emergent situation, and that the correct equipment/implants were available. Immediately prior to starting the procedure I conducted the Time Out with the procedural staff and re-confirmed the patient s name, procedure, and site/side.  (The Joint Commission universal protocol was followed.)  Yes    Sedation (Moderate or Deep): None    ABOVE ASSESSMENTS PERFORMED BY    Elizabeth Fischer MD      INDICATIONS FOR PROCEDURES  Inez Boston is a 48 year old patient with involuntary movements secondary to the diagnosis of cervical and generalized dystonia. Her baseline symptoms have been recalcitrant to oral medications and conservative therapy.  She is here today for reinjection with Botox.    GOAL OF PROCEDURE  The goal of this procedure is to increase active range of motion, improve volitional motor control, decrease pain  and enhance functional independence.      TOTAL DOSE ADMINISTERED  Dose Administered:  375 units  Botox (Botulinum Toxin Type A)       2:1 Dilution   Unavoidable Drug Waste: Yes  Amount of drug waste (mL): 25 units Botox.  Reason for waste:  Single use vial.  Diluent Used:  0.25% bupivacaine  Total Volume of Diluent Used: 6 ml  NDC #: Botox 100u ([address]-27)      CONSENT  The risks, benefits, and treatment options were discussed with Inez Boston and she agreed to proceed.    Written consent was obtained by [facility].     EQUIPMENT USED  Needle-37mm stimulating/recording  Needle-30 gauge  EMG/NCS Machine    SKIN PREPARATION  Skin preparation was performed using an alcohol wipe.    GUIDANCE DESCRIPTION  Electro-myographic guidance was necessary throughout the neck portion of the procedure to accurately identify all areas of dystonic muscles while avoiding injection of non-dystonic muscles, neighboring nerves and nearby vascular structures.       AREA/MUSCLE INJECTED: 375 UNITS BOTOX = TOTAL DOSE, 2:1 DILUTION    1. NECK MUSCLES: 40 UNITS BOTOX = TOTAL DOSE    Right Upper Trapezius (upper cervical) - 15 units of Botox at 3 site/s.   Left Upper Trapezius (upper cervical) - 15 units of Botox at 3 site/s.    Right Splenius Cervicis - 5 units of Botox at 1 site/s.   Left Splenius Cervicis -  5 units of Botox at 1 site/s.    2. UPPER  EXTREMITY MUSCLES: 100 UNITS BOTOX = TOTAL DOSE    Right Levator Scapulae - 10 units of Botox at 1 site/s.   Left Levator Scapulae - 10 units of Botox at 1 site/s.    Right Pectoralis Minor - 5 units at 1 site/s.   Left Pectoralis Minor - 5 units at 1 site/s.     Right Rhomboids - 20 units at 4 site/s.   Left Rhomboids - 20 units at 4 site/s.     Right Latissimus Dorsi - 15 units of Botox at 2 site/s.   Left Latissimus Dorsi - 15 units of Botox at 3 site/s.     4. FACIAL & SCALP MUSCLES: 235 UNITS BOTOX = TOTAL DOSE    Right Frontalis - 10 units of Botox at 2 site/s.  Left Frontalis - 10 units of Botox at 2 site/s.    Right Temporalis - 50 units of Botox at 5 site/s.  Left Temporalis - 50 units of Botox at 5 site/s.    Right Occipitalis - 35 units of Botox at 4 site/s.   Left Occipitalis - 35 units of Botox at 4 site/s.    Right  - 5 units of Botox at 1 site/s.   Left  - 5 units of Botox at 1 site/s.    Procerus - 5 units of Botox at 1 site/s.    Right Orbicularis Oculi - 7.5 units of Botox at 3 site/s.  Left Orbicularis Oculi - 7.5 units of Botox at 3 site/s.     Right Orbicularis Oris - 2.5 units of Botox at 1 site/s.   Left Orbicularis Oris - 2.5 units of Botox at 1 site/s.     Right Mentalis - 5 units of Botox at 1 site/s.   Left Mentalis - 5 units of Botox at 1 site/s.       RESPONSE TO PROCEDURE  Inez Boston tolerated the procedure well and there were no immediate complications. She was allowed to recover for an appropriate period of time and was discharged home in stable condition.    ASSESSMENT AND PLAN   Botulinum toxin injections: Dose of Botox was increased from 350 units to 375 units in hopes of increasing effectiveness and prolonging duration of benefit of injections. Patient will continue to monitor response to Botox and report at next appointment.   Referrals: None.   Follow up: Inez Boston was rescheduled for the next series of injections in 12 weeks, at which time we will  evaluate response to today's injections. she may call the clinic prior with any questions or concerns prior to the next appointment.       Again, thank you for allowing me to participate in the care of your patient.      Sincerely,    Elizabeth Fischer MD

## 2024-01-10 NOTE — NURSING NOTE
Chief Complaint   Patient presents with    Botox     Return       /75 (BP Location: Right arm, Patient Position: Chair, Cuff Size: Adult Regular)   Pulse 81   SpO2 97%     Terrence Welch EMT

## 2024-01-10 NOTE — PROGRESS NOTES
"  [facility]     PM&R CLINIC NOTE  BOTULINUM TOXIN PROCEDURE      HPI  Chief Complaint   Patient presents with    Botox     Return       Inez Boston is a 48 year old female with a history of  who presents to clinic for botulinum toxin injections for management of cervical dystonia.      SINCE LAST VISIT  Inez Boston was last seen here in clinic on 9/25/2023, at which time she received 300 units of Botox.     Patient reports the following new medical problems since last visit: She had a \"fainting spell\" while watching a band at a bar in 12/2023. She reports that when she woke up, her muscles were all in spasm and she was \"curled up in a ball\". These symptoms eventually subsided and she did not seek further medical care as she has had this happen in the past.         RESPONSE TO PREVIOUS TREATMENT    Side effects: No problems reported.    Pain Improvement: Yes. Approximately 50% improvement. Duration of Benefit:   12 weeks followed by gradual decrease in benefit.     Dystonia Improvement: Yes. Approximately 50% improvement. Botox has been extremely beneficial with regards to her dystonia.   Duration of Benefit:   12 weeks followed by gradual decrease in benefit.     Functional Improvement: Yes, her dystonia has improved so much that she was able to go back to working.       PHYSICAL EXAM  VS: /75 (BP Location: Right arm, Patient Position: Chair, Cuff Size: Adult Regular)   Pulse 81   SpO2 97%    GEN: Pleasant and cooperative, in no acute distress  HEENT: Right mouth drooping. No asymmetry with eyebrow lift.  HEAD AND NECK: Limited ROM with rotation to the left  HEAD, NECK AND TRUNK PATTERN:   Head & Neck Flexion:  Present  Head & Neck Rotation:   Right  Head & Neck Lateral Bend:   Right  Shoulder Elevation:   Left  JAW AND FACIAL PATTERN:   Jaw Clenching:   Bilateral  VOCAL INVOLVEMENT:   No  SPASMS: Dystonic movements with irritation and facial movements.   "     ALLERGIES  No Known Allergies    CURRENT MEDICATIONS    Current Outpatient Medications:     aspirin 81 MG EC tablet, Take 162 mg by mouth every morning, Disp: , Rfl:     baclofen (LIORESAL) 10 MG tablet, Take 1 tablet (10 mg) by mouth nightly as needed, Disp: 30 tablet, Rfl: 1    DULoxetine (CYMBALTA) 30 MG capsule, Take 1 capsule (30 mg) by mouth daily, Disp: 90 capsule, Rfl: 0    ipratropium (ATROVENT) 0.03 % nasal spray, Spray 2 sprays into both nostrils 3 times daily, Disp: 30 mL, Rfl: 1    lisdexamfetamine (VYVANSE) 40 MG capsule, Take 1 capsule (40 mg) by mouth every morning (Patient not taking: Reported on 1/10/2024), Disp: 30 capsule, Rfl: 0    Vitamin D, Cholecalciferol, 25 MCG (1000 UT) CAPS, Take 1,000 Units by mouth daily, Disp: , Rfl:     Current Facility-Administered Medications:     botulinum toxin type A (BOTOX) 100 units injection 400 Units, 400 Units, Intramuscular, Q90 Days, Elizabeth Fischer MD, 400 Units at 23 1214       BOTULINUM NEUROTOXIN INJECTION PROCEDURES    VERIFICATION OF PATIENT IDENTIFICATION AND PROCEDURE     Initials   Patient Name SES   Patient  SES   Procedure Verified by: SES     Prior to the start of the procedure and with procedural staff participation, I verbally confirmed the patient s identity using two indicators, relevant allergies, that the procedure was appropriate and matched the consent or emergent situation, and that the correct equipment/implants were available. Immediately prior to starting the procedure I conducted the Time Out with the procedural staff and re-confirmed the patient s name, procedure, and site/side. (The Joint Commission universal protocol was followed.)  Yes    Sedation (Moderate or Deep): None    ABOVE ASSESSMENTS PERFORMED BY    Elizabeth Fischer MD      INDICATIONS FOR PROCEDURES  Inez Boston is a 48 year old patient with involuntary movements secondary to the diagnosis of cervical and generalized dystonia. Her baseline  symptoms have been recalcitrant to oral medications and conservative therapy.  She is here today for reinjection with Botox.    GOAL OF PROCEDURE  The goal of this procedure is to increase active range of motion, improve volitional motor control, decrease pain  and enhance functional independence.      TOTAL DOSE ADMINISTERED  Dose Administered:  375 units  Botox (Botulinum Toxin Type A)       2:1 Dilution   Unavoidable Drug Waste: Yes  Amount of drug waste (mL): 25 units Botox.  Reason for waste:  Single use vial.  Diluent Used:  0.25% bupivacaine  Total Volume of Diluent Used: 6 ml  NDC #: Botox 100u ([address]-18)      CONSENT  The risks, benefits, and treatment options were discussed with Inez EVANGELISTA Ludy and she agreed to proceed.    Written consent was obtained by [facility].     EQUIPMENT USED  Needle-37mm stimulating/recording  Needle-30 gauge  EMG/NCS Machine    SKIN PREPARATION  Skin preparation was performed using an alcohol wipe.    GUIDANCE DESCRIPTION  Electro-myographic guidance was necessary throughout the neck portion of the procedure to accurately identify all areas of dystonic muscles while avoiding injection of non-dystonic muscles, neighboring nerves and nearby vascular structures.       AREA/MUSCLE INJECTED: 375 UNITS BOTOX = TOTAL DOSE, 2:1 DILUTION    1. NECK MUSCLES: 40 UNITS BOTOX = TOTAL DOSE    Right Upper Trapezius (upper cervical) - 15 units of Botox at 3 site/s.   Left Upper Trapezius (upper cervical) - 15 units of Botox at 3 site/s.    Right Splenius Cervicis - 5 units of Botox at 1 site/s.   Left Splenius Cervicis -  5 units of Botox at 1 site/s.    2. UPPER EXTREMITY MUSCLES: 100 UNITS BOTOX = TOTAL DOSE    Right Levator Scapulae - 10 units of Botox at 1 site/s.   Left Levator Scapulae - 10 units of Botox at 1 site/s.    Right Pectoralis Minor - 5 units at 1 site/s.   Left Pectoralis Minor - 5 units at 1 site/s.     Right Rhomboids - 20 units at 4 site/s.   Left Rhomboids - 20 units at 4  site/s.     Right Latissimus Dorsi - 15 units of Botox at 2 site/s.   Left Latissimus Dorsi - 15 units of Botox at 3 site/s.     4. FACIAL & SCALP MUSCLES: 235 UNITS BOTOX = TOTAL DOSE    Right Frontalis - 10 units of Botox at 2 site/s.  Left Frontalis - 10 units of Botox at 2 site/s.    Right Temporalis - 50 units of Botox at 5 site/s.  Left Temporalis - 50 units of Botox at 5 site/s.    Right Occipitalis - 35 units of Botox at 4 site/s.   Left Occipitalis - 35 units of Botox at 4 site/s.    Right  - 5 units of Botox at 1 site/s.   Left  - 5 units of Botox at 1 site/s.    Procerus - 5 units of Botox at 1 site/s.    Right Orbicularis Oculi - 7.5 units of Botox at 3 site/s.  Left Orbicularis Oculi - 7.5 units of Botox at 3 site/s.     Right Orbicularis Oris - 2.5 units of Botox at 1 site/s.   Left Orbicularis Oris - 2.5 units of Botox at 1 site/s.     Right Mentalis - 5 units of Botox at 1 site/s.   Left Mentalis - 5 units of Botox at 1 site/s.       RESPONSE TO PROCEDURE  Inez Boston tolerated the procedure well and there were no immediate complications. She was allowed to recover for an appropriate period of time and was discharged home in stable condition.    ASSESSMENT AND PLAN   Botulinum toxin injections: Dose of Botox was increased from 350 units to 375 units in hopes of increasing effectiveness and prolonging duration of benefit of injections. Patient will continue to monitor response to Botox and report at next appointment.   Referrals: None.   Follow up: Inez Boston was rescheduled for the next series of injections in 12 weeks, at which time we will evaluate response to today's injections. she may call the clinic prior with any questions or concerns prior to the next appointment.

## 2024-01-19 ENCOUNTER — THERAPY VISIT (OUTPATIENT)
Dept: SPEECH THERAPY | Facility: CLINIC | Age: 49
End: 2024-01-19
Payer: COMMERCIAL

## 2024-01-19 DIAGNOSIS — R13.12 OROPHARYNGEAL DYSPHAGIA: ICD-10-CM

## 2024-01-19 DIAGNOSIS — G24.9 DYSTONIA: ICD-10-CM

## 2024-01-19 DIAGNOSIS — R49.0 MUSCLE TENSION DYSPHONIA: Primary | ICD-10-CM

## 2024-01-19 PROCEDURE — 92507 TX SP LANG VOICE COMM INDIV: CPT | Mod: GN | Performed by: SPEECH-LANGUAGE PATHOLOGIST

## 2024-03-12 NOTE — PROGRESS NOTES
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   Progress Note Due Date 02/25/24   Progress Note Completed Date 12/27/23   Subjective Report   Subjective Report Pt arrived late, had Botox injections about 10 days ago, fainting spell EMS called, did not go to ED, Pt has hx. of this .   SLP Goals   SLP Goals 1;2;3   SLP Goal 1   Goal Identifier LTG 1-Dysphagia   Goal Description Pt will verbalize and impliment 2-3 strategies for LPRD managment given 0-min cues in order to reduce impact of reflux on upper esophageal and laryngeal tissues.   Rationale To maximize safety, ease and/or independence of oral intake   Goal Progress 1/3/24: reinforced/re-educated on LPRD precautions and mindfulness about amt of water she is drinking before bed and duration between final evening PO intake and lying down for bed. Pt endorses that she will be more aware of water intake close to bedtime. Education provided on oral dryness management strategies including; Biotene/ACT products, sipping water throughout the day, and use of bedside humidifier.   Target Date 02/25/24   SLP Goal 2   Goal Identifier LTG 2-Dysphagia/Oral Motor Exercises   Goal Description Pt will complete 8-10 repetitions of recommended oral motor stretches or exercises to improve ROM and coordination of movement need to support safe swallow function.   Rationale To maximize safety, ease and/or independence of oral intake   Goal Progress 1/9/24: SLP trained Pt in oral facial exercises targeting ROM, strength and coordination of movement with  lingual and labial targets being the most faciliating for Pt self reported reduction in facial/oral muscle tension. Pt completed 2-3x reps of each exercise.   Target Date 02/25/24   SLP Goal 3   Goal Identifier LTG 3-Muscle Tension   Goal Description Pt will complete oral-facial-laryngeal massage given 0-min cues in order to reduce muscle tension for improved voicing when communicating with family and friends.   Rationale To maximize functional communication within the home or community   Goal Progress See below:   Target Date 02/25/24   Treatment Interventions (SLP)   Treatment Interventions Treatment Speech/Lang/Voice   Treatment Speech/Lang/Voice   Treatment of Speech, Language, Voice Communication&/or Auditory Processing (81729) 28 Minutes   Patient Response/Progress HEP-Ongoing LTG 3-Reviewed laryngeal massage, Pt demonstrating completion with 0-min cues able to ID chest, base of skull and lateral neck muscles as main trigger areas. Reviewed and reinforced warm liquid as source of relaxation technique, steam following gym workout to reduce nasal/sinus pressure and facial tension.   Education   Learner/Method Patient;Listening;Demonstration;No Barriers to Learning   Plan   Home program Laryngeal massage 1-2x/day. LPRD diet and meal timing modifications, oral/facial exercises.   Plan for next session Review OME's review massage and LPRD recommendations.   Comments   Comments SLP to create Cervical Dystonia ID card.   Total Session Time   Total Treatment Time (sum of timed and untimed services) 28         DISCHARGE  Reason for Discharge: Patient chooses to discontinue therapy.  Financial/insurance issued noted in [employer] cancellation message.     Equipment Issued: none     Discharge Plan: Patient to continue home program.    Referring Provider:  Shaheed Hurtado      Thank you for referring  to outpatient therapy at [facility]. Please call Mary Zuniga MA,  SLP-CCC at [phone] email [email or web address] with any questions or concerns.      Mary Zuniga M.A., SLP-CCC  Speech-Language Pathologist

## 2024-03-14 DIAGNOSIS — G24.9 DYSTONIA: ICD-10-CM

## 2024-03-14 NOTE — TELEPHONE ENCOUNTER
[facility] Call Center    Phone Message    May a detailed message be left on voicemail: yes     Reason for Call: Medication Refill Request    Has the patient contacted the pharmacy for the refill? Yes   Name of medication being requested: baclofen (LIORESAL) 10 MG tablet   Provider who prescribed the medication: PCP  Pharmacy:   [facility] #[id number] [place], [place] - [address]     Date medication is needed: ASAP

## 2024-03-14 NOTE — TELEPHONE ENCOUNTER
baclofen (LIORESAL) 10 MG tablet      Last Written Prescription Date:  12/16/23  Last Fill Quantity: 30,   # refills: 1  Last Office Visit : 9/25/23  Future Office visit:  none    Routing refill request to provider for review/approval because:  Lioresal is not on the [facility], [facility] or [facility] refill protocol    Provider who prescribed the medication: PCP  Pharmacy:   [facility] #[id number] [address]

## 2024-03-15 ENCOUNTER — TELEPHONE (OUTPATIENT)
Dept: INTERNAL MEDICINE | Facility: CLINIC | Age: 49
End: 2024-03-15
Payer: COMMERCIAL

## 2024-03-15 DIAGNOSIS — F41.9 ANXIETY: ICD-10-CM

## 2024-03-15 RX ORDER — BACLOFEN 10 MG/1
10 TABLET ORAL
Qty: 30 TABLET | Refills: 1 | Status: SHIPPED | OUTPATIENT
Start: 2024-03-15 | End: 2024-08-22

## 2024-03-15 RX ORDER — DULOXETIN HYDROCHLORIDE 30 MG/1
30 CAPSULE, DELAYED RELEASE ORAL DAILY
Qty: 90 CAPSULE | Refills: 1 | Status: SHIPPED | OUTPATIENT
Start: 2024-03-15 | End: 2024-06-19

## 2024-03-15 NOTE — TELEPHONE ENCOUNTER
[facility] Call Center    Phone Message    May a detailed message be left on voicemail: yes     Reason for Call: Medication Refill Request    Has the patient contacted the pharmacy for the refill? Yes   Name of medication being requested: DULoxetine (CYMBALTA) 30 MG capsule   Provider who prescribed the medication:   Pharmacy:   [facility] #[id number] - [place], [place] - [address]     Date medication is needed: ASAP       Action Taken: Message routed to:  [facility]    Travel Screening: Not Applicable

## 2024-04-09 ENCOUNTER — OFFICE VISIT (OUTPATIENT)
Dept: PHYSICAL MEDICINE AND REHAB | Facility: CLINIC | Age: 49
End: 2024-04-09
Payer: COMMERCIAL

## 2024-04-09 VITALS — DIASTOLIC BLOOD PRESSURE: 50 MMHG | SYSTOLIC BLOOD PRESSURE: 110 MMHG | HEART RATE: 78 BPM

## 2024-04-09 DIAGNOSIS — G24.3 CERVICAL DYSTONIA: Primary | ICD-10-CM

## 2024-04-09 DIAGNOSIS — G24.4 IDIOPATHIC OROFACIAL DYSTONIA: ICD-10-CM

## 2024-04-09 PROCEDURE — 95874 GUIDE NERV DESTR NEEDLE EMG: CPT | Performed by: PHYSICAL MEDICINE & REHABILITATION

## 2024-04-09 PROCEDURE — 64616 CHEMODENERV MUSC NECK DYSTON: CPT | Mod: 50 | Performed by: PHYSICAL MEDICINE & REHABILITATION

## 2024-04-09 PROCEDURE — 64646 CHEMODENERV TRUNK MUSC 1-5: CPT | Performed by: PHYSICAL MEDICINE & REHABILITATION

## 2024-04-09 PROCEDURE — 64612 DESTROY NERVE FACE MUSCLE: CPT | Mod: 50 | Performed by: PHYSICAL MEDICINE & REHABILITATION

## 2024-04-09 NOTE — PROGRESS NOTES
"  [facility]     PM&R CLINIC NOTE  BOTULINUM TOXIN PROCEDURE      HPI  Chief Complaint   Patient presents with    Procedure     Botox     Inez Boston is a 48 year old female with a history of  who presents to clinic for botulinum toxin injections for management of cervical dystonia.      SINCE LAST VISIT  Inez Boston was last seen here in clinic on 1/10/2024, at which time she received 300 units of Botox.     Patient reports the following new medical problems since last visit: She does have an abscessed tooth which is going to be needing a root canal tomorrow.       In terms of her dystonia, she has noticed that the left side of her mouth is being involuntarily pulled laterally \"like a hook in my mouth\". She also notices more pain and twisting in the lateral torso region. When Botox is in effect, her baclofen is quite effective, but when the Botox has worn off, the baclofen is ineffective.     With the Botox on board, she states that she was feeling \"pretty good for a while\" but then she started noticing a return in her pain and dystonic movements about two weeks ago.     RESPONSE TO PREVIOUS TREATMENT    Side effects: No problems reported.    Pain Improvement: Yes. Approximately >50% improvement. Duration of Benefit:  12 weeks followed by gradual decrease in benefit.     Dystonia Improvement: Yes. Approximately >50% improvement. Botox has been extremely beneficial with regards to her dystonia.   Duration of Benefit: 12 weeks followed by gradual decrease in benefit.     Functional Improvement: Yes, her dystonia has improved so much that she was able to go back to working.       PHYSICAL EXAM  VS: /50 (BP Location: Right arm, Patient Position: Sitting)   Pulse 78    GEN: Pleasant and cooperative, in no acute distress  HEENT: Right mouth drooping. No asymmetry with eyebrow lift.  HEAD AND NECK: Limited ROM with rotation to the left  HEAD, NECK AND TRUNK PATTERN:   Head " & Neck Flexion:  Present  Head & Neck Rotation:   Right  Head & Neck Lateral Bend:   Right  Shoulder Elevation:   Left  JAW AND FACIAL PATTERN:   Jaw Clenching:   Bilateral  VOCAL INVOLVEMENT:   No  SPASMS: Dystonic movements with irritation and facial movements.       ALLERGIES  No Known Allergies    CURRENT MEDICATIONS    Current Outpatient Medications:     aspirin 81 MG EC tablet, Take 162 mg by mouth every morning, Disp: , Rfl:     baclofen (LIORESAL) 10 MG tablet, Take 1 tablet (10 mg) by mouth nightly as needed, Disp: 30 tablet, Rfl: 1    DULoxetine (CYMBALTA) 30 MG capsule, Take 1 capsule (30 mg) by mouth daily, Disp: 90 capsule, Rfl: 1    Vitamin D, Cholecalciferol, 25 MCG (1000 UT) CAPS, Take 1,000 Units by mouth daily, Disp: , Rfl:     ipratropium (ATROVENT) 0.03 % nasal spray, Spray 2 sprays into both nostrils 3 times daily, Disp: 30 mL, Rfl: 1    lisdexamfetamine (VYVANSE) 40 MG capsule, Take 1 capsule (40 mg) by mouth every morning (Patient not taking: Reported on 1/10/2024), Disp: 30 capsule, Rfl: 0    Current Facility-Administered Medications:     botulinum toxin type A (BOTOX) 100 units injection 400 Units, 400 Units, Intramuscular, Q90 Days, Elizabeth Fischer MD, 350 Units at 01/10/24 1253       BOTULINUM NEUROTOXIN INJECTION PROCEDURES    VERIFICATION OF PATIENT IDENTIFICATION AND PROCEDURE     Initials   Patient Name SES   Patient  SES   Procedure Verified by: SES     Prior to the start of the procedure and with procedural staff participation, I verbally confirmed the patient s identity using two indicators, relevant allergies, that the procedure was appropriate and matched the consent or emergent situation, and that the correct equipment/implants were available. Immediately prior to starting the procedure I conducted the Time Out with the procedural staff and re-confirmed the patient s name, procedure, and site/side. (The Joint Commission universal protocol was followed.)   Yes    Sedation (Moderate or Deep): None    ABOVE ASSESSMENTS PERFORMED BY    Elizabeth Fischer MD      INDICATIONS FOR PROCEDURES  Inez Boston is a 48 year old patient with involuntary movements secondary to the diagnosis of cervical and generalized dystonia. Her baseline symptoms have been recalcitrant to oral medications and conservative therapy.  She is here today for reinjection with Botox.    GOAL OF PROCEDURE  The goal of this procedure is to increase active range of motion, improve volitional motor control, decrease pain  and enhance functional independence.      TOTAL DOSE ADMINISTERED  Dose Administered:  375 units  Botox (Botulinum Toxin Type A)       2:1 Dilution   Unavoidable Drug Waste: Yes  Amount of drug waste (mL): 25 units Botox.  Reason for waste:  Single use vial.  Diluent Used:  0.25% bupivacaine  Total Volume of Diluent Used: 6 ml  NDC #: Botox 100u ([address]-60)      CONSENT  The risks, benefits, and treatment options were discussed with Inez Boston and she agreed to proceed.    Written consent was obtained by [facility].     EQUIPMENT USED  Needle-37mm stimulating/recording  Needle-30 gauge  EMG/NCS Machine    SKIN PREPARATION  Skin preparation was performed using an alcohol wipe.    GUIDANCE DESCRIPTION  Electro-myographic guidance was necessary throughout the neck portion of the procedure to accurately identify all areas of dystonic muscles while avoiding injection of non-dystonic muscles, neighboring nerves and nearby vascular structures.       AREA/MUSCLE INJECTED: 375 UNITS BOTOX = TOTAL DOSE, 2:1 DILUTION    1. NECK MUSCLES: 40 UNITS BOTOX = TOTAL DOSE    Right Upper Trapezius (upper cervical) - 15 units of Botox at 3 site/s.   Left Upper Trapezius (upper cervical) - 15 units of Botox at 3 site/s.    Right Splenius Cervicis - 5 units of Botox at 1 site/s.   Left Splenius Cervicis -  5 units of Botox at 1 site/s.    2. UPPER EXTREMITY MUSCLES: 100 UNITS BOTOX = TOTAL DOSE    Right  Levator Scapulae - 10 units of Botox at 1 site/s.   Left Levator Scapulae - 10 units of Botox at 1 site/s.    Right Pectoralis Minor - 5 units at 1 site/s.   Left Pectoralis Minor - 5 units at 1 site/s.     Right Rhomboids - 20 units at 4 site/s.   Left Rhomboids - 20 units at 4 site/s.     Right Latissimus Dorsi - 15 units of Botox at 2 site/s.   Left Latissimus Dorsi - 15 units of Botox at 3 site/s.     4. FACIAL & SCALP MUSCLES: 235 UNITS BOTOX = TOTAL DOSE    Right Frontalis - 10 units of Botox at 2 site/s.  Left Frontalis - 10 units of Botox at 2 site/s.    Right Temporalis - 50 units of Botox at 5 site/s.  Left Temporalis - 50 units of Botox at 5 site/s.    Right Occipitalis - 35 units of Botox at 4 site/s.   Left Occipitalis - 35 units of Botox at 4 site/s.    Right  - 5 units of Botox at 1 site/s.   Left  - 5 units of Botox at 1 site/s.    Procerus - 5 units of Botox at 1 site/s.    Right Orbicularis Oculi - 7.5 units of Botox at 3 site/s.  Left Orbicularis Oculi - 7.5 units of Botox at 3 site/s.     Right Orbicularis Oris - 2.5 units of Botox at 1 site/s.   Left Orbicularis Oris - 2.5 units of Botox at 1 site/s.     Right Mentalis - 5 units of Botox at 1 site/s.   Left Mentalis - 5 units of Botox at 1 site/s.       RESPONSE TO PROCEDURE  Inez Boston tolerated the procedure well and there were no immediate complications. She was allowed to recover for an appropriate period of time and was discharged home in stable condition.    ASSESSMENT AND PLAN   Botulinum toxin injections: Dose of Botox was increased from 350 units to 375 units in hopes of increasing effectiveness and prolonging duration of benefit of injections. Patient will continue to monitor response to Botox and report at next appointment.   Referrals: None.   Follow up: Inez Boston was rescheduled for the next series of injections in 12 weeks, at which time we will evaluate response to today's injections. she may call the  clinic prior with any questions or concerns prior to the next appointment.

## 2024-04-09 NOTE — NURSING NOTE
Chief Complaint   Patient presents with    Procedure     Botox     Selma Morel MA on 4/9/2024 at 10:38 AM

## 2024-04-09 NOTE — LETTER
"    4/9/2024         RE: Inez Boston  [address] [address]        Dear Colleague,    Thank you for referring your patient, Inez Boston, to the [facility]. Please see a copy of my visit note below.      [facility]     PM&R CLINIC NOTE  BOTULINUM TOXIN PROCEDURE      HPI  Chief Complaint   Patient presents with     Procedure     Botox     Inez Boston is a 48 year old female with a history of  who presents to clinic for botulinum toxin injections for management of cervical dystonia.      SINCE LAST VISIT  Inez Boston was last seen here in clinic on 1/10/2024, at which time she received 300 units of Botox.     Patient reports the following new medical problems since last visit: She does have an abscessed tooth which is going to be needing a root canal tomorrow.       In terms of her dystonia, she has noticed that the left side of her mouth is being involuntarily pulled laterally \"like a hook in my mouth\". She also notices more pain and twisting in the lateral torso region. When Botox is in effect, her baclofen is quite effective, but when the Botox has worn off, the baclofen is ineffective.     With the Botox on board, she states that she was feeling \"pretty good for a while\" but then she started noticing a return in her pain and dystonic movements about two weeks ago.     RESPONSE TO PREVIOUS TREATMENT    Side effects: No problems reported.    Pain Improvement: Yes. Approximately >50% improvement. Duration of Benefit:  12 weeks followed by gradual decrease in benefit.     Dystonia Improvement: Yes. Approximately >50% improvement. Botox has been extremely beneficial with regards to her dystonia.   Duration of Benefit: 12 weeks followed by gradual decrease in benefit.     Functional Improvement: Yes, her dystonia has improved so much that she was able to go back to working.       PHYSICAL EXAM  VS: /50 (BP " Location: Right arm, Patient Position: Sitting)   Pulse 78    GEN: Pleasant and cooperative, in no acute distress  HEENT: Right mouth drooping. No asymmetry with eyebrow lift.  HEAD AND NECK: Limited ROM with rotation to the left  HEAD, NECK AND TRUNK PATTERN:   Head & Neck Flexion:  Present  Head & Neck Rotation:   Right  Head & Neck Lateral Bend:   Right  Shoulder Elevation:   Left  JAW AND FACIAL PATTERN:   Jaw Clenching:   Bilateral  VOCAL INVOLVEMENT:   No  SPASMS: Dystonic movements with irritation and facial movements.       ALLERGIES  No Known Allergies    CURRENT MEDICATIONS    Current Outpatient Medications:      aspirin 81 MG EC tablet, Take 162 mg by mouth every morning, Disp: , Rfl:      baclofen (LIORESAL) 10 MG tablet, Take 1 tablet (10 mg) by mouth nightly as needed, Disp: 30 tablet, Rfl: 1     DULoxetine (CYMBALTA) 30 MG capsule, Take 1 capsule (30 mg) by mouth daily, Disp: 90 capsule, Rfl: 1     Vitamin D, Cholecalciferol, 25 MCG (1000 UT) CAPS, Take 1,000 Units by mouth daily, Disp: , Rfl:      ipratropium (ATROVENT) 0.03 % nasal spray, Spray 2 sprays into both nostrils 3 times daily, Disp: 30 mL, Rfl: 1     lisdexamfetamine (VYVANSE) 40 MG capsule, Take 1 capsule (40 mg) by mouth every morning (Patient not taking: Reported on 1/10/2024), Disp: 30 capsule, Rfl: 0    Current Facility-Administered Medications:      botulinum toxin type A (BOTOX) 100 units injection 400 Units, 400 Units, Intramuscular, Q90 Days, StandalElizabeth MD, 350 Units at 01/10/24 1253       BOTULINUM NEUROTOXIN INJECTION PROCEDURES    VERIFICATION OF PATIENT IDENTIFICATION AND PROCEDURE     Initials   Patient Name SES   Patient  SES   Procedure Verified by: SES     Prior to the start of the procedure and with procedural staff participation, I verbally confirmed the patient s identity using two indicators, relevant allergies, that the procedure was appropriate and matched the consent or emergent situation, and that  the correct equipment/implants were available. Immediately prior to starting the procedure I conducted the Time Out with the procedural staff and re-confirmed the patient s name, procedure, and site/side. (The Joint Commission universal protocol was followed.)  Yes    Sedation (Moderate or Deep): None    ABOVE ASSESSMENTS PERFORMED BY    Elizabeth Fischer MD      INDICATIONS FOR PROCEDURES  Inez Boston is a 48 year old patient with involuntary movements secondary to the diagnosis of cervical and generalized dystonia. Her baseline symptoms have been recalcitrant to oral medications and conservative therapy.  She is here today for reinjection with Botox.    GOAL OF PROCEDURE  The goal of this procedure is to increase active range of motion, improve volitional motor control, decrease pain  and enhance functional independence.      TOTAL DOSE ADMINISTERED  Dose Administered:  375 units  Botox (Botulinum Toxin Type A)       2:1 Dilution   Unavoidable Drug Waste: Yes  Amount of drug waste (mL): 25 units Botox.  Reason for waste:  Single use vial.  Diluent Used:  0.25% bupivacaine  Total Volume of Diluent Used: 6 ml  NDC #: Botox 100u ([address]-13)      CONSENT  The risks, benefits, and treatment options were discussed with Inez Boston and she agreed to proceed.    Written consent was obtained by [facility].     EQUIPMENT USED  Needle-37mm stimulating/recording  Needle-30 gauge  EMG/NCS Machine    SKIN PREPARATION  Skin preparation was performed using an alcohol wipe.    GUIDANCE DESCRIPTION  Electro-myographic guidance was necessary throughout the neck portion of the procedure to accurately identify all areas of dystonic muscles while avoiding injection of non-dystonic muscles, neighboring nerves and nearby vascular structures.       AREA/MUSCLE INJECTED: 375 UNITS BOTOX = TOTAL DOSE, 2:1 DILUTION    1. NECK MUSCLES: 40 UNITS BOTOX = TOTAL DOSE    Right Upper Trapezius (upper cervical) - 15 units of Botox at 3 site/s.    Left Upper Trapezius (upper cervical) - 15 units of Botox at 3 site/s.    Right Splenius Cervicis - 5 units of Botox at 1 site/s.   Left Splenius Cervicis -  5 units of Botox at 1 site/s.    2. UPPER EXTREMITY MUSCLES: 100 UNITS BOTOX = TOTAL DOSE    Right Levator Scapulae - 10 units of Botox at 1 site/s.   Left Levator Scapulae - 10 units of Botox at 1 site/s.    Right Pectoralis Minor - 5 units at 1 site/s.   Left Pectoralis Minor - 5 units at 1 site/s.     Right Rhomboids - 20 units at 4 site/s.   Left Rhomboids - 20 units at 4 site/s.     Right Latissimus Dorsi - 15 units of Botox at 2 site/s.   Left Latissimus Dorsi - 15 units of Botox at 3 site/s.     4. FACIAL & SCALP MUSCLES: 235 UNITS BOTOX = TOTAL DOSE    Right Frontalis - 10 units of Botox at 2 site/s.  Left Frontalis - 10 units of Botox at 2 site/s.    Right Temporalis - 50 units of Botox at 5 site/s.  Left Temporalis - 50 units of Botox at 5 site/s.    Right Occipitalis - 35 units of Botox at 4 site/s.   Left Occipitalis - 35 units of Botox at 4 site/s.    Right  - 5 units of Botox at 1 site/s.   Left  - 5 units of Botox at 1 site/s.    Procerus - 5 units of Botox at 1 site/s.    Right Orbicularis Oculi - 7.5 units of Botox at 3 site/s.  Left Orbicularis Oculi - 7.5 units of Botox at 3 site/s.     Right Orbicularis Oris - 2.5 units of Botox at 1 site/s.   Left Orbicularis Oris - 2.5 units of Botox at 1 site/s.     Right Mentalis - 5 units of Botox at 1 site/s.   Left Mentalis - 5 units of Botox at 1 site/s.       RESPONSE TO PROCEDURE  Inez Boston tolerated the procedure well and there were no immediate complications. She was allowed to recover for an appropriate period of time and was discharged home in stable condition.    ASSESSMENT AND PLAN   Botulinum toxin injections: Dose of Botox was increased from 350 units to 375 units in hopes of increasing effectiveness and prolonging duration of benefit of injections. Patient will  continue to monitor response to Botox and report at next appointment.   Referrals: None.   Follow up: Inez Boston was rescheduled for the next series of injections in 12 weeks, at which time we will evaluate response to today's injections. she may call the clinic prior with any questions or concerns prior to the next appointment.       Again, thank you for allowing me to participate in the care of your patient.        Sincerely,        Elizabeth Fischer MD

## 2024-05-20 DIAGNOSIS — G24.3 CERVICAL DYSTONIA: Primary | ICD-10-CM

## 2024-05-20 DIAGNOSIS — G24.1 GENETIC TORSION DYSTONIA: ICD-10-CM

## 2024-05-20 DIAGNOSIS — G24.4 IDIOPATHIC OROFACIAL DYSTONIA: ICD-10-CM

## 2024-05-22 ENCOUNTER — ANCILLARY PROCEDURE (OUTPATIENT)
Dept: MAMMOGRAPHY | Facility: CLINIC | Age: 49
End: 2024-05-22
Payer: COMMERCIAL

## 2024-05-22 DIAGNOSIS — Z12.31 VISIT FOR SCREENING MAMMOGRAM: ICD-10-CM

## 2024-05-22 PROCEDURE — 77063 BREAST TOMOSYNTHESIS BI: CPT | Performed by: RADIOLOGY

## 2024-05-22 PROCEDURE — 77067 SCR MAMMO BI INCL CAD: CPT | Performed by: RADIOLOGY

## 2024-06-08 ENCOUNTER — HEALTH MAINTENANCE LETTER (OUTPATIENT)
Age: 49
End: 2024-06-08

## 2024-06-09 DIAGNOSIS — F41.9 ANXIETY: Primary | ICD-10-CM

## 2024-06-19 RX ORDER — DULOXETIN HYDROCHLORIDE 30 MG/1
30 CAPSULE, DELAYED RELEASE ORAL DAILY
Qty: 90 CAPSULE | Refills: 0 | Status: SHIPPED | OUTPATIENT
Start: 2024-06-19

## 2024-07-09 ENCOUNTER — OFFICE VISIT (OUTPATIENT)
Dept: PHYSICAL MEDICINE AND REHAB | Facility: CLINIC | Age: 49
End: 2024-07-09
Payer: COMMERCIAL

## 2024-07-09 VITALS — HEART RATE: 71 BPM | SYSTOLIC BLOOD PRESSURE: 128 MMHG | DIASTOLIC BLOOD PRESSURE: 94 MMHG

## 2024-07-09 DIAGNOSIS — G24.1 GENETIC TORSION DYSTONIA: ICD-10-CM

## 2024-07-09 DIAGNOSIS — G24.4 IDIOPATHIC OROFACIAL DYSTONIA: ICD-10-CM

## 2024-07-09 DIAGNOSIS — G24.3 CERVICAL DYSTONIA: Primary | ICD-10-CM

## 2024-07-09 PROCEDURE — 64643 CHEMODENERV 1 EXTREM 1-4 EA: CPT | Performed by: PHYSICAL MEDICINE & REHABILITATION

## 2024-07-09 PROCEDURE — 64612 DESTROY NERVE FACE MUSCLE: CPT | Mod: 50 | Performed by: PHYSICAL MEDICINE & REHABILITATION

## 2024-07-09 PROCEDURE — 64616 CHEMODENERV MUSC NECK DYSTON: CPT | Mod: 50 | Performed by: PHYSICAL MEDICINE & REHABILITATION

## 2024-07-09 PROCEDURE — 64642 CHEMODENERV 1 EXTREMITY 1-4: CPT | Performed by: PHYSICAL MEDICINE & REHABILITATION

## 2024-07-09 PROCEDURE — 95874 GUIDE NERV DESTR NEEDLE EMG: CPT | Performed by: PHYSICAL MEDICINE & REHABILITATION

## 2024-07-09 NOTE — PROGRESS NOTES
[facility]     PM&R CLINIC NOTE  BOTULINUM TOXIN PROCEDURE      HPI  Chief Complaint   Patient presents with    Procedure     Botox     Inez Boston is a 48 year old female with a history of  who presents to clinic for botulinum toxin injections for management of cervical dystonia.      SINCE LAST VISIT  Inez Boston was last seen here in clinic on 4/9/2024, at which time she received 300 units of Botox.     Patient denies new medical diagnoses, illnesses, hospitalizations, emergency room visits, and injuries since the previous injection with botulinum neurotoxin.       RESPONSE TO PREVIOUS TREATMENT    Side effects: No problems reported.    Pain Improvement: Yes. Approximately >50% improvement. Duration of Benefit:  12 weeks followed by gradual decrease in benefit. The last few days have been more painful.     Dystonia Improvement: Yes. Approximately >50% improvement. Botox has been extremely beneficial with regards to her dystonia.   Duration of Benefit: 12 weeks followed by gradual decrease in benefit over the last few days.    Functional Improvement: Yes, her dystonia has improved so much that she was able to go back to working.       PHYSICAL EXAM  VS: BP (!) 128/94 (BP Location: Right arm, Patient Position: Sitting)   Pulse 71    GEN: Pleasant and cooperative, in no acute distress  HEENT: Right mouth drooping. No asymmetry with eyebrow lift.  HEAD AND NECK: Limited ROM with rotation to the left  HEAD, NECK AND TRUNK PATTERN:   Head & Neck Flexion:  Present  Head & Neck Rotation:   Right  Head & Neck Lateral Bend:   Right  Shoulder Elevation:   Left  JAW AND FACIAL PATTERN:   Jaw Clenching:   Bilateral  VOCAL INVOLVEMENT:   No  SPASMS: Dystonic movements with irritation and facial movements.       ALLERGIES  No Known Allergies    CURRENT MEDICATIONS    Current Outpatient Medications:     aspirin 81 MG EC tablet, Take 162 mg by mouth every morning, Disp: , Rfl:      baclofen (LIORESAL) 10 MG tablet, Take 1 tablet (10 mg) by mouth nightly as needed, Disp: 30 tablet, Rfl: 1    DULoxetine (CYMBALTA) 30 MG capsule, Take 1 capsule (30 mg) by mouth daily, Disp: 90 capsule, Rfl: 0    Vitamin D, Cholecalciferol, 25 MCG (1000 UT) CAPS, Take 1,000 Units by mouth daily, Disp: , Rfl:     ipratropium (ATROVENT) 0.03 % nasal spray, Spray 2 sprays into both nostrils 3 times daily, Disp: 30 mL, Rfl: 1    lisdexamfetamine (VYVANSE) 40 MG capsule, Take 1 capsule (40 mg) by mouth every morning (Patient not taking: Reported on 1/10/2024), Disp: 30 capsule, Rfl: 0    Current Facility-Administered Medications:     botulinum toxin type A (BOTOX) 100 units injection 400 Units, 400 Units, Intramuscular, Q90 Days, Elizabeth Fischer MD    botulinum toxin type A (BOTOX) 100 units injection 400 Units, 400 Units, Intramuscular, Q90 Days, Elizabeth Fischer MD, 375 Units at 24 1250       BOTULINUM NEUROTOXIN INJECTION PROCEDURES    VERIFICATION OF PATIENT IDENTIFICATION AND PROCEDURE     Initials   Patient Name SES   Patient  SES   Procedure Verified by: SES     Prior to the start of the procedure and with procedural staff participation, I verbally confirmed the patient s identity using two indicators, relevant allergies, that the procedure was appropriate and matched the consent or emergent situation, and that the correct equipment/implants were available. Immediately prior to starting the procedure I conducted the Time Out with the procedural staff and re-confirmed the patient s name, procedure, and site/side. (The Joint Commission universal protocol was followed.)  Yes    Sedation (Moderate or Deep): None    ABOVE ASSESSMENTS PERFORMED BY    Elizabeth Fischer MD      INDICATIONS FOR PROCEDURES  Inez Boston is a 48 year old patient with involuntary movements secondary to the diagnosis of cervical and generalized dystonia. Her baseline symptoms have been recalcitrant to oral  medications and conservative therapy.  She is here today for reinjection with Botox.    GOAL OF PROCEDURE  The goal of this procedure is to increase active range of motion, improve volitional motor control, decrease pain  and enhance functional independence.      TOTAL DOSE ADMINISTERED  Dose Administered:  375 units  Botox (Botulinum Toxin Type A)       2:1 Dilution   Unavoidable Drug Waste: Yes  Amount of drug waste (mL): 25 units Botox.  Reason for waste:  Single use vial.  Diluent Used:  0.25% bupivacaine  Total Volume of Diluent Used: 6 ml  NDC #: Botox 100u ([address]-70)      CONSENT  The risks, benefits, and treatment options were discussed with Inez Boston and she agreed to proceed.    Written consent was obtained by [facility].     EQUIPMENT USED  Needle-37mm stimulating/recording  Needle-30 gauge  EMG/NCS Machine    SKIN PREPARATION  Skin preparation was performed using an alcohol wipe.    GUIDANCE DESCRIPTION  Electro-myographic guidance was necessary throughout the neck portion of the procedure to accurately identify all areas of dystonic muscles while avoiding injection of non-dystonic muscles, neighboring nerves and nearby vascular structures.       AREA/MUSCLE INJECTED: 375 UNITS BOTOX = TOTAL DOSE, 2:1 DILUTION    1. NECK MUSCLES: 40 UNITS BOTOX = TOTAL DOSE    Right Upper Trapezius (upper cervical) - 15 units of Botox at 3 site/s.   Left Upper Trapezius (upper cervical) - 15 units of Botox at 3 site/s.    Right Splenius Cervicis - 5 units of Botox at 1 site/s.   Left Splenius Cervicis -  5 units of Botox at 1 site/s.    2. UPPER EXTREMITY MUSCLES: 100 UNITS BOTOX = TOTAL DOSE    Right Levator Scapulae - 10 units of Botox at 1 site/s.   Left Levator Scapulae - 10 units of Botox at 1 site/s.    Right Pectoralis Minor - 5 units at 1 site/s.   Left Pectoralis Minor - 5 units at 1 site/s.     Right Rhomboids - 20 units at 4 site/s.   Left Rhomboids - 20 units at 4 site/s.     Right Latissimus Dorsi - 15  units of Botox at 2 site/s.   Left Latissimus Dorsi - 15 units of Botox at 3 site/s.     4. FACIAL & SCALP MUSCLES: 235 UNITS BOTOX = TOTAL DOSE    Right Frontalis - 10 units of Botox at 2 site/s.  Left Frontalis - 10 units of Botox at 2 site/s.    Right Temporalis - 50 units of Botox at 5 site/s.  Left Temporalis - 50 units of Botox at 5 site/s.    Right Occipitalis - 35 units of Botox at 4 site/s.   Left Occipitalis - 35 units of Botox at 4 site/s.    Right  - 5 units of Botox at 1 site/s.   Left  - 5 units of Botox at 1 site/s.    Procerus - 5 units of Botox at 1 site/s.    Right Orbicularis Oculi - 7.5 units of Botox at 3 site/s.  Left Orbicularis Oculi - 7.5 units of Botox at 3 site/s.     Right Orbicularis Oris - 2.5 units of Botox at 1 site/s.   Left Orbicularis Oris - 2.5 units of Botox at 1 site/s.     Right Mentalis - 5 units of Botox at 1 site/s.   Left Mentalis - 5 units of Botox at 1 site/s.       RESPONSE TO PROCEDURE  Inez Boston tolerated the procedure well and there were no immediate complications. She was allowed to recover for an appropriate period of time and was discharged home in stable condition.    ASSESSMENT AND PLAN   Botulinum toxin injections: No changes made to Botox dose or distribution today. Patient will continue to monitor response to Botox and report at next appointment. Patient will continue to monitor response to Botox and report at next appointment.   Referrals: None.   Medications: None.   Follow up: Inez Boston was rescheduled for the next series of injections in 12 weeks, at which time we will evaluate response to today's injections. she may call the clinic prior with any questions or concerns prior to the next appointment.

## 2024-07-09 NOTE — LETTER
7/9/2024      Inez Boston  [address] [address]      Dear Colleague,    Thank you for referring your patient, Inez Boston, to the [facility]. Please see a copy of my visit note below.      [facility]     PM&R CLINIC NOTE  BOTULINUM TOXIN PROCEDURE      HPI  Chief Complaint   Patient presents with     Procedure     Botox     Inez Boston is a 48 year old female with a history of  who presents to clinic for botulinum toxin injections for management of cervical dystonia.      SINCE LAST VISIT  Inez Boston was last seen here in clinic on 4/9/2024, at which time she received 300 units of Botox.     Patient denies new medical diagnoses, illnesses, hospitalizations, emergency room visits, and injuries since the previous injection with botulinum neurotoxin.       RESPONSE TO PREVIOUS TREATMENT    Side effects: No problems reported.    Pain Improvement: Yes. Approximately >50% improvement. Duration of Benefit:  12 weeks followed by gradual decrease in benefit. The last few days have been more painful.     Dystonia Improvement: Yes. Approximately >50% improvement. Botox has been extremely beneficial with regards to her dystonia.   Duration of Benefit: 12 weeks followed by gradual decrease in benefit over the last few days.    Functional Improvement: Yes, her dystonia has improved so much that she was able to go back to working.       PHYSICAL EXAM  VS: BP (!) 128/94 (BP Location: Right arm, Patient Position: Sitting)   Pulse 71    GEN: Pleasant and cooperative, in no acute distress  HEENT: Right mouth drooping. No asymmetry with eyebrow lift.  HEAD AND NECK: Limited ROM with rotation to the left  HEAD, NECK AND TRUNK PATTERN:   Head & Neck Flexion:  Present  Head & Neck Rotation:   Right  Head & Neck Lateral Bend:   Right  Shoulder Elevation:   Left  JAW AND FACIAL PATTERN:   Jaw Clenching:   Bilateral  VOCAL INVOLVEMENT:    No  SPASMS: Dystonic movements with irritation and facial movements.       ALLERGIES  No Known Allergies    CURRENT MEDICATIONS    Current Outpatient Medications:      aspirin 81 MG EC tablet, Take 162 mg by mouth every morning, Disp: , Rfl:      baclofen (LIORESAL) 10 MG tablet, Take 1 tablet (10 mg) by mouth nightly as needed, Disp: 30 tablet, Rfl: 1     DULoxetine (CYMBALTA) 30 MG capsule, Take 1 capsule (30 mg) by mouth daily, Disp: 90 capsule, Rfl: 0     Vitamin D, Cholecalciferol, 25 MCG (1000 UT) CAPS, Take 1,000 Units by mouth daily, Disp: , Rfl:      ipratropium (ATROVENT) 0.03 % nasal spray, Spray 2 sprays into both nostrils 3 times daily, Disp: 30 mL, Rfl: 1     lisdexamfetamine (VYVANSE) 40 MG capsule, Take 1 capsule (40 mg) by mouth every morning (Patient not taking: Reported on 1/10/2024), Disp: 30 capsule, Rfl: 0    Current Facility-Administered Medications:      botulinum toxin type A (BOTOX) 100 units injection 400 Units, 400 Units, Intramuscular, Q90 Days, Elizabeth Fischer MD     botulinum toxin type A (BOTOX) 100 units injection 400 Units, 400 Units, Intramuscular, Q90 Days, Elizabeth Fischer MD, 375 Units at 24 1250       BOTULINUM NEUROTOXIN INJECTION PROCEDURES    VERIFICATION OF PATIENT IDENTIFICATION AND PROCEDURE     Initials   Patient Name SES   Patient  SES   Procedure Verified by: SES     Prior to the start of the procedure and with procedural staff participation, I verbally confirmed the patient s identity using two indicators, relevant allergies, that the procedure was appropriate and matched the consent or emergent situation, and that the correct equipment/implants were available. Immediately prior to starting the procedure I conducted the Time Out with the procedural staff and re-confirmed the patient s name, procedure, and site/side. (The Joint Commission universal protocol was followed.)  Yes    Sedation (Moderate or Deep): None    ABOVE ASSESSMENTS PERFORMED  BY    Elizabeth Fischer MD      INDICATIONS FOR PROCEDURES  Inez Boston is a 48 year old patient with involuntary movements secondary to the diagnosis of cervical and generalized dystonia. Her baseline symptoms have been recalcitrant to oral medications and conservative therapy.  She is here today for reinjection with Botox.    GOAL OF PROCEDURE  The goal of this procedure is to increase active range of motion, improve volitional motor control, decrease pain  and enhance functional independence.      TOTAL DOSE ADMINISTERED  Dose Administered:  375 units  Botox (Botulinum Toxin Type A)       2:1 Dilution   Unavoidable Drug Waste: Yes  Amount of drug waste (mL): 25 units Botox.  Reason for waste:  Single use vial.  Diluent Used:  0.25% bupivacaine  Total Volume of Diluent Used: 6 ml  NDC #: Botox 100u ([address]-63)      CONSENT  The risks, benefits, and treatment options were discussed with Inez Boston and she agreed to proceed.    Written consent was obtained by [facility].     EQUIPMENT USED  Needle-37mm stimulating/recording  Needle-30 gauge  EMG/NCS Machine    SKIN PREPARATION  Skin preparation was performed using an alcohol wipe.    GUIDANCE DESCRIPTION  Electro-myographic guidance was necessary throughout the neck portion of the procedure to accurately identify all areas of dystonic muscles while avoiding injection of non-dystonic muscles, neighboring nerves and nearby vascular structures.       AREA/MUSCLE INJECTED: 375 UNITS BOTOX = TOTAL DOSE, 2:1 DILUTION    1. NECK MUSCLES: 40 UNITS BOTOX = TOTAL DOSE    Right Upper Trapezius (upper cervical) - 15 units of Botox at 3 site/s.   Left Upper Trapezius (upper cervical) - 15 units of Botox at 3 site/s.    Right Splenius Cervicis - 5 units of Botox at 1 site/s.   Left Splenius Cervicis -  5 units of Botox at 1 site/s.    2. UPPER EXTREMITY MUSCLES: 100 UNITS BOTOX = TOTAL DOSE    Right Levator Scapulae - 10 units of Botox at 1 site/s.   Left Levator Scapulae -  10 units of Botox at 1 site/s.    Right Pectoralis Minor - 5 units at 1 site/s.   Left Pectoralis Minor - 5 units at 1 site/s.     Right Rhomboids - 20 units at 4 site/s.   Left Rhomboids - 20 units at 4 site/s.     Right Latissimus Dorsi - 15 units of Botox at 2 site/s.   Left Latissimus Dorsi - 15 units of Botox at 3 site/s.     4. FACIAL & SCALP MUSCLES: 235 UNITS BOTOX = TOTAL DOSE    Right Frontalis - 10 units of Botox at 2 site/s.  Left Frontalis - 10 units of Botox at 2 site/s.    Right Temporalis - 50 units of Botox at 5 site/s.  Left Temporalis - 50 units of Botox at 5 site/s.    Right Occipitalis - 35 units of Botox at 4 site/s.   Left Occipitalis - 35 units of Botox at 4 site/s.    Right  - 5 units of Botox at 1 site/s.   Left  - 5 units of Botox at 1 site/s.    Procerus - 5 units of Botox at 1 site/s.    Right Orbicularis Oculi - 7.5 units of Botox at 3 site/s.  Left Orbicularis Oculi - 7.5 units of Botox at 3 site/s.     Right Orbicularis Oris - 2.5 units of Botox at 1 site/s.   Left Orbicularis Oris - 2.5 units of Botox at 1 site/s.     Right Mentalis - 5 units of Botox at 1 site/s.   Left Mentalis - 5 units of Botox at 1 site/s.       RESPONSE TO PROCEDURE  Inez Boston tolerated the procedure well and there were no immediate complications. She was allowed to recover for an appropriate period of time and was discharged home in stable condition.    ASSESSMENT AND PLAN   Botulinum toxin injections: No changes made to Botox dose or distribution today. Patient will continue to monitor response to Botox and report at next appointment. Patient will continue to monitor response to Botox and report at next appointment.   Referrals: None.   Medications: None.   Follow up: Inez Boston was rescheduled for the next series of injections in 12 weeks, at which time we will evaluate response to today's injections. she may call the clinic prior with any questions or concerns prior to the next  appointment.       Again, thank you for allowing me to participate in the care of your patient.        Sincerely,        Elizabeth Fischer MD

## 2024-08-22 DIAGNOSIS — G24.9 DYSTONIA: ICD-10-CM

## 2024-08-23 RX ORDER — BACLOFEN 10 MG/1
10 TABLET ORAL
Qty: 30 TABLET | Refills: 1 | Status: SHIPPED | OUTPATIENT
Start: 2024-08-23

## 2024-08-23 NOTE — TELEPHONE ENCOUNTER
Medication Requested:    Disp Refills Start End GOPAL    baclofen (LIORESAL) 10 MG tablet 30 tablet 1 3/15/2024 -- No   Sig - Route: Take 1 tablet (10 mg) by mouth nightly as needed - Oral     ----------------------  Last Office Visit :     9/25/2023  [facility] [place]      Future Office visit:  none  ----------------------      Refill decision: Refill pended and routed to the provider for review/determination due to the following criteria not met:     Medication unable to be refilled by RN due to criteria not met as indicated.                 []Supervision - Pt not seen within past 12 months, no future appointment              []Compliance - lapse in therapy/gap in refills              []Verification - order discrepancy, clarification needed              []Controlled medication              [x]Medication not on [facility], [facility], [facility] refill protocol   [] Abnormal labs/test:  [] Overdue labs/test:    []Lety refill given x1, Pt did not follow-up, pended to care team for decision  [] Medication not active on Pt's med list  [] Drug interaction Warning  [] Medication is Reported/Historical              []Other:

## 2024-09-12 ENCOUNTER — TELEPHONE (OUTPATIENT)
Dept: PHYSICAL MEDICINE AND REHAB | Facility: CLINIC | Age: 49
End: 2024-09-12
Payer: COMMERCIAL

## 2024-10-02 ENCOUNTER — OFFICE VISIT (OUTPATIENT)
Dept: PHYSICAL MEDICINE AND REHAB | Facility: CLINIC | Age: 49
End: 2024-10-02
Payer: COMMERCIAL

## 2024-10-02 VITALS
RESPIRATION RATE: 16 BRPM | OXYGEN SATURATION: 99 % | SYSTOLIC BLOOD PRESSURE: 110 MMHG | HEART RATE: 64 BPM | DIASTOLIC BLOOD PRESSURE: 74 MMHG

## 2024-10-02 DIAGNOSIS — G24.1 GENETIC TORSION DYSTONIA: ICD-10-CM

## 2024-10-02 DIAGNOSIS — G24.3 CERVICAL DYSTONIA: Primary | ICD-10-CM

## 2024-10-02 DIAGNOSIS — G24.4 IDIOPATHIC OROFACIAL DYSTONIA: ICD-10-CM

## 2024-10-02 PROCEDURE — 64642 CHEMODENERV 1 EXTREMITY 1-4: CPT | Performed by: PHYSICAL MEDICINE & REHABILITATION

## 2024-10-02 PROCEDURE — 64616 CHEMODENERV MUSC NECK DYSTON: CPT | Mod: 50 | Performed by: PHYSICAL MEDICINE & REHABILITATION

## 2024-10-02 PROCEDURE — 64643 CHEMODENERV 1 EXTREM 1-4 EA: CPT | Performed by: PHYSICAL MEDICINE & REHABILITATION

## 2024-10-02 PROCEDURE — 64612 DESTROY NERVE FACE MUSCLE: CPT | Mod: 50 | Performed by: PHYSICAL MEDICINE & REHABILITATION

## 2024-10-02 PROCEDURE — 95874 GUIDE NERV DESTR NEEDLE EMG: CPT | Performed by: PHYSICAL MEDICINE & REHABILITATION

## 2024-10-02 RX ORDER — BUPIVACAINE HYDROCHLORIDE 2.5 MG/ML
8 INJECTION, SOLUTION EPIDURAL; INFILTRATION; INTRACAUDAL ONCE
Status: COMPLETED | OUTPATIENT
Start: 2024-10-02 | End: 2024-10-02

## 2024-10-02 RX ADMIN — BUPIVACAINE HYDROCHLORIDE 20 MG: 2.5 INJECTION, SOLUTION EPIDURAL; INFILTRATION; INTRACAUDAL at 14:39

## 2024-10-02 ASSESSMENT — PAIN SCALES - GENERAL: PAINLEVEL: MILD PAIN (3)

## 2024-10-02 NOTE — LETTER
10/2/2024       RE: Inez Boston  [address] [facility] [address]     Dear Colleague,    Thank you for referring your patient, Inez Boston, to the [facility] [place] at [facility]. Please see a copy of my visit note below.      [facility]     PM&R CLINIC NOTE  BOTULINUM TOXIN PROCEDURE      HPI  Chief Complaint   Patient presents with     Procedure     botox     Inez Boston is a 49 year old female with a history of  who presents to clinic for botulinum toxin injections for management of cervical dystonia.      SINCE LAST VISIT  Inez Boston was last seen here in clinic on 7/9/2024, at which time she received 375 units of Botox.     Patient denies new medical diagnoses, illnesses, hospitalizations, emergency room visits, and injuries since the previous injection with botulinum neurotoxin.       RESPONSE TO PREVIOUS TREATMENT    Side effects: No problems reported.    Pain Improvement: Yes. Approximately >50% improvement. Duration of Benefit:  12 weeks followed by gradual decrease in benefit. The last few days have been more painful.     Dystonia Improvement: Yes. Approximately >50% improvement. Botox has been extremely beneficial with regards to her dystonia.   Duration of Benefit: 12 weeks followed by gradual decrease in benefit over the last few days.    Functional Improvement: Yes, her dystonia has improved so much that she was able to go back to working.       PHYSICAL EXAM  VS: /74   Pulse 64   Resp 16   SpO2 99%    GEN: Pleasant and cooperative, in no acute distress  HEENT: Right mouth drooping. No asymmetry with eyebrow lift.  HEAD AND NECK: Limited ROM with rotation to the left  HEAD, NECK AND TRUNK PATTERN:   Head & Neck Flexion:  Present  Head & Neck Rotation:   Right  Head & Neck Lateral Bend:   Right  Shoulder Elevation:   Left  JAW AND  FACIAL PATTERN:   Jaw Clenching:   Bilateral  VOCAL INVOLVEMENT:   No  SPASMS: Dystonic movements with irritation and facial movements.       ALLERGIES  No Known Allergies    CURRENT MEDICATIONS    Current Outpatient Medications:      aspirin 81 MG EC tablet, Take 162 mg by mouth every morning, Disp: , Rfl:      baclofen (LIORESAL) 10 MG tablet, Take 1 tablet (10 mg) by mouth nightly as needed., Disp: 30 tablet, Rfl: 1     DULoxetine (CYMBALTA) 30 MG capsule, Take 1 capsule (30 mg) by mouth daily, Disp: 90 capsule, Rfl: 0     Vitamin D, Cholecalciferol, 25 MCG (1000 UT) CAPS, Take 1,000 Units by mouth daily, Disp: , Rfl:      ipratropium (ATROVENT) 0.03 % nasal spray, Spray 2 sprays into both nostrils 3 times daily, Disp: 30 mL, Rfl: 1     lisdexamfetamine (VYVANSE) 40 MG capsule, Take 1 capsule (40 mg) by mouth every morning (Patient not taking: Reported on 1/10/2024), Disp: 30 capsule, Rfl: 0    Current Facility-Administered Medications:      botulinum toxin type A (BOTOX) 100 units injection 400 Units, 400 Units, Intramuscular, Q90 Days, Elizabeth Fischer MD, 100 Units at 10/02/24 1443       BOTULINUM NEUROTOXIN INJECTION PROCEDURES    VERIFICATION OF PATIENT IDENTIFICATION AND PROCEDURE     Initials   Patient Name SES   Patient  SES   Procedure Verified by: SES     Prior to the start of the procedure and with procedural staff participation, I verbally confirmed the patient s identity using two indicators, relevant allergies, that the procedure was appropriate and matched the consent or emergent situation, and that the correct equipment/implants were available. Immediately prior to starting the procedure I conducted the Time Out with the procedural staff and re-confirmed the patient s name, procedure, and site/side. (The Joint Commission universal protocol was followed.)  Yes    Sedation (Moderate or Deep): None    ABOVE ASSESSMENTS PERFORMED BY    Elizabeth Fischer MD      INDICATIONS FOR  PROCEDURES  Inez Boston is a 49 year old patient with involuntary movements secondary to the diagnosis of cervical and generalized dystonia. Her baseline symptoms have been recalcitrant to oral medications and conservative therapy.  She is here today for reinjection with Botox.    GOAL OF PROCEDURE  The goal of this procedure is to increase active range of motion, improve volitional motor control, decrease pain  and enhance functional independence.      TOTAL DOSE ADMINISTERED  Dose Administered:  375 units  Botox (Botulinum Toxin Type A)       2:1 Dilution   Unavoidable Drug Waste: Yes  Amount of drug waste (mL): 25 units Botox.  Reason for waste:  Single use vial.  Diluent Used:  0.25% bupivacaine  Total Volume of Diluent Used: 6 ml  NDC #: Botox 100u ([address]-46)      CONSENT  The risks, benefits, and treatment options were discussed with Inez Boston and she agreed to proceed.    Written consent was obtained by [facility].     EQUIPMENT USED  Needle-37mm stimulating/recording  Needle-30 gauge  EMG/NCS Machine    SKIN PREPARATION  Skin preparation was performed using an alcohol wipe.    GUIDANCE DESCRIPTION  Electro-myographic guidance was necessary throughout the neck portion of the procedure to accurately identify all areas of dystonic muscles while avoiding injection of non-dystonic muscles, neighboring nerves and nearby vascular structures.       AREA/MUSCLE INJECTED: 375 UNITS BOTOX = TOTAL DOSE, 2:1 DILUTION    1. NECK MUSCLES: 40 UNITS BOTOX = TOTAL DOSE    Right Upper Trapezius (upper cervical) - 15 units of Botox at 3 site/s.   Left Upper Trapezius (upper cervical) - 15 units of Botox at 3 site/s.    Right Splenius Cervicis - 5 units of Botox at 1 site/s.   Left Splenius Cervicis -  5 units of Botox at 1 site/s.    2. UPPER EXTREMITY MUSCLES: 100 UNITS BOTOX = TOTAL DOSE    Right Levator Scapulae - 10 units of Botox at 1 site/s.   Left Levator Scapulae - 10 units of Botox at 1 site/s.    Right  Pectoralis Minor - 5 units at 1 site/s.   Left Pectoralis Minor - 5 units at 1 site/s.     Right Rhomboids - 20 units at 4 site/s.   Left Rhomboids - 20 units at 4 site/s.     Right Latissimus Dorsi - 15 units of Botox at 2 site/s.   Left Latissimus Dorsi - 15 units of Botox at 3 site/s.     4. FACIAL & SCALP MUSCLES: 235 UNITS BOTOX = TOTAL DOSE    Right Frontalis - 10 units of Botox at 2 site/s.  Left Frontalis - 10 units of Botox at 2 site/s.    Right Temporalis - 50 units of Botox at 5 site/s.  Left Temporalis - 50 units of Botox at 5 site/s.    Right Occipitalis - 35 units of Botox at 4 site/s.   Left Occipitalis - 35 units of Botox at 4 site/s.    Right  - 5 units of Botox at 1 site/s.   Left  - 5 units of Botox at 1 site/s.    Procerus - 5 units of Botox at 1 site/s.    Right Orbicularis Oculi - 7.5 units of Botox at 3 site/s.  Left Orbicularis Oculi - 7.5 units of Botox at 3 site/s.     Right Orbicularis Oris - 2.5 units of Botox at 1 site/s.   Left Orbicularis Oris - 2.5 units of Botox at 1 site/s.     Right Mentalis - 5 units of Botox at 1 site/s.   Left Mentalis - 5 units of Botox at 1 site/s.       RESPONSE TO PROCEDURE  Inez Boston tolerated the procedure well and there were no immediate complications. She was allowed to recover for an appropriate period of time and was discharged home in stable condition.    ASSESSMENT AND PLAN   Botulinum toxin injections: No changes made to Botox dose or distribution today. Patient will continue to monitor response to Botox and report at next appointment.  Referrals: None.   Medications: None.   Follow up: Inez Boston was rescheduled for the next series of injections in 12 weeks, at which time we will evaluate response to today's injections. she may call the clinic prior with any questions or concerns prior to the next appointment.       Again, thank you for allowing me to participate in the care of your patient.      Sincerely,    Elizabeth  TAWANA Fischer MD

## 2024-10-10 NOTE — PROGRESS NOTES
[facility]     PM&R CLINIC NOTE  BOTULINUM TOXIN PROCEDURE      HPI  Chief Complaint   Patient presents with    Procedure     botox     Inez Boston is a 49 year old female with a history of  who presents to clinic for botulinum toxin injections for management of cervical dystonia.      SINCE LAST VISIT  Inez Boston was last seen here in clinic on 7/9/2024, at which time she received 375 units of Botox.     Patient denies new medical diagnoses, illnesses, hospitalizations, emergency room visits, and injuries since the previous injection with botulinum neurotoxin.       RESPONSE TO PREVIOUS TREATMENT    Side effects: No problems reported.    Pain Improvement: Yes. Approximately >50% improvement. Duration of Benefit:  12 weeks followed by gradual decrease in benefit. The last few days have been more painful.     Dystonia Improvement: Yes. Approximately >50% improvement. Botox has been extremely beneficial with regards to her dystonia.   Duration of Benefit: 12 weeks followed by gradual decrease in benefit over the last few days.    Functional Improvement: Yes, her dystonia has improved so much that she was able to go back to working.       PHYSICAL EXAM  VS: /74   Pulse 64   Resp 16   SpO2 99%    GEN: Pleasant and cooperative, in no acute distress  HEENT: Right mouth drooping. No asymmetry with eyebrow lift.  HEAD AND NECK: Limited ROM with rotation to the left  HEAD, NECK AND TRUNK PATTERN:   Head & Neck Flexion:  Present  Head & Neck Rotation:   Right  Head & Neck Lateral Bend:   Right  Shoulder Elevation:   Left  JAW AND FACIAL PATTERN:   Jaw Clenching:   Bilateral  VOCAL INVOLVEMENT:   No  SPASMS: Dystonic movements with irritation and facial movements.       ALLERGIES  No Known Allergies    CURRENT MEDICATIONS    Current Outpatient Medications:     aspirin 81 MG EC tablet, Take 162 mg by mouth every morning, Disp: , Rfl:     baclofen (LIORESAL) 10 MG tablet,  Take 1 tablet (10 mg) by mouth nightly as needed., Disp: 30 tablet, Rfl: 1    DULoxetine (CYMBALTA) 30 MG capsule, Take 1 capsule (30 mg) by mouth daily, Disp: 90 capsule, Rfl: 0    Vitamin D, Cholecalciferol, 25 MCG (1000 UT) CAPS, Take 1,000 Units by mouth daily, Disp: , Rfl:     ipratropium (ATROVENT) 0.03 % nasal spray, Spray 2 sprays into both nostrils 3 times daily, Disp: 30 mL, Rfl: 1    lisdexamfetamine (VYVANSE) 40 MG capsule, Take 1 capsule (40 mg) by mouth every morning (Patient not taking: Reported on 1/10/2024), Disp: 30 capsule, Rfl: 0    Current Facility-Administered Medications:     botulinum toxin type A (BOTOX) 100 units injection 400 Units, 400 Units, Intramuscular, Q90 Days, Elizabeth Fischer MD, 100 Units at 10/02/24 1443       BOTULINUM NEUROTOXIN INJECTION PROCEDURES    VERIFICATION OF PATIENT IDENTIFICATION AND PROCEDURE     Initials   Patient Name SES   Patient  SES   Procedure Verified by: SES     Prior to the start of the procedure and with procedural staff participation, I verbally confirmed the patient s identity using two indicators, relevant allergies, that the procedure was appropriate and matched the consent or emergent situation, and that the correct equipment/implants were available. Immediately prior to starting the procedure I conducted the Time Out with the procedural staff and re-confirmed the patient s name, procedure, and site/side. (The Joint Commission universal protocol was followed.)  Yes    Sedation (Moderate or Deep): None    ABOVE ASSESSMENTS PERFORMED BY    Elizabeth Fischer MD      INDICATIONS FOR PROCEDURES  Inez Boston is a 49 year old patient with involuntary movements secondary to the diagnosis of cervical and generalized dystonia. Her baseline symptoms have been recalcitrant to oral medications and conservative therapy.  She is here today for reinjection with Botox.    GOAL OF PROCEDURE  The goal of this procedure is to increase active range of  motion, improve volitional motor control, decrease pain  and enhance functional independence.      TOTAL DOSE ADMINISTERED  Dose Administered:  375 units  Botox (Botulinum Toxin Type A)       2:1 Dilution   Unavoidable Drug Waste: Yes  Amount of drug waste (mL): 25 units Botox.  Reason for waste:  Single use vial.  Diluent Used:  0.25% bupivacaine  Total Volume of Diluent Used: 6 ml  NDC #: Botox 100u ([address]-31)      CONSENT  The risks, benefits, and treatment options were discussed with Inez Boston and she agreed to proceed.    Written consent was obtained by [facility].     EQUIPMENT USED  Needle-37mm stimulating/recording  Needle-30 gauge  EMG/NCS Machine    SKIN PREPARATION  Skin preparation was performed using an alcohol wipe.    GUIDANCE DESCRIPTION  Electro-myographic guidance was necessary throughout the neck portion of the procedure to accurately identify all areas of dystonic muscles while avoiding injection of non-dystonic muscles, neighboring nerves and nearby vascular structures.       AREA/MUSCLE INJECTED: 375 UNITS BOTOX = TOTAL DOSE, 2:1 DILUTION    1. NECK MUSCLES: 40 UNITS BOTOX = TOTAL DOSE    Right Upper Trapezius (upper cervical) - 15 units of Botox at 3 site/s.   Left Upper Trapezius (upper cervical) - 15 units of Botox at 3 site/s.    Right Splenius Cervicis - 5 units of Botox at 1 site/s.   Left Splenius Cervicis -  5 units of Botox at 1 site/s.    2. UPPER EXTREMITY MUSCLES: 100 UNITS BOTOX = TOTAL DOSE    Right Levator Scapulae - 10 units of Botox at 1 site/s.   Left Levator Scapulae - 10 units of Botox at 1 site/s.    Right Pectoralis Minor - 5 units at 1 site/s.   Left Pectoralis Minor - 5 units at 1 site/s.     Right Rhomboids - 20 units at 4 site/s.   Left Rhomboids - 20 units at 4 site/s.     Right Latissimus Dorsi - 15 units of Botox at 2 site/s.   Left Latissimus Dorsi - 15 units of Botox at 3 site/s.     4. FACIAL & SCALP MUSCLES: 235 UNITS BOTOX = TOTAL DOSE    Right Frontalis -  10 units of Botox at 2 site/s.  Left Frontalis - 10 units of Botox at 2 site/s.    Right Temporalis - 50 units of Botox at 5 site/s.  Left Temporalis - 50 units of Botox at 5 site/s.    Right Occipitalis - 35 units of Botox at 4 site/s.   Left Occipitalis - 35 units of Botox at 4 site/s.    Right  - 5 units of Botox at 1 site/s.   Left  - 5 units of Botox at 1 site/s.    Procerus - 5 units of Botox at 1 site/s.    Right Orbicularis Oculi - 7.5 units of Botox at 3 site/s.  Left Orbicularis Oculi - 7.5 units of Botox at 3 site/s.     Right Orbicularis Oris - 2.5 units of Botox at 1 site/s.   Left Orbicularis Oris - 2.5 units of Botox at 1 site/s.     Right Mentalis - 5 units of Botox at 1 site/s.   Left Mentalis - 5 units of Botox at 1 site/s.       RESPONSE TO PROCEDURE  Inez Boston tolerated the procedure well and there were no immediate complications. She was allowed to recover for an appropriate period of time and was discharged home in stable condition.    ASSESSMENT AND PLAN   Botulinum toxin injections: No changes made to Botox dose or distribution today. Patient will continue to monitor response to Botox and report at next appointment.  Referrals: None.   Medications: None.   Follow up: Inez Boston was rescheduled for the next series of injections in 12 weeks, at which time we will evaluate response to today's injections. she may call the clinic prior with any questions or concerns prior to the next appointment.

## 2025-01-03 ENCOUNTER — OFFICE VISIT (OUTPATIENT)
Dept: PHYSICAL MEDICINE AND REHAB | Facility: CLINIC | Age: 50
End: 2025-01-03
Payer: COMMERCIAL

## 2025-01-03 DIAGNOSIS — G24.4 IDIOPATHIC OROFACIAL DYSTONIA: ICD-10-CM

## 2025-01-03 DIAGNOSIS — G24.1 GENETIC TORSION DYSTONIA: ICD-10-CM

## 2025-01-03 DIAGNOSIS — G24.3 CERVICAL DYSTONIA: Primary | ICD-10-CM

## 2025-01-03 PROCEDURE — 64643 CHEMODENERV 1 EXTREM 1-4 EA: CPT | Performed by: PHYSICAL MEDICINE & REHABILITATION

## 2025-01-03 PROCEDURE — 64612 DESTROY NERVE FACE MUSCLE: CPT | Mod: 50 | Performed by: PHYSICAL MEDICINE & REHABILITATION

## 2025-01-03 PROCEDURE — 64642 CHEMODENERV 1 EXTREMITY 1-4: CPT | Performed by: PHYSICAL MEDICINE & REHABILITATION

## 2025-01-03 PROCEDURE — 95874 GUIDE NERV DESTR NEEDLE EMG: CPT | Performed by: PHYSICAL MEDICINE & REHABILITATION

## 2025-01-03 PROCEDURE — 64616 CHEMODENERV MUSC NECK DYSTON: CPT | Mod: 50 | Performed by: PHYSICAL MEDICINE & REHABILITATION

## 2025-01-03 NOTE — LETTER
1/3/2025       RE: Inez Boston  [address] [facility] [address]     Dear Colleague,    Thank you for referring your patient, Inez Boston, to the [facility] [place] at [facility]. Please see a copy of my visit note below.      [facility]     PM&R CLINIC NOTE  BOTULINUM TOXIN PROCEDURE      HPI  No chief complaint on file.    Inez Boston is a 49 year old female with a history of  who presents to clinic for botulinum toxin injections for management of cervical dystonia.      SINCE LAST VISIT  Inez Boston was last seen here in clinic on 10/2/2024, at which time she received 375 units of Botox.     Patient denies new medical diagnoses, illnesses, hospitalizations, emergency room visits, and injuries since the previous injection with botulinum neurotoxin.       RESPONSE TO PREVIOUS TREATMENT    Side effects: No problems reported.    Pain Improvement: Yes. Approximately >50% improvement. Duration of Benefit:  12 weeks followed by gradual decrease in benefit. The last few days have been more painful.     Dystonia Improvement: Yes. Approximately >50% improvement. Botox has been extremely beneficial with regards to her dystonia.   Duration of Benefit: 12 weeks followed by gradual decrease in benefit over the last few days.    Functional Improvement: Yes, her dystonia has improved so much that she was able to go back to working.       PHYSICAL EXAM  VS: There were no vitals taken for this visit.   GEN: Pleasant and cooperative, in no acute distress  HEENT: Right mouth drooping. No asymmetry with eyebrow lift.  HEAD AND NECK: Limited ROM with rotation to the left  HEAD, NECK AND TRUNK PATTERN:   Head & Neck Flexion:  Present  Head & Neck Rotation:   Right  Head & Neck Lateral Bend:   Right  Shoulder Elevation:   Left  JAW AND FACIAL PATTERN:   Jaw Clenching:    Bilateral  VOCAL INVOLVEMENT:   No  SPASMS: Dystonic movements with irritation and facial movements.       ALLERGIES  No Known Allergies    CURRENT MEDICATIONS    Current Outpatient Medications:      aspirin 81 MG EC tablet, Take 162 mg by mouth every morning, Disp: , Rfl:      baclofen (LIORESAL) 10 MG tablet, Take 1 tablet (10 mg) by mouth nightly as needed., Disp: 30 tablet, Rfl: 1     DULoxetine (CYMBALTA) 30 MG capsule, Take 1 capsule (30 mg) by mouth daily, Disp: 90 capsule, Rfl: 0     ipratropium (ATROVENT) 0.03 % nasal spray, Spray 2 sprays into both nostrils 3 times daily, Disp: 30 mL, Rfl: 1     lisdexamfetamine (VYVANSE) 40 MG capsule, Take 1 capsule (40 mg) by mouth every morning (Patient not taking: Reported on 1/10/2024), Disp: 30 capsule, Rfl: 0     Vitamin D, Cholecalciferol, 25 MCG (1000 UT) CAPS, Take 1,000 Units by mouth daily, Disp: , Rfl:     Current Facility-Administered Medications:      botulinum toxin type A (BOTOX) 100 units injection 400 Units, 400 Units, Intramuscular, Q90 Days, Elizabeth Fischer MD, 100 Units at 10/02/24 1443       BOTULINUM NEUROTOXIN INJECTION PROCEDURES    VERIFICATION OF PATIENT IDENTIFICATION AND PROCEDURE     Initials   Patient Name SES   Patient  SES   Procedure Verified by: SES     Prior to the start of the procedure and with procedural staff participation, I verbally confirmed the patient s identity using two indicators, relevant allergies, that the procedure was appropriate and matched the consent or emergent situation, and that the correct equipment/implants were available. Immediately prior to starting the procedure I conducted the Time Out with the procedural staff and re-confirmed the patient s name, procedure, and site/side. (The Joint Commission universal protocol was followed.)  Yes    Sedation (Moderate or Deep): None    ABOVE ASSESSMENTS PERFORMED BY    Elizabeth Fischer MD      INDICATIONS FOR PROCEDURES  Inez Boston is a 49 year old  patient with involuntary movements secondary to the diagnosis of cervical and generalized dystonia. Her baseline symptoms have been recalcitrant to oral medications and conservative therapy.  She is here today for reinjection with Botox.    GOAL OF PROCEDURE  The goal of this procedure is to increase active range of motion, improve volitional motor control, decrease pain  and enhance functional independence.      TOTAL DOSE ADMINISTERED  Dose Administered:  375 units  Botox (Botulinum Toxin Type A)       2:1 Dilution   Unavoidable Drug Waste: Yes  Amount of drug waste (mL): 25 units Botox.  Reason for waste:  Single use vial.  Diluent Used:  0.25% bupivacaine  Total Volume of Diluent Used: 6 ml  NDC #: Botox 100u ([address]-07)      CONSENT  The risks, benefits, and treatment options were discussed with Inez Boston and she agreed to proceed.    Written consent was obtained by [facility].     EQUIPMENT USED  Needle-37mm stimulating/recording  Needle-30 gauge  EMG/NCS Machine    SKIN PREPARATION  Skin preparation was performed using an alcohol wipe.    GUIDANCE DESCRIPTION  Electro-myographic guidance was necessary throughout the neck portion of the procedure to accurately identify all areas of dystonic muscles while avoiding injection of non-dystonic muscles, neighboring nerves and nearby vascular structures.       AREA/MUSCLE INJECTED: 375 UNITS BOTOX = TOTAL DOSE, 2:1 DILUTION    1. NECK MUSCLES: 40 UNITS BOTOX = TOTAL DOSE    Right Upper Trapezius (upper cervical) - 15 units of Botox at 3 site/s.   Left Upper Trapezius (upper cervical) - 15 units of Botox at 3 site/s.    Right Splenius Cervicis - 5 units of Botox at 1 site/s.   Left Splenius Cervicis -  5 units of Botox at 1 site/s.    2. UPPER EXTREMITY MUSCLES: 100 UNITS BOTOX = TOTAL DOSE    Right Levator Scapulae - 10 units of Botox at 1 site/s.   Left Levator Scapulae - 10 units of Botox at 1 site/s.    Right Pectoralis Minor - 5 units at 1 site/s.   Left  Pectoralis Minor - 5 units at 1 site/s.     Right Rhomboids - 20 units at 4 site/s.   Left Rhomboids - 20 units at 4 site/s.     Right Latissimus Dorsi - 15 units of Botox at 2 site/s.   Left Latissimus Dorsi - 15 units of Botox at 3 site/s.     4. FACIAL & SCALP MUSCLES: 235 UNITS BOTOX = TOTAL DOSE    Right Frontalis - 10 units of Botox at 2 site/s.  Left Frontalis - 10 units of Botox at 2 site/s.    Right Temporalis - 50 units of Botox at 5 site/s.  Left Temporalis - 50 units of Botox at 5 site/s.    Right Occipitalis - 35 units of Botox at 4 site/s.   Left Occipitalis - 35 units of Botox at 4 site/s.    Right  - 5 units of Botox at 1 site/s.   Left  - 5 units of Botox at 1 site/s.    Procerus - 5 units of Botox at 1 site/s.    Right Orbicularis Oculi - 7.5 units of Botox at 3 site/s.  Left Orbicularis Oculi - 7.5 units of Botox at 3 site/s.     Right Orbicularis Oris - 2.5 units of Botox at 1 site/s.   Left Orbicularis Oris - 2.5 units of Botox at 1 site/s.     Right Mentalis - 5 units of Botox at 1 site/s.   Left Mentalis - 5 units of Botox at 1 site/s.       RESPONSE TO PROCEDURE  Inez Boston tolerated the procedure well and there were no immediate complications. She was allowed to recover for an appropriate period of time and was discharged home in stable condition.    ASSESSMENT AND PLAN   Botulinum toxin injections: No changes made to Botox dose or distribution today. Patient will continue to monitor response to Botox and report at next appointment.  Referrals: None.   Medications: None.   Follow up: Inez Boston was rescheduled for the next series of injections in 12 weeks, at which time we will evaluate response to today's injections. she may call the clinic prior with any questions or concerns prior to the next appointment.       Again, thank you for allowing me to participate in the care of your patient.      Sincerely,    Elizabeth Fischer MD

## 2025-01-03 NOTE — PROGRESS NOTES
[facility]     PM&R CLINIC NOTE  BOTULINUM TOXIN PROCEDURE      HPI  No chief complaint on file.    Inez Boston is a 49 year old female with a history of  who presents to clinic for botulinum toxin injections for management of cervical dystonia.      SINCE LAST VISIT  Inez Boston was last seen here in clinic on 10/2/2024, at which time she received 375 units of Botox.     Patient denies new medical diagnoses, illnesses, hospitalizations, emergency room visits, and injuries since the previous injection with botulinum neurotoxin.       RESPONSE TO PREVIOUS TREATMENT    Side effects: No problems reported.    Pain Improvement: Yes. Approximately >50% improvement. Duration of Benefit:  12 weeks followed by gradual decrease in benefit. The last few days have been more painful.     Dystonia Improvement: Yes. Approximately >50% improvement. Botox has been extremely beneficial with regards to her dystonia.   Duration of Benefit: 12 weeks followed by gradual decrease in benefit over the last few days.    Functional Improvement: Yes, her dystonia has improved so much that she was able to go back to working.       PHYSICAL EXAM  VS: There were no vitals taken for this visit.   GEN: Pleasant and cooperative, in no acute distress  HEENT: Right mouth drooping. No asymmetry with eyebrow lift.  HEAD AND NECK: Limited ROM with rotation to the left  HEAD, NECK AND TRUNK PATTERN:   Head & Neck Flexion:  Present  Head & Neck Rotation:   Right  Head & Neck Lateral Bend:   Right  Shoulder Elevation:   Left  JAW AND FACIAL PATTERN:   Jaw Clenching:   Bilateral  VOCAL INVOLVEMENT:   No  SPASMS: Dystonic movements with irritation and facial movements.       ALLERGIES  No Known Allergies    CURRENT MEDICATIONS    Current Outpatient Medications:     aspirin 81 MG EC tablet, Take 162 mg by mouth every morning, Disp: , Rfl:     baclofen (LIORESAL) 10 MG tablet, Take 1 tablet (10 mg) by mouth nightly  as needed., Disp: 30 tablet, Rfl: 1    DULoxetine (CYMBALTA) 30 MG capsule, Take 1 capsule (30 mg) by mouth daily, Disp: 90 capsule, Rfl: 0    ipratropium (ATROVENT) 0.03 % nasal spray, Spray 2 sprays into both nostrils 3 times daily, Disp: 30 mL, Rfl: 1    lisdexamfetamine (VYVANSE) 40 MG capsule, Take 1 capsule (40 mg) by mouth every morning (Patient not taking: Reported on 1/10/2024), Disp: 30 capsule, Rfl: 0    Vitamin D, Cholecalciferol, 25 MCG (1000 UT) CAPS, Take 1,000 Units by mouth daily, Disp: , Rfl:     Current Facility-Administered Medications:     botulinum toxin type A (BOTOX) 100 units injection 400 Units, 400 Units, Intramuscular, Q90 Days, Elizabeth Fischer MD, 100 Units at 10/02/24 1443       BOTULINUM NEUROTOXIN INJECTION PROCEDURES    VERIFICATION OF PATIENT IDENTIFICATION AND PROCEDURE     Initials   Patient Name SES   Patient  SES   Procedure Verified by: SES     Prior to the start of the procedure and with procedural staff participation, I verbally confirmed the patient s identity using two indicators, relevant allergies, that the procedure was appropriate and matched the consent or emergent situation, and that the correct equipment/implants were available. Immediately prior to starting the procedure I conducted the Time Out with the procedural staff and re-confirmed the patient s name, procedure, and site/side. (The Joint Commission universal protocol was followed.)  Yes    Sedation (Moderate or Deep): None    ABOVE ASSESSMENTS PERFORMED BY    Elizabeth Fischer MD      INDICATIONS FOR PROCEDURES  Inez Boston is a 49 year old patient with involuntary movements secondary to the diagnosis of cervical and generalized dystonia. Her baseline symptoms have been recalcitrant to oral medications and conservative therapy.  She is here today for reinjection with Botox.    GOAL OF PROCEDURE  The goal of this procedure is to increase active range of motion, improve volitional motor control,  decrease pain  and enhance functional independence.      TOTAL DOSE ADMINISTERED  Dose Administered:  375 units  Botox (Botulinum Toxin Type A)       2:1 Dilution   Unavoidable Drug Waste: Yes  Amount of drug waste (mL): 25 units Botox.  Reason for waste:  Single use vial.  Diluent Used:  0.25% bupivacaine  Total Volume of Diluent Used: 6 ml  NDC #: Botox 100u ([address]-99)      CONSENT  The risks, benefits, and treatment options were discussed with Inez Boston and she agreed to proceed.    Written consent was obtained by [facility].     EQUIPMENT USED  Needle-37mm stimulating/recording  Needle-30 gauge  EMG/NCS Machine    SKIN PREPARATION  Skin preparation was performed using an alcohol wipe.    GUIDANCE DESCRIPTION  Electro-myographic guidance was necessary throughout the neck portion of the procedure to accurately identify all areas of dystonic muscles while avoiding injection of non-dystonic muscles, neighboring nerves and nearby vascular structures.       AREA/MUSCLE INJECTED: 375 UNITS BOTOX = TOTAL DOSE, 2:1 DILUTION    1. NECK MUSCLES: 40 UNITS BOTOX = TOTAL DOSE    Right Upper Trapezius (upper cervical) - 15 units of Botox at 3 site/s.   Left Upper Trapezius (upper cervical) - 15 units of Botox at 3 site/s.    Right Splenius Cervicis - 5 units of Botox at 1 site/s.   Left Splenius Cervicis -  5 units of Botox at 1 site/s.    2. UPPER EXTREMITY MUSCLES: 100 UNITS BOTOX = TOTAL DOSE    Right Levator Scapulae - 10 units of Botox at 1 site/s.   Left Levator Scapulae - 10 units of Botox at 1 site/s.    Right Pectoralis Minor - 5 units at 1 site/s.   Left Pectoralis Minor - 5 units at 1 site/s.     Right Rhomboids - 20 units at 4 site/s.   Left Rhomboids - 20 units at 4 site/s.     Right Latissimus Dorsi - 15 units of Botox at 2 site/s.   Left Latissimus Dorsi - 15 units of Botox at 3 site/s.     4. FACIAL & SCALP MUSCLES: 235 UNITS BOTOX = TOTAL DOSE    Right Frontalis - 10 units of Botox at 2 site/s.  Left  Frontalis - 10 units of Botox at 2 site/s.    Right Temporalis - 50 units of Botox at 5 site/s.  Left Temporalis - 50 units of Botox at 5 site/s.    Right Occipitalis - 35 units of Botox at 4 site/s.   Left Occipitalis - 35 units of Botox at 4 site/s.    Right  - 5 units of Botox at 1 site/s.   Left  - 5 units of Botox at 1 site/s.    Procerus - 5 units of Botox at 1 site/s.    Right Orbicularis Oculi - 7.5 units of Botox at 3 site/s.  Left Orbicularis Oculi - 7.5 units of Botox at 3 site/s.     Right Orbicularis Oris - 2.5 units of Botox at 1 site/s.   Left Orbicularis Oris - 2.5 units of Botox at 1 site/s.     Right Mentalis - 5 units of Botox at 1 site/s.   Left Mentalis - 5 units of Botox at 1 site/s.       RESPONSE TO PROCEDURE  Inez Boston tolerated the procedure well and there were no immediate complications. She was allowed to recover for an appropriate period of time and was discharged home in stable condition.    ASSESSMENT AND PLAN   Botulinum toxin injections: No changes made to Botox dose or distribution today. Patient will continue to monitor response to Botox and report at next appointment.  Referrals: None.   Medications: None.   Follow up: Inze Boston was rescheduled for the next series of injections in 12 weeks, at which time we will evaluate response to today's injections. she may call the clinic prior with any questions or concerns prior to the next appointment.

## 2025-01-24 ENCOUNTER — LAB (OUTPATIENT)
Dept: LAB | Facility: CLINIC | Age: 50
End: 2025-01-24
Payer: COMMERCIAL

## 2025-01-24 DIAGNOSIS — R79.89 ELEVATED PLATELET COUNT: ICD-10-CM

## 2025-01-24 DIAGNOSIS — R94.6 ABNORMAL FINDING ON THYROID FUNCTION TEST: ICD-10-CM

## 2025-01-24 LAB
ALBUMIN SERPL BCG-MCNC: 4.2 G/DL (ref 3.5–5.2)
ALP SERPL-CCNC: 58 U/L (ref 40–150)
ALT SERPL W P-5'-P-CCNC: 16 U/L (ref 0–50)
ANION GAP SERPL CALCULATED.3IONS-SCNC: 9 MMOL/L (ref 7–15)
AST SERPL W P-5'-P-CCNC: 20 U/L (ref 0–45)
BASOPHILS # BLD AUTO: 0.1 10E3/UL (ref 0–0.2)
BASOPHILS NFR BLD AUTO: 1 %
BILIRUB SERPL-MCNC: 0.3 MG/DL
BUN SERPL-MCNC: 12 MG/DL (ref 6–20)
CALCIUM SERPL-MCNC: 9.2 MG/DL (ref 8.8–10.4)
CHLORIDE SERPL-SCNC: 103 MMOL/L (ref 98–107)
CHOLEST SERPL-MCNC: 250 MG/DL
CREAT SERPL-MCNC: 0.64 MG/DL (ref 0.51–0.95)
EGFRCR SERPLBLD CKD-EPI 2021: >90 ML/MIN/1.73M2
EOSINOPHIL # BLD AUTO: 0.2 10E3/UL (ref 0–0.7)
EOSINOPHIL NFR BLD AUTO: 2 %
ERYTHROCYTE [DISTWIDTH] IN BLOOD BY AUTOMATED COUNT: 15.4 % (ref 10–15)
FASTING STATUS PATIENT QL REPORTED: YES
FASTING STATUS PATIENT QL REPORTED: YES
GLUCOSE SERPL-MCNC: 98 MG/DL (ref 70–99)
HCO3 SERPL-SCNC: 26 MMOL/L (ref 22–29)
HCT VFR BLD AUTO: 32.6 % (ref 35–47)
HDLC SERPL-MCNC: 70 MG/DL
HGB BLD-MCNC: 10.1 G/DL (ref 11.7–15.7)
IMM GRANULOCYTES # BLD: 0 10E3/UL
IMM GRANULOCYTES NFR BLD: 0 %
LDLC SERPL CALC-MCNC: 152 MG/DL
LYMPHOCYTES # BLD AUTO: 3 10E3/UL (ref 0.8–5.3)
LYMPHOCYTES NFR BLD AUTO: 33 %
MCH RBC QN AUTO: 26.3 PG (ref 26.5–33)
MCHC RBC AUTO-ENTMCNC: 31 G/DL (ref 31.5–36.5)
MCV RBC AUTO: 85 FL (ref 78–100)
MONOCYTES # BLD AUTO: 0.8 10E3/UL (ref 0–1.3)
MONOCYTES NFR BLD AUTO: 9 %
NEUTROPHILS # BLD AUTO: 5.1 10E3/UL (ref 1.6–8.3)
NEUTROPHILS NFR BLD AUTO: 55 %
NONHDLC SERPL-MCNC: 180 MG/DL
NRBC # BLD AUTO: 0 10E3/UL
NRBC BLD AUTO-RTO: 0 /100
PLATELET # BLD AUTO: 510 10E3/UL (ref 150–450)
POTASSIUM SERPL-SCNC: 4.4 MMOL/L (ref 3.4–5.3)
PROT SERPL-MCNC: 7.2 G/DL (ref 6.4–8.3)
RBC # BLD AUTO: 3.84 10E6/UL (ref 3.8–5.2)
SODIUM SERPL-SCNC: 138 MMOL/L (ref 135–145)
TRIGL SERPL-MCNC: 138 MG/DL
TSH SERPL DL<=0.005 MIU/L-ACNC: 1.89 UIU/ML (ref 0.3–4.2)
WBC # BLD AUTO: 9.2 10E3/UL (ref 4–11)

## 2025-01-24 PROCEDURE — 80061 LIPID PANEL: CPT | Performed by: PATHOLOGY

## 2025-01-24 PROCEDURE — 84443 ASSAY THYROID STIM HORMONE: CPT | Performed by: PATHOLOGY

## 2025-01-24 PROCEDURE — 80053 COMPREHEN METABOLIC PANEL: CPT | Performed by: PATHOLOGY

## 2025-01-24 PROCEDURE — 36415 COLL VENOUS BLD VENIPUNCTURE: CPT | Performed by: PATHOLOGY

## 2025-01-24 PROCEDURE — 85025 COMPLETE CBC W/AUTO DIFF WBC: CPT | Performed by: PATHOLOGY

## 2025-01-30 RX ORDER — BUPIVACAINE HYDROCHLORIDE 2.5 MG/ML
4 INJECTION, SOLUTION EPIDURAL; INFILTRATION; INTRACAUDAL ONCE
Status: ACTIVE | OUTPATIENT
Start: 2025-01-30

## 2025-02-07 ENCOUNTER — LAB (OUTPATIENT)
Dept: LAB | Facility: CLINIC | Age: 50
End: 2025-02-07
Payer: COMMERCIAL

## 2025-02-07 ENCOUNTER — OFFICE VISIT (OUTPATIENT)
Dept: OBGYN | Facility: CLINIC | Age: 50
End: 2025-02-07
Attending: INTERNAL MEDICINE
Payer: COMMERCIAL

## 2025-02-07 VITALS
TEMPERATURE: 98.2 F | OXYGEN SATURATION: 100 % | SYSTOLIC BLOOD PRESSURE: 120 MMHG | BODY MASS INDEX: 23.93 KG/M2 | DIASTOLIC BLOOD PRESSURE: 74 MMHG | HEART RATE: 87 BPM | WEIGHT: 162.7 LBS

## 2025-02-07 DIAGNOSIS — N93.9 ABNORMAL UTERINE BLEEDING (AUB): Primary | ICD-10-CM

## 2025-02-07 DIAGNOSIS — E78.00 HIGH CHOLESTEROL: ICD-10-CM

## 2025-02-07 DIAGNOSIS — D64.9 ANEMIA, UNSPECIFIED TYPE: ICD-10-CM

## 2025-02-07 DIAGNOSIS — Z12.4 CERVICAL CANCER SCREENING: ICD-10-CM

## 2025-02-07 LAB
BASOPHILS # BLD AUTO: 0.1 10E3/UL (ref 0–0.2)
BASOPHILS NFR BLD AUTO: 1 %
EOSINOPHIL # BLD AUTO: 0.1 10E3/UL (ref 0–0.7)
EOSINOPHIL NFR BLD AUTO: 1 %
ERYTHROCYTE [DISTWIDTH] IN BLOOD BY AUTOMATED COUNT: 15.7 % (ref 10–15)
FERRITIN SERPL-MCNC: 18 NG/ML (ref 6–175)
FOLATE SERPL-MCNC: 23.8 NG/ML (ref 4.6–34.8)
HCT VFR BLD AUTO: 32.2 % (ref 35–47)
HGB BLD-MCNC: 9.9 G/DL (ref 11.7–15.7)
IMM GRANULOCYTES # BLD: 0 10E3/UL
IMM GRANULOCYTES NFR BLD: 0 %
IRON BINDING CAPACITY (ROCHE): 429 UG/DL (ref 240–430)
IRON SATN MFR SERPL: 5 % (ref 15–46)
IRON SERPL-MCNC: 23 UG/DL (ref 37–145)
LYMPHOCYTES # BLD AUTO: 2.4 10E3/UL (ref 0.8–5.3)
LYMPHOCYTES NFR BLD AUTO: 22 %
MCH RBC QN AUTO: 26.8 PG (ref 26.5–33)
MCHC RBC AUTO-ENTMCNC: 30.7 G/DL (ref 31.5–36.5)
MCV RBC AUTO: 87 FL (ref 78–100)
MONOCYTES # BLD AUTO: 0.8 10E3/UL (ref 0–1.3)
MONOCYTES NFR BLD AUTO: 7 %
NEUTROPHILS # BLD AUTO: 7.7 10E3/UL (ref 1.6–8.3)
NEUTROPHILS NFR BLD AUTO: 69 %
PLATELET # BLD AUTO: 563 10E3/UL (ref 150–450)
RBC # BLD AUTO: 3.7 10E6/UL (ref 3.8–5.2)
RETICS # AUTO: 0.06 10E6/UL (ref 0.03–0.1)
RETICS/RBC NFR AUTO: 1.6 % (ref 0.5–2)
VIT B12 SERPL-MCNC: 310 PG/ML (ref 232–1245)
WBC # BLD AUTO: 11.1 10E3/UL (ref 4–11)

## 2025-02-07 PROCEDURE — 83540 ASSAY OF IRON: CPT

## 2025-02-07 PROCEDURE — 87624 HPV HI-RISK TYP POOLED RSLT: CPT | Performed by: OBSTETRICS & GYNECOLOGY

## 2025-02-07 PROCEDURE — 82607 VITAMIN B-12: CPT

## 2025-02-07 PROCEDURE — 99000 SPECIMEN HANDLING OFFICE-LAB: CPT

## 2025-02-07 PROCEDURE — 84238 ASSAY NONENDOCRINE RECEPTOR: CPT | Mod: 90

## 2025-02-07 PROCEDURE — 36415 COLL VENOUS BLD VENIPUNCTURE: CPT

## 2025-02-07 PROCEDURE — 82746 ASSAY OF FOLIC ACID SERUM: CPT

## 2025-02-07 PROCEDURE — 83550 IRON BINDING TEST: CPT

## 2025-02-07 PROCEDURE — G0145 SCR C/V CYTO,THINLAYER,RESCR: HCPCS | Performed by: OBSTETRICS & GYNECOLOGY

## 2025-02-07 PROCEDURE — 82728 ASSAY OF FERRITIN: CPT

## 2025-02-07 PROCEDURE — 99214 OFFICE O/P EST MOD 30 MIN: CPT | Performed by: OBSTETRICS & GYNECOLOGY

## 2025-02-07 PROCEDURE — 99207 BLOOD MORPHOLOGY PATHOLOGIST REVIEW: CPT | Performed by: PATHOLOGY

## 2025-02-07 PROCEDURE — 85025 COMPLETE CBC W/AUTO DIFF WBC: CPT

## 2025-02-07 PROCEDURE — 85045 AUTOMATED RETICULOCYTE COUNT: CPT

## 2025-02-07 ASSESSMENT — ANXIETY QUESTIONNAIRES
GAD7 TOTAL SCORE: 2
7. FEELING AFRAID AS IF SOMETHING AWFUL MIGHT HAPPEN: NOT AT ALL
1. FEELING NERVOUS, ANXIOUS, OR ON EDGE: SEVERAL DAYS
5. BEING SO RESTLESS THAT IT IS HARD TO SIT STILL: NOT AT ALL
3. WORRYING TOO MUCH ABOUT DIFFERENT THINGS: NOT AT ALL
IF YOU CHECKED OFF ANY PROBLEMS ON THIS QUESTIONNAIRE, HOW DIFFICULT HAVE THESE PROBLEMS MADE IT FOR YOU TO DO YOUR WORK, TAKE CARE OF THINGS AT HOME, OR GET ALONG WITH OTHER PEOPLE: NOT DIFFICULT AT ALL
GAD7 TOTAL SCORE: 2
2. NOT BEING ABLE TO STOP OR CONTROL WORRYING: NOT AT ALL
6. BECOMING EASILY ANNOYED OR IRRITABLE: NOT AT ALL

## 2025-02-07 ASSESSMENT — PATIENT HEALTH QUESTIONNAIRE - PHQ9
SUM OF ALL RESPONSES TO PHQ QUESTIONS 1-9: 2
5. POOR APPETITE OR OVEREATING: SEVERAL DAYS

## 2025-02-09 LAB — STFR SERPL-MCNC: 6.2 MG/L

## 2025-02-10 ENCOUNTER — TELEPHONE (OUTPATIENT)
Dept: ONCOLOGY | Facility: CLINIC | Age: 50
End: 2025-02-10
Payer: COMMERCIAL

## 2025-02-10 LAB
HPV HR 12 DNA CVX QL NAA+PROBE: NEGATIVE
HPV16 DNA CVX QL NAA+PROBE: NEGATIVE
HPV18 DNA CVX QL NAA+PROBE: NEGATIVE
HUMAN PAPILLOMA VIRUS FINAL DIAGNOSIS: NORMAL

## 2025-02-10 RX ORDER — MEDROXYPROGESTERONE ACETATE 10 MG
10 TABLET ORAL DAILY
Qty: 28 TABLET | Refills: 0 | Status: SHIPPED | OUTPATIENT
Start: 2025-02-10 | End: 2025-02-24

## 2025-02-11 ENCOUNTER — TELEPHONE (OUTPATIENT)
Dept: OBGYN | Facility: CLINIC | Age: 50
End: 2025-02-11
Payer: COMMERCIAL

## 2025-02-11 PROBLEM — N93.9 ABNORMAL UTERINE BLEEDING (AUB): Status: ACTIVE | Noted: 2025-02-07

## 2025-02-11 NOTE — TELEPHONE ENCOUNTER
Type of surgery: gyn  Location of surgery: [facility]  Date and time of surgery: 04/04/25 7:15AM  Surgeon: Ky  Pre-Op Appt Date: 03/14/25 Enio Dominguez  Post-Op Appt Date: 04/18/25    Packet sent out: Yes  Pre-cert/Authorization completed:  Not Applicable  Date: 02/11/25       Marybeth  She/her/hers  [facility]

## 2025-02-12 ENCOUNTER — HOSPITAL ENCOUNTER (OUTPATIENT)
Dept: CT IMAGING | Facility: CLINIC | Age: 50
Discharge: HOME | End: 2025-02-12
Attending: INTERNAL MEDICINE
Payer: COMMERCIAL

## 2025-02-12 DIAGNOSIS — D64.9 ANEMIA, UNSPECIFIED TYPE: ICD-10-CM

## 2025-02-12 DIAGNOSIS — E78.00 HIGH CHOLESTEROL: ICD-10-CM

## 2025-02-12 LAB
BKR AP ASSOCIATED HPV REPORT: NORMAL
BKR LAB AP GYN ADEQUACY: NORMAL
BKR LAB AP GYN INTERPRETATION: NORMAL
BKR LAB AP GYN OTHER FINDINGS: NORMAL
BKR LAB AP LMP: NORMAL
BKR LAB AP PREVIOUS ABNORMAL: NORMAL
PATH REPORT.COMMENTS IMP SPEC: NORMAL
PATH REPORT.FINAL DX SPEC: NORMAL
PATH REPORT.MICROSCOPIC SPEC OTHER STN: NORMAL
PATH REPORT.MICROSCOPIC SPEC OTHER STN: NORMAL
PATH REPORT.RELEVANT HX SPEC: NORMAL

## 2025-02-12 PROCEDURE — 75571 CT HRT W/O DYE W/CA TEST: CPT

## 2025-02-12 PROCEDURE — 75571 CT HRT W/O DYE W/CA TEST: CPT | Mod: 26 | Performed by: INTERNAL MEDICINE

## 2025-03-17 DIAGNOSIS — D75.839 THROMBOCYTOSIS: Primary | ICD-10-CM

## 2025-03-20 ENCOUNTER — TELEPHONE (OUTPATIENT)
Dept: GASTROENTEROLOGY | Facility: CLINIC | Age: 50
End: 2025-03-20
Payer: COMMERCIAL

## 2025-03-20 NOTE — TELEPHONE ENCOUNTER
"Endoscopy Scheduling Screen    Have you had any respiratory illness or flu-like symptoms in the last 10 days?  No    What is your communication preference for Instructions and/or Bowel Prep?   MyChart    What insurance is in the chart?  Other:  [employer]    Ordering/Referring Provider: BONITA BROWN   (If ordering provider performs procedure, schedule with ordering provider unless otherwise instructed. )    BMI: Estimated body mass index is 24.39 kg/m  as calculated from the following:    Height as of 3/14/25: 1.725 m (5' 7.91\").    Weight as of 3/14/25: 72.6 kg (160 lb).     Sedation Ordered  moderate sedation.   If patient BMI > 50 do not schedule in [facility].    If patient BMI > 45 do not schedule at [facility].    Are you taking methadone or Suboxone?  NO, No RN review required.    Have you been diagnosed and are being treated for severe PTSD or severe anxiety?  NO, No RN review required.    Are you taking any prescription medications for pain 3 or more times per week?   NO, No RN review required.    Do you have a history of malignant hyperthermia?  No    (Females) Are you currently pregnant?   No     Have you been diagnosed or told you have pulmonary hypertension?   No    Do you have an LVAD?  No    Have you been told you have moderate to severe sleep apnea?  No.    Have you been told you have COPD, asthma, or any other lung disease?  No    Has your doctor ordered any cardiac tests like echo, angiogram, stress test, ablation, or EKG, that you have not completed yet?  No    Do you  have a history of any heart conditions?  No     Have you ever had or are you waiting for an organ transplant?  No. Continue scheduling, no site restrictions.    Have you had a stroke or transient ischemic attack (TIA aka \"mini stroke\") in the last 2 years?   No.    Have you been diagnosed with or been told you have cirrhosis of the liver?   No.    Are you currently on dialysis?   No    Do you need assistance transferring?   No    BMI: Estimated " "body mass index is 24.39 kg/m  as calculated from the following:    Height as of 3/14/25: 1.725 m (5' 7.91\").    Weight as of 3/14/25: 72.6 kg (160 lb).     Is patients BMI > 40 and scheduling location [facility]?  No    Do you take an injectable or oral medication for weight loss or diabetes (excluding insulin)?  No    Do you take the medication Naltrexone?  No    Do you take blood thinners?  No       Prep   Are you currently on dialysis or do you have chronic kidney disease?  No    Do you have a diagnosis of diabetes?  No    Do you have a diagnosis of cystic fibrosis (CF)?  No    On a regular basis do you go 3 -5 days between bowel movements?  No    BMI > 40?  No    Preferred Pharmacy:    [id number] [place], [place] - [address]  [address] [place] [id number]  Phone: [phone] Fax: [phone]      Final Scheduling Details     Procedure scheduled  Colonoscopy / Upper endoscopy (EGD)    Surgeon:       Date of procedure:  4/25/25     Pre-OP / PAC:   No - Not required for this site.    Location  [facility] - [facility] - Patient preference.    Sedation   Moderate Sedation - Per order.      Patient Reminders:   You will receive a call from a Nurse to review instructions and health history.  This assessment must be completed prior to your procedure.  Failure to complete the Nurse assessment may result in the procedure being cancelled.      On the day of your procedure, please designate an adult(s) who can drive you home stay with you for the next 24 hours. The medicines used in the exam will make you sleepy. You will not be able to drive.      You cannot take public transportation, ride share services, or non-medical taxi service without a responsible caregiver.  Medical transport services are allowed with the requirement that a responsible caregiver will receive you at your destination.  We require that drivers and caregivers are confirmed prior to your procedure.  "

## 2025-03-28 ENCOUNTER — ANCILLARY PROCEDURE (OUTPATIENT)
Dept: ULTRASOUND IMAGING | Facility: CLINIC | Age: 50
End: 2025-03-28
Attending: OBSTETRICS & GYNECOLOGY
Payer: COMMERCIAL

## 2025-03-28 DIAGNOSIS — N93.9 ABNORMAL UTERINE BLEEDING (AUB): ICD-10-CM

## 2025-03-28 PROCEDURE — 76856 US EXAM PELVIC COMPLETE: CPT | Performed by: OBSTETRICS & GYNECOLOGY

## 2025-03-28 PROCEDURE — 76830 TRANSVAGINAL US NON-OB: CPT | Performed by: OBSTETRICS & GYNECOLOGY

## 2025-03-31 ENCOUNTER — OFFICE VISIT (OUTPATIENT)
Dept: PHYSICAL MEDICINE AND REHAB | Facility: CLINIC | Age: 50
End: 2025-03-31
Payer: COMMERCIAL

## 2025-03-31 DIAGNOSIS — G24.3 CERVICAL DYSTONIA: Primary | ICD-10-CM

## 2025-03-31 DIAGNOSIS — G24.1 GENETIC TORSION DYSTONIA: ICD-10-CM

## 2025-03-31 DIAGNOSIS — G24.4 IDIOPATHIC OROFACIAL DYSTONIA: ICD-10-CM

## 2025-03-31 RX ADMIN — BUPIVACAINE HYDROCHLORIDE 10 MG: 2.5 INJECTION, SOLUTION EPIDURAL; INFILTRATION; INTRACAUDAL; PERINEURAL at 10:14

## 2025-03-31 NOTE — LETTER
3/31/2025       RE: Inez Boston  [address] [facility] [facility] [address]     Dear Colleague,    Thank you for referring your patient, Inez Boston, to the [facility] [place] at [facility]. Please see a copy of my visit note below.      [facility]     PM&R CLINIC NOTE  BOTULINUM TOXIN PROCEDURE      HPI  Chief Complaint   Patient presents with     Botox     Here for botox injections, confirmed with pt     Inez Boston is a 49 year old female with a history of  who presents to clinic for botulinum toxin injections for management of cervical dystonia.      SINCE LAST VISIT  Inez Boston was last seen here in clinic on 1/03/2025, at which time she received 375 units of Botox.     Patient denies new medical diagnoses, illnesses, hospitalizations, emergency room visits, and injuries since the previous injection with botulinum neurotoxin.     Today: She reports she sleeps better when botox is working, doesn't have to stretch as much at work. Botox usually effective within a week or two of injections, still feels that she has more symptoms in her R neck/upper back area than other spots. Would like to consider increasing dose or adjusting spots to receive more units in that area. 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    RESPONSE TO PREVIOUS TREATMENT    Side effects: No problems reported.    Pain Improvement: Yes. Approximately >50% improvement. Duration of Benefit:  11 weeks followed by gradual decrease in benefit. The last few days have been more painful.     Dystonia Improvement: Yes. Approximately >50% improvement. Botox has been extremely beneficial with regards to her dystonia.   Duration of Benefit: 11 weeks followed by gradual decrease in benefit over the last few  days.    Functional Improvement: Yes, her dystonia has improved so much that she was able to go back to working.       PHYSICAL EXAM  VS: LMP 2025 (Within Days)    GEN: Pleasant and cooperative, in no acute distress  HEENT: No asymmetry with eyebrow lift.  HEAD AND NECK: Full ROM but with spasms on neck extension, b/l lateral flexion  HEAD, NECK AND TRUNK PATTERN:   Head & Neck Flexion:  Present  Head & Neck Rotation: Slight  Right  Shoulder Elevation:  Slight Left  JAW AND FACIAL PATTERN:   Jaw Clenching:   Bilateral  VOCAL INVOLVEMENT:   No  SPASMS: Dystonic movements with irritation and facial movements.       ALLERGIES  No Known Allergies    CURRENT MEDICATIONS    Current Outpatient Medications:      aspirin 81 MG EC tablet, Take 162 mg by mouth every morning, Disp: , Rfl:      baclofen (LIORESAL) 10 MG tablet, Take 1 tablet (10 mg) by mouth nightly as needed., Disp: 30 tablet, Rfl: 1     DULoxetine (CYMBALTA) 30 MG capsule, Take 1 capsule (30 mg) by mouth daily., Disp: 90 capsule, Rfl: 1     progesterone (PROMETRIUM) 100 MG capsule, Take 100 mg by mouth daily., Disp: , Rfl:      Vitamin D, Cholecalciferol, 25 MCG (1000 UT) CAPS, Take 1,000 Units by mouth daily, Disp: , Rfl:     Current Facility-Administered Medications:      botulinum toxin type A (BOTOX) 100 units injection 400 Units, 400 Units, Intramuscular, Q90 Days, Standal, Elizabeth Tai MD, 375 Units at 25 1400     bupivacaine (MARCAINE) 0.25% preservative free injection, 4 mL, Other, Once,        BOTULINUM NEUROTOXIN INJECTION PROCEDURES    VERIFICATION OF PATIENT IDENTIFICATION AND PROCEDURE     Initials   Patient Name SES   Patient  SES   Procedure Verified by: SES     Prior to the start of the procedure and with procedural staff participation, I verbally confirmed the patient s identity using two indicators, relevant allergies, that the procedure was appropriate and matched the consent or emergent situation, and that the correct  equipment/implants were available. Immediately prior to starting the procedure I conducted the Time Out with the procedural staff and re-confirmed the patient s name, procedure, and site/side. (The Joint Commission universal protocol was followed.)  Yes    Sedation (Moderate or Deep): None    ABOVE ASSESSMENTS PERFORMED BY    Elizabeth Fischer MD      INDICATIONS FOR PROCEDURES  Inez Boston is a 49 year old patient with involuntary movements secondary to the diagnosis of cervical and generalized dystonia. Her baseline symptoms have been recalcitrant to oral medications and conservative therapy.  She is here today for reinjection with Botox.    GOAL OF PROCEDURE  The goal of this procedure is to increase active range of motion, improve volitional motor control, decrease pain  and enhance functional independence.      TOTAL DOSE ADMINISTERED  Dose Administered:  400 units  Botox (Botulinum Toxin Type A)       2:1 Dilution   Unavoidable Drug Waste: No  Diluent Used:  0.25% bupivacaine  Total Volume of Diluent Used: 8 ml  NDC #: Botox 100u ([address]-07)      CONSENT  The risks, benefits, and treatment options were discussed with Inez Boston and she agreed to proceed.    Written consent was obtained by [facility].     EQUIPMENT USED  Needle-37mm stimulating/recording  Needle-30 gauge  EMG/NCS Machine    SKIN PREPARATION  Skin preparation was performed using an alcohol wipe.    GUIDANCE DESCRIPTION  Electro-myographic guidance was necessary throughout the neck portion of the procedure to accurately identify all areas of dystonic muscles while avoiding injection of non-dystonic muscles, neighboring nerves and nearby vascular structures.       AREA/MUSCLE INJECTED: 400 UNITS BOTOX = TOTAL DOSE, 2:1 DILUTION    1. NECK MUSCLES: 50 UNITS BOTOX = TOTAL DOSE     Right Upper Trapezius (upper cervical) - 25 units of Botox at 3 site/s.   Left Upper Trapezius (upper cervical) - 15 units of Botox at 3 site/s.    Right Splenius  Cervicis - 5 units of Botox at 1 site/s.   Left Splenius Cervicis -  5 units of Botox at 1 site/s.    2. UPPER EXTREMITY MUSCLES: 105 UNITS BOTOX = TOTAL DOSE    Right Levator Scapulae - 15 units of Botox at 1 site/s.   Left Levator Scapulae - 10 units of Botox at 1 site/s.    Right Pectoralis Minor - 5 units at 1 site/s.   Left Pectoralis Minor - 5 units at 1 site/s.     Right Rhomboids - 20 units at 4 site/s.   Left Rhomboids - 20 units at 4 site/s.     Right Latissimus Dorsi - 15 units of Botox at 2 site/s.   Left Latissimus Dorsi - 15 units of Botox at 3 site/s.     4. FACIAL & SCALP MUSCLES: 245 UNITS BOTOX = TOTAL DOSE    Right Frontalis - 10 units of Botox at 2 site/s.  Left Frontalis - 10 units of Botox at 2 site/s.    Right Temporalis - 50 units of Botox at 5 site/s.  Left Temporalis - 50 units of Botox at 5 site/s.    Right Occipitalis - 45 units of Botox at 4 site/s.   Left Occipitalis - 45 units of Botox at 4 site/s.    Right  - 5 units of Botox at 1 site/s.   Left  - 5 units of Botox at 1 site/s.    Procerus - 5 units of Botox at 1 site/s.    Right Orbicularis Oculi - 7.5 units of Botox at 3 site/s.  Left Orbicularis Oculi - 7.5 units of Botox at 3 site/s.     Right Orbicularis Oris - 2.5 units of Botox at 1 site/s.   Left Orbicularis Oris - 2.5 units of Botox at 1 site/s.     Right Mentalis - 5 units of Botox at 1 site/s.   Left Mentalis - 5 units of Botox at 1 site/s.       RESPONSE TO PROCEDURE  Inez Boston tolerated the procedure well and there were no immediate complications. She was allowed to recover for an appropriate period of time and was discharged home in stable condition.    ASSESSMENT AND PLAN   Botulinum toxin injections: The dose of Botox was increased to 400 units, with adjustments made to increase coverage in the right neck and upper back regions. The patient will continue to monitor her response to Botox and follow up at the next appointment.  Referrals: None.    Medications: None.   Follow up: Inez Boston was rescheduled for the next series of injections in 12 weeks, at which time we will evaluate response to today's injections. she may call the clinic prior with any questions or concerns prior to the next appointment.     Patient reviewed with attending physician, Dr. Fischer, who agrees with the assessment and plan.    Abida Benítez MD  [facility] PM&R PGY-2  03/31/2025  Pager #: [phone]      I, Elizabeth Fischer MD, saw this patient with resident, Dr. Brennan, and agree with the findings and plan of care as documented in this note. I personally reviewed the chart (vitals signs, medications, labs and imaging). My key findings and key management decisions made are reflected in this note.  I performed the entire procedure listed above.      Elizabeth Fischer MD          Again, thank you for allowing me to participate in the care of your patient.      Sincerely,    Elizabeth Fischer MD

## 2025-03-31 NOTE — NURSING NOTE
Chief Complaint   Patient presents with    Botox     Here for botox injections, confirmed with pt     Sly Falk

## 2025-03-31 NOTE — PROGRESS NOTES
[facility]     PM&R CLINIC NOTE  BOTULINUM TOXIN PROCEDURE      HPI  Chief Complaint   Patient presents with    Botox     Here for botox injections, confirmed with pt     Inez Boston is a 49 year old female with a history of  who presents to clinic for botulinum toxin injections for management of cervical dystonia.      SINCE LAST VISIT  Inez Boston was last seen here in clinic on 1/03/2025, at which time she received 375 units of Botox.     Patient denies new medical diagnoses, illnesses, hospitalizations, emergency room visits, and injuries since the previous injection with botulinum neurotoxin.     Today: She reports she sleeps better when botox is working, doesn't have to stretch as much at work. Botox usually effective within a week or two of injections, still feels that she has more symptoms in her R neck/upper back area than other spots. Would like to consider increasing dose or adjusting spots to receive more units in that area. 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    RESPONSE TO PREVIOUS TREATMENT    Side effects: No problems reported.    Pain Improvement: Yes. Approximately >50% improvement. Duration of Benefit:  11 weeks followed by gradual decrease in benefit. The last few days have been more painful.     Dystonia Improvement: Yes. Approximately >50% improvement. Botox has been extremely beneficial with regards to her dystonia.   Duration of Benefit: 11 weeks followed by gradual decrease in benefit over the last few days.    Functional Improvement: Yes, her dystonia has improved so much that she was able to go back to working.       PHYSICAL EXAM  VS: LMP 02/27/2025 (Within Days)    GEN: Pleasant and cooperative, in no acute distress  HEENT: No asymmetry with eyebrow lift.  HEAD AND NECK: Full ROM but with spasms on neck extension, b/l lateral flexion  HEAD,  NECK AND TRUNK PATTERN:   Head & Neck Flexion:  Present  Head & Neck Rotation: Slight  Right  Shoulder Elevation:  Slight Left  JAW AND FACIAL PATTERN:   Jaw Clenching:   Bilateral  VOCAL INVOLVEMENT:   No  SPASMS: Dystonic movements with irritation and facial movements.       ALLERGIES  No Known Allergies    CURRENT MEDICATIONS    Current Outpatient Medications:     aspirin 81 MG EC tablet, Take 162 mg by mouth every morning, Disp: , Rfl:     baclofen (LIORESAL) 10 MG tablet, Take 1 tablet (10 mg) by mouth nightly as needed., Disp: 30 tablet, Rfl: 1    DULoxetine (CYMBALTA) 30 MG capsule, Take 1 capsule (30 mg) by mouth daily., Disp: 90 capsule, Rfl: 1    progesterone (PROMETRIUM) 100 MG capsule, Take 100 mg by mouth daily., Disp: , Rfl:     Vitamin D, Cholecalciferol, 25 MCG (1000 UT) CAPS, Take 1,000 Units by mouth daily, Disp: , Rfl:     Current Facility-Administered Medications:     botulinum toxin type A (BOTOX) 100 units injection 400 Units, 400 Units, Intramuscular, Q90 Days, Elizabeth Fischer MD, 375 Units at 25 1400    bupivacaine (MARCAINE) 0.25% preservative free injection, 4 mL, Other, Once,        BOTULINUM NEUROTOXIN INJECTION PROCEDURES    VERIFICATION OF PATIENT IDENTIFICATION AND PROCEDURE     Initials   Patient Name SES   Patient  SES   Procedure Verified by: SES     Prior to the start of the procedure and with procedural staff participation, I verbally confirmed the patient s identity using two indicators, relevant allergies, that the procedure was appropriate and matched the consent or emergent situation, and that the correct equipment/implants were available. Immediately prior to starting the procedure I conducted the Time Out with the procedural staff and re-confirmed the patient s name, procedure, and site/side. (The Joint Commission universal protocol was followed.)  Yes    Sedation (Moderate or Deep): None    ABOVE ASSESSMENTS PERFORMED BY    Elizabeth Fischer  MD      INDICATIONS FOR PROCEDURES  Inez Boston is a 49 year old patient with involuntary movements secondary to the diagnosis of cervical and generalized dystonia. Her baseline symptoms have been recalcitrant to oral medications and conservative therapy.  She is here today for reinjection with Botox.    GOAL OF PROCEDURE  The goal of this procedure is to increase active range of motion, improve volitional motor control, decrease pain  and enhance functional independence.      TOTAL DOSE ADMINISTERED  Dose Administered:  400 units  Botox (Botulinum Toxin Type A)       2:1 Dilution   Unavoidable Drug Waste: No  Diluent Used:  0.25% bupivacaine  Total Volume of Diluent Used: 8 ml  NDC #: Botox 100u ([address]-52)      CONSENT  The risks, benefits, and treatment options were discussed with Inez Boston and she agreed to proceed.    Written consent was obtained by [facility].     EQUIPMENT USED  Needle-37mm stimulating/recording  Needle-30 gauge  EMG/NCS Machine    SKIN PREPARATION  Skin preparation was performed using an alcohol wipe.    GUIDANCE DESCRIPTION  Electro-myographic guidance was necessary throughout the neck portion of the procedure to accurately identify all areas of dystonic muscles while avoiding injection of non-dystonic muscles, neighboring nerves and nearby vascular structures.       AREA/MUSCLE INJECTED: 400 UNITS BOTOX = TOTAL DOSE, 2:1 DILUTION    1. NECK MUSCLES: 50 UNITS BOTOX = TOTAL DOSE     Right Upper Trapezius (upper cervical) - 25 units of Botox at 3 site/s.   Left Upper Trapezius (upper cervical) - 15 units of Botox at 3 site/s.    Right Splenius Cervicis - 5 units of Botox at 1 site/s.   Left Splenius Cervicis -  5 units of Botox at 1 site/s.    2. UPPER EXTREMITY MUSCLES: 105 UNITS BOTOX = TOTAL DOSE    Right Levator Scapulae - 15 units of Botox at 1 site/s.   Left Levator Scapulae - 10 units of Botox at 1 site/s.    Right Pectoralis Minor - 5 units at 1 site/s.   Left Pectoralis Minor -  5 units at 1 site/s.     Right Rhomboids - 20 units at 4 site/s.   Left Rhomboids - 20 units at 4 site/s.     Right Latissimus Dorsi - 15 units of Botox at 2 site/s.   Left Latissimus Dorsi - 15 units of Botox at 3 site/s.     4. FACIAL & SCALP MUSCLES: 245 UNITS BOTOX = TOTAL DOSE    Right Frontalis - 10 units of Botox at 2 site/s.  Left Frontalis - 10 units of Botox at 2 site/s.    Right Temporalis - 50 units of Botox at 5 site/s.  Left Temporalis - 50 units of Botox at 5 site/s.    Right Occipitalis - 45 units of Botox at 4 site/s.   Left Occipitalis - 45 units of Botox at 4 site/s.    Right  - 5 units of Botox at 1 site/s.   Left  - 5 units of Botox at 1 site/s.    Procerus - 5 units of Botox at 1 site/s.    Right Orbicularis Oculi - 7.5 units of Botox at 3 site/s.  Left Orbicularis Oculi - 7.5 units of Botox at 3 site/s.     Right Orbicularis Oris - 2.5 units of Botox at 1 site/s.   Left Orbicularis Oris - 2.5 units of Botox at 1 site/s.     Right Mentalis - 5 units of Botox at 1 site/s.   Left Mentalis - 5 units of Botox at 1 site/s.       RESPONSE TO PROCEDURE  Inez Boston tolerated the procedure well and there were no immediate complications. She was allowed to recover for an appropriate period of time and was discharged home in stable condition.    ASSESSMENT AND PLAN   Botulinum toxin injections: The dose of Botox was increased to 400 units, with adjustments made to increase coverage in the right neck and upper back regions. The patient will continue to monitor her response to Botox and follow up at the next appointment.  Referrals: None.   Medications: None.   Follow up: Inez Boston was rescheduled for the next series of injections in 12 weeks, at which time we will evaluate response to today's injections. she may call the clinic prior with any questions or concerns prior to the next appointment.     Patient reviewed with attending physician, Dr. Fischer, who agrees with the  assessment and plan.    Abida Benítez MD  [facility] PM&R PGY-2  03/31/2025  Pager #: [phone]      I, Elizabeth Fischer MD, saw this patient with resident, Dr. Brennan, and agree with the findings and plan of care as documented in this note. I personally reviewed the chart (vitals signs, medications, labs and imaging). My key findings and key management decisions made are reflected in this note.  I performed the entire procedure listed above.      Elizabeth Fischer MD

## 2025-04-02 ENCOUNTER — LAB (OUTPATIENT)
Dept: LAB | Facility: CLINIC | Age: 50
End: 2025-04-02
Attending: OBSTETRICS & GYNECOLOGY
Payer: COMMERCIAL

## 2025-04-02 DIAGNOSIS — D75.839 THROMBOCYTOSIS: ICD-10-CM

## 2025-04-02 LAB
BASOPHILS # BLD AUTO: 0.1 10E3/UL (ref 0–0.2)
BASOPHILS NFR BLD AUTO: 1 %
EOSINOPHIL # BLD AUTO: 0.1 10E3/UL (ref 0–0.7)
EOSINOPHIL NFR BLD AUTO: 2 %
ERYTHROCYTE [DISTWIDTH] IN BLOOD BY AUTOMATED COUNT: 16.6 % (ref 10–15)
HCT VFR BLD AUTO: 36.4 % (ref 35–47)
HGB BLD-MCNC: 11.7 G/DL (ref 11.7–15.7)
HOLD SPECIMEN: NORMAL
IMM GRANULOCYTES # BLD: 0 10E3/UL
IMM GRANULOCYTES NFR BLD: 0 %
LYMPHOCYTES # BLD AUTO: 1.9 10E3/UL (ref 0.8–5.3)
LYMPHOCYTES NFR BLD AUTO: 24 %
MCH RBC QN AUTO: 29.3 PG (ref 26.5–33)
MCHC RBC AUTO-ENTMCNC: 32.1 G/DL (ref 31.5–36.5)
MCV RBC AUTO: 91 FL (ref 78–100)
MONOCYTES # BLD AUTO: 0.6 10E3/UL (ref 0–1.3)
MONOCYTES NFR BLD AUTO: 7 %
NEUTROPHILS # BLD AUTO: 5.4 10E3/UL (ref 1.6–8.3)
NEUTROPHILS NFR BLD AUTO: 67 %
PLATELET # BLD AUTO: 412 10E3/UL (ref 150–450)
RBC # BLD AUTO: 3.99 10E6/UL (ref 3.8–5.2)
WBC # BLD AUTO: 8.1 10E3/UL (ref 4–11)

## 2025-04-02 PROCEDURE — 85025 COMPLETE CBC W/AUTO DIFF WBC: CPT

## 2025-04-02 PROCEDURE — 36415 COLL VENOUS BLD VENIPUNCTURE: CPT

## 2025-04-04 ENCOUNTER — HOSPITAL ENCOUNTER (OUTPATIENT)
Facility: AMBULATORY SURGERY CENTER | Age: 50
Discharge: HOME | End: 2025-04-04
Attending: OBSTETRICS & GYNECOLOGY | Admitting: OBSTETRICS & GYNECOLOGY
Payer: COMMERCIAL

## 2025-04-04 VITALS
HEART RATE: 63 BPM | WEIGHT: 160 LBS | HEIGHT: 68 IN | TEMPERATURE: 97.2 F | DIASTOLIC BLOOD PRESSURE: 66 MMHG | SYSTOLIC BLOOD PRESSURE: 107 MMHG | RESPIRATION RATE: 16 BRPM | BODY MASS INDEX: 24.25 KG/M2 | OXYGEN SATURATION: 99 %

## 2025-04-04 LAB
HCG UR QL: NEGATIVE
INTERNAL QC OK POCT: NORMAL
POCT KIT EXPIRATION DATE: NORMAL
POCT KIT LOT NUMBER: NORMAL

## 2025-04-04 PROCEDURE — 88305 TISSUE EXAM BY PATHOLOGIST: CPT | Mod: TC | Performed by: OBSTETRICS & GYNECOLOGY

## 2025-04-04 PROCEDURE — 58558 HYSTEROSCOPY BIOPSY: CPT

## 2025-04-04 PROCEDURE — 88305 TISSUE EXAM BY PATHOLOGIST: CPT | Mod: 26 | Performed by: PATHOLOGY

## 2025-04-04 PROCEDURE — 81025 URINE PREGNANCY TEST: CPT | Performed by: PATHOLOGY

## 2025-04-04 RX ORDER — HYDROMORPHONE HYDROCHLORIDE 1 MG/ML
0.4 INJECTION, SOLUTION INTRAMUSCULAR; INTRAVENOUS; SUBCUTANEOUS EVERY 5 MIN PRN
Status: DISCONTINUED | OUTPATIENT
Start: 2025-04-04 | End: 2025-04-04 | Stop reason: HOSPADM

## 2025-04-04 RX ORDER — ACETAMINOPHEN 325 MG/1
975 TABLET ORAL ONCE
Status: COMPLETED | OUTPATIENT
Start: 2025-04-04 | End: 2025-04-04

## 2025-04-04 RX ORDER — ONDANSETRON 4 MG/1
4 TABLET, ORALLY DISINTEGRATING ORAL EVERY 30 MIN PRN
Status: DISCONTINUED | OUTPATIENT
Start: 2025-04-04 | End: 2025-04-04 | Stop reason: HOSPADM

## 2025-04-04 RX ORDER — SODIUM CHLORIDE, SODIUM LACTATE, POTASSIUM CHLORIDE, CALCIUM CHLORIDE 600; 310; 30; 20 MG/100ML; MG/100ML; MG/100ML; MG/100ML
INJECTION, SOLUTION INTRAVENOUS CONTINUOUS
Status: DISCONTINUED | OUTPATIENT
Start: 2025-04-04 | End: 2025-04-04 | Stop reason: HOSPADM

## 2025-04-04 RX ORDER — OXYCODONE HYDROCHLORIDE 5 MG/1
10 TABLET ORAL
Status: DISCONTINUED | OUTPATIENT
Start: 2025-04-04 | End: 2025-04-05 | Stop reason: HOSPADM

## 2025-04-04 RX ORDER — DEXAMETHASONE SODIUM PHOSPHATE 10 MG/ML
4 INJECTION, SOLUTION INTRAMUSCULAR; INTRAVENOUS
Status: DISCONTINUED | OUTPATIENT
Start: 2025-04-04 | End: 2025-04-04 | Stop reason: HOSPADM

## 2025-04-04 RX ORDER — FENTANYL CITRATE 50 UG/ML
50 INJECTION, SOLUTION INTRAMUSCULAR; INTRAVENOUS EVERY 5 MIN PRN
Status: DISCONTINUED | OUTPATIENT
Start: 2025-04-04 | End: 2025-04-04 | Stop reason: HOSPADM

## 2025-04-04 RX ORDER — OXYCODONE HYDROCHLORIDE 5 MG/1
5 TABLET ORAL
Status: DISCONTINUED | OUTPATIENT
Start: 2025-04-04 | End: 2025-04-05 | Stop reason: HOSPADM

## 2025-04-04 RX ORDER — ONDANSETRON 2 MG/ML
4 INJECTION INTRAMUSCULAR; INTRAVENOUS EVERY 30 MIN PRN
Status: DISCONTINUED | OUTPATIENT
Start: 2025-04-04 | End: 2025-04-04 | Stop reason: HOSPADM

## 2025-04-04 RX ORDER — NALOXONE HYDROCHLORIDE 0.4 MG/ML
0.1 INJECTION, SOLUTION INTRAMUSCULAR; INTRAVENOUS; SUBCUTANEOUS
Status: DISCONTINUED | OUTPATIENT
Start: 2025-04-04 | End: 2025-04-04 | Stop reason: HOSPADM

## 2025-04-04 RX ORDER — DEXAMETHASONE SODIUM PHOSPHATE 10 MG/ML
4 INJECTION, SOLUTION INTRAMUSCULAR; INTRAVENOUS
Status: DISCONTINUED | OUTPATIENT
Start: 2025-04-04 | End: 2025-04-05 | Stop reason: HOSPADM

## 2025-04-04 RX ORDER — FERROUS SULFATE 325(65) MG
325 TABLET ORAL
COMMUNITY

## 2025-04-04 RX ORDER — LIDOCAINE HYDROCHLORIDE 10 MG/ML
INJECTION, SOLUTION INFILTRATION; PERINEURAL PRN
Status: DISCONTINUED | OUTPATIENT
Start: 2025-04-04 | End: 2025-04-04 | Stop reason: HOSPADM

## 2025-04-04 RX ORDER — HYDROMORPHONE HYDROCHLORIDE 1 MG/ML
0.2 INJECTION, SOLUTION INTRAMUSCULAR; INTRAVENOUS; SUBCUTANEOUS EVERY 5 MIN PRN
Status: DISCONTINUED | OUTPATIENT
Start: 2025-04-04 | End: 2025-04-04 | Stop reason: HOSPADM

## 2025-04-04 RX ORDER — ONDANSETRON 2 MG/ML
4 INJECTION INTRAMUSCULAR; INTRAVENOUS EVERY 30 MIN PRN
Status: DISCONTINUED | OUTPATIENT
Start: 2025-04-04 | End: 2025-04-05 | Stop reason: HOSPADM

## 2025-04-04 RX ORDER — MISOPROSTOL 200 UG/1
TABLET ORAL
COMMUNITY
Start: 2025-03-28

## 2025-04-04 RX ORDER — ONDANSETRON 4 MG/1
4 TABLET, ORALLY DISINTEGRATING ORAL EVERY 30 MIN PRN
Status: DISCONTINUED | OUTPATIENT
Start: 2025-04-04 | End: 2025-04-05 | Stop reason: HOSPADM

## 2025-04-04 RX ORDER — LIDOCAINE 40 MG/G
CREAM TOPICAL
Status: DISCONTINUED | OUTPATIENT
Start: 2025-04-04 | End: 2025-04-04 | Stop reason: HOSPADM

## 2025-04-04 RX ORDER — FENTANYL CITRATE 50 UG/ML
25 INJECTION, SOLUTION INTRAMUSCULAR; INTRAVENOUS EVERY 5 MIN PRN
Status: DISCONTINUED | OUTPATIENT
Start: 2025-04-04 | End: 2025-04-04 | Stop reason: HOSPADM

## 2025-04-04 RX ORDER — NALOXONE HYDROCHLORIDE 0.4 MG/ML
0.1 INJECTION, SOLUTION INTRAMUSCULAR; INTRAVENOUS; SUBCUTANEOUS
Status: DISCONTINUED | OUTPATIENT
Start: 2025-04-04 | End: 2025-04-05 | Stop reason: HOSPADM

## 2025-04-04 RX ADMIN — ACETAMINOPHEN 975 MG: 325 TABLET ORAL at 06:49

## 2025-04-04 RX ADMIN — SODIUM CHLORIDE, SODIUM LACTATE, POTASSIUM CHLORIDE, CALCIUM CHLORIDE: 600; 310; 30; 20 INJECTION, SOLUTION INTRAVENOUS at 06:49

## 2025-04-04 NOTE — OP NOTE
Patient: Inez Boston   : 1975  MRN: [id number]    Date of Service: 2025     Pre-operative diagnosis:   Abnormal uterine bleeding     Post-operative diagnosis:  Same   Procedure: Hysteroscopy with dilation and curettage, polypectomy   Surgeon: Elda Mcpherson MD   Assistants: None  Anesthesia: monitored anesthesia care withIV sedation and local anesthetic  EBL: 10mL  IV Fluids: 800 mL crystalloid  Hysteroscopic fluid deficit: 210 mL normal saline  Specimens: endometrial curettings   Complications: none apparent    Findings: Exam under anesthesia revealed normal external genitalia, normal vaginal mucosa and normal cervix without lesions. Bimanual exam demonstrated 6 week size anteverted mobile uterus, no adnexal masses. Hysteroscopy revealed a small endocervical polyp and normal uterine cavity with thickened endometrial lining . Bilateral tubal ostia visualized.    Indications: Inez Boston is a 49 year old  with abnormal uterine bleeding. A endometrial biopsy was attempted in the office but unsuccessful due to cervical stenosis so hysteroscopy and dilation and curettage was recommended. Pelvic US also showed a endocervical mass. Risks, benefits, and alternatives to the procedure were discussed. The patient's questions were answered, understanding confirmed, and the patient signed written informed consent.    Technique: The patient was taken to the operating room where SCDs placed for VTE prophylaxis. No antibiotics given. She was placed under MAC anesthesia without difficulty. After patient was comfortable, she was placed in the dorsal lithotomy position with feet in Naresh stirrups. An exam under anesthesia was performed with findings as noted above. The patient was prepped and draped in the usual sterile fashion. Time was out performed to confirm patient's identity and planned procedure.     A speculum was placed in the vagina and the cervix visualized. A single-toothed tenaculum was  placed on the anterior lip of the cervix at 12 o'clock after infiltrating with 1mL 1% lidocaine plain. A paracervical block was placed at 4 and 8 o'clock with the same local anesthetic, using a total of 20 mL of 1% lidocaine. The cervical os was carefully dilated to 7 mm with sequential hegar dilators without difficulty or encountering resistance. The hysteroscope was placed through the cervix into the uterine cavity with outflow turned on as to achieve hydrodilation of cervix while entering into uterine cavity. A cervical polyp was noted just inferior to the internal os. This was removed with the hysteroscopic morcellator. Examination of the uterine cavity demonstrated thickened endometrial lining and this was circumferentially sampled with the hysteroscopic morcellator and a normal cavity was noted upon completion of the procedure. The hysteroscope was then removed.     A fluid deficit of 210 mL normal saline noted. The tenaculum was removed from the cervix and sites noted to be hemostatic. The speculum was removed from the vagina.     Instrument, needle and sponge counts were correct x2. I was present and scrubbed for the entire procedure. The patient tolerated the procedure well. The patient was awoken in the OR and transferred to the PACU in stable condition.     Elda Mcpherson MD

## 2025-04-04 NOTE — DISCHARGE INSTRUCTIONS
[facility]  Home Care Following Anesthesia  For 24 hours after surgery:  Get plenty of rest.  A responsible adult must stay with you for at least 24 hours after you leave the surgery center.  Do not drive or use heavy equipment.  If you have weakness or tingling, don't drive or use heavy equipment until this feeling goes away.   Do not drink alcohol.   Avoid strenuous or risky activities.  Ask for help when climbing stairs.  You may feel lightheaded.  IF so, sit for a few minutes before standing.  Have someone help you get up.   If you have nausea (feel sick to your stomach): Drink only clear liquids such as apple juice, ginger ale, broth or 7-Up.  Rest may also help.  Be sure to drink enough fluids.  Move to a regular diet as you feel able.   You may have a slight fever.  Call the doctor if your fever is over 100 F (37.7 C) (taken under the tongue) or lasts longer than 24 hours.  You may have a dry mouth, a sore throat, muscle aches or trouble sleeping. These should go away after 24 hours.  Do not make important or legal decisions.   It is recommended to avoid smoking.               Tips for taking pain medications  To get the best pain relief possible, remember these points:  Take pain medications as directed, before pain becomes severe.  Pain medication can upset your stomach: taking it with food may help.  Constipation is a common side effect of pain medication. Drink plenty of  fluids.  Eat foods high in fiber. Take a stool softener if recommended by your doctor or pharmacist.  Do not drink alcohol, drive or operate machinery while taking pain medications.  Ask about other ways to control pain, such as with heat, ice or relaxation.    Tylenol/Acetaminophen Consumption    If you feel your pain relief is insufficient, you may take Tylenol/Acetaminophen in addition to your narcotic pain medication.   Be careful not to exceed 4,000 mg of Tylenol/Acetaminophen in a 24 hour  period from all sources.  If you are taking extra strength Tylenol/acetaminophen (500 mg), the maximum dose is 8 tablets in 24 hours.  If you are taking regular strength acetaminophen (325 mg), the maximum dose is 12 tablets in 24 hours.    Call a doctor for any of the following:  Signs of infection (fever, growing tenderness at the surgery site, a large amount of drainage or bleeding, severe pain, foul-smelling drainage, redness, swelling).  It has been over 8 to 10 hours since surgery and you are still not able to urinate (pass water).  Headache for over 24 hours.  Numbness, tingling or weakness the day after surgery (if you had spinal anesthesia).  Signs of Covid-19 infection (temperature over 100 degrees, shortness of breath, cough, loss of taste/smell, generalized body aches, persistent headache, chills, sore throat, nausea/vomiting/diarrhea)    Your doctor is:  Dr. Elda Mcpherson, OB/GYN: [phone]                    After hours and weekends call the hospital @ [phone] and ask for the resident on call for:  OB/GYN  For emergency care, call the:  [facility]: [phone] (TTY for hearing impaired: [phone])

## 2025-04-16 NOTE — PROGRESS NOTES
HPI;    Overall doing well. No further GYN bleeding. No other HEENT, cardiopulmonary, abdominal, ,GYN, neurological, systemic, psychiatric, lymphatic, endocrine, vascula complaints.     Past Medical History:   Diagnosis Date    ADHD (attention deficit hyperactivity disorder)     Anxiety     BRCA gene mutation negative     Cervical dystonia     Hyperlipidemia     Thrombocytosis      Past Surgical History:   Procedure Laterality Date    COLONOSCOPY      DILATION AND CURETTAGE, OPERATIVE HYSTEROSCOPY WITH MORCELLATOR, COMBINED N/A 4/4/2025    Procedure: HYSTEROSCOPY, WITH DILATION AND CURETTAGE OF UTERUS USING MORCELLATOR;  Surgeon: Elda Mcpherson MD;  Location: [facility] OR    LAPAROSCOPIC OOPHORECTOMY Left 6/3/2022    Procedure: Laparoscopic oophorectomy;  Surgeon: Elda Mcpherson MD;  Location: [facility] OR    LAPAROSCOPIC OOPHORECTOMY  6/3/2022    Procedure: ;  Surgeon: Elda Mcpherson MD;  Location: [facility]    LAPAROSCOPIC OOPHORECTOMY  6/3/2022    Procedure: ;  Surgeon: Elda Mcpherson MD;  Location: [facility]    LAPAROSCOPIC SALPINGOTOMY Bilateral 6/3/2022    Procedure: bilateral salpingectomy;  Surgeon: Elda Mcpherson MD;  Location: [facility] OR    LAPAROSCOPY      6/3/2022    MYRINGOTOMY W/ TUBES      as a child     PE:    Vitals noted, gen, nad, cooperative, alert, neck supple nl rom, lungs with good air movement, RRR, S1, S2, no MRG, abdomen, no acute findings. Grossly normal neurological exam.     Recent Results (from the past 720 hours)   CBC with platelets and differential    Collection Time: 04/02/25  8:30 AM   Result Value Ref Range    WBC Count 8.1 4.0 - 11.0 10e3/uL    RBC Count 3.99 3.80 - 5.20 10e6/uL    Hemoglobin 11.7 11.7 - 15.7 g/dL    Hematocrit 36.4 35.0 - 47.0 %    MCV 91 78 - 100 fL    MCH 29.3 26.5 - 33.0 pg    MCHC 32.1 31.5 - 36.5 g/dL    RDW 16.6 (H) 10.0 - 15.0 %    Platelet Count 412 150 - 450 10e3/uL    % Neutrophils 67 %    % Lymphocytes 24 %    % Monocytes 7 %    % Eosinophils 2 %     "% Basophils 1 %    % Immature Granulocytes 0 %    Absolute Neutrophils 5.4 1.6 - 8.3 10e3/uL    Absolute Lymphocytes 1.9 0.8 - 5.3 10e3/uL    Absolute Monocytes 0.6 0.0 - 1.3 10e3/uL    Absolute Eosinophils 0.1 0.0 - 0.7 10e3/uL    Absolute Basophils 0.1 0.0 - 0.2 10e3/uL    Absolute Immature Granulocytes 0.0 <=0.4 10e3/uL   Extra Purple Top Tube    Collection Time: 04/02/25  8:30 AM   Result Value Ref Range    Hold Specimen JIC    hCG qual urine POCT    Collection Time: 04/04/25  6:40 AM   Result Value Ref Range    HCG Qual Urine Negative Negative    Internal QC Check POCT Valid Valid    POCT Kit Lot Number [id number]     POCT Kit Expiration Date 2026-08-12    Surgical Pathology Exam    Collection Time: 04/04/25  7:41 AM   Result Value Ref Range    Case Report       Surgical Pathology Report                         Case: [id number]                                  Authorizing Provider:  Elda Mcpherson MD     Collected:           04/04/2025 07:41 AM          Ordering Location:     [facility] OR  Received:            04/04/2025 08:51 AM                                 [place]                                                                  Pathologist:           Mary Barba MD                                                             Specimen:    Endometrium, Endometrial Curettings                                                        Final Diagnosis       Endometrium, Curettage:  - Benign endocervical polyp(s)  - Proliferative-type endometrium  - Negative for hyperplasia or atypia        Clinical Information       Abnormal uterine bleeding (AUB)       Gross Description       A(1). Endometrium, Endometrial Curettings:  The specimen is received in formalin with proper patient identification, labeled \"endometrial curettings\".  The specimen consists of a 2.2 x 1.3 cm aggregate of dark red-tan brown " to pink, irregular fragments of soft tissue admixed with dark-red, clotted blood.  Additionally received separate in a suction collection device is a 2.5 x 2.4 x 0.2 cm aggregate of gray-tan to pink-tan white, rubbery, irregular fragments of soft tissue admixed with dark red-brown, clotted blood.  The specimen is filtered, wrapped, submitted in toto as A1-2.       Microscopic Description       Microscopic examination was performed and used to determine the final diagnosis.     I have personally reviewed all specimens and or slides, including the listed special stains, and used them with my medical judgement to determine the final diagnosis.       Performing Labs       The technical component of this testing was completed at [facility].    Stain controls for all stains resulted within this report have been reviewed and show appropriate reactivity.       Case Images           A/P:       1. Immunizations; COVID-19 Pfizer x 3. Moderna x 2. Td/Tdap on 3/21/2022. Influenza vaccine done 11/14/2024  2. Hematology appt. With Dr. Caceres 10/5/2022 regarding thrombocytosis. Remains on ASA (162 mg). If platelets >700K then can discuss bone marrow bx per heme. Platelets 535 on 10/18/2023. Next visit 5/5/2025  3. Cervical dystonia: follow up with Dr. Fischer, PMR 6/27/2025 Botox injections;  regarding neck and myofacial pain. On baclofen more for sleep.   4. GYN; she had hysteroscopy with Dr. Mcpherson 4/4/2025 for abnormal bleeding and follow up 4/18/2025. Negative for malignancy.   5. On Cymbalta and she finds this beneficial.  Seen Dr. Moya, Psychiatry 9/8/2023  6. Mammogram; 5/22/2024. Ordered mammogram today 4/17/2025  7. She has seen PT for groin pain 2/24/2022 and 3/21/2022.   8. Genetic counseling given family history significant for breast cancer on 4/28/2022 and 5/24/2022.   9. Colonoscopy 12/7/2021 and repeat in 10 years.   10. Lipid panel 1/24/2025 TG's 138,   and HDL 70. She was seen 12/12/2023 by Dr. Landon, Cardiology   11. Seen Neurology 6/21/2022, Dr. Wylie for cervical dystonia. She is not taking Sinemet (nausea).  12. Neuropsychiatric testing was done 8/3/2022.  13. She was seen 1/11/2023 by Dr. Wu, Neurophthalmology, for dry eyes possibly related to Botox injections. 14. Dermatology: she has a 5/28/2025 appt. With Dr. Chaves.   15. ENT appt. With Dr. Hurtado on 12/19/2023.  Speech Therapy 3/12/2024    40 minutes spent on the date of the encounter doing chart review, history and exam, documentation and further activities as noted above exclusive of procedures and other billable interpretations

## 2025-04-17 ENCOUNTER — PATIENT OUTREACH (OUTPATIENT)
Dept: CARE COORDINATION | Facility: CLINIC | Age: 50
End: 2025-04-17

## 2025-04-17 ENCOUNTER — OFFICE VISIT (OUTPATIENT)
Dept: INTERNAL MEDICINE | Facility: CLINIC | Age: 50
End: 2025-04-17
Payer: COMMERCIAL

## 2025-04-17 VITALS
SYSTOLIC BLOOD PRESSURE: 101 MMHG | OXYGEN SATURATION: 100 % | BODY MASS INDEX: 25.69 KG/M2 | WEIGHT: 163.7 LBS | HEIGHT: 67 IN | TEMPERATURE: 98.1 F | RESPIRATION RATE: 14 BRPM | HEART RATE: 81 BPM | DIASTOLIC BLOOD PRESSURE: 65 MMHG

## 2025-04-17 DIAGNOSIS — Z12.31 ENCOUNTER FOR SCREENING MAMMOGRAM FOR BREAST CANCER: Primary | ICD-10-CM

## 2025-04-17 DIAGNOSIS — F41.9 ANXIETY: ICD-10-CM

## 2025-04-17 DIAGNOSIS — G24.9 DYSTONIA: ICD-10-CM

## 2025-04-17 RX ORDER — BACLOFEN 10 MG/1
10 TABLET ORAL
Qty: 30 TABLET | Refills: 1 | Status: SHIPPED | OUTPATIENT
Start: 2025-04-17

## 2025-04-17 RX ORDER — DULOXETIN HYDROCHLORIDE 30 MG/1
30 CAPSULE, DELAYED RELEASE ORAL DAILY
Qty: 90 CAPSULE | Refills: 1 | Status: SHIPPED | OUTPATIENT
Start: 2025-04-17

## 2025-04-18 ENCOUNTER — OFFICE VISIT (OUTPATIENT)
Dept: OBGYN | Facility: CLINIC | Age: 50
End: 2025-04-18
Payer: COMMERCIAL

## 2025-04-18 VITALS
DIASTOLIC BLOOD PRESSURE: 68 MMHG | WEIGHT: 166 LBS | BODY MASS INDEX: 25.75 KG/M2 | HEART RATE: 82 BPM | SYSTOLIC BLOOD PRESSURE: 113 MMHG

## 2025-04-18 DIAGNOSIS — N93.9 ABNORMAL UTERINE BLEEDING (AUB): ICD-10-CM

## 2025-04-18 DIAGNOSIS — Z98.890 STATUS POST HYSTEROSCOPY: Primary | ICD-10-CM

## 2025-04-18 PROCEDURE — 3074F SYST BP LT 130 MM HG: CPT | Performed by: OBSTETRICS & GYNECOLOGY

## 2025-04-18 PROCEDURE — 99213 OFFICE O/P EST LOW 20 MIN: CPT | Performed by: OBSTETRICS & GYNECOLOGY

## 2025-04-18 PROCEDURE — 3078F DIAST BP <80 MM HG: CPT | Performed by: OBSTETRICS & GYNECOLOGY

## 2025-04-18 RX ORDER — MEDROXYPROGESTERONE ACETATE 10 MG
10 TABLET ORAL DAILY
Qty: 7 TABLET | Refills: 1 | Status: SHIPPED | OUTPATIENT
Start: 2025-04-18 | End: 2025-04-25

## 2025-04-21 RX ORDER — BUPIVACAINE HYDROCHLORIDE 2.5 MG/ML
8 INJECTION, SOLUTION EPIDURAL; INFILTRATION; INTRACAUDAL; PERINEURAL ONCE
Status: ACTIVE | OUTPATIENT
Start: 2025-04-21

## 2025-04-25 ENCOUNTER — HOSPITAL ENCOUNTER (OUTPATIENT)
Facility: AMBULATORY SURGERY CENTER | Age: 50
Discharge: HOME | End: 2025-04-25
Attending: INTERNAL MEDICINE | Admitting: INTERNAL MEDICINE
Payer: COMMERCIAL

## 2025-04-25 VITALS
TEMPERATURE: 97 F | BODY MASS INDEX: 26.06 KG/M2 | RESPIRATION RATE: 16 BRPM | OXYGEN SATURATION: 96 % | WEIGHT: 166 LBS | HEART RATE: 66 BPM | SYSTOLIC BLOOD PRESSURE: 101 MMHG | DIASTOLIC BLOOD PRESSURE: 57 MMHG | HEIGHT: 67 IN

## 2025-04-25 LAB
COLONOSCOPY: NORMAL
HCG UR QL: NEGATIVE
INTERNAL QC OK POCT: NORMAL
POCT KIT EXPIRATION DATE: NORMAL
POCT KIT LOT NUMBER: NORMAL
UPPER GI ENDOSCOPY: NORMAL

## 2025-04-25 PROCEDURE — 88342 IMHCHEM/IMCYTCHM 1ST ANTB: CPT | Mod: 26 | Performed by: STUDENT IN AN ORGANIZED HEALTH CARE EDUCATION/TRAINING PROGRAM

## 2025-04-25 PROCEDURE — 88341 IMHCHEM/IMCYTCHM EA ADD ANTB: CPT | Mod: TC | Performed by: INTERNAL MEDICINE

## 2025-04-25 PROCEDURE — 43239 EGD BIOPSY SINGLE/MULTIPLE: CPT | Performed by: INTERNAL MEDICINE

## 2025-04-25 PROCEDURE — 81025 URINE PREGNANCY TEST: CPT | Performed by: PATHOLOGY

## 2025-04-25 PROCEDURE — 45385 COLONOSCOPY W/LESION REMOVAL: CPT | Performed by: INTERNAL MEDICINE

## 2025-04-25 PROCEDURE — 88341 IMHCHEM/IMCYTCHM EA ADD ANTB: CPT | Mod: 26 | Performed by: STUDENT IN AN ORGANIZED HEALTH CARE EDUCATION/TRAINING PROGRAM

## 2025-04-25 PROCEDURE — 88305 TISSUE EXAM BY PATHOLOGIST: CPT | Mod: 26 | Performed by: STUDENT IN AN ORGANIZED HEALTH CARE EDUCATION/TRAINING PROGRAM

## 2025-04-25 RX ORDER — PROCHLORPERAZINE MALEATE 10 MG
10 TABLET ORAL EVERY 6 HOURS PRN
Status: DISCONTINUED | OUTPATIENT
Start: 2025-04-25 | End: 2025-04-26 | Stop reason: HOSPADM

## 2025-04-25 RX ORDER — NALOXONE HYDROCHLORIDE 0.4 MG/ML
0.2 INJECTION, SOLUTION INTRAMUSCULAR; INTRAVENOUS; SUBCUTANEOUS
Status: DISCONTINUED | OUTPATIENT
Start: 2025-04-25 | End: 2025-04-26 | Stop reason: HOSPADM

## 2025-04-25 RX ORDER — LIDOCAINE 40 MG/G
CREAM TOPICAL
Status: DISCONTINUED | OUTPATIENT
Start: 2025-04-25 | End: 2025-04-25 | Stop reason: HOSPADM

## 2025-04-25 RX ORDER — FLUMAZENIL 0.1 MG/ML
0.2 INJECTION, SOLUTION INTRAVENOUS
Status: DISCONTINUED | OUTPATIENT
Start: 2025-04-25 | End: 2025-04-26 | Stop reason: HOSPADM

## 2025-04-25 RX ORDER — NALOXONE HYDROCHLORIDE 0.4 MG/ML
0.4 INJECTION, SOLUTION INTRAMUSCULAR; INTRAVENOUS; SUBCUTANEOUS
Status: DISCONTINUED | OUTPATIENT
Start: 2025-04-25 | End: 2025-04-26 | Stop reason: HOSPADM

## 2025-04-25 RX ORDER — ONDANSETRON 4 MG/1
4 TABLET, ORALLY DISINTEGRATING ORAL EVERY 6 HOURS PRN
Status: DISCONTINUED | OUTPATIENT
Start: 2025-04-25 | End: 2025-04-26 | Stop reason: HOSPADM

## 2025-04-25 RX ORDER — FENTANYL CITRATE 50 UG/ML
INJECTION, SOLUTION INTRAMUSCULAR; INTRAVENOUS PRN
Status: DISCONTINUED | OUTPATIENT
Start: 2025-04-25 | End: 2025-04-25 | Stop reason: HOSPADM

## 2025-04-25 RX ORDER — ONDANSETRON 2 MG/ML
4 INJECTION INTRAMUSCULAR; INTRAVENOUS EVERY 6 HOURS PRN
Status: DISCONTINUED | OUTPATIENT
Start: 2025-04-25 | End: 2025-04-26 | Stop reason: HOSPADM

## 2025-04-25 RX ORDER — ONDANSETRON 2 MG/ML
4 INJECTION INTRAMUSCULAR; INTRAVENOUS
Status: DISCONTINUED | OUTPATIENT
Start: 2025-04-25 | End: 2025-04-25 | Stop reason: HOSPADM

## 2025-04-25 NOTE — H&P
Inez Boston  [id number]  female  49 year old      Reason for procedure/surgery: anemia    Patient Active Problem List   Diagnosis    Pelvic pain in female    Urge incontinence    Anxiety    Attention deficit hyperactivity disorder (ADHD), combined type    Dysesthesia    Dystonia    History of severe acute respiratory syndrome coronavirus 2 (SARS-CoV-2) disease    Cannabis use disorder, mild, abuse    Tobacco use disorder, moderate, dependence    Recurrent major depressive disorder, in full remission    Mixed hyperlipidemia    Thrombocytosis    Abnormal uterine bleeding (AUB)       Past Surgical History:    Past Surgical History:   Procedure Laterality Date    COLONOSCOPY      DILATION AND CURETTAGE, OPERATIVE HYSTEROSCOPY WITH MORCELLATOR, COMBINED N/A 4/4/2025    Procedure: HYSTEROSCOPY, WITH DILATION AND CURETTAGE OF UTERUS USING MORCELLATOR;  Surgeon: Elda Mcpherson MD;  Location: [facility] OR    LAPAROSCOPIC OOPHORECTOMY Left 6/3/2022    Procedure: Laparoscopic oophorectomy;  Surgeon: Elda Mcpherson MD;  [facility]    LAPAROSCOPIC OOPHORECTOMY  6/3/2022    Procedure: ;  Surgeon: Elda Mcpherson MD;  [facility]    LAPAROSCOPIC OOPHORECTOMY  6/3/2022    Procedure: ;  Surgeon: Elda Mcpherson MD;  [facility]    LAPAROSCOPIC SALPINGOTOMY Bilateral 6/3/2022    Procedure: bilateral salpingectomy;  Surgeon: Elda Mcpherson MD;  [facility]    LAPAROSCOPY      6/3/2022    MYRINGOTOMY W/ TUBES      as a child       Past Medical History:   Past Medical History:   Diagnosis Date    ADHD (attention deficit hyperactivity disorder)     Anxiety     BRCA gene mutation negative     Cervical dystonia     Hyperlipidemia     Thrombocytosis        Social History:   Social History     Tobacco Use    Smoking status: Former     Types: Cigarettes, Vaping Device     Passive exposure: Past    Smokeless tobacco: Never   Substance Use Topics    Alcohol use: Yes     Alcohol/week: 2.0 standard drinks of  alcohol     Types: 2 Cans of beer per week     Comment: a couple times a week       Family History:   Family History   Problem Relation Age of Onset    Skin Cancer Mother     Depression Mother     Breast Cancer Mother 45    Urinary tract infection Mother     Osteoporosis Mother     Attention Deficit Disorder Mother     Coronary Artery Disease Father     Hyperlipidemia Maternal Grandmother     Urinary tract infection Maternal Grandmother     Colon Cancer Maternal Grandmother         early age onset    Hyperlipidemia Maternal Grandfather     Congenital heart disease Paternal Grandmother     Thyroid Disease Paternal Grandmother     Heart Failure Paternal Grandfather     Depression Brother     Attention Deficit Disorder Brother     Substance Abuse Paternal Uncle     Uterine Cancer Other         maternal grandmother's sister    Glaucoma No family hx of     Macular Degeneration No family hx of        Allergies: No Known Allergies    Active Medications:   Current Outpatient Medications   Medication Sig Dispense Refill    aspirin 81 MG EC tablet Take 162 mg by mouth every morning      baclofen (LIORESAL) 10 MG tablet Take 1 tablet (10 mg) by mouth nightly as needed. 30 tablet 1    Cyanocobalamin (B-12) 1000 MCG TBCR Take by mouth.      DULoxetine (CYMBALTA) 30 MG capsule Take 1 capsule (30 mg) by mouth daily. 90 capsule 1    ferrous sulfate (FEROSUL) 325 (65 Fe) MG tablet Take 325 mg by mouth daily (with breakfast). (Patient not taking: Reported on 4/18/2025)      medroxyPROGESTERone (PROVERA) 10 MG tablet Take 1 tablet (10 mg) by mouth daily for 7 days. 7 tablet 1    progesterone (PROMETRIUM) 100 MG capsule Take 100 mg by mouth daily.      Vitamin D, Cholecalciferol, 25 MCG (1000 UT) CAPS Take 1,000 Units by mouth daily         Systemic Review:   CONSTITUTIONAL: NEGATIVE for fever, chills, change in weight  ENT/MOUTH: NEGATIVE for ear, mouth and throat problems  RESP: NEGATIVE for significant cough or SOB  CV: NEGATIVE for  chest pain, palpitations or peripheral edema    Physical Examination:   Vital Signs: LMP 02/27/2025 (Within Days)   GENERAL: healthy, alert and no distress  NECK: no adenopathy, no asymmetry, masses, or scars  RESP: lungs clear to auscultation - no rales, rhonchi or wheezes  CV: regular rate and rhythm, normal S1 S2, no S3 or S4, no murmur, click or rub, no peripheral edema and peripheral pulses strong  ABDOMEN: soft, nontender, no hepatosplenomegaly, no masses and bowel sounds normal  MS: no gross musculoskeletal defects noted, no edema    Plan: Appropriate to proceed as scheduled.      Alma Delia Paul MD  4/25/2025    PCP:  Tony Segal

## 2025-04-27 NOTE — PROGRESS NOTES
GYN Post-op Progress Note    CC: post-op check     HPI: nIez Boston is a 49 year old  who is 2 weeks post-op s/p hysteroscopy with removal of endocervical polyp. Since her procedure Inez  reports that she has been feeling well. She is ambulating and voiding without difficulty. She denies any dysuria or urinary frequency and is able to fully empty her bladder. Vaginal bleeding is minimal She denies nausea or vomiting and is tolerating a general diet. No concerns.         Physical exam:  Vitals:    25 1554   BP: 113/68   BP Location: Left arm   Patient Position: Sitting   Cuff Size: Adult Regular   Pulse: 82   Weight: 75.3 kg (166 lb)       General: Patient alert and oriented, no acute distress  CV: no peripheral edema or cyanosis  Resp: normal respiratory effort and equal lung expansion  Ext: non-tender, no edema    Endometrium, Curettage:  - Benign endocervical polyp(s)  - Proliferative-type endometrium  - Negative for hyperplasia or atypia      Assessment and Plan    Inez Boston is a 49 year old  who is s/p hysteroscopy  and presents for a post-op visit  (Z98.890) Status post hysteroscopy  (primary encounter diagnosis)  -Patient progressing well, no post-op concerns   -OK to resume normal pre-op activities     (N93.9) Abnormal uterine bleeding (AUB)  -Suspect that abnormal bleeding was 2/2 the endocervical polyp but Inez is concerned that the heavy bleeding could return and would like a Rx for Provera to keep on hand.   -I encouraged her to monitor her bleeding with her next period and only use the provera if needed, she should also let us know if she continues to have heavy bleeding with her periods.   Plan: medroxyPROGESTERone (PROVERA) 10 MG tablet          Dispo: RTC PRN    Elda Mcpherson MD

## 2025-04-29 LAB
PATH REPORT.COMMENTS IMP SPEC: NORMAL
PATH REPORT.FINAL DX SPEC: NORMAL
PATH REPORT.GROSS SPEC: NORMAL
PATH REPORT.MICROSCOPIC SPEC OTHER STN: NORMAL
PATH REPORT.RELEVANT HX SPEC: NORMAL
PHOTO IMAGE: NORMAL

## 2025-05-05 ENCOUNTER — ONCOLOGY VISIT (OUTPATIENT)
Dept: ONCOLOGY | Facility: CLINIC | Age: 50
End: 2025-05-05
Attending: INTERNAL MEDICINE
Payer: COMMERCIAL

## 2025-05-05 ENCOUNTER — LAB (OUTPATIENT)
Dept: INFUSION THERAPY | Facility: CLINIC | Age: 50
End: 2025-05-05
Attending: INTERNAL MEDICINE
Payer: COMMERCIAL

## 2025-05-05 VITALS
WEIGHT: 168 LBS | RESPIRATION RATE: 16 BRPM | HEIGHT: 67 IN | SYSTOLIC BLOOD PRESSURE: 108 MMHG | BODY MASS INDEX: 26.37 KG/M2 | DIASTOLIC BLOOD PRESSURE: 71 MMHG | OXYGEN SATURATION: 97 % | HEART RATE: 75 BPM

## 2025-05-05 DIAGNOSIS — D75.839 THROMBOCYTOSIS: Primary | ICD-10-CM

## 2025-05-05 DIAGNOSIS — R79.0 ABNORMAL IRON SATURATION: Primary | ICD-10-CM

## 2025-05-05 LAB
BASOPHILS # BLD AUTO: 0.1 10E3/UL (ref 0–0.2)
BASOPHILS NFR BLD AUTO: 1 %
EOSINOPHIL # BLD AUTO: 0.1 10E3/UL (ref 0–0.7)
EOSINOPHIL NFR BLD AUTO: 1 %
ERYTHROCYTE [DISTWIDTH] IN BLOOD BY AUTOMATED COUNT: 14.8 % (ref 10–15)
FERRITIN SERPL-MCNC: 23 NG/ML (ref 6–175)
HCT VFR BLD AUTO: 36 % (ref 35–47)
HGB BLD-MCNC: 11.6 G/DL (ref 11.7–15.7)
IMM GRANULOCYTES # BLD: 0 10E3/UL
IMM GRANULOCYTES NFR BLD: 0 %
IRON BINDING CAPACITY (ROCHE): 361 UG/DL (ref 240–430)
IRON SATN MFR SERPL: 29 % (ref 15–46)
IRON SERPL-MCNC: 106 UG/DL (ref 37–145)
LYMPHOCYTES # BLD AUTO: 2.1 10E3/UL (ref 0.8–5.3)
LYMPHOCYTES NFR BLD AUTO: 17 %
MCH RBC QN AUTO: 30 PG (ref 26.5–33)
MCHC RBC AUTO-ENTMCNC: 32.2 G/DL (ref 31.5–36.5)
MCV RBC AUTO: 93 FL (ref 78–100)
MONOCYTES # BLD AUTO: 0.9 10E3/UL (ref 0–1.3)
MONOCYTES NFR BLD AUTO: 7 %
NEUTROPHILS # BLD AUTO: 8.6 10E3/UL (ref 1.6–8.3)
NEUTROPHILS NFR BLD AUTO: 74 %
NRBC # BLD AUTO: 0 10E3/UL
NRBC BLD AUTO-RTO: 0 /100
PLATELET # BLD AUTO: 423 10E3/UL (ref 150–450)
RBC # BLD AUTO: 3.87 10E6/UL (ref 3.8–5.2)
WBC # BLD AUTO: 11.8 10E3/UL (ref 4–11)

## 2025-05-05 PROCEDURE — 36415 COLL VENOUS BLD VENIPUNCTURE: CPT | Performed by: INTERNAL MEDICINE

## 2025-05-05 PROCEDURE — 36415 COLL VENOUS BLD VENIPUNCTURE: CPT

## 2025-05-05 PROCEDURE — 85004 AUTOMATED DIFF WBC COUNT: CPT | Performed by: INTERNAL MEDICINE

## 2025-05-05 PROCEDURE — 83550 IRON BINDING TEST: CPT | Performed by: INTERNAL MEDICINE

## 2025-05-05 PROCEDURE — 82728 ASSAY OF FERRITIN: CPT | Performed by: INTERNAL MEDICINE

## 2025-05-05 PROCEDURE — 99213 OFFICE O/P EST LOW 20 MIN: CPT | Performed by: INTERNAL MEDICINE

## 2025-05-05 PROCEDURE — 99214 OFFICE O/P EST MOD 30 MIN: CPT | Performed by: INTERNAL MEDICINE

## 2025-05-05 ASSESSMENT — PAIN SCALES - GENERAL: PAINLEVEL_OUTOF10: NO PAIN (0)

## 2025-05-05 NOTE — LETTER
"5/5/2025      Inez Boston  [address] [place] [facility] [address]      Dear Colleague,    Thank you for referring your patient, Inez Boston, to the [facility]. Please see a copy of my visit note below.    Oncology Rooming Note    May 5, 2025 3:02 PM   Inez Boston is a 49 year old female who presents for:    Chief Complaint   Patient presents with     Oncology Clinic Visit     Thrombocytosis     Initial Vitals: /71 (BP Location: Left arm, Patient Position: Sitting, Cuff Size: Adult Large)   Pulse 75   Resp 16   Ht 1.702 m (5' 7\")   Wt 76.2 kg (168 lb)   LMP 04/05/2025 (Approximate)   SpO2 97%   BMI 26.31 kg/m   Estimated body mass index is 26.31 kg/m  as calculated from the following:    Height as of this encounter: 1.702 m (5' 7\").    Weight as of this encounter: 76.2 kg (168 lb). Body surface area is 1.9 meters squared.  No Pain (0) Comment: Data Unavailable   Patient's last menstrual period was 04/05/2025 (approximate).  Allergies reviewed: Yes  Medications reviewed: Yes    Medications: Medication refills not needed today.  Pharmacy name entered into [employer]: [id number] - [place], [place] - [address]    Frailty Screening:   Is the patient here for a new oncology consult visit in cancer care? 2. No    PHQ9:  Did this patient require a PHQ9?: No      Clinical concerns: None       Rosa Maria Pratt MA              [facility]    Hematology/Oncology New Patient Note      Today's Date: 05/05/25    Reason for Consult: Iron deficiency      HISTORY OF PRESENT ILLNESS: Inez is a very pleasant 49-year-old female with the following hematologic history  1.  Saw her for thrombocytosis in 2022 and at that time it was felt that she likely had reactive thrombocytosis as a new Arcara JAK2 mutation was tested and negative.  Patient had thrombocytosis prior to COVID infection in October 2020.  Most recent thrombocytosis was related to " iron deficiency now resolved  2.  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  Mother had breast cancer at the age of 47  3.Due to heavy menstruation recent blood work completed showed a hemoglobin of 9.4 the lowest most recently at 11.7.  She was also diagnosed with iron deficiency with iron saturation of 5%.  Ferritin of 18 with an elevated soluble transferrin    4 started oral iron daily currently taking it with some issues with constipation  5 lab morphology negative  6 recent bleeding for last couple of months recently had a polypectomy from the uterus since then the bleeding has lightened  7 endoscopy and colonoscopy completed results reviewed.She was found to have H. pylori testing that was positive    Overall Inez says that she was a little fatigued with iron deficiency but now feeling better.  No issues with hair loss or leg cramps      REVIEW OF SYSTEMS:   14 point ROS was reviewed and is negative other than as noted above in HPI.       HOME MEDICATIONS:  Current Outpatient Medications   Medication Sig Dispense Refill     aspirin 81 MG EC tablet Take 162 mg by mouth every morning       baclofen (LIORESAL) 10 MG tablet Take 1 tablet (10 mg) by mouth nightly as needed. 30 tablet 1     Cyanocobalamin (B-12) 1000 MCG TBCR Take by mouth.       DULoxetine (CYMBALTA) 30 MG capsule Take 1 capsule (30 mg) by mouth daily. 90 capsule 1     ferrous sulfate (FEROSUL) 325 (65 Fe) MG tablet Take 325 mg by mouth daily (with breakfast).       progesterone (PROMETRIUM) 100 MG capsule Take 100 mg by mouth daily.       Vitamin D, Cholecalciferol, 25 MCG (1000 UT) CAPS Take 1,000 Units by mouth daily           ALLERGIES:  No Known Allergies      PAST MEDICAL HISTORY:  Past Medical History:   Diagnosis Date     ADHD (attention deficit hyperactivity disorder)      Anxiety      BRCA gene mutation negative      Cervical dystonia      Hyperlipidemia      Thrombocytosis          PAST SURGICAL HISTORY:  Past Surgical  History:   Procedure Laterality Date     COLONOSCOPY       COLONOSCOPY N/A 4/25/2025    Procedure: COLONOSCOPY, WITH POLYPECTOMY;  Surgeon: Alma Delia Paul MD;  Location: [facility] OR     DILATION AND CURETTAGE, OPERATIVE HYSTEROSCOPY WITH MORCELLATOR, COMBINED N/A 4/4/2025    Procedure: HYSTEROSCOPY, WITH DILATION AND CURETTAGE OF UTERUS USING MORCELLATOR;  Surgeon: Elda Mcpherson MD;  Location: [facility] OR     ESOPHAGOSCOPY, GASTROSCOPY, DUODENOSCOPY (EGD), COMBINED N/A 4/25/2025    Procedure: Esophagoscopy, gastroscopy, duodenoscopy (EGD), combined;  Surgeon: Alma Delia Paul MD;  Location: [facility] OR     LAPAROSCOPIC OOPHORECTOMY Left 6/3/2022    Procedure: Laparoscopic oophorectomy;  Surgeon: Elda Mcpherson MD;  Location: [facility]     LAPAROSCOPIC OOPHORECTOMY  6/3/2022    Procedure: ;  Surgeon: Elda Mcpherson MD;  Location: [facility]     LAPAROSCOPIC OOPHORECTOMY  6/3/2022    Procedure: ;  Surgeon: Elda Mcpherson MD;  Location: [facility]     LAPAROSCOPIC SALPINGOTOMY Bilateral 6/3/2022    Procedure: bilateral salpingectomy;  Surgeon: Elda Mcpherson MD;  Location: [facility] OR     LAPAROSCOPY      6/3/2022     MYRINGOTOMY W/ TUBES      as a child         SOCIAL HISTORY:  Social History     Socioeconomic History     Marital status: Single     Spouse name: Not on file     Number of children: Not on file     Years of education: Not on file     Highest education level: Not on file   Occupational History     Not on file   Tobacco Use     Smoking status: Former     Types: Cigarettes, Vaping Device     Passive exposure: Past     Smokeless tobacco: Never   Vaping Use     Vaping status: Former     Substances: Nicotine     Devices: Pre-filled pod   Substance and Sexual Activity     Alcohol use: Yes     Alcohol/week: 2.0 standard drinks of alcohol     Types: 2 Cans of beer per week     Comment: a couple times a week     Drug use: Yes     Types: Marijuana     Sexual activity: Not on file   Other Topics Concern     Not on file    Social History Narrative    n looking at Inez's family history, it is possible that a cancer susceptibility gene is present due to the family history of early onset breast cancer in her mother and also the history of early onset colon cancer in her maternal grandmother.  In additional, although the available details from the extended family is somewhat limited, the history of a possible uterine cancer in her maternal grandmother's sister (as uterine and colon cancer can be related to each other in some hereditary cancer conditions) and also the early onset cancer in her maternal grandmother's mother is also further supportive of the question about a possible hereditary cancer risk factor in Inez's mother's side of the family.     Social Drivers of Health     Financial Resource Strain: Low Risk  (3/14/2025)    Financial Resource Strain      Within the past 12 months, have you or your family members you live with been unable to get utilities (heat, electricity) when it was really needed?: No   Food Insecurity: Low Risk  (3/14/2025)    Food Insecurity      Within the past 12 months, did you worry that your food would run out before you got money to buy more?: No      Within the past 12 months, did the food you bought just not last and you didn t have money to get more?: No   Transportation Needs: Low Risk  (3/14/2025)    Transportation Needs      Within the past 12 months, has lack of transportation kept you from medical appointments, getting your medicines, non-medical meetings or appointments, work, or from getting things that you need?: No   Physical Activity: Unknown (1/24/2025)    Exercise Vital Sign      Days of Exercise per Week: 3 days      Minutes of Exercise per Session: Not on file   Stress: Stress Concern Present (1/24/2025)    [employer] [place] [employer] - Occupational Stress Questionnaire      Feeling of Stress : To some extent   Social Connections: Unknown (1/24/2025)    Social  "Connection and Isolation Panel [NHANES]      Frequency of Communication with Friends and Family: Not on file      Frequency of Social Gatherings with Friends and Family: Three times a week      Attends [employer] Services: Not on file      Active Member of Clubs or Organizations: Not on file      Attends Club or Organization Meetings: Not on file      Marital Status: Not on file   Interpersonal Safety: Low Risk  (4/25/2025)    Interpersonal Safety      Do you feel physically and emotionally safe where you currently live?: Yes      Within the past 12 months, have you been hit, slapped, kicked or otherwise physically hurt by someone?: No      Within the past 12 months, have you been humiliated or emotionally abused in other ways by your partner or ex-partner?: No   Housing Stability: Low Risk  (3/14/2025)    Housing Stability      Do you have housing? : Yes      Are you worried about losing your housing?: No         FAMILY HISTORY:  Family History   Problem Relation Age of Onset     Skin Cancer Mother      Depression Mother      Breast Cancer Mother 45     Urinary tract infection Mother      Osteoporosis Mother      Attention Deficit Disorder Mother      Coronary Artery Disease Father      Hyperlipidemia Maternal Grandmother      Urinary tract infection Maternal Grandmother      Colon Cancer Maternal Grandmother         early age onset     Hyperlipidemia Maternal Grandfather      Congenital heart disease Paternal Grandmother      Thyroid Disease Paternal Grandmother      Heart Failure Paternal Grandfather      Depression Brother      Attention Deficit Disorder Brother      Substance Abuse Paternal Uncle      Uterine Cancer Other         maternal grandmother's sister     Glaucoma No family hx of      Macular Degeneration No family hx of          PHYSICAL EXAM:  Vital signs:  /71 (BP Location: Left arm, Patient Position: Sitting, Cuff Size: Adult Large)   Pulse 75   Resp 16   Ht 1.702 m (5' 7\")   Wt 76.2 kg " (168 lb)   LMP 2025 (Approximate)   SpO2 97%   BMI 26.31 kg/m     ECO  GENERAL/CONSTITUTIONAL: No acute distress.  EYES: Pupils are equal, round, and react to light and accommodation. Extraocular movements intact.  No scleral icterus.  ENT/MOUTH: Neck supple. Oropharynx clear, no mucositis.  LYMPH: No anterior cervical, posterior cervical, supraclavicular, axillary or inguinal adenopathy.   RESPIRATORY: Clear to auscultation bilaterally. No crackles or wheezing.   CARDIOVASCULAR: Regular rate and rhythm without murmurs, gallops, or rubs.  GASTROINTESTINAL: No hepatosplenomegaly, masses, or tenderness. The patient has normal bowel sounds. No guarding.  No distention.  MUSCULOSKELETAL: Warm and well-perfused, no cyanosis, clubbing, or edema.  NEUROLOGIC: Cranial nerves II-XII are intact. Alert, oriented, answers questions appropriately.  INTEGUMENTARY: No rashes or jaundice.  GAIT: Steady, does not use assistive device      LABS:  CBC RESULTS:   Recent Labs   Lab Test 25  1408   WBC 11.8*   RBC 3.87   HGB 11.6*   HCT 36.0   MCV 93   MCH 30.0   MCHC 32.2   RDW 14.8          Recent Labs   Lab Test 25  0734 23  1953    135*   POTASSIUM 4.4 3.9   CHLORIDE 103 98   CO2 26 25   ANIONGAP 9 12   GLC 98 91   BUN 12.0 7.3   CR 0.64 0.63   TEJA 9.2 8.9         PATHOLOGY:  H pylori testing positive     IMAGING:    none  ASSESSMENT/PLAN:  Inez is a very pleasant 49-year-old female who had a history of reactive thrombocytosis now resolved now presenting with iron deficiency anemia    She also has B12 that are borderline normal that were noted about 2 months ago above 300.  She is a pescatarian    1.  Anemia: Reviewed the chart in detail likely due to iron deficiency from H. pylori being positive and heavy menstruation for few months.  I would recommend changing to iron bisglycinate 3 times weekly now instead of taking it daily.  Its better absorption and easier on the GI tract.  She  prefers to follow-up with her primary care on this so I would recommend that we get iron saturation and ferritin tested in 2 to 3 months and if stable at that time I would stop the iron since she would add 6 months of therapy and recheck again 4 to 6 months later to make sure she is stable.  Hemoglobin is improving.  Peripheral smear noted and suggestive of iron deficiency    Iron levels drawn today we will touch base.     2.  H. pylori positive: Treatment per GI.  No other cause of iron deficiency    3.  RAD 50 VUS positive.  Family history positive for breast cancer.  She has dense breast.  I would recommend alternating MRIs with mammograms.  She is doing that through her primary care physician. Ok for use of HRT up to 5 years if needed    4 Vitamin B12 borderline normal: Told the patient to take B12 2 tablets every other day and repeat in 2 to 3 months.  She will complete that with her primary care physician as well        I have sent a message to her primary care physician.  Follow-up labs with them.  If there is any concerns or questions in the future she will reach out.  See us back as needed        Nam Caceres MD  Hematology/Oncology  [facility]       Again, thank you for allowing me to participate in the care of your patient.        Sincerely,        Nam Caceres MD    Electronically signed

## 2025-05-05 NOTE — PROGRESS NOTES
"Oncology Rooming Note    May 5, 2025 3:02 PM   Inez Boston is a 49 year old female who presents for:    Chief Complaint   Patient presents with    Oncology Clinic Visit     Thrombocytosis     Initial Vitals: /71 (BP Location: Left arm, Patient Position: Sitting, Cuff Size: Adult Large)   Pulse 75   Resp 16   Ht 1.702 m (5' 7\")   Wt 76.2 kg (168 lb)   LMP 04/05/2025 (Approximate)   SpO2 97%   BMI 26.31 kg/m   Estimated body mass index is 26.31 kg/m  as calculated from the following:    Height as of this encounter: 1.702 m (5' 7\").    Weight as of this encounter: 76.2 kg (168 lb). Body surface area is 1.9 meters squared.  No Pain (0) Comment: Data Unavailable   Patient's last menstrual period was 04/05/2025 (approximate).  Allergies reviewed: Yes  Medications reviewed: Yes    Medications: Medication refills not needed today.  Pharmacy name entered into [employer]: [id number] - [facility] [address]    Frailty Screening:   Is the patient here for a new oncology consult visit in cancer care? 2. No    PHQ9:  Did this patient require a PHQ9?: No      Clinical concerns: None       Rosa Maria Pratt MA            "

## 2025-05-05 NOTE — PROGRESS NOTES
Medical Assistant Note:  Inez Boston presents today for blood draw.    Patient seen by provider today: Yes: john.   present during visit today: Not Applicable.    Concerns: No Concerns.    Procedure:  Lab draw site: rac, Needle type: bf, Gauge: 23.    Post Assessment:  Labs drawn without difficulty: Yes.    Discharge Plan:  Departure Mode: Ambulatory.    Face to Face Time: 5 min  Daniel cbc.    Adilene Mcdaniel, CMA

## 2025-05-05 NOTE — PROGRESS NOTES
[facility]    Hematology/Oncology New Patient Note      Today's Date: 05/05/25    Reason for Consult: Iron deficiency      HISTORY OF PRESENT ILLNESS: Inez is a very pleasant 49-year-old female with the following hematologic history  1.  Saw her for thrombocytosis in 2022 and at that time it was felt that she likely had reactive thrombocytosis as a new Arcara JAK2 mutation was tested and negative.  Patient had thrombocytosis prior to COVID infection in October 2020.  Most recent thrombocytosis was related to iron deficiency now resolved  2.  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  Mother had breast cancer at the age of 47  3.Due to heavy menstruation recent blood work completed showed a hemoglobin of 9.4 the lowest most recently at 11.7.  She was also diagnosed with iron deficiency with iron saturation of 5%.  Ferritin of 18 with an elevated soluble transferrin    4 started oral iron daily currently taking it with some issues with constipation  5 lab morphology negative  6 recent bleeding for last couple of months recently had a polypectomy from the uterus since then the bleeding has lightened  7 endoscopy and colonoscopy completed results reviewed.She was found to have H. pylori testing that was positive    Overall Inez says that she was a little fatigued with iron deficiency but now feeling better.  No issues with hair loss or leg cramps      REVIEW OF SYSTEMS:   14 point ROS was reviewed and is negative other than as noted above in HPI.       HOME MEDICATIONS:  Current Outpatient Medications   Medication Sig Dispense Refill    aspirin 81 MG EC tablet Take 162 mg by mouth every morning      baclofen (LIORESAL) 10 MG tablet Take 1 tablet (10 mg) by mouth nightly as needed. 30 tablet 1    Cyanocobalamin (B-12) 1000 MCG TBCR Take by mouth.      DULoxetine (CYMBALTA) 30 MG capsule Take 1 capsule (30 mg) by mouth daily. 90 capsule 1    ferrous sulfate (FEROSUL) 325 (65 Fe)  MG tablet Take 325 mg by mouth daily (with breakfast).      progesterone (PROMETRIUM) 100 MG capsule Take 100 mg by mouth daily.      Vitamin D, Cholecalciferol, 25 MCG (1000 UT) CAPS Take 1,000 Units by mouth daily           ALLERGIES:  No Known Allergies      PAST MEDICAL HISTORY:  Past Medical History:   Diagnosis Date    ADHD (attention deficit hyperactivity disorder)     Anxiety     BRCA gene mutation negative     Cervical dystonia     Hyperlipidemia     Thrombocytosis          PAST SURGICAL HISTORY:  Past Surgical History:   Procedure Laterality Date    COLONOSCOPY      COLONOSCOPY N/A 4/25/2025    Procedure: COLONOSCOPY, WITH POLYPECTOMY;  Surgeon: Alma Delia Paul MD;  Location: [facility] OR    DILATION AND CURETTAGE, OPERATIVE HYSTEROSCOPY WITH MORCELLATOR, COMBINED N/A 4/4/2025    Procedure: HYSTEROSCOPY, WITH DILATION AND CURETTAGE OF UTERUS USING MORCELLATOR;  Surgeon: Elda Mcpherson MD;  Location: [facility] OR    ESOPHAGOSCOPY, GASTROSCOPY, DUODENOSCOPY (EGD), COMBINED N/A 4/25/2025    Procedure: Esophagoscopy, gastroscopy, duodenoscopy (EGD), combined;  Surgeon: Alma Delia Paul MD;  Location: [facility] OR    LAPAROSCOPIC OOPHORECTOMY Left 6/3/2022    Procedure: Laparoscopic oophorectomy;  Surgeon: Elda Mcpherson MD;  Location: [facility]    LAPAROSCOPIC OOPHORECTOMY  6/3/2022    Procedure: ;  Surgeon: Elda Mcpherson MD;  Location: [facility]    LAPAROSCOPIC OOPHORECTOMY  6/3/2022    Procedure: ;  Surgeon: Elda Mcpherson MD;  Location: [facility]    LAPAROSCOPIC SALPINGOTOMY Bilateral 6/3/2022    Procedure: bilateral salpingectomy;  Surgeon: Elda Mcpherson MD;  Location: [facility] OR    LAPAROSCOPY      6/3/2022    MYRINGOTOMY W/ TUBES      as a child         SOCIAL HISTORY:  Social History     Socioeconomic History    Marital status: Single     Spouse name: Not on file    Number of children: Not on file    Years of education: Not on file    Highest education level: Not on file   Occupational History    Not on  file   Tobacco Use    Smoking status: Former     Types: Cigarettes, Vaping Device     Passive exposure: Past    Smokeless tobacco: Never   Vaping Use    Vaping status: Former    Substances: Nicotine    Devices: Pre-filled pod   Substance and Sexual Activity    Alcohol use: Yes     Alcohol/week: 2.0 standard drinks of alcohol     Types: 2 Cans of beer per week     Comment: a couple times a week    Drug use: Yes     Types: Marijuana    Sexual activity: Not on file   Other Topics Concern    Not on file   Social History Narrative    n looking at Inez's family history, it is possible that a cancer susceptibility gene is present due to the family history of early onset breast cancer in her mother and also the history of early onset colon cancer in her maternal grandmother.  In additional, although the available details from the extended family is somewhat limited, the history of a possible uterine cancer in her maternal grandmother's sister (as uterine and colon cancer can be related to each other in some hereditary cancer conditions) and also the early onset cancer in her maternal grandmother's mother is also further supportive of the question about a possible hereditary cancer risk factor in Inez's mother's side of the family.     Social Drivers of Health     Financial Resource Strain: Low Risk  (3/14/2025)    Financial Resource Strain     Within the past 12 months, have you or your family members you live with been unable to get utilities (heat, electricity) when it was really needed?: No   Food Insecurity: Low Risk  (3/14/2025)    Food Insecurity     Within the past 12 months, did you worry that your food would run out before you got money to buy more?: No     Within the past 12 months, did the food you bought just not last and you didn t have money to get more?: No   Transportation Needs: Low Risk  (3/14/2025)    Transportation Needs     Within the past 12 months, has lack of transportation kept you from medical  appointments, getting your medicines, non-medical meetings or appointments, work, or from getting things that you need?: No   Physical Activity: Unknown (1/24/2025)    Exercise Vital Sign     Days of Exercise per Week: 3 days     Minutes of Exercise per Session: Not on file   Stress: Stress Concern Present (1/24/2025)    [employer] [place] [employer] - Occupational Stress Questionnaire     Feeling of Stress : To some extent   Social Connections: Unknown (1/24/2025)    Social Connection and Isolation Panel [NHANES]     Frequency of Communication with Friends and Family: Not on file     Frequency of Social Gatherings with Friends and Family: Three times a week     Attends Jehovah's witness Services: Not on file     Active Member of Clubs or Organizations: Not on file     Attends Club or Organization Meetings: Not on file     Marital Status: Not on file   Interpersonal Safety: Low Risk  (4/25/2025)    Interpersonal Safety     Do you feel physically and emotionally safe where you currently live?: Yes     Within the past 12 months, have you been hit, slapped, kicked or otherwise physically hurt by someone?: No     Within the past 12 months, have you been humiliated or emotionally abused in other ways by your partner or ex-partner?: No   Housing Stability: Low Risk  (3/14/2025)    Housing Stability     Do you have housing? : Yes     Are you worried about losing your housing?: No         FAMILY HISTORY:  Family History   Problem Relation Age of Onset    Skin Cancer Mother     Depression Mother     Breast Cancer Mother 45    Urinary tract infection Mother     Osteoporosis Mother     Attention Deficit Disorder Mother     Coronary Artery Disease Father     Hyperlipidemia Maternal Grandmother     Urinary tract infection Maternal Grandmother     Colon Cancer Maternal Grandmother         early age onset    Hyperlipidemia Maternal Grandfather     Congenital heart disease Paternal Grandmother     Thyroid Disease Paternal  "Grandmother     Heart Failure Paternal Grandfather     Depression Brother     Attention Deficit Disorder Brother     Substance Abuse Paternal Uncle     Uterine Cancer Other         maternal grandmother's sister    Glaucoma No family hx of     Macular Degeneration No family hx of          PHYSICAL EXAM:  Vital signs:  /71 (BP Location: Left arm, Patient Position: Sitting, Cuff Size: Adult Large)   Pulse 75   Resp 16   Ht 1.702 m (5' 7\")   Wt 76.2 kg (168 lb)   LMP 2025 (Approximate)   SpO2 97%   BMI 26.31 kg/m     ECO  GENERAL/CONSTITUTIONAL: No acute distress.  EYES: Pupils are equal, round, and react to light and accommodation. Extraocular movements intact.  No scleral icterus.  ENT/MOUTH: Neck supple. Oropharynx clear, no mucositis.  LYMPH: No anterior cervical, posterior cervical, supraclavicular, axillary or inguinal adenopathy.   RESPIRATORY: Clear to auscultation bilaterally. No crackles or wheezing.   CARDIOVASCULAR: Regular rate and rhythm without murmurs, gallops, or rubs.  GASTROINTESTINAL: No hepatosplenomegaly, masses, or tenderness. The patient has normal bowel sounds. No guarding.  No distention.  MUSCULOSKELETAL: Warm and well-perfused, no cyanosis, clubbing, or edema.  NEUROLOGIC: Cranial nerves II-XII are intact. Alert, oriented, answers questions appropriately.  INTEGUMENTARY: No rashes or jaundice.  GAIT: Steady, does not use assistive device      LABS:  CBC RESULTS:   Recent Labs   Lab Test 25  1408   WBC 11.8*   RBC 3.87   HGB 11.6*   HCT 36.0   MCV 93   MCH 30.0   MCHC 32.2   RDW 14.8          Recent Labs   Lab Test 25  0734 23  1953    135*   POTASSIUM 4.4 3.9   CHLORIDE 103 98   CO2 26 25   ANIONGAP 9 12   GLC 98 91   BUN 12.0 7.3   CR 0.64 0.63   TEJA 9.2 8.9         PATHOLOGY:  H pylori testing positive     IMAGING:    none  ASSESSMENT/PLAN:  Inez is a very pleasant 49-year-old female who had a history of reactive thrombocytosis now " resolved now presenting with iron deficiency anemia    She also has B12 that are borderline normal that were noted about 2 months ago above 300.  She is a pescatarian    1.  Anemia: Reviewed the chart in detail likely due to iron deficiency from H. pylori being positive and heavy menstruation for few months.  I would recommend changing to iron bisglycinate 3 times weekly now instead of taking it daily.  Its better absorption and easier on the GI tract.  She prefers to follow-up with her primary care on this so I would recommend that we get iron saturation and ferritin tested in 2 to 3 months and if stable at that time I would stop the iron since she would add 6 months of therapy and recheck again 4 to 6 months later to make sure she is stable.  Hemoglobin is improving.  Peripheral smear noted and suggestive of iron deficiency    Iron levels drawn today we will touch base.     2.  H. pylori positive: Treatment per GI.  No other cause of iron deficiency    3.  RAD 50 VUS positive.  Family history positive for breast cancer.  She has dense breast.  I would recommend alternating MRIs with mammograms.  She is doing that through her primary care physician. Ok for use of HRT up to 5 years if needed    4 Vitamin B12 borderline normal: Told the patient to take B12 2 tablets every other day and repeat in 2 to 3 months.  She will complete that with her primary care physician as well        I have sent a message to her primary care physician.  Follow-up labs with them.  If there is any concerns or questions in the future she will reach out.  See us back as needed        Nam Caceres MD  Hematology/Oncology  [facility]

## 2025-05-13 ENCOUNTER — MYC MEDICAL ADVICE (OUTPATIENT)
Dept: INTERNAL MEDICINE | Facility: CLINIC | Age: 50
End: 2025-05-13
Payer: COMMERCIAL

## 2025-05-13 ENCOUNTER — RESULTS FOLLOW-UP (OUTPATIENT)
Dept: GASTROENTEROLOGY | Facility: CLINIC | Age: 50
End: 2025-05-13

## 2025-05-13 DIAGNOSIS — A04.8 H. PYLORI INFECTION: Primary | ICD-10-CM

## 2025-05-15 ENCOUNTER — PATIENT OUTREACH (OUTPATIENT)
Dept: GASTROENTEROLOGY | Facility: CLINIC | Age: 50
End: 2025-05-15
Payer: COMMERCIAL

## 2025-05-15 PROBLEM — D12.6 ADENOMATOUS POLYP OF COLON: Status: ACTIVE | Noted: 2025-05-15

## 2025-05-21 ENCOUNTER — MYC MEDICAL ADVICE (OUTPATIENT)
Dept: INTERNAL MEDICINE | Facility: CLINIC | Age: 50
End: 2025-05-21
Payer: COMMERCIAL

## 2025-05-21 DIAGNOSIS — F41.9 ANXIETY: ICD-10-CM

## 2025-05-21 RX ORDER — DULOXETIN HYDROCHLORIDE 30 MG/1
30 CAPSULE, DELAYED RELEASE ORAL DAILY
Qty: 90 CAPSULE | Refills: 1 | Status: CANCELLED | OUTPATIENT
Start: 2025-05-21

## 2025-05-21 NOTE — TELEPHONE ENCOUNTER
Last Written Prescription:     DULoxetine (CYMBALTA) 30 MG capsule 90 capsule 1 4/17/2025 -- No   Sig - Route: Take 1 capsule (30 mg) by mouth daily. - Oral   Addressed in a different encounter.

## 2025-05-28 ENCOUNTER — OFFICE VISIT (OUTPATIENT)
Dept: DERMATOLOGY | Facility: CLINIC | Age: 50
End: 2025-05-28
Payer: COMMERCIAL

## 2025-05-28 DIAGNOSIS — L82.1 SK (SEBORRHEIC KERATOSIS): ICD-10-CM

## 2025-05-28 DIAGNOSIS — L71.9 ROSACEA: Primary | ICD-10-CM

## 2025-05-28 DIAGNOSIS — R79.89 ELEVATED PLATELET COUNT: ICD-10-CM

## 2025-05-28 DIAGNOSIS — D22.9 MULTIPLE NEVI: ICD-10-CM

## 2025-05-28 DIAGNOSIS — R94.6 ABNORMAL FINDING ON THYROID FUNCTION TEST: ICD-10-CM

## 2025-05-28 DIAGNOSIS — B36.0 TV (TINEA VERSICOLOR): ICD-10-CM

## 2025-05-28 DIAGNOSIS — Z12.83 SKIN CANCER SCREENING: ICD-10-CM

## 2025-05-28 RX ORDER — KETOCONAZOLE 20 MG/ML
SHAMPOO, SUSPENSION TOPICAL
Qty: 120 ML | Refills: 11 | Status: SHIPPED | OUTPATIENT
Start: 2025-05-28

## 2025-05-28 ASSESSMENT — PAIN SCALES - GENERAL: PAINLEVEL_OUTOF10: NO PAIN (0)

## 2025-05-28 NOTE — NURSING NOTE
Dermatology Rooming Note    Inez Boston's goals for this visit include:   Chief Complaint   Patient presents with    Derm Problem     Inez is here today for a skin check; She reports no spots of concern.     AUTUMN Villalobos

## 2025-05-28 NOTE — PROGRESS NOTES
[facility] Dermatology Note  Encounter Date: May 28, 2025  Office Visit     Dermatology Problem List:    1. Folliculitis, upper back   - Clindamycin 1% lotion      2. Arthropod bites, papular urticaria   - Triamcinolone 0.1% ointment      3. Hair loss, right scalp, since 2020   Previous treatments: topical minoxidil (not effective)    ____________________________________________    Assessment & Plan:     # Nevi- none with atypia  - discussed warning signs for skin cancer  - continue good sun protection    # Benign findings: lentigo, cherry angiomas and seborrheic keratoses    # Rosacea  - trial of metrocream twice daily    # Tinea versicolor on back  - ketoconazole shampoo as wash on back daily.       Follow-up: 1 year(s) in-person, or earlier for new or changing lesions    Staff:     Valentina Chaves MD  ____________________________________________    CC: Derm Problem (Inez is here today for a skin check; She reports no spots of concern.)    HPI:  Ms. Inez Boston is a(n) 49 year old female who presents today as a return patient for a skin check.  I am meeting her for the first time.  No bleeding or tender lesions.  No changing moles.  Does note acne bumps of face    Patient is otherwise feeling well, without additional skin concerns.     Labs Reviewed:  N/A    Physical Exam:  Vitals: LMP 04/05/2025 (Approximate)   SKIN: Total skin excluding the undergarment areas but including buttocks was performed. The exam included the head/face, neck, both arms, chest, back, abdomen, both legs, digits and/or nails.   - nevi with no atypia on dermoscopy  - lentigo  - cherry angiomas  - waxy stuck on papules  - inflammatory papules of cheeks and nose with erythema  - pink salmon patches with scale of upper back     - No other lesions of concern on areas examined.     Medications:  Current Outpatient Medications   Medication Sig Dispense Refill    aspirin 81 MG EC tablet Take 162 mg by mouth every morning       baclofen (LIORESAL) 10 MG tablet Take 1 tablet (10 mg) by mouth nightly as needed. 30 tablet 1    Cyanocobalamin (B-12) 1000 MCG TBCR Take by mouth.      DULoxetine (CYMBALTA) 30 MG capsule Take 1 capsule (30 mg) by mouth daily. 90 capsule 1    progesterone (PROMETRIUM) 100 MG capsule Take 100 mg by mouth daily. (Patient taking differently: Take 100 mg by mouth daily. PRN)      Vitamin D, Cholecalciferol, 25 MCG (1000 UT) CAPS Take 1,000 Units by mouth daily      ferrous sulfate (FEROSUL) 325 (65 Fe) MG tablet Take 325 mg by mouth daily (with breakfast). (Patient not taking: Reported on 5/28/2025)       Current Facility-Administered Medications   Medication Dose Route Frequency Provider Last Rate Last Admin    bupivacaine (MARCAINE) 0.25% preservative free injection  8 mL Other Once           Past Medical History:   Patient Active Problem List   Diagnosis    Pelvic pain in female    Urge incontinence    Anxiety    Attention deficit hyperactivity disorder (ADHD), combined type    Dysesthesia    Dystonia    History of severe acute respiratory syndrome coronavirus 2 (SARS-CoV-2) disease    Cannabis use disorder, mild, abuse    Tobacco use disorder, moderate, dependence    Recurrent major depressive disorder, in full remission    Mixed hyperlipidemia    Thrombocytosis    Abnormal uterine bleeding (AUB)    Adenomatous polyp of colon     Past Medical History:   Diagnosis Date    ADHD (attention deficit hyperactivity disorder)     Anxiety     BRCA gene mutation negative     Cervical dystonia     Hyperlipidemia     Thrombocytosis        CC Tony Segal MD  [address] [address] on close of this encounter.

## 2025-05-30 ENCOUNTER — ANCILLARY PROCEDURE (OUTPATIENT)
Dept: MAMMOGRAPHY | Facility: CLINIC | Age: 50
End: 2025-05-30
Attending: INTERNAL MEDICINE
Payer: COMMERCIAL

## 2025-05-30 DIAGNOSIS — Z12.31 ENCOUNTER FOR SCREENING MAMMOGRAM FOR BREAST CANCER: ICD-10-CM

## 2025-05-30 PROCEDURE — 77063 BREAST TOMOSYNTHESIS BI: CPT | Mod: GC

## 2025-05-30 PROCEDURE — 77067 SCR MAMMO BI INCL CAD: CPT | Mod: GC

## 2025-06-11 DIAGNOSIS — G24.4 IDIOPATHIC OROFACIAL DYSTONIA: ICD-10-CM

## 2025-06-11 DIAGNOSIS — G24.3 CERVICAL DYSTONIA: Primary | ICD-10-CM

## 2025-06-11 DIAGNOSIS — G24.1 GENETIC TORSION DYSTONIA: ICD-10-CM

## 2025-06-12 NOTE — PROGRESS NOTES
Medication Therapy Management (MTM) Encounter    ASSESSMENT:                            Medication Adherence/Access: No issues identified.    H. pylori:  Inez would benefit from continuing first-line therapy with optimized bismuth quadruple therapy for treatment of H. Pylori. Inez will be due for eradication testing no sooner than 4 weeks after completion of tetracycline and metronidazole. Patient should not take PPIs for at least 1-2 weeks prior to eradication testing. Earliest eradication test date: 7/19/25. Will follow up via [employer] after eradication test has resulted.       PLAN:                               I recommend completing treatment with Bismuth Quadruple therapy for 14 days  Tetracycline 500 mg: Take 1 capsule by mouth 4 times daily   Metronidazole 250 mg: Take 1 tablet by mouth 4 times daily   Pepto Bismol 262 mg chew tab: Take 2 tablets by mouth 4 times daily   Omeprazole 20 mg: Take 1 capsule by mouth twice daily      Please continue to hold your iron supplement while on these medications     Please limit your calcium intake (dairy products) while on these medications.    An order for H. Pylori eradication test kit has been placed. You can  from any [facility] lab at your convenience but do not test until on or after 7/19/25  Make a lab only appointment to get your kit:  [employer] scheduling: [employer] offers online lab scheduling at all [facility] laboratory locations.   Call to schedule: You can call [phone] to schedule your lab appointment.    Follow-up: via [employer] for eradication reminder    SUBJECTIVE/OBJECTIVE:                          Inez Boston is a 49 year old female seen for a follow-up visit.       Reason for visit: H. pylori treatment and eradication.     Allergies/ADRs: Reviewed in chart  Tobacco: She reports that she has quit smoking. Her smoking use included cigarettes and vaping device. She has been exposed to tobacco smoke. She has never used smokeless  tobacco.    Medication Adherence/Access: no issues reported.    H. pylori:   - Bismuth Quad:   - Tetracycline 500 mg : 1 cap 4 times daily   - Metronidazole 500 m tab 4 times daily  - Pepto Bismol 262 mg chew tab: 2 tabs (524 mg) 4 times daily   - Omeprazole 20 mg twice daily  Duration of Therapy:  14 days     Started regimen on: 25  Anticipated completion date: 25  Eradication testing no sooner than: 25    H. Pylori symptoms reported at previous visit:   burning pain in stomach, heartburn, bloating, and loss of appetite   Feels H. Pylori symptoms have improved.    Taking medications as directed: yes   Adverse effects: first day has some nausea but has not had any since. A little bit more tired, dark stool and loose stool   Confirms has been limiting or avoiding dairy.  Confirms understanding of eradication testing.    Diagnosis by stomach biopsy on 25   Previous treatment regimens: none on file  Previous Macrolide exposure: none on file  Penicillin Allergy:  No  Pregnant/breastfeeding: No  KIDNEY: No known chronic kidney disease  LIVER: No known chronic liver disease  Drug-drug interactions: No dairy, multivitamins or antacids         Today's Vitals: LMP 2025 (Approximate)   ----------------      I spent 8 minutes with this patient today. All changes were made via collaborative practice agreement with Alma Delia Paul MD.     A summary of these recommendations was sent via [email or web address].    Misa Welch, PharmD  MTM Pharmacist  [facility]     Telemedicine Visit Details  The patient's medications can be safely assessed via a telemedicine encounter.  Type of service:  Telephone visit  Originating Location (pt. Location): Home    Distant Location (provider location):  Off-site  Start Time: 1200  End Time: 1208     Medication Therapy Recommendations  No medication therapy recommendations to display

## 2025-06-13 ENCOUNTER — VIRTUAL VISIT (OUTPATIENT)
Dept: PHARMACY | Facility: CLINIC | Age: 50
End: 2025-06-13
Attending: INTERNAL MEDICINE
Payer: COMMERCIAL

## 2025-06-13 DIAGNOSIS — A04.8 H. PYLORI INFECTION: Primary | ICD-10-CM

## 2025-06-13 NOTE — NURSING NOTE
Current patient location: Work    Is the patient currently in the state of [place]? YES    Visit mode: TELEPHONE    If the visit is dropped, the patient can be reconnected by:TELEPHONE VISIT: Phone number:   Telephone Information:   Mobile [phone]       Will anyone else be joining the visit? NO  (If patient encounters technical issues they should call [phone])    Are changes needed to the allergy or medication list? Pt stated no changes to allergies and Pt stated no med changes    Are refills needed on medications prescribed by this physician? NO    Rooming Documentation:  Not applicable    Reason for visit: RECHECK    Puja DSOUZA

## 2025-06-17 NOTE — PATIENT INSTRUCTIONS
"Recommendations from today's MTM visit:                                                       I recommend completing treatment with Bismuth Quadruple therapy for 14 days  Tetracycline 500 mg: Take 1 capsule by mouth 4 times daily   Metronidazole 250 mg: Take 1 tablet by mouth 4 times daily   Pepto Bismol 262 mg chew tab: Take 2 tablets by mouth 4 times daily   Omeprazole 20 mg: Take 1 capsule by mouth twice daily      Please continue to hold your iron supplement while on these medications     Please limit your calcium intake (dairy products) while on these medications.    An order for H. Pylori eradication test kit has been placed. You can  from any [facility] lab at your convenience but do not test until on or after 7/19/25  Make a lab only appointment to get your kit:  [employer] scheduling: [employer] offers online lab scheduling at all [facility] laboratory locations.   Call to schedule: You can call [phone] to schedule your lab appointment.    Follow-up: via [employer] for eradication reminder    It was great speaking with you today.  I value your experience and would be very thankful for your time in providing feedback in our clinic survey. In the next few days, you may receive an email or text message from [employer] with a link to a survey related to your  clinical pharmacist.\"     To schedule another [facility] appointment, please call the clinic directly or you may call the [facility] scheduling line at [phone].    My Clinical Pharmacist's contact information:                                                      Please feel free to contact me with any questions or concerns you have.      Misa Welch, PharmD  [facility] Pharmacist  [facility]    "

## 2025-06-27 ENCOUNTER — OFFICE VISIT (OUTPATIENT)
Dept: PHYSICAL MEDICINE AND REHAB | Facility: CLINIC | Age: 50
End: 2025-06-27
Payer: COMMERCIAL

## 2025-06-27 DIAGNOSIS — G24.1 GENETIC TORSION DYSTONIA: ICD-10-CM

## 2025-06-27 DIAGNOSIS — G24.3 CERVICAL DYSTONIA: Primary | ICD-10-CM

## 2025-06-27 DIAGNOSIS — G24.4 IDIOPATHIC OROFACIAL DYSTONIA: ICD-10-CM

## 2025-06-27 PROCEDURE — 64643 CHEMODENERV 1 EXTREM 1-4 EA: CPT | Performed by: PHYSICAL MEDICINE & REHABILITATION

## 2025-06-27 PROCEDURE — 64612 DESTROY NERVE FACE MUSCLE: CPT | Mod: 50 | Performed by: PHYSICAL MEDICINE & REHABILITATION

## 2025-06-27 PROCEDURE — 64642 CHEMODENERV 1 EXTREMITY 1-4: CPT | Performed by: PHYSICAL MEDICINE & REHABILITATION

## 2025-06-27 PROCEDURE — 64616 CHEMODENERV MUSC NECK DYSTON: CPT | Mod: 50 | Performed by: PHYSICAL MEDICINE & REHABILITATION

## 2025-06-27 PROCEDURE — 95874 GUIDE NERV DESTR NEEDLE EMG: CPT | Performed by: PHYSICAL MEDICINE & REHABILITATION

## 2025-06-27 RX ADMIN — BUPIVACAINE HYDROCHLORIDE 20 MG: 2.5 INJECTION, SOLUTION EPIDURAL; INFILTRATION; INTRACAUDAL; PERINEURAL at 12:43

## 2025-06-27 NOTE — LETTER
6/27/2025       RE: Inez Boston  [address] [facility] [address]     Dear Colleague,    Thank you for referring your patient, Inez Boston, to the [facility] [place] at [facility]. Please see a copy of my visit note below.      [facility]     PM&R CLINIC NOTE  BOTULINUM TOXIN PROCEDURE      HPI  No chief complaint on file.    Inez Boston is a 49 year old female with a history of  who presents to clinic for botulinum toxin injections for management of rather generalized dystonia, including cervical, oromandibular and genetic torsion dystonia.      SINCE LAST VISIT  Inez Boston was last seen here in clinic on 3/31/2025, at which time she received 375 units of Botox.     Patient denies new medical diagnoses, illnesses, hospitalizations, emergency room visits, and injuries since the previous injection with botulinum neurotoxin.     RESPONSE TO PREVIOUS TREATMENT    Side effects: No problems reported.    Pain Improvement: Yes. Approximately >50% improvement. Duration of Benefit: 11 weeks followed by gradual decrease in benefit. The last few days have been more painful.     Dystonia Improvement: Yes. Approximately >50% improvement. Botox has been extremely beneficial with regards to her dystonia.   Duration of Benefit: 11 weeks followed by gradual decrease in benefit over the last few days.    Functional Improvement: Yes, her dystonia has improved so much that she was able to go back to working. She very much looks forward to her Botox appointments because it helps her so much.       PHYSICAL EXAM  VS: There were no vitals taken for this visit.   GEN: Pleasant and cooperative, in no acute distress  HEENT: No asymmetry with eyebrow lift.  HEAD AND NECK: Full ROM but with spasms on neck extension, b/l lateral flexion  HEAD, NECK AND TRUNK PATTERN:   Head & Neck  Flexion:  Present  Head & Neck Rotation: Slight  Right  Shoulder Elevation:  Slight Left  JAW AND FACIAL PATTERN:   Jaw Clenching:   Bilateral  VOCAL INVOLVEMENT:   No  SPASMS: Dystonic movements with irritation and facial movements.       ALLERGIES  No Known Allergies    CURRENT MEDICATIONS    Current Outpatient Medications:      aspirin 81 MG EC tablet, Take 162 mg by mouth every morning, Disp: , Rfl:      baclofen (LIORESAL) 10 MG tablet, Take 1 tablet (10 mg) by mouth nightly as needed., Disp: 30 tablet, Rfl: 1     bismuth subsalicylate (PEPTO BISMOL) 262 MG chewable tablet, Take 2 tablets (524 mg) by mouth 4 times daily (before meals and nightly). Please direct to buy OTC if not covered. H. Pylori., Disp: 112 tablet, Rfl: 0     Cyanocobalamin (B-12) 1000 MCG TBCR, Take by mouth., Disp: , Rfl:      DULoxetine (CYMBALTA) 30 MG capsule, Take 1 capsule (30 mg) by mouth daily., Disp: 90 capsule, Rfl: 1     ferrous sulfate (FEROSUL) 325 (65 Fe) MG tablet, Take 325 mg by mouth daily (with breakfast). (Patient not taking: Reported on 5/28/2025), Disp: , Rfl:      ketoconazole (NIZORAL) 2 % external shampoo, Use as wash for back rash daily, Disp: 120 mL, Rfl: 11     metroNIDAZOLE (FLAGYL) 500 MG tablet, Take 1 tablet (500 mg) by mouth 4 times daily., Disp: 56 tablet, Rfl: 0     metroNIDAZOLE (METROCREAM) 0.75 % external cream, Twice daily for face, Disp: 45 g, Rfl: 11     omeprazole (PRILOSEC) 20 MG DR capsule, Take 1 capsule (20 mg) by mouth 2 times daily. Take 30-60 minutes before a meal. If you forget, take after meal., Disp: 28 capsule, Rfl: 0     progesterone (PROMETRIUM) 100 MG capsule, Take 100 mg by mouth daily. (Patient taking differently: Take 100 mg by mouth daily. PRN), Disp: , Rfl:      tetracycline (ACHROMYCIN/SUMYCIN) 500 MG capsule, Take 1 capsule (500 mg) by mouth 4 times daily., Disp: 56 capsule, Rfl: 0     Vitamin D, Cholecalciferol, 25 MCG (1000 UT) CAPS, Take 1,000 Units by mouth daily, Disp: , Rfl:      Current Facility-Administered Medications:      botulinum toxin type A (BOTOX) 100 units injection 400 Units, 400 Units, Intramuscular, Q12 weeks, Elizabeth Fischer MD, 400 Units at 25 1243       BOTULINUM NEUROTOXIN INJECTION PROCEDURES    VERIFICATION OF PATIENT IDENTIFICATION AND PROCEDURE     Initials   Patient Name SES   Patient  SES   Procedure Verified by: SES     Prior to the start of the procedure and with procedural staff participation, I verbally confirmed the patient s identity using two indicators, relevant allergies, that the procedure was appropriate and matched the consent or emergent situation, and that the correct equipment/implants were available. Immediately prior to starting the procedure I conducted the Time Out with the procedural staff and re-confirmed the patient s name, procedure, and site/side. (The Joint Commission universal protocol was followed.)  Yes    Sedation (Moderate or Deep): None    ABOVE ASSESSMENTS PERFORMED BY    Elizabeth Fischer MD      INDICATIONS FOR PROCEDURES  Inez Boston is a 49 year old patient with involuntary movements secondary to the diagnosis of cervical and generalized dystonia. Her baseline symptoms have been recalcitrant to oral medications and conservative therapy.  She is here today for reinjection with Botox.    GOAL OF PROCEDURE  The goal of this procedure is to increase active range of motion, improve volitional motor control, decrease pain  and enhance functional independence.      TOTAL DOSE ADMINISTERED  Dose Administered:  400 units  Botox (Botulinum Toxin Type A)       2:1 Dilution   Unavoidable Drug Waste: No  Diluent Used:  0.25% bupivacaine  Total Volume of Diluent Used: 8 ml  NDC #: Botox 100u ([address]-86)      CONSENT  The risks, benefits, and treatment options were discussed with Inez Boston and she agreed to proceed.    Written consent was obtained by [facility].     EQUIPMENT USED  Needle-37mm  stimulating/recording  Needle-30 gauge  EMG/NCS Machine    SKIN PREPARATION  Skin preparation was performed using an alcohol wipe.    GUIDANCE DESCRIPTION  Electro-myographic guidance was necessary throughout the neck portion of the procedure to accurately identify all areas of dystonic muscles while avoiding injection of non-dystonic muscles, neighboring nerves and nearby vascular structures.       AREA/MUSCLE INJECTED: 400 UNITS BOTOX = TOTAL DOSE, 2:1 DILUTION    1. NECK MUSCLES: 50 UNITS BOTOX = TOTAL DOSE     Right Upper Trapezius (upper cervical) - 25 units of Botox at 3 site/s.   Left Upper Trapezius (upper cervical) - 15 units of Botox at 3 site/s.    Right Splenius Cervicis - 5 units of Botox at 1 site/s.   Left Splenius Cervicis -  5 units of Botox at 1 site/s.    2. UPPER EXTREMITY MUSCLES: 105 UNITS BOTOX = TOTAL DOSE    Right Levator Scapulae - 15 units of Botox at 1 site/s.   Left Levator Scapulae - 10 units of Botox at 1 site/s.    Right Pectoralis Minor - 5 units at 1 site/s.   Left Pectoralis Minor - 5 units at 1 site/s.     Right Rhomboids - 20 units at 4 site/s.   Left Rhomboids - 20 units at 4 site/s.     Right Latissimus Dorsi - 15 units of Botox at 2 site/s.   Left Latissimus Dorsi - 15 units of Botox at 3 site/s.     4. FACIAL & SCALP MUSCLES: 245 UNITS BOTOX = TOTAL DOSE    Right Frontalis - 10 units of Botox at 2 site/s.  Left Frontalis - 10 units of Botox at 2 site/s.    Right Temporalis - 50 units of Botox at 5 site/s.  Left Temporalis - 50 units of Botox at 5 site/s.    Right Occipitalis - 40 units of Botox at 4 site/s.   Left Occipitalis - 40 units of Botox at 4 site/s.    Right  - 5 units of Botox at 1 site/s.   Left  - 5 units of Botox at 1 site/s.    Procerus - 5 units of Botox at 1 site/s.    Right Orbicularis Oculi - 7.5 units of Botox at 3 site/s.  Left Orbicularis Oculi - 7.5 units of Botox at 3 site/s.     Right Orbicularis Oris - 2.5 units of Botox at 1  site/s.   Left Orbicularis Oris - 2.5 units of Botox at 1 site/s.     Right Mentalis - 5 units of Botox at 1 site/s.   Left Mentalis - 5 units of Botox at 1 site/s.       RESPONSE TO PROCEDURE  Inez Boston tolerated the procedure well and there were no immediate complications. She was allowed to recover for an appropriate period of time and was discharged home in stable condition.    ASSESSMENT AND PLAN   Botulinum toxin injections: No changes made to Botox dose or distribution today. Patient will continue to monitor response to Botox and report at next appointment.   Referrals: None.   Medications: None.   Follow up: Inez Boston was rescheduled for the next series of injections in 12 weeks, at which time we will evaluate response to today's injections. she may call the clinic prior with any questions or concerns prior to the next appointment.        Elizabeth Fischer MD        Again, thank you for allowing me to participate in the care of your patient.      Sincerely,    Elizabeth Fischer MD

## 2025-06-30 NOTE — PROGRESS NOTES
[facility]     PM&R CLINIC NOTE  BOTULINUM TOXIN PROCEDURE      HPI  No chief complaint on file.    Inez Boston is a 49 year old female with a history of  who presents to clinic for botulinum toxin injections for management of rather generalized dystonia, including cervical, oromandibular and genetic torsion dystonia.      SINCE LAST VISIT  Inez Boston was last seen here in clinic on 3/31/2025, at which time she received 375 units of Botox.     Patient denies new medical diagnoses, illnesses, hospitalizations, emergency room visits, and injuries since the previous injection with botulinum neurotoxin.     RESPONSE TO PREVIOUS TREATMENT    Side effects: No problems reported.    Pain Improvement: Yes. Approximately >50% improvement. Duration of Benefit: 11 weeks followed by gradual decrease in benefit. The last few days have been more painful.     Dystonia Improvement: Yes. Approximately >50% improvement. Botox has been extremely beneficial with regards to her dystonia.   Duration of Benefit: 11 weeks followed by gradual decrease in benefit over the last few days.    Functional Improvement: Yes, her dystonia has improved so much that she was able to go back to working. She very much looks forward to her Botox appointments because it helps her so much.       PHYSICAL EXAM  VS: There were no vitals taken for this visit.   GEN: Pleasant and cooperative, in no acute distress  HEENT: No asymmetry with eyebrow lift.  HEAD AND NECK: Full ROM but with spasms on neck extension, b/l lateral flexion  HEAD, NECK AND TRUNK PATTERN:   Head & Neck Flexion:  Present  Head & Neck Rotation: Slight  Right  Shoulder Elevation:  Slight Left  JAW AND FACIAL PATTERN:   Jaw Clenching:   Bilateral  VOCAL INVOLVEMENT:   No  SPASMS: Dystonic movements with irritation and facial movements.       ALLERGIES  No Known Allergies    CURRENT MEDICATIONS    Current Outpatient Medications:     aspirin 81 MG EC  tablet, Take 162 mg by mouth every morning, Disp: , Rfl:     baclofen (LIORESAL) 10 MG tablet, Take 1 tablet (10 mg) by mouth nightly as needed., Disp: 30 tablet, Rfl: 1    bismuth subsalicylate (PEPTO BISMOL) 262 MG chewable tablet, Take 2 tablets (524 mg) by mouth 4 times daily (before meals and nightly). Please direct to buy OTC if not covered. H. Pylori., Disp: 112 tablet, Rfl: 0    Cyanocobalamin (B-12) 1000 MCG TBCR, Take by mouth., Disp: , Rfl:     DULoxetine (CYMBALTA) 30 MG capsule, Take 1 capsule (30 mg) by mouth daily., Disp: 90 capsule, Rfl: 1    ferrous sulfate (FEROSUL) 325 (65 Fe) MG tablet, Take 325 mg by mouth daily (with breakfast). (Patient not taking: Reported on 2025), Disp: , Rfl:     ketoconazole (NIZORAL) 2 % external shampoo, Use as wash for back rash daily, Disp: 120 mL, Rfl: 11    metroNIDAZOLE (FLAGYL) 500 MG tablet, Take 1 tablet (500 mg) by mouth 4 times daily., Disp: 56 tablet, Rfl: 0    metroNIDAZOLE (METROCREAM) 0.75 % external cream, Twice daily for face, Disp: 45 g, Rfl: 11    omeprazole (PRILOSEC) 20 MG DR capsule, Take 1 capsule (20 mg) by mouth 2 times daily. Take 30-60 minutes before a meal. If you forget, take after meal., Disp: 28 capsule, Rfl: 0    progesterone (PROMETRIUM) 100 MG capsule, Take 100 mg by mouth daily. (Patient taking differently: Take 100 mg by mouth daily. PRN), Disp: , Rfl:     tetracycline (ACHROMYCIN/SUMYCIN) 500 MG capsule, Take 1 capsule (500 mg) by mouth 4 times daily., Disp: 56 capsule, Rfl: 0    Vitamin D, Cholecalciferol, 25 MCG (1000 UT) CAPS, Take 1,000 Units by mouth daily, Disp: , Rfl:     Current Facility-Administered Medications:     botulinum toxin type A (BOTOX) 100 units injection 400 Units, 400 Units, Intramuscular, Q12 weeks, StandElizabeth rojo MD, 400 Units at 25 1243       BOTULINUM NEUROTOXIN INJECTION PROCEDURES    VERIFICATION OF PATIENT IDENTIFICATION AND PROCEDURE     Initials   Patient Name SES   Patient  SES    Procedure Verified by: DIANDRA     Prior to the start of the procedure and with procedural staff participation, I verbally confirmed the patient s identity using two indicators, relevant allergies, that the procedure was appropriate and matched the consent or emergent situation, and that the correct equipment/implants were available. Immediately prior to starting the procedure I conducted the Time Out with the procedural staff and re-confirmed the patient s name, procedure, and site/side. (The Joint Commission universal protocol was followed.)  Yes    Sedation (Moderate or Deep): None    ABOVE ASSESSMENTS PERFORMED BY    Elizabeth Fischer MD      INDICATIONS FOR PROCEDURES  Inez Boston is a 49 year old patient with involuntary movements secondary to the diagnosis of cervical and generalized dystonia. Her baseline symptoms have been recalcitrant to oral medications and conservative therapy.  She is here today for reinjection with Botox.    GOAL OF PROCEDURE  The goal of this procedure is to increase active range of motion, improve volitional motor control, decrease pain  and enhance functional independence.      TOTAL DOSE ADMINISTERED  Dose Administered:  400 units  Botox (Botulinum Toxin Type A)       2:1 Dilution   Unavoidable Drug Waste: No  Diluent Used:  0.25% bupivacaine  Total Volume of Diluent Used: 8 ml  NDC #: Botox 100u ([id number])      CONSENT  The risks, benefits, and treatment options were discussed with Inez Boston and she agreed to proceed.    Written consent was obtained by [facility].     EQUIPMENT USED  Needle-37mm stimulating/recording  Needle-30 gauge  EMG/NCS Machine    SKIN PREPARATION  Skin preparation was performed using an alcohol wipe.    GUIDANCE DESCRIPTION  Electro-myographic guidance was necessary throughout the neck portion of the procedure to accurately identify all areas of dystonic muscles while avoiding injection of non-dystonic muscles, neighboring nerves and nearby vascular  structures.       AREA/MUSCLE INJECTED: 400 UNITS BOTOX = TOTAL DOSE, 2:1 DILUTION    1. NECK MUSCLES: 50 UNITS BOTOX = TOTAL DOSE     Right Upper Trapezius (upper cervical) - 25 units of Botox at 3 site/s.   Left Upper Trapezius (upper cervical) - 15 units of Botox at 3 site/s.    Right Splenius Cervicis - 5 units of Botox at 1 site/s.   Left Splenius Cervicis -  5 units of Botox at 1 site/s.    2. UPPER EXTREMITY MUSCLES: 105 UNITS BOTOX = TOTAL DOSE    Right Levator Scapulae - 15 units of Botox at 1 site/s.   Left Levator Scapulae - 10 units of Botox at 1 site/s.    Right Pectoralis Minor - 5 units at 1 site/s.   Left Pectoralis Minor - 5 units at 1 site/s.     Right Rhomboids - 20 units at 4 site/s.   Left Rhomboids - 20 units at 4 site/s.     Right Latissimus Dorsi - 15 units of Botox at 2 site/s.   Left Latissimus Dorsi - 15 units of Botox at 3 site/s.     4. FACIAL & SCALP MUSCLES: 245 UNITS BOTOX = TOTAL DOSE    Right Frontalis - 10 units of Botox at 2 site/s.  Left Frontalis - 10 units of Botox at 2 site/s.    Right Temporalis - 50 units of Botox at 5 site/s.  Left Temporalis - 50 units of Botox at 5 site/s.    Right Occipitalis - 40 units of Botox at 4 site/s.   Left Occipitalis - 40 units of Botox at 4 site/s.    Right  - 5 units of Botox at 1 site/s.   Left  - 5 units of Botox at 1 site/s.    Procerus - 5 units of Botox at 1 site/s.    Right Orbicularis Oculi - 7.5 units of Botox at 3 site/s.  Left Orbicularis Oculi - 7.5 units of Botox at 3 site/s.     Right Orbicularis Oris - 2.5 units of Botox at 1 site/s.   Left Orbicularis Oris - 2.5 units of Botox at 1 site/s.     Right Mentalis - 5 units of Botox at 1 site/s.   Left Mentalis - 5 units of Botox at 1 site/s.       RESPONSE TO PROCEDURE  Inez Boston tolerated the procedure well and there were no immediate complications. She was allowed to recover for an appropriate period of time and was discharged home in stable  condition.    ASSESSMENT AND PLAN   Botulinum toxin injections: No changes made to Botox dose or distribution today. Patient will continue to monitor response to Botox and report at next appointment.   Referrals: None.   Medications: None.   Follow up: Inez Boston was rescheduled for the next series of injections in 12 weeks, at which time we will evaluate response to today's injections. she may call the clinic prior with any questions or concerns prior to the next appointment.        Elizabeth Fischer MD

## 2025-07-02 ENCOUNTER — TELEPHONE (OUTPATIENT)
Dept: DERMATOLOGY | Facility: CLINIC | Age: 50
End: 2025-07-02
Payer: COMMERCIAL

## 2025-07-02 NOTE — TELEPHONE ENCOUNTER
7/2 Left Voicemail (1st Attempt) and Sent Mychart (1st Attempt) for the patient to call back and schedule the following:    Appointment type: Return Dermatology  Provider: Andie  Return date: 5/28/2026  Specialty phone number: [phone]  Additional appointment(s) needed: na  Additonal Notes: na

## 2025-08-04 ENCOUNTER — LAB (OUTPATIENT)
Dept: LAB | Facility: CLINIC | Age: 50
End: 2025-08-04
Payer: COMMERCIAL

## 2025-08-04 DIAGNOSIS — A04.8 H. PYLORI INFECTION: ICD-10-CM

## (undated) DEVICE — NDL SPINAL 22GA 3.5" QUINCKE 405181

## (undated) DEVICE — SEAL SET MYOSURE ROD LENS SCOPE SINGLE USE 40-902

## (undated) DEVICE — ENDO TROCAR FIRST ENTRY KII FIOS ADV FIX 12X100MM CFF73

## (undated) DEVICE — SOL NACL 0.9% IRRIG 500ML BOTTLE 2F7123

## (undated) DEVICE — SOL NACL 0.9% IRRIG 1000ML BOTTLE 2F7124

## (undated) DEVICE — SOL WATER IRRIG 500ML BOTTLE 2F7113

## (undated) DEVICE — DEVICE TISSUE REMOVAL HYSTEROSCOPIC MYOSURE LITE 30-401LITE

## (undated) DEVICE — GLOVE BIOGEL PI MICRO SZ 6.5 48565

## (undated) DEVICE — KIT ENDO TURNOVER/PROCEDURE CARRY-ON 101822

## (undated) DEVICE — SPECIMEN CONTAINER 60MLW/10% FORMALIN 59601

## (undated) DEVICE — PREP DYNA-HEX 4% CHG SCRUB 4OZ BOTTLE MDS098710

## (undated) DEVICE — ESU HOLDER LAP INST DISP PURPLE LONG 330MM H-PRO-330

## (undated) DEVICE — CATH TRAY FOLEY SURESTEP 16FR WDRAIN BAG STLK LATEX A300316A

## (undated) DEVICE — GOWN XLG DISP 9545

## (undated) DEVICE — DRAPE UNDER BUTTOCK 8483

## (undated) DEVICE — KIT ENDO FIRST STEP DISINFECTANT 200ML W/POUCH EP-4

## (undated) DEVICE — DRSG TEGADERM 2 3/8X2 3/4" 1624W

## (undated) DEVICE — SU VICRYL 0 UR-6 27" J603H

## (undated) DEVICE — SOL WATER IRRIG 1000ML BOTTLE 2F7114

## (undated) DEVICE — TUBING SUCTION 10'X3/16" N510

## (undated) DEVICE — PAD CHUX UNDERPAD 30X36" P3036C

## (undated) DEVICE — ENDO POUCH UNIV RETRIEVAL SYSTEM INZII 10MM CD001

## (undated) DEVICE — Device

## (undated) DEVICE — NDL 25GA 1.5" 305127

## (undated) DEVICE — LINEN GOWN X4 5410

## (undated) DEVICE — TUBING C02 INSUFFLATION LAP FILTER HEATER 6198

## (undated) DEVICE — SU VICRYL 0 CT-2 27" J334H

## (undated) DEVICE — SPECIMEN CONTAINER 3OZ W/FORMALIN 59901

## (undated) DEVICE — ENDO SCOPE WARMER LF TM500

## (undated) DEVICE — PREP SCRUB SOL EXIDINE 4% CHG 4OZ 29002-404

## (undated) DEVICE — ESU PENCIL W/HOLSTER E2350H

## (undated) DEVICE — DRSG STERI STRIP 1/2X4" R1547

## (undated) DEVICE — PANTIES MESH LG/XLG 2PK 706M2

## (undated) DEVICE — KIT PROCEDURE FLUENT IN/OUT FLOWPAK TISS TRAP FLT-112S

## (undated) DEVICE — GLOVE PROTEXIS W/NEU-THERA 6.5  2D73TE65

## (undated) DEVICE — GLOVE BIOGEL PI ULTRATOUCH G SZ 6.5 42165

## (undated) DEVICE — GOWN IMPERVIOUS 2XL BLUE

## (undated) DEVICE — PACK ASC GYN MINOR P15GMUMA

## (undated) DEVICE — TUBING SUCTION MEDI-VAC 1/4"X20' N620A

## (undated) DEVICE — ESU LIGASURE LAPAROSCOPIC BLUNT TIP SEALER 5MMX37CM LF1837

## (undated) DEVICE — ENDO BITE BLOCK ADULT OMNI-BLOC

## (undated) DEVICE — JELLY LUBRICATING SURGILUBE 2OZ TUBE 281020502

## (undated) DEVICE — SOL ADH LIQUID BENZOIN SWAB 0.6ML C1544

## (undated) DEVICE — SUCTION MANIFOLD NEPTUNE 2 SYS 1 PORT 702-025-000

## (undated) DEVICE — SU MONOCRYL 4-0 PS-2 18" UND Y496G

## (undated) DEVICE — ENDO TROCAR SLEEVE KII ADV FIXATION 05X100MM CFS02

## (undated) DEVICE — GLOVE BIOGEL PI MICRO INDICATOR UNDERGLOVE SZ 7.0 48970

## (undated) DEVICE — LINEN TOWEL PACK X5 5464

## (undated) DEVICE — JELLY LUBRICATING SURGILUBE 2OZ TUBE

## (undated) DEVICE — SYR 50ML SLIP TIP W/O NDL 309654

## (undated) DEVICE — SOL NACL 0.9% IRRIG 3000ML BAG 2B7477

## (undated) DEVICE — SOLIDIFIER (USE FOR UP TO 1500CC) MSOLID1500

## (undated) DEVICE — DRSG GAUZE 2X2" 8042

## (undated) DEVICE — ENDO TROCAR FIRST ENTRY KII FIOS ADV FIX 05X100MM CFF03

## (undated) DEVICE — SYR 50ML LL W/O NDL 309653

## (undated) DEVICE — DRAPE POUCH IRR 1016

## (undated) DEVICE — GLOVE PROTEXIS BLUE W/NEU-THERA 7.0  2D73EB70

## (undated) DEVICE — DECANTER TRANSFER DEVICE 2008S

## (undated) DEVICE — SUCTION MANIFOLD NEPTUNE 2 SYS 4 PORT 0702-020-000

## (undated) DEVICE — PAD PERI INDIV WRAP 11" 2022A

## (undated) DEVICE — BLADE KNIFE SURG 15 371115

## (undated) RX ORDER — BUPIVACAINE HYDROCHLORIDE 2.5 MG/ML
INJECTION, SOLUTION EPIDURAL; INFILTRATION; INTRACAUDAL
Status: DISPENSED
Start: 2022-06-03

## (undated) RX ORDER — FENTANYL CITRATE-0.9 % NACL/PF 10 MCG/ML
PLASTIC BAG, INJECTION (ML) INTRAVENOUS
Status: DISPENSED
Start: 2022-06-03

## (undated) RX ORDER — LIDOCAINE HYDROCHLORIDE 10 MG/ML
INJECTION, SOLUTION EPIDURAL; INFILTRATION; INTRACAUDAL; PERINEURAL
Status: DISPENSED
Start: 2025-04-04

## (undated) RX ORDER — BUPIVACAINE HYDROCHLORIDE 2.5 MG/ML
INJECTION, SOLUTION EPIDURAL; INFILTRATION; INTRACAUDAL
Status: DISPENSED
Start: 2023-09-25

## (undated) RX ORDER — GABAPENTIN 300 MG/1
CAPSULE ORAL
Status: DISPENSED
Start: 2022-06-03

## (undated) RX ORDER — KETOROLAC TROMETHAMINE 30 MG/ML
INJECTION, SOLUTION INTRAMUSCULAR; INTRAVENOUS
Status: DISPENSED
Start: 2025-04-04

## (undated) RX ORDER — BUPIVACAINE HYDROCHLORIDE 5 MG/ML
INJECTION, SOLUTION EPIDURAL; INTRACAUDAL
Status: DISPENSED
Start: 2023-03-02

## (undated) RX ORDER — BUPIVACAINE HYDROCHLORIDE 2.5 MG/ML
INJECTION, SOLUTION EPIDURAL; INFILTRATION; INTRACAUDAL
Status: DISPENSED
Start: 2024-01-10

## (undated) RX ORDER — DEXAMETHASONE SODIUM PHOSPHATE 4 MG/ML
INJECTION, SOLUTION INTRA-ARTICULAR; INTRALESIONAL; INTRAMUSCULAR; INTRAVENOUS; SOFT TISSUE
Status: DISPENSED
Start: 2025-04-04

## (undated) RX ORDER — BUPIVACAINE HYDROCHLORIDE 2.5 MG/ML
INJECTION, SOLUTION EPIDURAL; INFILTRATION; INTRACAUDAL; PERINEURAL
Status: DISPENSED
Start: 2025-03-31

## (undated) RX ORDER — ONDANSETRON 2 MG/ML
INJECTION INTRAMUSCULAR; INTRAVENOUS
Status: DISPENSED
Start: 2025-04-04

## (undated) RX ORDER — DEXAMETHASONE SODIUM PHOSPHATE 4 MG/ML
INJECTION, SOLUTION INTRA-ARTICULAR; INTRALESIONAL; INTRAMUSCULAR; INTRAVENOUS; SOFT TISSUE
Status: DISPENSED
Start: 2022-06-03

## (undated) RX ORDER — PROPOFOL 10 MG/ML
INJECTION, EMULSION INTRAVENOUS
Status: DISPENSED
Start: 2025-04-04

## (undated) RX ORDER — GABAPENTIN 100 MG/1
CAPSULE ORAL
Status: DISPENSED
Start: 2022-06-03

## (undated) RX ORDER — FENTANYL CITRATE 50 UG/ML
INJECTION, SOLUTION INTRAMUSCULAR; INTRAVENOUS
Status: DISPENSED
Start: 2025-04-25

## (undated) RX ORDER — LIDOCAINE HYDROCHLORIDE 20 MG/ML
INJECTION, SOLUTION EPIDURAL; INFILTRATION; INTRACAUDAL; PERINEURAL
Status: DISPENSED
Start: 2022-06-03

## (undated) RX ORDER — FERRIC SUBSULFATE 0.21 G/G
LIQUID TOPICAL
Status: DISPENSED
Start: 2025-04-04

## (undated) RX ORDER — HYDROMORPHONE HYDROCHLORIDE 1 MG/ML
INJECTION, SOLUTION INTRAMUSCULAR; INTRAVENOUS; SUBCUTANEOUS
Status: DISPENSED
Start: 2022-06-03

## (undated) RX ORDER — ACETAMINOPHEN 325 MG/1
TABLET ORAL
Status: DISPENSED
Start: 2022-06-03

## (undated) RX ORDER — EPHEDRINE SULFATE 50 MG/ML
INJECTION, SOLUTION INTRAMUSCULAR; INTRAVENOUS; SUBCUTANEOUS
Status: DISPENSED
Start: 2022-06-03

## (undated) RX ORDER — FENTANYL CITRATE 50 UG/ML
INJECTION, SOLUTION INTRAMUSCULAR; INTRAVENOUS
Status: DISPENSED
Start: 2025-04-04

## (undated) RX ORDER — FENTANYL CITRATE 50 UG/ML
INJECTION, SOLUTION INTRAMUSCULAR; INTRAVENOUS
Status: DISPENSED
Start: 2022-06-03

## (undated) RX ORDER — BUPIVACAINE HYDROCHLORIDE 2.5 MG/ML
INJECTION, SOLUTION EPIDURAL; INFILTRATION; INTRACAUDAL
Status: DISPENSED
Start: 2024-10-02

## (undated) RX ORDER — BUPIVACAINE HYDROCHLORIDE 2.5 MG/ML
INJECTION, SOLUTION EPIDURAL; INFILTRATION; INTRACAUDAL
Status: DISPENSED
Start: 2023-06-02

## (undated) RX ORDER — ONDANSETRON 2 MG/ML
INJECTION INTRAMUSCULAR; INTRAVENOUS
Status: DISPENSED
Start: 2022-06-03

## (undated) RX ORDER — BUPIVACAINE HYDROCHLORIDE 2.5 MG/ML
INJECTION, SOLUTION EPIDURAL; INFILTRATION; INTRACAUDAL; PERINEURAL
Status: DISPENSED
Start: 2025-06-27

## (undated) RX ORDER — PROPOFOL 10 MG/ML
INJECTION, EMULSION INTRAVENOUS
Status: DISPENSED
Start: 2022-06-03

## (undated) RX ORDER — BUPIVACAINE HYDROCHLORIDE 2.5 MG/ML
INJECTION, SOLUTION EPIDURAL; INFILTRATION; INTRACAUDAL
Status: DISPENSED
Start: 2025-01-03